# Patient Record
Sex: FEMALE | Race: WHITE | NOT HISPANIC OR LATINO | Employment: OTHER | URBAN - METROPOLITAN AREA
[De-identification: names, ages, dates, MRNs, and addresses within clinical notes are randomized per-mention and may not be internally consistent; named-entity substitution may affect disease eponyms.]

---

## 2017-01-03 ENCOUNTER — OFFICE VISIT (OUTPATIENT)
Dept: INTERNAL MEDICINE | Facility: CLINIC | Age: 77
End: 2017-01-03
Payer: MEDICARE

## 2017-01-03 VITALS
WEIGHT: 108.44 LBS | HEIGHT: 62 IN | TEMPERATURE: 98 F | DIASTOLIC BLOOD PRESSURE: 80 MMHG | BODY MASS INDEX: 19.96 KG/M2 | RESPIRATION RATE: 16 BRPM | SYSTOLIC BLOOD PRESSURE: 130 MMHG | HEART RATE: 68 BPM

## 2017-01-03 DIAGNOSIS — I10 ESSENTIAL HYPERTENSION: Chronic | ICD-10-CM

## 2017-01-03 DIAGNOSIS — R91.1 PULMONARY NODULE: ICD-10-CM

## 2017-01-03 DIAGNOSIS — I77.1 ARTERIAL TORTUOSITY: ICD-10-CM

## 2017-01-03 DIAGNOSIS — I70.0 AORTIC ATHEROSCLEROSIS: ICD-10-CM

## 2017-01-03 DIAGNOSIS — K21.9 GASTROESOPHAGEAL REFLUX DISEASE, ESOPHAGITIS PRESENCE NOT SPECIFIED: ICD-10-CM

## 2017-01-03 DIAGNOSIS — F13.20 BENZODIAZEPINE DEPENDENCE: ICD-10-CM

## 2017-01-03 DIAGNOSIS — I48.0 PAROXYSMAL ATRIAL FIBRILLATION: ICD-10-CM

## 2017-01-03 DIAGNOSIS — E78.5 HYPERLIPIDEMIA, UNSPECIFIED HYPERLIPIDEMIA TYPE: Primary | Chronic | ICD-10-CM

## 2017-01-03 DIAGNOSIS — D69.2 SENILE PURPURA: ICD-10-CM

## 2017-01-03 PROCEDURE — 1157F ADVNC CARE PLAN IN RCRD: CPT | Mod: S$GLB,,, | Performed by: INTERNAL MEDICINE

## 2017-01-03 PROCEDURE — 99499 UNLISTED E&M SERVICE: CPT | Mod: S$GLB,,, | Performed by: INTERNAL MEDICINE

## 2017-01-03 PROCEDURE — 1159F MED LIST DOCD IN RCRD: CPT | Mod: S$GLB,,, | Performed by: INTERNAL MEDICINE

## 2017-01-03 PROCEDURE — 1160F RVW MEDS BY RX/DR IN RCRD: CPT | Mod: S$GLB,,, | Performed by: INTERNAL MEDICINE

## 2017-01-03 PROCEDURE — 3075F SYST BP GE 130 - 139MM HG: CPT | Mod: S$GLB,,, | Performed by: INTERNAL MEDICINE

## 2017-01-03 PROCEDURE — 1126F AMNT PAIN NOTED NONE PRSNT: CPT | Mod: S$GLB,,, | Performed by: INTERNAL MEDICINE

## 2017-01-03 PROCEDURE — 3079F DIAST BP 80-89 MM HG: CPT | Mod: S$GLB,,, | Performed by: INTERNAL MEDICINE

## 2017-01-03 PROCEDURE — 99999 PR PBB SHADOW E&M-EST. PATIENT-LVL III: CPT | Mod: PBBFAC,,, | Performed by: INTERNAL MEDICINE

## 2017-01-03 PROCEDURE — 99213 OFFICE O/P EST LOW 20 MIN: CPT | Mod: S$GLB,,, | Performed by: INTERNAL MEDICINE

## 2017-01-03 NOTE — MR AVS SNAPSHOT
[facility]   [facility]  [place] [address]  Phone: [phone]  Fax: [phone]                  Tigist Murry   1/3/2017 7:40 AM   Office Visit    Description:  Female : 1940   Provider:  Poppy Franco DO   Department:  [facility]           Reason for Visit     Follow-up           Diagnoses this Visit        Comments    Hyperlipidemia, unspecified hyperlipidemia type    -  Primary     Gastroesophageal reflux disease, esophagitis presence not specified         Essential hypertension         Paroxysmal atrial fibrillation         Aortic atherosclerosis         Arterial tortuosity         Benzodiazepine dependence         Senile purpura         Pulmonary nodule                To Do List           Goals (5 Years of Data)     None      Follow-Up and Disposition     Return in about 3 months (around 4/3/2017).      [facility] On Call     [facility] On Call Nurse Care Line -  Assistance  Registered nurses in the [facility] On Call Center provide clinical advisement, health education, appointment booking, and other advisory services.  Call for this free service at [phone].             Medications           Message regarding Medications     Verify the changes and/or additions to your medication regime listed below are the same as discussed with your clinician today.  If any of these changes or additions are incorrect, please notify your healthcare provider.        STOP taking these medications     levocetirizine (XYZAL) 5 MG tablet Take 1 tablet (5 mg total) by mouth every evening.    montelukast (SINGULAIR) 4 mg GrPk granules Take 4 mg by mouth every evening.           Verify that the below list of medications is an accurate representation of the medications you are currently taking.  If none reported, the list may be blank. If incorrect, please contact your healthcare provider. Carry this list with you in case of emergency.           Current  "Medications     alprazolam (XANAX) 0.25 MG tablet Take 1 tablet (0.25 mg total) by mouth 2 (two) times daily as needed for Anxiety.    apixaban (ELIQUIS) 5 mg Tab Take 1 tablet (5 mg total) by mouth 2 (two) times daily.    azelastine (ASTELIN) 137 mcg (0.1 %) nasal spray 1 spray (137 mcg total) by Nasal route 2 (two) times daily.    dexlansoprazole (DEXILANT) 60 mg capsule Take 60 mg by mouth once daily.    diltiazem (TIAZAC) 360 MG CpSR Take 1 capsule (360 mg total) by mouth once daily.    multivitamin capsule Take 1 capsule by mouth once daily.    oxybutynin (DITROPAN) 5 MG Tab Take 1 tablet (5 mg total) by mouth once daily.    ranitidine (ZANTAC) 300 MG tablet Take 1 tablet (300 mg total) by mouth every evening.    simvastatin (ZOCOR) 20 MG tablet Take 1 tablet (20 mg total) by mouth every evening.           Clinical Reference Information           Vital Signs - Last Recorded  Most recent update: 1/3/2017  7:41 AM by Shivani Rangel LPN    BP Pulse Temp Resp Ht Wt    130/80 (BP Location: Left arm, Patient Position: Sitting, BP Method: Manual) 68 97.9 °F (36.6 °C) (Oral) 16 5' 2" (1.575 m) 49.2 kg (108 lb 7.5 oz)    BMI                19.84 kg/m2          Blood Pressure          Most Recent Value    BP  130/80      Allergies as of 1/3/2017     Ciprofloxacin      Immunizations Administered on Date of Encounter - 1/3/2017     None      "

## 2017-01-03 NOTE — PROGRESS NOTES
"Subjective:       Patient ID: Tigist Murry is a 76 y.o. female.    Chief Complaint: Follow-up ("annual visit" per pt)    Patient is a 76 y.o.female with atrial fibrillation, senile purpura, svt, aortic atherosclerosis, benzo dependence and arterial tortuosity who presents today for follow up.      Vaccines: Influenza (yearly); Tetanus (up to date) ; Pneumovax (2010); Zoster (declines for now); Prevnar ( declines for now)  Eye exam: due now; pt will schedule  Mammogram: up to date  Gyn exam: hysterectomy  Colonoscopy: 2013    Dependent edema: improved from last week; wearing compression stockings; likely due to standing too long to clean dishes for two hours      Review of Systems   Constitutional: Negative for appetite change, chills, diaphoresis, fatigue and fever.   HENT: Negative for congestion, dental problem, ear discharge, ear pain, hearing loss, postnasal drip, sinus pressure and sore throat.    Eyes: Negative for discharge, redness and itching.   Respiratory: Negative for cough, chest tightness, shortness of breath and wheezing.    Cardiovascular: Negative for chest pain, palpitations and leg swelling.   Gastrointestinal: Negative for abdominal pain, constipation, diarrhea, nausea and vomiting.   Endocrine: Negative for cold intolerance and heat intolerance.   Genitourinary: Negative for difficulty urinating, frequency, hematuria and urgency.   Musculoskeletal: Negative for arthralgias, back pain, gait problem, myalgias and neck pain.   Skin: Negative for color change and rash.   Neurological: Negative for dizziness, syncope and headaches.   Hematological: Negative for adenopathy.   Psychiatric/Behavioral: Negative for behavioral problems and sleep disturbance. The patient is not nervous/anxious.        Objective:      Physical Exam   Constitutional: She is oriented to person, place, and time. She appears well-developed and well-nourished. No distress.   HENT:   Head: Normocephalic and atraumatic. "   Right Ear: External ear normal.   Left Ear: External ear normal.   Eyes: Conjunctivae and EOM are normal. Pupils are equal, round, and reactive to light. Right eye exhibits no discharge. Left eye exhibits no discharge. No scleral icterus.   Neck: Normal range of motion. Neck supple. No JVD present. No thyromegaly present.   Cardiovascular: Normal rate, regular rhythm, normal heart sounds and intact distal pulses.  Exam reveals no gallop and no friction rub.    No murmur heard.  Pulmonary/Chest: Effort normal and breath sounds normal. No stridor. No respiratory distress. She has no wheezes. She has no rales. She exhibits no tenderness.   Abdominal: Soft. Bowel sounds are normal. She exhibits no distension. There is no tenderness. There is no rebound.   Musculoskeletal: Normal range of motion. She exhibits no edema or tenderness.   Lymphadenopathy:     She has no cervical adenopathy.   Neurological: She is alert and oriented to person, place, and time.   Skin: Skin is warm. No rash noted. She is not diaphoretic. No erythema.   1+ pitting edema b/l   Psychiatric: She has a normal mood and affect. Her behavior is normal.   Nursing note and vitals reviewed.      Assessment and Plan:       1. Hyperlipidemia, unspecified hyperlipidemia type  - diet controlled; on zocor as well    2. Gastroesophageal reflux disease, esophagitis presence not specified  - stable on dexilant; causes chronic cough    3. Essential hypertension  - good control; low salt diet    4. Paroxysmal atrial fibrillation  - stable on diltiazem and apixaban    5. Aortic atherosclerosis  - stable on statin; monitoring    6. Arterial tortuosity  - stable on statin; monitoring    7. Benzodiazepine dependence  - stable; takes xanax prn    8. Senile purpura  - stable; monitoring    9. Pulmonary nodule  - CT due in march        No Follow-up on file.

## 2017-01-05 ENCOUNTER — TELEPHONE (OUTPATIENT)
Dept: INTERNAL MEDICINE | Facility: CLINIC | Age: 77
End: 2017-01-05

## 2017-01-05 NOTE — TELEPHONE ENCOUNTER
----- Message from Fariha Snider LPN sent at 9/27/2016  3:19 PM CDT -----  Regarding: repeat CT  Repeat CT for right lower lobe nodule.

## 2017-01-18 ENCOUNTER — TELEPHONE (OUTPATIENT)
Dept: INTERNAL MEDICINE | Facility: CLINIC | Age: 77
End: 2017-01-18

## 2017-01-18 ENCOUNTER — CLINICAL SUPPORT (OUTPATIENT)
Dept: CARDIOLOGY | Facility: CLINIC | Age: 77
End: 2017-01-18
Payer: MEDICARE

## 2017-01-18 ENCOUNTER — HOSPITAL ENCOUNTER (OUTPATIENT)
Dept: RADIOLOGY | Facility: HOSPITAL | Age: 77
Discharge: HOME | End: 2017-01-18
Attending: INTERNAL MEDICINE
Payer: MEDICARE

## 2017-01-18 ENCOUNTER — OFFICE VISIT (OUTPATIENT)
Dept: INTERNAL MEDICINE | Facility: CLINIC | Age: 77
End: 2017-01-18
Payer: MEDICARE

## 2017-01-18 VITALS
TEMPERATURE: 98 F | HEIGHT: 61 IN | WEIGHT: 108.94 LBS | BODY MASS INDEX: 20.57 KG/M2 | SYSTOLIC BLOOD PRESSURE: 120 MMHG | DIASTOLIC BLOOD PRESSURE: 62 MMHG | HEART RATE: 72 BPM

## 2017-01-18 DIAGNOSIS — R60.0 BILATERAL LEG EDEMA: ICD-10-CM

## 2017-01-18 DIAGNOSIS — M25.561 ACUTE PAIN OF RIGHT KNEE: ICD-10-CM

## 2017-01-18 DIAGNOSIS — I35.9 NONRHEUMATIC AORTIC VALVE DISORDER: ICD-10-CM

## 2017-01-18 DIAGNOSIS — R60.0 BILATERAL LEG EDEMA: Primary | ICD-10-CM

## 2017-01-18 LAB
AORTIC VALVE REGURGITATION: NORMAL
DIASTOLIC DYSFUNCTION: NO
ESTIMATED PA SYSTOLIC PRESSURE: 29
GLOBAL PERICARDIAL EFFUSION: NORMAL
MITRAL VALVE REGURGITATION: NORMAL
RETIRED EF AND QEF - SEE NOTES: 63 (ref 55–65)
TRICUSPID VALVE REGURGITATION: NORMAL

## 2017-01-18 PROCEDURE — 73560 X-RAY EXAM OF KNEE 1 OR 2: CPT | Mod: 26,RT,, | Performed by: RADIOLOGY

## 2017-01-18 PROCEDURE — 1125F AMNT PAIN NOTED PAIN PRSNT: CPT | Mod: S$GLB,,, | Performed by: INTERNAL MEDICINE

## 2017-01-18 PROCEDURE — 99999 PR PBB SHADOW E&M-EST. PATIENT-LVL III: CPT | Mod: PBBFAC,,, | Performed by: INTERNAL MEDICINE

## 2017-01-18 PROCEDURE — 3078F DIAST BP <80 MM HG: CPT | Mod: S$GLB,,, | Performed by: INTERNAL MEDICINE

## 2017-01-18 PROCEDURE — 93306 TTE W/DOPPLER COMPLETE: CPT | Mod: S$GLB,,, | Performed by: INTERNAL MEDICINE

## 2017-01-18 PROCEDURE — 71020 XR CHEST PA AND LATERAL: CPT | Mod: 26,,, | Performed by: RADIOLOGY

## 2017-01-18 PROCEDURE — 3074F SYST BP LT 130 MM HG: CPT | Mod: S$GLB,,, | Performed by: INTERNAL MEDICINE

## 2017-01-18 PROCEDURE — 99214 OFFICE O/P EST MOD 30 MIN: CPT | Mod: S$GLB,,, | Performed by: INTERNAL MEDICINE

## 2017-01-18 PROCEDURE — 1160F RVW MEDS BY RX/DR IN RCRD: CPT | Mod: S$GLB,,, | Performed by: INTERNAL MEDICINE

## 2017-01-18 PROCEDURE — 1157F ADVNC CARE PLAN IN RCRD: CPT | Mod: S$GLB,,, | Performed by: INTERNAL MEDICINE

## 2017-01-18 PROCEDURE — 1159F MED LIST DOCD IN RCRD: CPT | Mod: S$GLB,,, | Performed by: INTERNAL MEDICINE

## 2017-01-18 NOTE — PROGRESS NOTES
Subjective:       Patient ID: Tigist Murry is a 76 y.o. female.    Chief Complaint: Leg Swelling; Foot Swelling; and Knee Pain    Patient is a 76 y.o.female who presents today for swelling.    1. Over the weekend, she had swelling in both ankles and feet. She is wearing the compression stockings during the day and elevating legs at night but swelling is still occurring. She denies pain in the legs. She had a negative ultrasound for clots in both legs a few weeks ago.    2. Right knee pain: located on the knee cap. She had meniscus surgery a few years ago. She cannot kneel on this knee at all due to pain and discomfort. Pain is worse when trying to drive.    Review of Systems   Constitutional: Negative for appetite change, chills, diaphoresis, fatigue and fever.   HENT: Negative for congestion, dental problem, ear discharge, ear pain, hearing loss, postnasal drip, sinus pressure and sore throat.    Eyes: Negative for discharge, redness and itching.   Respiratory: Negative for cough, chest tightness, shortness of breath and wheezing.    Cardiovascular: Positive for leg swelling. Negative for chest pain and palpitations.   Gastrointestinal: Negative for abdominal pain, constipation, diarrhea, nausea and vomiting.   Endocrine: Negative for cold intolerance and heat intolerance.   Genitourinary: Negative for difficulty urinating, frequency, hematuria and urgency.   Musculoskeletal: Positive for arthralgias. Negative for back pain, gait problem, myalgias and neck pain.   Skin: Negative for color change and rash.   Neurological: Negative for dizziness, syncope and headaches.   Hematological: Negative for adenopathy.   Psychiatric/Behavioral: Negative for behavioral problems and sleep disturbance. The patient is not nervous/anxious.        Objective:      Physical Exam   Constitutional: She is oriented to person, place, and time. She appears well-developed and well-nourished. No distress.   HENT:   Head: Normocephalic  and atraumatic.   Right Ear: External ear normal.   Left Ear: External ear normal.   Eyes: Conjunctivae and EOM are normal. Pupils are equal, round, and reactive to light. Right eye exhibits no discharge. Left eye exhibits no discharge. No scleral icterus.   Neck: Normal range of motion. Neck supple. No JVD present. No thyromegaly present.   Cardiovascular: Normal rate, regular rhythm, normal heart sounds and intact distal pulses.  Exam reveals no gallop and no friction rub.    No murmur heard.  Pulmonary/Chest: Effort normal and breath sounds normal. No stridor. No respiratory distress. She has no wheezes. She has no rales. She exhibits no tenderness.   Abdominal: Soft. Bowel sounds are normal. She exhibits no distension. There is no tenderness. There is no rebound.   Musculoskeletal: She exhibits no edema.        Right knee: She exhibits decreased range of motion. She exhibits no swelling. Tenderness found. Medial joint line tenderness noted.   Lymphadenopathy:     She has no cervical adenopathy.   1+ pitting edema bilateral anterior legs   Neurological: She is alert and oriented to person, place, and time.   Skin: Skin is warm. No rash noted. She is not diaphoretic. No erythema.   Psychiatric: She has a normal mood and affect. Her behavior is normal.   Nursing note and vitals reviewed.      Assessment and Plan:       1. Bilateral leg edema  - CHF vs dependent edema  - may need trial of diuretic  - X-Ray Chest PA And Lateral; Future  - 2D Echo w/ Color Flow Doppler; Future  - Brain natriuretic peptide; Future  - Basic metabolic panel; Future    2. Acute pain of right knee  - X-Ray Knee 1 or 2 View Right; Future  - will likely need ortho referral    Notify clinic if symptoms change, worsen or do not improve        No Follow-up on file.

## 2017-01-19 RX ORDER — FUROSEMIDE 20 MG/1
20 TABLET ORAL DAILY
Qty: 7 TABLET | Refills: 0 | Status: SHIPPED | OUTPATIENT
Start: 2017-01-19 | End: 2017-02-01 | Stop reason: SDUPTHER

## 2017-01-19 NOTE — TELEPHONE ENCOUNTER
Spoke to pt and informed of lab and x-ray results, pt agreeable to lasix. [facility]. Informed pt to contact Dr. To's office to schedule.

## 2017-01-19 NOTE — TELEPHONE ENCOUNTER
----- Message from Chana Peterson sent at 1/18/2017  3:15 PM CST -----  Contact: self. Call her at [phone]   Patient is returning a phone call.  Who left a message for the patient: Dr. Giron office  Does patient know what this is regarding:  Test results  Comments:

## 2017-01-30 DIAGNOSIS — I48.0 PAROXYSMAL ATRIAL FIBRILLATION: ICD-10-CM

## 2017-01-30 NOTE — TELEPHONE ENCOUNTER
Nurse spoke with patient and will send 90 scrip to [facility] pharmacy for her.    ----- Message from Marlyn Roa sent at 1/30/2017  8:43 AM CST -----  Contact: pt called  Pt called, requesting 3 month of supply of RX eliquis 5 mg. [facility] pharmacy. She is needing medication as soon as possible.Ph for pt is [phone]. Thank you

## 2017-02-01 ENCOUNTER — OFFICE VISIT (OUTPATIENT)
Dept: INTERNAL MEDICINE | Facility: CLINIC | Age: 77
End: 2017-02-01
Payer: MEDICARE

## 2017-02-01 VITALS
TEMPERATURE: 99 F | WEIGHT: 109.13 LBS | HEART RATE: 65 BPM | HEIGHT: 60 IN | BODY MASS INDEX: 21.42 KG/M2 | SYSTOLIC BLOOD PRESSURE: 124 MMHG | RESPIRATION RATE: 15 BRPM | DIASTOLIC BLOOD PRESSURE: 70 MMHG

## 2017-02-01 DIAGNOSIS — R60.0 BILATERAL LEG EDEMA: Primary | ICD-10-CM

## 2017-02-01 PROCEDURE — 1159F MED LIST DOCD IN RCRD: CPT | Mod: S$GLB,,, | Performed by: INTERNAL MEDICINE

## 2017-02-01 PROCEDURE — 1157F ADVNC CARE PLAN IN RCRD: CPT | Mod: S$GLB,,, | Performed by: INTERNAL MEDICINE

## 2017-02-01 PROCEDURE — 1126F AMNT PAIN NOTED NONE PRSNT: CPT | Mod: S$GLB,,, | Performed by: INTERNAL MEDICINE

## 2017-02-01 PROCEDURE — 99213 OFFICE O/P EST LOW 20 MIN: CPT | Mod: S$GLB,,, | Performed by: INTERNAL MEDICINE

## 2017-02-01 PROCEDURE — 1160F RVW MEDS BY RX/DR IN RCRD: CPT | Mod: S$GLB,,, | Performed by: INTERNAL MEDICINE

## 2017-02-01 PROCEDURE — 3078F DIAST BP <80 MM HG: CPT | Mod: S$GLB,,, | Performed by: INTERNAL MEDICINE

## 2017-02-01 PROCEDURE — 99999 PR PBB SHADOW E&M-EST. PATIENT-LVL III: CPT | Mod: PBBFAC,,, | Performed by: INTERNAL MEDICINE

## 2017-02-01 PROCEDURE — 3074F SYST BP LT 130 MM HG: CPT | Mod: S$GLB,,, | Performed by: INTERNAL MEDICINE

## 2017-02-01 RX ORDER — FUROSEMIDE 20 MG/1
20 TABLET ORAL DAILY PRN
Qty: 30 TABLET | Refills: 6 | Status: SHIPPED | OUTPATIENT
Start: 2017-02-01 | End: 2017-04-25 | Stop reason: SDUPTHER

## 2017-02-01 RX ORDER — POTASSIUM CHLORIDE 750 MG/1
10 TABLET, EXTENDED RELEASE ORAL DAILY
Qty: 30 TABLET | Refills: 6 | Status: SHIPPED | OUTPATIENT
Start: 2017-02-01 | End: 2017-07-01 | Stop reason: SDUPTHER

## 2017-02-01 NOTE — PROGRESS NOTES
Subjective:       Patient ID: Tigist Murry is a 76 y.o. female.    Chief Complaint: Foot Swelling    Patient is a 76 y.o.female who presents today for foot swelling. It did resolve with use of lasix a few weeks ago. She would like to continue this medication as needed. Her cardiac work up was negative for CHF. She denies shortness of breath. She admits to a lot of recent activity on her feet and thinks that exacerbated it again.    Review of Systems   Constitutional: Negative for appetite change, chills, diaphoresis, fatigue and fever.   HENT: Negative for congestion, dental problem, ear discharge, ear pain, hearing loss, postnasal drip, sinus pressure and sore throat.    Eyes: Negative for discharge, redness and itching.   Respiratory: Negative for cough, chest tightness, shortness of breath and wheezing.    Cardiovascular: Positive for leg swelling. Negative for chest pain and palpitations.   Gastrointestinal: Negative for abdominal pain, constipation, diarrhea, nausea and vomiting.   Endocrine: Negative for cold intolerance and heat intolerance.   Genitourinary: Negative for difficulty urinating, frequency, hematuria and urgency.   Musculoskeletal: Negative for arthralgias, back pain, gait problem, myalgias and neck pain.   Skin: Negative for color change and rash.   Neurological: Negative for dizziness, syncope and headaches.   Hematological: Negative for adenopathy.   Psychiatric/Behavioral: Negative for behavioral problems and sleep disturbance. The patient is not nervous/anxious.        Objective:      Physical Exam   Constitutional: She is oriented to person, place, and time. She appears well-developed and well-nourished. No distress.   HENT:   Head: Normocephalic and atraumatic.   Right Ear: External ear normal.   Left Ear: External ear normal.   Eyes: Conjunctivae and EOM are normal. Pupils are equal, round, and reactive to light. Right eye exhibits no discharge. Left eye exhibits no discharge. No  scleral icterus.   Neck: Normal range of motion. Neck supple. No JVD present. No thyromegaly present.   Cardiovascular: Normal rate, regular rhythm, normal heart sounds and intact distal pulses.  Exam reveals no gallop and no friction rub.    No murmur heard.  Pulmonary/Chest: Effort normal and breath sounds normal. No stridor. No respiratory distress. She has no wheezes. She has no rales. She exhibits no tenderness.   Abdominal: Soft. Bowel sounds are normal. She exhibits no distension. There is no tenderness. There is no rebound.   Musculoskeletal: Normal range of motion. She exhibits no edema or tenderness.   Lymphadenopathy:     She has no cervical adenopathy.   2+ pitting edema b/l left worse than right   Neurological: She is alert and oriented to person, place, and time.   Skin: Skin is warm. No rash noted. She is not diaphoretic. No erythema.   Psychiatric: She has a normal mood and affect. Her behavior is normal.   Nursing note and vitals reviewed.      Assessment and Plan:       1. Bilateral leg edema  - advised pt to take potassium supplement on days that she takes the lasix  - compression stockings daily  - low salt diet  - Notify clinic if symptoms change, worsen or do not improve  - furosemide (LASIX) 20 MG tablet; Take 1 tablet (20 mg total) by mouth daily as needed (sweeling).  Dispense: 30 tablet; Refill: 6  - potassium chloride (KLOR-CON) 10 MEQ TbSR; Take 1 tablet (10 mEq total) by mouth once daily.  Dispense: 30 tablet; Refill: 6          No Follow-up on file.

## 2017-04-04 ENCOUNTER — TELEPHONE (OUTPATIENT)
Dept: INTERNAL MEDICINE | Facility: CLINIC | Age: 77
End: 2017-04-04

## 2017-04-04 DIAGNOSIS — I10 ESSENTIAL HYPERTENSION: Primary | ICD-10-CM

## 2017-04-04 DIAGNOSIS — E78.5 HYPERLIPIDEMIA, UNSPECIFIED HYPERLIPIDEMIA TYPE: ICD-10-CM

## 2017-04-25 ENCOUNTER — TELEPHONE (OUTPATIENT)
Dept: INTERNAL MEDICINE | Facility: CLINIC | Age: 77
End: 2017-04-25

## 2017-04-25 DIAGNOSIS — R60.0 BILATERAL LEG EDEMA: ICD-10-CM

## 2017-04-25 DIAGNOSIS — E78.5 HYPERLIPIDEMIA, UNSPECIFIED HYPERLIPIDEMIA TYPE: ICD-10-CM

## 2017-04-25 DIAGNOSIS — I48.0 PAROXYSMAL ATRIAL FIBRILLATION: ICD-10-CM

## 2017-04-25 RX ORDER — SIMVASTATIN 20 MG/1
20 TABLET, FILM COATED ORAL NIGHTLY
Qty: 90 TABLET | Refills: 3 | Status: SHIPPED | OUTPATIENT
Start: 2017-04-25 | End: 2017-05-10 | Stop reason: SDUPTHER

## 2017-04-25 RX ORDER — FUROSEMIDE 20 MG/1
20 TABLET ORAL DAILY PRN
Qty: 90 TABLET | Refills: 3 | Status: SHIPPED | OUTPATIENT
Start: 2017-04-25 | End: 2017-05-10 | Stop reason: SDUPTHER

## 2017-04-26 DIAGNOSIS — N32.81 OVERACTIVE BLADDER: ICD-10-CM

## 2017-04-26 DIAGNOSIS — I48.0 PAROXYSMAL ATRIAL FIBRILLATION: ICD-10-CM

## 2017-04-26 DIAGNOSIS — E78.5 HYPERLIPIDEMIA: Chronic | ICD-10-CM

## 2017-04-26 RX ORDER — SIMVASTATIN 20 MG/1
TABLET, FILM COATED ORAL
Qty: 90 TABLET | Refills: 3 | Status: SHIPPED | OUTPATIENT
Start: 2017-04-26 | End: 2017-05-30 | Stop reason: SDUPTHER

## 2017-04-26 RX ORDER — OXYBUTYNIN CHLORIDE 5 MG/1
5 TABLET ORAL DAILY
Qty: 90 TABLET | Refills: 1 | Status: SHIPPED | OUTPATIENT
Start: 2017-04-26 | End: 2017-07-14 | Stop reason: SDUPTHER

## 2017-04-26 NOTE — TELEPHONE ENCOUNTER
----- Message from Claire Evangelista sent at 4/26/2017  8:46 AM CDT -----  RX request - refill or new RX.  Is this a refill or new RX: refill   RX name and strength: oxybutynin (DITROPAN) 5 MG Tab  Directions: 1x  Is this a 30 day or 90 day RX:  30  Pharmacy name and phone #: [facility] [phone]  Comments:  Need authorization    RX request - refill or new RX.  Is this a refill or new RX:  refill  RX name and strength: simvastatin (ZOCOR) 20 MG tablet  Directions: 1x  Is this a 30 day or 90 day RX:    Pharmacy name and phone #: [phone]  Comments:  Need authorization

## 2017-04-27 DIAGNOSIS — I48.0 PAROXYSMAL ATRIAL FIBRILLATION: ICD-10-CM

## 2017-05-10 ENCOUNTER — TELEPHONE (OUTPATIENT)
Dept: INTERNAL MEDICINE | Facility: CLINIC | Age: 77
End: 2017-05-10

## 2017-05-10 DIAGNOSIS — R60.0 BILATERAL LEG EDEMA: ICD-10-CM

## 2017-05-10 DIAGNOSIS — E78.5 HYPERLIPIDEMIA, UNSPECIFIED HYPERLIPIDEMIA TYPE: ICD-10-CM

## 2017-05-10 RX ORDER — SIMVASTATIN 20 MG/1
20 TABLET, FILM COATED ORAL NIGHTLY
Qty: 90 TABLET | Refills: 3 | Status: SHIPPED | OUTPATIENT
Start: 2017-05-10 | End: 2018-04-13 | Stop reason: SDUPTHER

## 2017-05-10 RX ORDER — FUROSEMIDE 20 MG/1
20 TABLET ORAL DAILY PRN
Qty: 90 TABLET | Refills: 3 | Status: SHIPPED | OUTPATIENT
Start: 2017-05-10 | End: 2018-06-15

## 2017-05-22 DIAGNOSIS — I47.10 SVT (SUPRAVENTRICULAR TACHYCARDIA): Primary | ICD-10-CM

## 2017-05-23 ENCOUNTER — LAB VISIT (OUTPATIENT)
Dept: LAB | Facility: HOSPITAL | Age: 77
End: 2017-05-23
Attending: INTERNAL MEDICINE
Payer: MEDICARE

## 2017-05-23 DIAGNOSIS — I10 ESSENTIAL HYPERTENSION: ICD-10-CM

## 2017-05-23 DIAGNOSIS — E78.5 HYPERLIPIDEMIA, UNSPECIFIED HYPERLIPIDEMIA TYPE: ICD-10-CM

## 2017-05-23 LAB
ALBUMIN SERPL BCP-MCNC: 4.4 G/DL
ALP SERPL-CCNC: 94 U/L
ALT SERPL W/O P-5'-P-CCNC: 27 U/L
ANION GAP SERPL CALC-SCNC: 8 MMOL/L
AST SERPL-CCNC: 21 U/L
BASOPHILS # BLD AUTO: 0.02 K/UL
BASOPHILS NFR BLD: 0.3 %
BILIRUB SERPL-MCNC: 0.9 MG/DL
BUN SERPL-MCNC: 17 MG/DL
CALCIUM SERPL-MCNC: 10.4 MG/DL
CHLORIDE SERPL-SCNC: 103 MMOL/L
CHOLEST/HDLC SERPL: 2.5 {RATIO}
CO2 SERPL-SCNC: 32 MMOL/L
CREAT SERPL-MCNC: 1 MG/DL
DIFFERENTIAL METHOD: ABNORMAL
EOSINOPHIL # BLD AUTO: 0.1 K/UL
EOSINOPHIL NFR BLD: 0.8 %
ERYTHROCYTE [DISTWIDTH] IN BLOOD BY AUTOMATED COUNT: 12.9 %
EST. GFR  (AFRICAN AMERICAN): >60 ML/MIN/1.73 M^2
EST. GFR  (NON AFRICAN AMERICAN): 54.5 ML/MIN/1.73 M^2
GLUCOSE SERPL-MCNC: 85 MG/DL
HCT VFR BLD AUTO: 49.4 %
HDL/CHOLESTEROL RATIO: 40.7 %
HDLC SERPL-MCNC: 177 MG/DL
HDLC SERPL-MCNC: 72 MG/DL
HGB BLD-MCNC: 16.1 G/DL
LDLC SERPL CALC-MCNC: 93.4 MG/DL
LYMPHOCYTES # BLD AUTO: 1.4 K/UL
LYMPHOCYTES NFR BLD: 21.2 %
MCH RBC QN AUTO: 30.2 PG
MCHC RBC AUTO-ENTMCNC: 32.6 %
MCV RBC AUTO: 93 FL
MONOCYTES # BLD AUTO: 0.8 K/UL
MONOCYTES NFR BLD: 11.7 %
NEUTROPHILS # BLD AUTO: 4.4 K/UL
NEUTROPHILS NFR BLD: 65.7 %
NONHDLC SERPL-MCNC: 105 MG/DL
PLATELET # BLD AUTO: 216 K/UL
PMV BLD AUTO: 12 FL
POTASSIUM SERPL-SCNC: 4.1 MMOL/L
PROT SERPL-MCNC: 7.4 G/DL
RBC # BLD AUTO: 5.33 M/UL
SODIUM SERPL-SCNC: 143 MMOL/L
TRIGL SERPL-MCNC: 58 MG/DL
TSH SERPL DL<=0.005 MIU/L-ACNC: 1.47 UIU/ML
WBC # BLD AUTO: 6.64 K/UL

## 2017-05-23 PROCEDURE — 36415 COLL VENOUS BLD VENIPUNCTURE: CPT | Mod: PO

## 2017-05-23 PROCEDURE — 80053 COMPREHEN METABOLIC PANEL: CPT

## 2017-05-23 PROCEDURE — 80061 LIPID PANEL: CPT

## 2017-05-23 PROCEDURE — 84443 ASSAY THYROID STIM HORMONE: CPT

## 2017-05-23 PROCEDURE — 85025 COMPLETE CBC W/AUTO DIFF WBC: CPT

## 2017-05-23 RX ORDER — DILTIAZEM HYDROCHLORIDE 360 MG/1
CAPSULE, EXTENDED RELEASE ORAL
Qty: 90 CAPSULE | Refills: 3 | Status: SHIPPED | OUTPATIENT
Start: 2017-05-23 | End: 2017-05-30 | Stop reason: SDUPTHER

## 2017-05-30 ENCOUNTER — OFFICE VISIT (OUTPATIENT)
Dept: INTERNAL MEDICINE | Facility: CLINIC | Age: 77
End: 2017-05-30
Payer: MEDICARE

## 2017-05-30 VITALS
HEART RATE: 52 BPM | BODY MASS INDEX: 21.12 KG/M2 | DIASTOLIC BLOOD PRESSURE: 67 MMHG | HEIGHT: 60 IN | WEIGHT: 107.56 LBS | SYSTOLIC BLOOD PRESSURE: 123 MMHG | TEMPERATURE: 98 F | RESPIRATION RATE: 16 BRPM

## 2017-05-30 DIAGNOSIS — I48.0 PAROXYSMAL ATRIAL FIBRILLATION: ICD-10-CM

## 2017-05-30 DIAGNOSIS — D69.2 SENILE PURPURA: ICD-10-CM

## 2017-05-30 DIAGNOSIS — Z00.00 ANNUAL PHYSICAL EXAM: Primary | ICD-10-CM

## 2017-05-30 DIAGNOSIS — E78.5 HYPERLIPIDEMIA, UNSPECIFIED HYPERLIPIDEMIA TYPE: Chronic | ICD-10-CM

## 2017-05-30 DIAGNOSIS — I10 ESSENTIAL HYPERTENSION: Chronic | ICD-10-CM

## 2017-05-30 DIAGNOSIS — Z12.31 VISIT FOR SCREENING MAMMOGRAM: ICD-10-CM

## 2017-05-30 DIAGNOSIS — F41.9 ANXIETY: ICD-10-CM

## 2017-05-30 DIAGNOSIS — G47.00 INSOMNIA, UNSPECIFIED TYPE: ICD-10-CM

## 2017-05-30 DIAGNOSIS — I47.10 SVT (SUPRAVENTRICULAR TACHYCARDIA): ICD-10-CM

## 2017-05-30 DIAGNOSIS — R91.1 PULMONARY NODULE: ICD-10-CM

## 2017-05-30 PROCEDURE — 99499 UNLISTED E&M SERVICE: CPT | Mod: S$GLB,,, | Performed by: INTERNAL MEDICINE

## 2017-05-30 PROCEDURE — G0009 ADMIN PNEUMOCOCCAL VACCINE: HCPCS | Mod: S$GLB,,, | Performed by: INTERNAL MEDICINE

## 2017-05-30 PROCEDURE — 99999 PR PBB SHADOW E&M-EST. PATIENT-LVL III: CPT | Mod: PBBFAC,,, | Performed by: INTERNAL MEDICINE

## 2017-05-30 PROCEDURE — 90670 PCV13 VACCINE IM: CPT | Mod: S$GLB,,, | Performed by: INTERNAL MEDICINE

## 2017-05-30 PROCEDURE — 99397 PER PM REEVAL EST PAT 65+ YR: CPT | Mod: S$GLB,,, | Performed by: INTERNAL MEDICINE

## 2017-05-30 RX ORDER — DILTIAZEM HYDROCHLORIDE 360 MG/1
360 CAPSULE, EXTENDED RELEASE ORAL DAILY
Qty: 90 CAPSULE | Refills: 3 | Status: SHIPPED | OUTPATIENT
Start: 2017-05-30 | End: 2017-09-11 | Stop reason: SDUPTHER

## 2017-05-30 RX ORDER — TRAZODONE HYDROCHLORIDE 50 MG/1
50 TABLET ORAL NIGHTLY
Qty: 90 TABLET | Refills: 3 | Status: SHIPPED | OUTPATIENT
Start: 2017-05-30 | End: 2018-03-16 | Stop reason: SDUPTHER

## 2017-05-30 NOTE — PROGRESS NOTES
Subjective:       Patient ID: Tigist Murry is a 76 y.o. female.    Chief Complaint: Annual Exam and Anxiety    Patient is a 76 y.o.female who presents today for annual.    Cholesterol:  Vaccines: Influenza (yearly); Tetanus (up to date) ; Pneumovax (2010); Zoster (declines for now); Prevnar ( today)  Eye exam: due now  Mammogram: due now  Gyn exam: hysterectomy  Colonoscopy: 2013  DEXA: declines    Review of Systems   Constitutional: Negative for appetite change, chills, diaphoresis, fatigue and fever.   HENT: Negative for congestion, dental problem, ear discharge, ear pain, hearing loss, postnasal drip, sinus pressure and sore throat.    Eyes: Negative for discharge, redness and itching.   Respiratory: Negative for cough, chest tightness, shortness of breath and wheezing.    Cardiovascular: Negative for chest pain, palpitations and leg swelling.   Gastrointestinal: Negative for abdominal pain, constipation, diarrhea, nausea and vomiting.   Endocrine: Negative for cold intolerance and heat intolerance.   Genitourinary: Negative for difficulty urinating, frequency, hematuria and urgency.   Musculoskeletal: Negative for arthralgias, back pain, gait problem, myalgias and neck pain.   Skin: Negative for color change and rash.   Neurological: Negative for dizziness, syncope and headaches.   Hematological: Negative for adenopathy.   Psychiatric/Behavioral: Positive for sleep disturbance. Negative for behavioral problems. The patient is not nervous/anxious.        Objective:      Physical Exam   Constitutional: She is oriented to person, place, and time. She appears well-developed and well-nourished. No distress.   HENT:   Head: Normocephalic and atraumatic.   Right Ear: External ear normal.   Left Ear: External ear normal.   Eyes: Conjunctivae and EOM are normal. Pupils are equal, round, and reactive to light. Right eye exhibits no discharge. Left eye exhibits no discharge. No scleral icterus.   Neck: Normal range of  motion. Neck supple. No JVD present. No thyromegaly present.   Cardiovascular: Normal rate, regular rhythm, normal heart sounds and intact distal pulses.  Exam reveals no gallop and no friction rub.    No murmur heard.  Pulmonary/Chest: Effort normal and breath sounds normal. No stridor. No respiratory distress. She has no wheezes. She has no rales. She exhibits no tenderness.   Abdominal: Soft. Bowel sounds are normal. She exhibits no distension. There is no tenderness. There is no rebound.   Musculoskeletal: Normal range of motion. She exhibits no edema or tenderness.   Lymphadenopathy:     She has no cervical adenopathy.   Neurological: She is alert and oriented to person, place, and time. No cranial nerve deficit.   Skin: Skin is warm. No rash noted. She is not diaphoretic. No erythema.   Psychiatric: She has a normal mood and affect. Her behavior is normal.   Nursing note and vitals reviewed.      Assessment and Plan:       1. Annual physical exam  - labs reviewed  - mammo due now  - declines dexa  - Pneumococcal Conjugate Vaccine (13 Valent) (IM)    2. Pulmonary nodule  - due for CT; pt declines for now    3. Visit for screening mammogram  - Mammo Digital Screening Bilat with CAD; Future    4. Paroxysmal atrial fibrillation  - stable on blood thinner and diltiazem; follows with cardio    5. Essential hypertension  - good control    6. Hyperlipidemia, unspecified hyperlipidemia type  - on statin    7. Senile purpura  - monitoring    8. Anxiety  - stable on xanax prn    9. SVT (supraventricular tachycardia)  - diltiaZEM (TIAZAC) 360 MG CpSR; Take 1 capsule (360 mg total) by mouth once daily.  Dispense: 90 capsule; Refill: 3    10. Insomnia, unspecified type  - trial of trazodone          No Follow-up on file.

## 2017-06-08 ENCOUNTER — HOSPITAL ENCOUNTER (OUTPATIENT)
Dept: RADIOLOGY | Facility: HOSPITAL | Age: 77
Discharge: HOME | End: 2017-06-08
Attending: INTERNAL MEDICINE
Payer: MEDICARE

## 2017-06-08 DIAGNOSIS — Z12.31 VISIT FOR SCREENING MAMMOGRAM: ICD-10-CM

## 2017-06-08 PROCEDURE — 77063 BREAST TOMOSYNTHESIS BI: CPT | Mod: 26,,, | Performed by: RADIOLOGY

## 2017-06-08 PROCEDURE — 77067 SCR MAMMO BI INCL CAD: CPT | Mod: TC

## 2017-06-08 PROCEDURE — 77067 SCR MAMMO BI INCL CAD: CPT | Mod: 26,,, | Performed by: RADIOLOGY

## 2017-06-13 DIAGNOSIS — R05.3 CHRONIC COUGH: ICD-10-CM

## 2017-06-13 NOTE — TELEPHONE ENCOUNTER
----- Message from Rick Bourne sent at 6/13/2017  9:17 AM CDT -----  Contact: Patient   RX request - refill or new RX.  Is this a refill or new RX: new   RX name and strength: ranitidine (ZANTAC) 300 MG tablet   Directions: Take 1 tablet (300 mg total) by mouth every evening.   Is this a 30 day or 90 day RX:  90  Pharmacy name and phone #: [facility] -  [phone] (Phone)  [phone] (Fax)  Comments:  Need a new RX for medication from PCP..

## 2017-06-14 NOTE — TELEPHONE ENCOUNTER
----- Message from Risa Mix sent at 6/14/2017  8:28 AM CDT -----  Contact: patient- [phone]  RX request - refill or new RX.  Is this a refill or new RX:  New Rx  RX name and strength: ranitidine (ZANTAC) 300 MG tablet   Directions:   Is this a 30 day or 90 day RX:  90  Pharmacy name and phone #: [facility] [phone] (Phone)  [phone] (Fax)   Comments:

## 2017-07-01 ENCOUNTER — TELEPHONE (OUTPATIENT)
Dept: INTERNAL MEDICINE | Facility: CLINIC | Age: 77
End: 2017-07-01

## 2017-07-01 DIAGNOSIS — R60.0 BILATERAL LEG EDEMA: ICD-10-CM

## 2017-07-01 RX ORDER — POTASSIUM CHLORIDE 750 MG/1
10 TABLET, EXTENDED RELEASE ORAL DAILY
Qty: 90 TABLET | Refills: 3 | Status: SHIPPED | OUTPATIENT
Start: 2017-07-01 | End: 2018-06-15

## 2017-07-12 ENCOUNTER — TELEPHONE (OUTPATIENT)
Dept: ELECTROPHYSIOLOGY | Facility: CLINIC | Age: 77
End: 2017-07-12

## 2017-07-12 NOTE — TELEPHONE ENCOUNTER
----- Message from Karoline Sanchez MA sent at 7/11/2017  5:00 PM CDT -----  Received: Today   Message Contents   [facility]oh[facility] ANTOINE Staff  Caller: pt (Today,  4:33 PM)         Pt needs to have an ECHO and EKG done at the [facility] on 9/11/17 before her visit with Dr. Dowling.  I do not have orders and it is giving me trouble trying to schedule these appt's.  Can you please contact the pt to schedule?  She can be reached @ [phone].  Thanks!!

## 2017-07-14 ENCOUNTER — TELEPHONE (OUTPATIENT)
Dept: INTERNAL MEDICINE | Facility: CLINIC | Age: 77
End: 2017-07-14

## 2017-07-14 DIAGNOSIS — N32.81 OVERACTIVE BLADDER: ICD-10-CM

## 2017-07-14 RX ORDER — OXYBUTYNIN CHLORIDE 5 MG/1
5 TABLET ORAL DAILY
Qty: 90 TABLET | Refills: 1 | Status: SHIPPED | OUTPATIENT
Start: 2017-07-14 | End: 2018-05-08 | Stop reason: SDUPTHER

## 2017-07-18 RX ORDER — ALPRAZOLAM 0.25 MG/1
TABLET ORAL
Qty: 180 TABLET | Refills: 0 | Status: SHIPPED | OUTPATIENT
Start: 2017-07-18 | End: 2017-12-11 | Stop reason: SDUPTHER

## 2017-08-10 ENCOUNTER — HOSPITAL ENCOUNTER (OUTPATIENT)
Dept: CARDIOLOGY | Facility: HOSPITAL | Age: 77
Discharge: HOME | End: 2017-08-10
Attending: INTERNAL MEDICINE
Payer: MEDICARE

## 2017-08-10 DIAGNOSIS — F41.9 ANXIETY: ICD-10-CM

## 2017-08-10 DIAGNOSIS — E78.5 HYPERLIPIDEMIA, UNSPECIFIED HYPERLIPIDEMIA TYPE: Chronic | ICD-10-CM

## 2017-08-10 DIAGNOSIS — I47.10 SVT (SUPRAVENTRICULAR TACHYCARDIA): ICD-10-CM

## 2017-08-10 DIAGNOSIS — R00.2 PALPITATION: ICD-10-CM

## 2017-08-10 DIAGNOSIS — I10 ESSENTIAL HYPERTENSION: Chronic | ICD-10-CM

## 2017-08-10 DIAGNOSIS — R00.2 PALPITATIONS: ICD-10-CM

## 2017-08-10 DIAGNOSIS — I48.0 PAROXYSMAL ATRIAL FIBRILLATION: ICD-10-CM

## 2017-08-10 PROCEDURE — 93227 XTRNL ECG REC<48 HR R&I: CPT | Mod: ,,, | Performed by: INTERNAL MEDICINE

## 2017-08-10 PROCEDURE — 93226 XTRNL ECG REC<48 HR SCAN A/R: CPT

## 2017-08-16 ENCOUNTER — TELEPHONE (OUTPATIENT)
Dept: INTERNAL MEDICINE | Facility: CLINIC | Age: 77
End: 2017-08-16

## 2017-08-16 RX ORDER — BETAMETHASONE VALERATE 1.2 MG/G
CREAM TOPICAL 2 TIMES DAILY
Qty: 1 TUBE | Refills: 1 | Status: SHIPPED | OUTPATIENT
Start: 2017-08-16 | End: 2018-06-01

## 2017-08-16 NOTE — TELEPHONE ENCOUNTER
----- Message from Noreen Sebastian sent at 8/16/2017  9:28 AM CDT -----  Contact: Self [phone]  RX request - refill or new RX.  Is this a refill or new RX:  New  RX name and strength: Betamethasone Valerate Lotion 0.1%  Directions:   Is this a 30 day or 90 day RX:    Pharmacy name and phone #: [facility] [phone] (phone) [phone] (Fax)  Comments:  Psoriasis cream was prescribed by her dermatologist advised may need to be seen by Dr Franco. Please advise

## 2017-09-11 ENCOUNTER — OFFICE VISIT (OUTPATIENT)
Dept: CARDIOLOGY | Facility: CLINIC | Age: 77
End: 2017-09-11
Payer: MEDICARE

## 2017-09-11 VITALS
SYSTOLIC BLOOD PRESSURE: 110 MMHG | HEIGHT: 60 IN | DIASTOLIC BLOOD PRESSURE: 58 MMHG | WEIGHT: 105 LBS | HEART RATE: 60 BPM | BODY MASS INDEX: 20.62 KG/M2

## 2017-09-11 DIAGNOSIS — I48.0 PAROXYSMAL ATRIAL FIBRILLATION: Primary | ICD-10-CM

## 2017-09-11 DIAGNOSIS — I48.0 PAF (PAROXYSMAL ATRIAL FIBRILLATION): ICD-10-CM

## 2017-09-11 DIAGNOSIS — E78.5 HYPERLIPIDEMIA, UNSPECIFIED HYPERLIPIDEMIA TYPE: Chronic | ICD-10-CM

## 2017-09-11 DIAGNOSIS — I10 ESSENTIAL HYPERTENSION: Chronic | ICD-10-CM

## 2017-09-11 DIAGNOSIS — I47.10 SVT (SUPRAVENTRICULAR TACHYCARDIA): ICD-10-CM

## 2017-09-11 PROCEDURE — 1126F AMNT PAIN NOTED NONE PRSNT: CPT | Mod: S$GLB,,, | Performed by: INTERNAL MEDICINE

## 2017-09-11 PROCEDURE — 3074F SYST BP LT 130 MM HG: CPT | Mod: S$GLB,,, | Performed by: INTERNAL MEDICINE

## 2017-09-11 PROCEDURE — 99214 OFFICE O/P EST MOD 30 MIN: CPT | Mod: S$GLB,,, | Performed by: INTERNAL MEDICINE

## 2017-09-11 PROCEDURE — 3008F BODY MASS INDEX DOCD: CPT | Mod: S$GLB,,, | Performed by: INTERNAL MEDICINE

## 2017-09-11 PROCEDURE — 99499 UNLISTED E&M SERVICE: CPT | Mod: S$GLB,,, | Performed by: INTERNAL MEDICINE

## 2017-09-11 PROCEDURE — 1159F MED LIST DOCD IN RCRD: CPT | Mod: S$GLB,,, | Performed by: INTERNAL MEDICINE

## 2017-09-11 PROCEDURE — 3078F DIAST BP <80 MM HG: CPT | Mod: S$GLB,,, | Performed by: INTERNAL MEDICINE

## 2017-09-11 PROCEDURE — 93000 ELECTROCARDIOGRAM COMPLETE: CPT | Mod: S$GLB,,, | Performed by: INTERNAL MEDICINE

## 2017-09-11 PROCEDURE — 99999 PR PBB SHADOW E&M-EST. PATIENT-LVL III: CPT | Mod: PBBFAC,,, | Performed by: INTERNAL MEDICINE

## 2017-09-11 RX ORDER — DILTIAZEM HYDROCHLORIDE 240 MG/1
240 CAPSULE, EXTENDED RELEASE ORAL DAILY
Qty: 90 CAPSULE | Refills: 3 | Status: SHIPPED | OUTPATIENT
Start: 2017-09-11 | End: 2018-09-18 | Stop reason: SDUPTHER

## 2017-09-11 NOTE — PROGRESS NOTES
Subjective:    Patient ID:  Tigist Murry is a 77 y.o. female who presents for follow-up of SVT      HPI   Former pt of JOSE Perry; switched to me to be seen in [place]    77 y.o. 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  Still has PAF, as evidenced on MCOT and Holter monitoring. Asx for the most part. Sometimes fast HR, during which she feels palps.  No CP, no SOB, no LH/presync/sync.    has a chronic cough thought related to GERD or some form of reflex related to eating / palatal stimulation  no LH/presync/sync.    Holter: NSR, PAF (more NSR than AF). 3.3 sec conversion pause (unclear time day/night).  echo 63%. mild LAE.    My interpretation of today's ECG is NSR    Review of Systems   Constitution: Negative. Negative for weakness and malaise/fatigue.   HENT: Negative.  Negative for ear pain and tinnitus.    Eyes: Negative for blurred vision.   Cardiovascular: Negative for chest pain, dyspnea on exertion, near-syncope and syncope.   Respiratory: Positive for cough. Negative for shortness of breath.    Endocrine: Negative.  Negative for polyuria.   Hematologic/Lymphatic: Does not bruise/bleed easily.   Skin: Negative.  Negative for rash.   Musculoskeletal: Negative.  Negative for joint pain and muscle weakness.   Gastrointestinal: Negative.  Negative for abdominal pain and change in bowel habit.   Genitourinary: Negative for frequency.   Neurological: Negative.  Negative for dizziness.   Psychiatric/Behavioral: Negative for depression. The patient is nervous/anxious.    Allergic/Immunologic: Negative for environmental allergies.        Objective:    Physical Exam   Constitutional: She is oriented to person, place, and time. Vital signs are normal. She appears well-developed and well-nourished. She is active and cooperative.   HENT:   Head: Normocephalic and atraumatic.   Eyes: Conjunctivae and EOM are normal.   Neck: Normal range of  motion. Carotid bruit is not present. No tracheal deviation and no edema present. No thyroid mass and no thyromegaly present.   Cardiovascular: Normal rate, regular rhythm, normal heart sounds, intact distal pulses and normal pulses.   No extrasystoles are present. PMI is not displaced.  Exam reveals no gallop and no friction rub.    No murmur heard.  Pulmonary/Chest: Effort normal and breath sounds normal. No respiratory distress. She has no wheezes. She has no rales.   Abdominal: Soft. Normal appearance. She exhibits no distension. There is no hepatosplenomegaly.   Musculoskeletal: Normal range of motion.   Neurological: She is alert and oriented to person, place, and time. Coordination normal.   Skin: Skin is warm and dry. No rash noted.   Psychiatric: She has a normal mood and affect. Her speech is normal and behavior is normal. Thought content normal. Cognition and memory are normal.   Nursing note and vitals reviewed.        Assessment:       1. Paroxysmal atrial fibrillation    2. Essential hypertension    3. Hyperlipidemia, unspecified hyperlipidemia type    4. SVT (supraventricular tachycardia)         Plan:       Asx PAF.  Decrease diltiazem 360 -> 240  Return in 1 year with echo and holter, or earlier prn.

## 2017-09-14 DIAGNOSIS — I48.0 PAF (PAROXYSMAL ATRIAL FIBRILLATION): Primary | ICD-10-CM

## 2017-10-09 ENCOUNTER — TELEPHONE (OUTPATIENT)
Dept: INTERNAL MEDICINE | Facility: CLINIC | Age: 77
End: 2017-10-09

## 2017-10-09 DIAGNOSIS — E78.5 HYPERLIPIDEMIA, UNSPECIFIED HYPERLIPIDEMIA TYPE: Primary | ICD-10-CM

## 2017-10-09 NOTE — TELEPHONE ENCOUNTER
----- Message from Rick Bourne sent at 10/9/2017  3:32 PM CDT -----  Contact: self [phone]  Doctor appointment and lab have been scheduled.  Please link lab orders to the lab appointment.  Date of doctor appointment:  12/11  Physical or EP:  EP  Date of lab appointment:  12/05  Comments:

## 2017-10-10 ENCOUNTER — IMMUNIZATION (OUTPATIENT)
Dept: FAMILY MEDICINE | Facility: CLINIC | Age: 77
End: 2017-10-10
Payer: MEDICARE

## 2017-10-10 DIAGNOSIS — Z23 NEED FOR PROPHYLACTIC VACCINATION AND INOCULATION AGAINST INFLUENZA: Primary | ICD-10-CM

## 2017-10-10 PROCEDURE — 90662 IIV NO PRSV INCREASED AG IM: CPT | Mod: S$GLB,,, | Performed by: INTERNAL MEDICINE

## 2017-10-10 PROCEDURE — G0008 ADMIN INFLUENZA VIRUS VAC: HCPCS | Mod: S$GLB,,, | Performed by: INTERNAL MEDICINE

## 2017-10-18 ENCOUNTER — TELEPHONE (OUTPATIENT)
Dept: ELECTROPHYSIOLOGY | Facility: CLINIC | Age: 77
End: 2017-10-18

## 2017-10-18 NOTE — TELEPHONE ENCOUNTER
----- Message from Moe Rodriguez sent at 10/18/2017  8:30 AM CDT -----  Contact: patient  Please call pt at [phone]. Patient would like to get a clarification on Diltiazem 240 mg   Dr Dowling pt    Thank you

## 2017-10-18 NOTE — TELEPHONE ENCOUNTER
Returned Pt's call. No answer. Left message for Pt to return call if she still has questions.    Pt returned call. Shew anted to complete her diltiazem 360 mg before changing to the 240 mg. Explained to her that her HR was slowing to much at times and she needed to decreased her dosage. Pt voiced understanding and will decrease dose.

## 2017-12-05 ENCOUNTER — LAB VISIT (OUTPATIENT)
Dept: LAB | Facility: HOSPITAL | Age: 77
End: 2017-12-05
Attending: INTERNAL MEDICINE
Payer: MEDICARE

## 2017-12-05 DIAGNOSIS — E78.5 HYPERLIPIDEMIA, UNSPECIFIED HYPERLIPIDEMIA TYPE: ICD-10-CM

## 2017-12-05 LAB
ALBUMIN SERPL BCP-MCNC: 3.9 G/DL
ALP SERPL-CCNC: 84 U/L
ALT SERPL W/O P-5'-P-CCNC: 23 U/L
ANION GAP SERPL CALC-SCNC: 10 MMOL/L
AST SERPL-CCNC: 20 U/L
BILIRUB SERPL-MCNC: 0.7 MG/DL
BUN SERPL-MCNC: 17 MG/DL
CALCIUM SERPL-MCNC: 10 MG/DL
CHLORIDE SERPL-SCNC: 105 MMOL/L
CHOLEST SERPL-MCNC: 169 MG/DL
CHOLEST/HDLC SERPL: 2.7 {RATIO}
CO2 SERPL-SCNC: 28 MMOL/L
CREAT SERPL-MCNC: 0.9 MG/DL
EST. GFR  (AFRICAN AMERICAN): >60 ML/MIN/1.73 M^2
EST. GFR  (NON AFRICAN AMERICAN): >60 ML/MIN/1.73 M^2
GLUCOSE SERPL-MCNC: 96 MG/DL
HDLC SERPL-MCNC: 63 MG/DL
HDLC SERPL: 37.3 %
LDLC SERPL CALC-MCNC: 92.4 MG/DL
NONHDLC SERPL-MCNC: 106 MG/DL
POTASSIUM SERPL-SCNC: 4.3 MMOL/L
PROT SERPL-MCNC: 7 G/DL
SODIUM SERPL-SCNC: 143 MMOL/L
TRIGL SERPL-MCNC: 68 MG/DL

## 2017-12-05 PROCEDURE — 36415 COLL VENOUS BLD VENIPUNCTURE: CPT | Mod: PO

## 2017-12-05 PROCEDURE — 80053 COMPREHEN METABOLIC PANEL: CPT

## 2017-12-05 PROCEDURE — 80061 LIPID PANEL: CPT

## 2017-12-06 ENCOUNTER — OFFICE VISIT (OUTPATIENT)
Dept: INTERNAL MEDICINE | Facility: CLINIC | Age: 77
End: 2017-12-06
Payer: MEDICARE

## 2017-12-06 VITALS
WEIGHT: 105 LBS | HEART RATE: 88 BPM | RESPIRATION RATE: 15 BRPM | BODY MASS INDEX: 20.62 KG/M2 | HEIGHT: 60 IN | TEMPERATURE: 99 F | SYSTOLIC BLOOD PRESSURE: 100 MMHG | DIASTOLIC BLOOD PRESSURE: 60 MMHG

## 2017-12-06 DIAGNOSIS — G56.01 CARPAL TUNNEL SYNDROME ON RIGHT: ICD-10-CM

## 2017-12-06 DIAGNOSIS — D69.2 SENILE PURPURA: ICD-10-CM

## 2017-12-06 DIAGNOSIS — I77.1 ARTERIAL TORTUOSITY: ICD-10-CM

## 2017-12-06 DIAGNOSIS — I10 HYPERTENSION, UNSPECIFIED TYPE: Chronic | ICD-10-CM

## 2017-12-06 DIAGNOSIS — F13.20 BENZODIAZEPINE DEPENDENCE: ICD-10-CM

## 2017-12-06 DIAGNOSIS — I70.0 AORTIC ATHEROSCLEROSIS: ICD-10-CM

## 2017-12-06 DIAGNOSIS — K57.90 DIVERTICULOSIS OF INTESTINE WITHOUT BLEEDING, UNSPECIFIED INTESTINAL TRACT LOCATION: ICD-10-CM

## 2017-12-06 DIAGNOSIS — Z00.00 ENCOUNTER FOR PREVENTIVE HEALTH EXAMINATION: Primary | ICD-10-CM

## 2017-12-06 DIAGNOSIS — F41.9 ANXIETY: ICD-10-CM

## 2017-12-06 DIAGNOSIS — I48.91 ATRIAL FIBRILLATION, UNSPECIFIED TYPE: ICD-10-CM

## 2017-12-06 DIAGNOSIS — Z79.01 LONG TERM CURRENT USE OF ANTICOAGULANT: ICD-10-CM

## 2017-12-06 DIAGNOSIS — L40.9 PSORIASIS: ICD-10-CM

## 2017-12-06 DIAGNOSIS — K21.9 GASTROESOPHAGEAL REFLUX DISEASE, ESOPHAGITIS PRESENCE NOT SPECIFIED: ICD-10-CM

## 2017-12-06 DIAGNOSIS — I51.89 LEFT VENTRICULAR DIASTOLIC DYSFUNCTION WITH PRESERVED SYSTOLIC FUNCTION: ICD-10-CM

## 2017-12-06 DIAGNOSIS — E78.49 OTHER HYPERLIPIDEMIA: Chronic | ICD-10-CM

## 2017-12-06 DIAGNOSIS — N32.81 OVERACTIVE BLADDER: ICD-10-CM

## 2017-12-06 DIAGNOSIS — M85.80 OSTEOPENIA, UNSPECIFIED LOCATION: ICD-10-CM

## 2017-12-06 DIAGNOSIS — F43.21 SITUATIONAL DEPRESSION: ICD-10-CM

## 2017-12-06 DIAGNOSIS — R91.1 PULMONARY NODULE: ICD-10-CM

## 2017-12-06 PROCEDURE — 99499 UNLISTED E&M SERVICE: CPT | Mod: S$GLB,,, | Performed by: NURSE PRACTITIONER

## 2017-12-06 PROCEDURE — 99999 PR PBB SHADOW E&M-EST. PATIENT-LVL V: CPT | Mod: PBBFAC,,, | Performed by: NURSE PRACTITIONER

## 2017-12-06 PROCEDURE — G0439 PPPS, SUBSEQ VISIT: HCPCS | Mod: S$GLB,,, | Performed by: NURSE PRACTITIONER

## 2017-12-06 RX ORDER — LANOLIN ALCOHOL/MO/W.PET/CERES
1 CREAM (GRAM) TOPICAL 2 TIMES DAILY
COMMUNITY
End: 2022-04-07

## 2017-12-06 RX ORDER — UBIDECARENONE 30 MG
100 CAPSULE ORAL DAILY
COMMUNITY
End: 2022-04-07

## 2017-12-06 RX ORDER — GLUCOSAMINE/CHONDRO SU A 500-400 MG
1 TABLET ORAL DAILY
COMMUNITY
End: 2018-06-15

## 2017-12-06 RX ORDER — ASCORBIC ACID 500 MG
500 TABLET ORAL DAILY
COMMUNITY
End: 2022-04-07

## 2017-12-06 NOTE — PATIENT INSTRUCTIONS
Counseling and Referral of Other Preventative  (Italic type indicates deductible and co-insurance are waived)    Patient Name: Tigist Murry  Today's Date: 12/6/2017      SERVICE LIMITATIONS RECOMMENDATION    Vaccines    · Pneumococcal (once after 65)    · Influenza (annually)    · Hepatitis B (if medium/high risk)    · Prevnar 13      Hepatitis B medium/high risk factors:       - End-stage renal disease       - Hemophiliacs who received Factor VII or         IX concentrates       - Clients of institutions for the mentally             retarded       - Persons who live in the same house as          a HepB carrier       - Homosexual men       - Illicit injectable drug abusers     Pneumococcal: current     Influenza: current     Hepatitis B: n/a     Prevnar 13: current    Mammogram (biennial age 50-74)  Annually (age 40 or over)  6/8/17    Pap (up to age 70 and after 70 if unknown history or abnormal study last 10 years)         n/a    Colorectal cancer screening (to age 75)    · Fecal occult blood test (annual)  · Flexible sigmoidoscopy (5y)  · Screening colonoscopy (10y)  · Barium enema   external- Dr Hyde   2015    Diabetes self-management training (no USPSTF recommendations)  Requires referral by treating physician for patient with diabetes or renal disease. 10 hours of initial DSMT sessions of no less than 30 minutes each in a continuous 12-month period. 2 hours of follow-up DSMT in subsequent years. n/a    Bone mass measurements (age 65 & older, biennial)  Requires diagnosis related to osteoporosis or estrogen deficiency. Biennial benefit unless patient has history of long-term glucocorticoid  declined    Glaucoma screening (no USPSTF recommendation)  Diabetes mellitus, family history   , age 50 or over    American, age 65 or over  current-external    Medical nutrition therapy for diabetes or renal disease (no recommended schedule)  Requires referral by treating physician for patient  with diabetes or renal disease or kidney transplant within the past 3 years.  Can be provided in same year as diabetes self-management training (DSMT), and CMS recommends medical nutrition therapy take place after DSMT. Up to 3 hours for initial year and 2 hours in subsequent years.  n/a    Cardiovascular screening blood tests (every 5 years)  · Fasting lipid panel  Order as a panel if possible  current    Diabetes screening tests (at least every 3 years, Medicare covers annually or at 6-month intervals for prediabetic patients)  · Fasting blood sugar (FBS) or glucose tolerance test (GTT)  Patient must be diagnosed with one of the following:       - Hypertension       - Dyslipidemia       - Obesity (BMI 30kg/m2)       - Previous elevated impaired FBS or GTT       ... or any two of the following:       - Overweight (BMI 25 but <30)       - Family history of diabetes       - Age 65 or older       - History of gestational diabetes or birth of baby weighing more than 9 pounds  current    HIV screening (annually for increased risk patients)  · HIV-1 and HIV-2 by EIA, or ALLY, rapid antibody test or oral mucosa transudate  Patients must be at increased risk for HIV infection per USPSTF guidelines or pregnant. Tests covered annually for patient at increased risk or as requested by the patient. Pregnant patients may receive up to 3 tests during pregnancy.  Risks discussed, screening is declined    Smoking cessation counseling (up to 8 sessions per year)  Patients must be asymptomatic of tobacco-related conditions to receive as a preventative service.  n/a    Subsequent annual wellness visit  At least 12 months since last AWV  Return in one year     The following information is provided to all patients.  This information is to help you find resources for any of the problems found today that may be affecting your health:                Living healthy guide: www.[facility].[place].gov      Understanding Diabetes: www.diabetes.org       Eating healthy: www.cdc.gov/healthyweight      [facility] home safety checklist: www.cdc.gov/steadi/patient.html      Agency on Aging: www.goea.[place].gov      Alcoholics anonymous (AA): www.aa.org      Physical Activity: www.jhon.nih.gov/yc2fawj      Tobacco use: www.quitwithusla.org

## 2017-12-06 NOTE — PROGRESS NOTES
Tigist Murry presented for a  Medicare AWV and comprehensive Health Risk Assessment today. The following components were reviewed and updated:    · Medical history  · Family History  · Social history  · Allergies and Current Medications  · Health Risk Assessment  · Health Maintenance  · Care Team     ** See Completed Assessments for Annual Wellness Visit within the encounter summary.**       The following assessments were completed:  · Living Situation  · CAGE  · Depression Screening  · Timed Get Up and Go  · Whisper Test  · Cognitive Function Screening  · Nutrition Screening  · ADL Screening  · PAQ Screening    Vitals:    12/06/17 0916   BP: 100/60   Pulse: 88   Resp: 15   Temp: 98.6 °F (37 °C)   TempSrc: Oral   Weight: 47.6 kg (105 lb)   Height: 5' (1.524 m)     Body mass index is 20.51 kg/m².  Physical Exam   Constitutional: She is oriented to person, place, and time. She appears well-developed and well-nourished.   HENT:   Head: Normocephalic and atraumatic.   Cardiovascular: Normal rate, regular rhythm and normal heart sounds.    No murmur heard.  Pulmonary/Chest: Effort normal and breath sounds normal.   Abdominal: Soft. Bowel sounds are normal. There is no tenderness.   Musculoskeletal: She exhibits no edema.   Neurological: She is alert and oriented to person, place, and time.   Skin: Skin is warm and dry.   Psychiatric: She has a normal mood and affect. Her behavior is normal. Judgment and thought content normal.   Nursing note and vitals reviewed.        Diagnoses and health risks identified today and associated recommendations/orders:    1. Pulmonary nodule  Stable- followed by pulmonary & PCP    2. Hypertension, unspecified type  Stable- followed by cardiology    3. Other hyperlipidemia  Stable- followed by cardiology    4. Senile purpura  Stable- followed by PCP    5. Benzodiazepine dependence  Stable- followed by PCP      6. 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 Gastroesophageal reflux disease, esophagitis presence not specified  Stable- followed by external gastroenterology    8. Aortic atherosclerosis  Stable- followed by cardiology    9. Arterial tortuosity  Stable- followed by cardiology    10. Atrial fibrillation, unspecified type  Stable- followed by cardiology    11. Situational depression  Stable- followed by PCP      12. Osteopenia, unspecified location  Stable- followed by PCP    13. Carpal tunnel syndrome on right  Stable- followed by PCP    14. Psoriasis  Stable- followed by PCP    15. Anxiety  Stable- followed by PCP      16. Diverticulosis of intestine without bleeding, unspecified intestinal tract location  Stable- followed by external gastroenterology    17. Overactive bladder  Stable- followed by PCP    18. Encounter for preventive health examination  Assessments completed  Preventative health recommendations reviewed     19. Long term anticoagulant  Stable- followed by cardiology      Provided Tigist with a 5-10 year written screening schedule and personal prevention plan. Recommendations were developed using the USPSTF age appropriate recommendations. Education, counseling, and referrals were provided as needed. After Visit Summary printed and given to patient which includes a list of additional screenings\tests needed. Declined dxa. Fall risk precautions. anticoagulant  Precautions-bleeding,trauma- states knowledgeable.    Return in about 1 year (around 12/6/2018) for HRA.    Bonnie Jackson NP

## 2017-12-11 ENCOUNTER — OFFICE VISIT (OUTPATIENT)
Dept: INTERNAL MEDICINE | Facility: CLINIC | Age: 77
End: 2017-12-11
Payer: MEDICARE

## 2017-12-11 VITALS
TEMPERATURE: 98 F | BODY MASS INDEX: 20.91 KG/M2 | WEIGHT: 106.5 LBS | DIASTOLIC BLOOD PRESSURE: 72 MMHG | RESPIRATION RATE: 16 BRPM | HEART RATE: 101 BPM | SYSTOLIC BLOOD PRESSURE: 124 MMHG | HEIGHT: 60 IN

## 2017-12-11 DIAGNOSIS — I70.0 AORTIC ATHEROSCLEROSIS: ICD-10-CM

## 2017-12-11 DIAGNOSIS — I10 HYPERTENSION, UNSPECIFIED TYPE: Primary | Chronic | ICD-10-CM

## 2017-12-11 DIAGNOSIS — E78.49 OTHER HYPERLIPIDEMIA: Chronic | ICD-10-CM

## 2017-12-11 PROCEDURE — 99499 UNLISTED E&M SERVICE: CPT | Mod: S$GLB,,, | Performed by: INTERNAL MEDICINE

## 2017-12-11 PROCEDURE — 99213 OFFICE O/P EST LOW 20 MIN: CPT | Mod: S$GLB,,, | Performed by: INTERNAL MEDICINE

## 2017-12-11 PROCEDURE — 99999 PR PBB SHADOW E&M-EST. PATIENT-LVL III: CPT | Mod: PBBFAC,,, | Performed by: INTERNAL MEDICINE

## 2017-12-11 RX ORDER — ALPRAZOLAM 0.25 MG/1
0.25 TABLET ORAL 2 TIMES DAILY PRN
Qty: 180 TABLET | Refills: 0 | Status: SHIPPED | OUTPATIENT
Start: 2017-12-11 | End: 2018-10-31 | Stop reason: SDUPTHER

## 2017-12-11 NOTE — PROGRESS NOTES
Subjective:       Patient ID: Tigist Murry is a 77 y.o. female.    Chief Complaint: Follow-up (6 mo)    Patient is a 77 y.o.female who presents today for follow up.    Cholesterol: reviewed  Vaccines: Influenza (yearly); Tetanus (up to date) ; Pneumovax (2010); Zoster (declines for now); Prevnar (done)  Eye exam: due now  Mammogram: June 2017  Gyn exam: hysterectomy  Colonoscopy: 2013  DEXA: declines; understands risks        Review of Systems   Constitutional: Negative for appetite change, chills, diaphoresis, fatigue and fever.   HENT: Negative for congestion, dental problem, ear discharge, ear pain, hearing loss, postnasal drip, sinus pressure and sore throat.    Eyes: Negative for discharge, redness and itching.   Respiratory: Negative for cough, chest tightness, shortness of breath and wheezing.    Cardiovascular: Negative for chest pain, palpitations and leg swelling.   Gastrointestinal: Negative for abdominal pain, constipation, diarrhea, nausea and vomiting.   Endocrine: Negative for cold intolerance and heat intolerance.   Genitourinary: Negative for difficulty urinating, frequency, hematuria and urgency.   Musculoskeletal: Negative for arthralgias, back pain, gait problem, myalgias and neck pain.   Skin: Negative for color change and rash.   Neurological: Negative for dizziness, syncope and headaches.   Hematological: Negative for adenopathy.   Psychiatric/Behavioral: Negative for behavioral problems and sleep disturbance. The patient is not nervous/anxious.        Objective:      Physical Exam   Constitutional: She is oriented to person, place, and time. She appears well-developed and well-nourished. No distress.   HENT:   Head: Normocephalic and atraumatic.   Right Ear: External ear normal.   Left Ear: External ear normal.   Nose: Nose normal.   Mouth/Throat: Oropharynx is clear and moist. No oropharyngeal exudate.   Eyes: Conjunctivae and EOM are normal. Pupils are equal, round, and reactive to  light. Right eye exhibits no discharge. Left eye exhibits no discharge. No scleral icterus.   Neck: Normal range of motion. Neck supple. No JVD present. No thyromegaly present.   Cardiovascular: Normal rate, regular rhythm, normal heart sounds and intact distal pulses.  Exam reveals no gallop and no friction rub.    No murmur heard.  Pulmonary/Chest: Effort normal and breath sounds normal. No stridor. No respiratory distress. She has no wheezes. She has no rales. She exhibits no tenderness.   Abdominal: Soft. Bowel sounds are normal. She exhibits no distension. There is no tenderness. There is no rebound.   Musculoskeletal: Normal range of motion. She exhibits no edema or tenderness.   Lymphadenopathy:     She has no cervical adenopathy.   Neurological: She is alert and oriented to person, place, and time. No cranial nerve deficit.   Skin: Skin is warm and dry. No rash noted. She is not diaphoretic. No erythema.   Psychiatric: She has a normal mood and affect. Her behavior is normal.   Nursing note and vitals reviewed.      Assessment and Plan:       1. Hypertension, unspecified type  - stable on current meds  - labs reviewed    2. Other hyperlipidemia  - stable on meds    3. Aortic atherosclerosis  - stable on statin          No Follow-up on file.

## 2018-02-14 ENCOUNTER — TELEPHONE (OUTPATIENT)
Dept: INTERNAL MEDICINE | Facility: CLINIC | Age: 78
End: 2018-02-14

## 2018-02-14 DIAGNOSIS — I10 BENIGN HYPERTENSION: ICD-10-CM

## 2018-02-14 DIAGNOSIS — Z00.00 ANNUAL PHYSICAL EXAM: Primary | ICD-10-CM

## 2018-02-14 NOTE — TELEPHONE ENCOUNTER
----- Message from Chana Peterson sent at 2/14/2018  3:13 PM CST -----  Contact: Self call [phone]  Doctor appointment and lab have been scheduled.  Please link lab orders to the lab appointment.  Date of doctor appointment:  6/1  Physical or EP:  EPP  Date of lab appointment:  5/25  Comments:

## 2018-03-16 RX ORDER — TRAZODONE HYDROCHLORIDE 50 MG/1
TABLET ORAL
Qty: 90 TABLET | Refills: 3 | Status: SHIPPED | OUTPATIENT
Start: 2018-03-16 | End: 2018-06-01

## 2018-04-12 ENCOUNTER — TELEPHONE (OUTPATIENT)
Dept: INTERNAL MEDICINE | Facility: CLINIC | Age: 78
End: 2018-04-12

## 2018-04-13 DIAGNOSIS — E78.5 HYPERLIPIDEMIA, UNSPECIFIED HYPERLIPIDEMIA TYPE: ICD-10-CM

## 2018-04-13 RX ORDER — SIMVASTATIN 20 MG/1
20 TABLET, FILM COATED ORAL NIGHTLY
Qty: 90 TABLET | Refills: 0 | Status: SHIPPED | OUTPATIENT
Start: 2018-04-13 | End: 2018-11-12 | Stop reason: SDUPTHER

## 2018-04-13 NOTE — TELEPHONE ENCOUNTER
"----- Message from Chana Peterson sent at 4/13/2018  2:55 PM CDT -----  Contact: Self Call    [phone]  RX request - refill or new RX.  Is this a refill or new RX:  Refill  RX name and strength: simvastatin (ZOCOR) 20 MG tablet  Directions:   Is this a 30 day or 90 day RX:  90  Pharmacy name and phone # (DON'T enter "on file" or "in chart"): [facility]      [phone] (Phone)  [phone] (Fax)  "

## 2018-04-16 ENCOUNTER — TELEPHONE (OUTPATIENT)
Dept: INTERNAL MEDICINE | Facility: CLINIC | Age: 78
End: 2018-04-16

## 2018-04-16 NOTE — TELEPHONE ENCOUNTER
Received [place] approval for Simvastatin 20 mg .  Approved through 4-    faxed approval to [employer] @ [phone]

## 2018-04-30 DIAGNOSIS — R05.3 CHRONIC COUGH: ICD-10-CM

## 2018-05-08 ENCOUNTER — TELEPHONE (OUTPATIENT)
Dept: INTERNAL MEDICINE | Facility: CLINIC | Age: 78
End: 2018-05-08

## 2018-05-08 DIAGNOSIS — N32.81 OVERACTIVE BLADDER: ICD-10-CM

## 2018-05-08 RX ORDER — OXYBUTYNIN CHLORIDE 5 MG/1
5 TABLET ORAL DAILY
Qty: 90 TABLET | Refills: 0 | Status: SHIPPED | OUTPATIENT
Start: 2018-05-08 | End: 2018-05-08 | Stop reason: SDUPTHER

## 2018-05-08 RX ORDER — OXYBUTYNIN CHLORIDE 5 MG/1
5 TABLET ORAL DAILY
Qty: 90 TABLET | Refills: 0 | Status: SHIPPED | OUTPATIENT
Start: 2018-05-08 | End: 2018-09-12 | Stop reason: SDUPTHER

## 2018-05-08 NOTE — TELEPHONE ENCOUNTER
"----- Message from Ana Ruiz sent at 5/8/2018  1:06 PM CDT -----  Contact: self/ [phone]  RX request - refill or new RX.refill  Is this a refill or new RX:    RX name and strength: oxybutynin (DITROPAN) 5 MG Tab 90 tablet   Directions:   Is this a 30 day or 90 day RX:    Pharmacy name and phone # (DON'T enter "on file" or "in chart"): [facility] - [place], [place] - [address] [phone] (Phone)  [phone] (Fax)    Comments:        "

## 2018-05-08 NOTE — TELEPHONE ENCOUNTER
"----- Message from Carrie Alexander LPN sent at 5/8/2018  3:18 PM CDT -----  Contact: self/ [phone]      ----- Message -----  From: Ana Ruiz  Sent: 5/8/2018   1:06 PM  To: [facility] Staff    RX request - refill or new RX.refill  Is this a refill or new RX:    RX name and strength: oxybutynin (DITROPAN) 5 MG Tab 90 tablet   Directions:   Is this a 30 day or 90 day RX:    Pharmacy name and phone # (DON'T enter "on file" or "in chart"): [facility] - [place], [place] - [address] [phone] (Phone)  [phone] (Fax)    Comments:        "

## 2018-05-21 ENCOUNTER — PES CALL (OUTPATIENT)
Dept: ADMINISTRATIVE | Facility: CLINIC | Age: 78
End: 2018-05-21

## 2018-05-25 ENCOUNTER — LAB VISIT (OUTPATIENT)
Dept: LAB | Facility: HOSPITAL | Age: 78
End: 2018-05-25
Attending: INTERNAL MEDICINE
Payer: MEDICARE

## 2018-05-25 DIAGNOSIS — I10 BENIGN HYPERTENSION: ICD-10-CM

## 2018-05-25 LAB
ALBUMIN SERPL BCP-MCNC: 4.3 G/DL
ALP SERPL-CCNC: 85 U/L
ALT SERPL W/O P-5'-P-CCNC: 29 U/L
ANION GAP SERPL CALC-SCNC: 8 MMOL/L
AST SERPL-CCNC: 25 U/L
BASOPHILS # BLD AUTO: 0.02 K/UL
BASOPHILS NFR BLD: 0.4 %
BILIRUB SERPL-MCNC: 0.9 MG/DL
BUN SERPL-MCNC: 19 MG/DL
CALCIUM SERPL-MCNC: 10.3 MG/DL
CHLORIDE SERPL-SCNC: 104 MMOL/L
CHOLEST SERPL-MCNC: 166 MG/DL
CHOLEST/HDLC SERPL: 2.7 {RATIO}
CO2 SERPL-SCNC: 31 MMOL/L
CREAT SERPL-MCNC: 1 MG/DL
DIFFERENTIAL METHOD: ABNORMAL
EOSINOPHIL # BLD AUTO: 0 K/UL
EOSINOPHIL NFR BLD: 0.7 %
ERYTHROCYTE [DISTWIDTH] IN BLOOD BY AUTOMATED COUNT: 12.6 %
EST. GFR  (AFRICAN AMERICAN): >60 ML/MIN/1.73 M^2
EST. GFR  (NON AFRICAN AMERICAN): 54.1 ML/MIN/1.73 M^2
GLUCOSE SERPL-MCNC: 81 MG/DL
HCT VFR BLD AUTO: 49.5 %
HDLC SERPL-MCNC: 62 MG/DL
HDLC SERPL: 37.3 %
HGB BLD-MCNC: 15.8 G/DL
IMM GRANULOCYTES # BLD AUTO: 0.02 K/UL
IMM GRANULOCYTES NFR BLD AUTO: 0.4 %
LDLC SERPL CALC-MCNC: 94.8 MG/DL
LYMPHOCYTES # BLD AUTO: 1 K/UL
LYMPHOCYTES NFR BLD: 21.1 %
MCH RBC QN AUTO: 30.2 PG
MCHC RBC AUTO-ENTMCNC: 31.9 G/DL
MCV RBC AUTO: 95 FL
MONOCYTES # BLD AUTO: 0.4 K/UL
MONOCYTES NFR BLD: 8.9 %
NEUTROPHILS # BLD AUTO: 3.1 K/UL
NEUTROPHILS NFR BLD: 68.5 %
NONHDLC SERPL-MCNC: 104 MG/DL
NRBC BLD-RTO: 0 /100 WBC
PLATELET # BLD AUTO: 212 K/UL
PMV BLD AUTO: 11.2 FL
POTASSIUM SERPL-SCNC: 4.4 MMOL/L
PROT SERPL-MCNC: 6.9 G/DL
RBC # BLD AUTO: 5.23 M/UL
SODIUM SERPL-SCNC: 143 MMOL/L
TRIGL SERPL-MCNC: 46 MG/DL
TSH SERPL DL<=0.005 MIU/L-ACNC: 0.99 UIU/ML
WBC # BLD AUTO: 4.5 K/UL

## 2018-05-25 PROCEDURE — 84443 ASSAY THYROID STIM HORMONE: CPT

## 2018-05-25 PROCEDURE — 36415 COLL VENOUS BLD VENIPUNCTURE: CPT | Mod: PO

## 2018-05-25 PROCEDURE — 80061 LIPID PANEL: CPT

## 2018-05-25 PROCEDURE — 85025 COMPLETE CBC W/AUTO DIFF WBC: CPT

## 2018-05-25 PROCEDURE — 80053 COMPREHEN METABOLIC PANEL: CPT

## 2018-05-28 DIAGNOSIS — I48.0 PAROXYSMAL ATRIAL FIBRILLATION: ICD-10-CM

## 2018-05-28 RX ORDER — APIXABAN 5 MG/1
TABLET, FILM COATED ORAL
Qty: 180 TABLET | Refills: 3 | Status: SHIPPED | OUTPATIENT
Start: 2018-05-28 | End: 2019-05-03 | Stop reason: SDUPTHER

## 2018-06-01 ENCOUNTER — OFFICE VISIT (OUTPATIENT)
Dept: INTERNAL MEDICINE | Facility: CLINIC | Age: 78
End: 2018-06-01
Payer: MEDICARE

## 2018-06-01 VITALS
HEIGHT: 62 IN | WEIGHT: 108.25 LBS | TEMPERATURE: 98 F | BODY MASS INDEX: 19.92 KG/M2 | HEART RATE: 65 BPM | RESPIRATION RATE: 18 BRPM | SYSTOLIC BLOOD PRESSURE: 132 MMHG | DIASTOLIC BLOOD PRESSURE: 60 MMHG

## 2018-06-01 DIAGNOSIS — K21.9 GASTROESOPHAGEAL REFLUX DISEASE, ESOPHAGITIS PRESENCE NOT SPECIFIED: ICD-10-CM

## 2018-06-01 DIAGNOSIS — E78.49 OTHER HYPERLIPIDEMIA: Chronic | ICD-10-CM

## 2018-06-01 DIAGNOSIS — Z00.00 ANNUAL PHYSICAL EXAM: Primary | ICD-10-CM

## 2018-06-01 DIAGNOSIS — G47.00 INSOMNIA, UNSPECIFIED TYPE: ICD-10-CM

## 2018-06-01 DIAGNOSIS — Z12.31 ENCOUNTER FOR SCREENING MAMMOGRAM FOR BREAST CANCER: ICD-10-CM

## 2018-06-01 DIAGNOSIS — D69.2 SENILE PURPURA: ICD-10-CM

## 2018-06-01 DIAGNOSIS — R91.1 PULMONARY NODULE: ICD-10-CM

## 2018-06-01 DIAGNOSIS — I10 HYPERTENSION, UNSPECIFIED TYPE: Chronic | ICD-10-CM

## 2018-06-01 DIAGNOSIS — F13.20 BENZODIAZEPINE DEPENDENCE: ICD-10-CM

## 2018-06-01 DIAGNOSIS — I77.1 ARTERIAL TORTUOSITY: ICD-10-CM

## 2018-06-01 DIAGNOSIS — I48.91 ATRIAL FIBRILLATION, UNSPECIFIED TYPE: ICD-10-CM

## 2018-06-01 DIAGNOSIS — F41.9 ANXIETY: ICD-10-CM

## 2018-06-01 DIAGNOSIS — I70.0 AORTIC ATHEROSCLEROSIS: ICD-10-CM

## 2018-06-01 PROCEDURE — 99999 PR PBB SHADOW E&M-EST. PATIENT-LVL III: CPT | Mod: PBBFAC,,, | Performed by: INTERNAL MEDICINE

## 2018-06-01 PROCEDURE — 99499 UNLISTED E&M SERVICE: CPT | Mod: S$PBB,,, | Performed by: INTERNAL MEDICINE

## 2018-06-01 PROCEDURE — 3075F SYST BP GE 130 - 139MM HG: CPT | Mod: CPTII,S$GLB,, | Performed by: INTERNAL MEDICINE

## 2018-06-01 PROCEDURE — 3078F DIAST BP <80 MM HG: CPT | Mod: CPTII,S$GLB,, | Performed by: INTERNAL MEDICINE

## 2018-06-01 PROCEDURE — 99397 PER PM REEVAL EST PAT 65+ YR: CPT | Mod: S$GLB,,, | Performed by: INTERNAL MEDICINE

## 2018-06-01 RX ORDER — BETAMETHASONE VALERATE 0.1 %
LOTION (ML) TOPICAL 2 TIMES DAILY
Qty: 60 ML | Refills: 1 | Status: SHIPPED | OUTPATIENT
Start: 2018-06-01 | End: 2019-05-07

## 2018-06-01 RX ORDER — TRAZODONE HYDROCHLORIDE 100 MG/1
100 TABLET ORAL NIGHTLY PRN
Qty: 30 TABLET | Refills: 3 | Status: SHIPPED | OUTPATIENT
Start: 2018-06-01 | End: 2018-06-15

## 2018-06-01 RX ORDER — DILTIAZEM HYDROCHLORIDE 240 MG/1
CAPSULE, EXTENDED RELEASE ORAL
COMMUNITY
Start: 2018-02-22 | End: 2018-06-15

## 2018-06-01 NOTE — PROGRESS NOTES
Subjective:       Patient ID: Tigist Murry is a 77 y.o. female.    Chief Complaint: Annual Exam    HPI   Patient is a 77 y.o.female who presents today for annual.     Cholesterol: normal  Vaccines: Influenza (10/17); Tetanus (declined) ; Pneumovax (2010); Zoster (2018); Prevnar (2017)  Eye exam: 2017  Mammogram: 6/17  Gyn exam: hysterectomy  Colonoscopy: 2013  DEXA: declines  Review of Systems   Constitutional: Negative for activity change, appetite change, chills, diaphoresis, fatigue, fever and unexpected weight change.   HENT: Negative for congestion, mouth sores, postnasal drip, rhinorrhea, sinus pressure, sneezing, sore throat, trouble swallowing and voice change.    Eyes: Negative for pain, discharge and visual disturbance.   Respiratory: Negative for cough, shortness of breath and wheezing.    Cardiovascular: Negative for chest pain, palpitations and leg swelling.   Gastrointestinal: Negative for abdominal pain, blood in stool, constipation, diarrhea, nausea and vomiting.   Endocrine: Negative for cold intolerance and heat intolerance.   Genitourinary: Negative for difficulty urinating, dysuria, frequency, hematuria and urgency.   Musculoskeletal: Negative for arthralgias and myalgias.   Skin: Negative for rash and wound.   Allergic/Immunologic: Negative for environmental allergies and food allergies.   Neurological: Negative for dizziness, tremors, seizures, syncope, weakness, light-headedness and headaches.   Hematological: Negative for adenopathy. Does not bruise/bleed easily.   Psychiatric/Behavioral: Positive for sleep disturbance. Negative for confusion, self-injury and suicidal ideas. The patient is nervous/anxious.        Objective:      Physical Exam   Constitutional: She is oriented to person, place, and time. She appears well-developed and well-nourished. No distress.   HENT:   Head: Normocephalic and atraumatic.   Right Ear: External ear normal.   Left Ear: External ear normal.   Nose: Nose  normal.   Mouth/Throat: Oropharynx is clear and moist. No oropharyngeal exudate.   Eyes: Conjunctivae and EOM are normal. Pupils are equal, round, and reactive to light. Right eye exhibits no discharge. Left eye exhibits no discharge. No scleral icterus.   Neck: Neck supple. No JVD present. No thyromegaly present.   Cardiovascular: Normal rate, regular rhythm, normal heart sounds and intact distal pulses.    No murmur heard.  Pulmonary/Chest: Effort normal and breath sounds normal. No respiratory distress. She has no wheezes. She has no rales. She exhibits no tenderness.   Abdominal: Soft. Bowel sounds are normal. She exhibits no distension. There is no tenderness. There is no guarding.   Musculoskeletal: She exhibits no edema.   Lymphadenopathy:     She has no cervical adenopathy.   Neurological: She is alert and oriented to person, place, and time. No cranial nerve deficit. Coordination normal.   Skin: Skin is warm and dry. No rash noted. She is not diaphoretic. No pallor.   Psychiatric: She has a normal mood and affect. Judgment normal.   Nursing note and vitals reviewed.      Assessment:       1. Annual physical exam    2. Atrial fibrillation, unspecified type    3. Hypertension, unspecified type    4. Other hyperlipidemia    5. Arterial tortuosity    6. Aortic atherosclerosis    7. Gastroesophageal reflux disease, esophagitis presence not specified    8. Insomnia, unspecified type    9. Anxiety    10. Benzodiazepine dependence    11. Senile purpura    12. Pulmonary nodule        Plan:    1. Blood work reviewed with pt       Vaccines: Influenza (10/17); Tetanus (declined) ; Pneumovax (2010); Zoster (2018); Prevnar (2017)       Eye exam: 2017       Mammogram: 6/17       Gyn exam: hysterectomy       Colonoscopy: 2013       DEXA: declines   2. PAF- stable, CPT   3. HTN-  Controlled on current meds   4. HLD- stable on Zocor   5/6. Stable, CPT   7. GERD- stable on Dexilant/Zantac   8. Insomnia- increase Trazodone to  100 mg qHS   9/10. Anxiety/benzo dependence- stable on Xanax PRN  11. Senile purpura- stable, continue to monitor   12. Pulm nodule- pt declining repeat CT scan   13. Mammo ordered

## 2018-06-15 ENCOUNTER — OFFICE VISIT (OUTPATIENT)
Dept: FAMILY MEDICINE | Facility: CLINIC | Age: 78
End: 2018-06-15
Payer: MEDICARE

## 2018-06-15 ENCOUNTER — HOSPITAL ENCOUNTER (OUTPATIENT)
Dept: RADIOLOGY | Facility: HOSPITAL | Age: 78
Discharge: HOME | End: 2018-06-15
Attending: INTERNAL MEDICINE
Payer: MEDICARE

## 2018-06-15 VITALS
WEIGHT: 108.94 LBS | HEIGHT: 60 IN | BODY MASS INDEX: 21.39 KG/M2 | DIASTOLIC BLOOD PRESSURE: 60 MMHG | SYSTOLIC BLOOD PRESSURE: 118 MMHG | OXYGEN SATURATION: 95 % | HEART RATE: 70 BPM

## 2018-06-15 DIAGNOSIS — Z12.31 ENCOUNTER FOR SCREENING MAMMOGRAM FOR BREAST CANCER: ICD-10-CM

## 2018-06-15 DIAGNOSIS — J84.10 CALCIFIED GRANULOMA OF LUNG: ICD-10-CM

## 2018-06-15 DIAGNOSIS — I10 ESSENTIAL HYPERTENSION: Chronic | ICD-10-CM

## 2018-06-15 DIAGNOSIS — F13.20 BENZODIAZEPINE DEPENDENCE: ICD-10-CM

## 2018-06-15 DIAGNOSIS — N32.81 OVERACTIVE BLADDER: ICD-10-CM

## 2018-06-15 DIAGNOSIS — F41.9 ANXIETY: ICD-10-CM

## 2018-06-15 DIAGNOSIS — L40.9 PSORIASIS: ICD-10-CM

## 2018-06-15 DIAGNOSIS — N18.30 CKD (CHRONIC KIDNEY DISEASE) STAGE 3, GFR 30-59 ML/MIN: ICD-10-CM

## 2018-06-15 DIAGNOSIS — M85.80 OSTEOPENIA, UNSPECIFIED LOCATION: ICD-10-CM

## 2018-06-15 DIAGNOSIS — I70.0 AORTIC ATHEROSCLEROSIS: ICD-10-CM

## 2018-06-15 DIAGNOSIS — Z00.00 ENCOUNTER FOR PREVENTIVE HEALTH EXAMINATION: Primary | ICD-10-CM

## 2018-06-15 DIAGNOSIS — E78.49 OTHER HYPERLIPIDEMIA: Chronic | ICD-10-CM

## 2018-06-15 DIAGNOSIS — I48.91 ATRIAL FIBRILLATION, UNSPECIFIED TYPE: ICD-10-CM

## 2018-06-15 DIAGNOSIS — K21.9 GASTROESOPHAGEAL REFLUX DISEASE, ESOPHAGITIS PRESENCE NOT SPECIFIED: ICD-10-CM

## 2018-06-15 PROCEDURE — 77067 SCR MAMMO BI INCL CAD: CPT | Mod: 26,,, | Performed by: RADIOLOGY

## 2018-06-15 PROCEDURE — 99999 PR PBB SHADOW E&M-EST. PATIENT-LVL V: CPT | Mod: PBBFAC,,, | Performed by: NURSE PRACTITIONER

## 2018-06-15 PROCEDURE — 3074F SYST BP LT 130 MM HG: CPT | Mod: CPTII,S$GLB,, | Performed by: NURSE PRACTITIONER

## 2018-06-15 PROCEDURE — G0439 PPPS, SUBSEQ VISIT: HCPCS | Mod: S$GLB,,, | Performed by: NURSE PRACTITIONER

## 2018-06-15 PROCEDURE — 3078F DIAST BP <80 MM HG: CPT | Mod: CPTII,S$GLB,, | Performed by: NURSE PRACTITIONER

## 2018-06-15 PROCEDURE — 77063 BREAST TOMOSYNTHESIS BI: CPT | Mod: 26,,, | Performed by: RADIOLOGY

## 2018-06-15 PROCEDURE — 99499 UNLISTED E&M SERVICE: CPT | Mod: S$GLB,,, | Performed by: NURSE PRACTITIONER

## 2018-06-15 PROCEDURE — 77067 SCR MAMMO BI INCL CAD: CPT | Mod: TC

## 2018-06-15 RX ORDER — TRAZODONE HYDROCHLORIDE 50 MG/1
1 TABLET ORAL NIGHTLY
COMMUNITY
Start: 2018-06-04 | End: 2019-06-10 | Stop reason: SDUPTHER

## 2018-06-15 NOTE — PATIENT INSTRUCTIONS
Counseling and Referral of Other Preventative  (Italic type indicates deductible and co-insurance are waived)    Patient Name: Tigist Murry  Today's Date: 6/15/2018    Health Maintenance       Date Due Completion Date    Mammogram 06/09/2018 6/9/2017    Influenza Vaccine 08/01/2018 10/10/2017    Override on 10/10/2017: Done    DEXA SCAN 09/20/2018 9/20/2016 (Declined)    Override on 9/20/2016: Declined    Override on 8/3/2010: Done    Lipid Panel 05/25/2019 5/25/2018    Colonoscopy 01/04/2020 1/4/2013 (Done)    Override on 1/4/2013: Done    Override on 1/17/2006: Done    TETANUS VACCINE 09/20/2026 9/20/2016 (Declined)    Override on 9/20/2016: Declined        No orders of the defined types were placed in this encounter.    The following information is provided to all patients.  This information is to help you find resources for any of the problems found today that may be affecting your health:                Living healthy guide: www.[facility].[place].gov      Understanding Diabetes: www.diabetes.org      Eating healthy: www.cdc.gov/healthyweight      CDC home safety checklist: www.cdc.gov/steadi/patient.html      Agency on Aging: www.goea.[place].gov      Alcoholics anonymous (AA): www.aa.org      Physical Activity: www.jhon.nih.gov/ua5gjvs      Tobacco use: www.quitwithusla.org

## 2018-06-15 NOTE — PROGRESS NOTES
Tigist Murry presented for a  Medicare AWV and comprehensive Health Risk Assessment today. The following components were reviewed and updated:    · Medical history  · Family History  · Social history  · Allergies and Current Medications  · Health Risk Assessment  · Health Maintenance  · Care Team     ** See Completed Assessments for Annual Wellness Visit within the encounter summary.**       The following assessments were completed:  · Living Situation  · CAGE  · Depression Screening  · Timed Get Up and Go  · Whisper Test  · Cognitive Function Screening  · Nutrition Screening  · ADL Screening  · PAQ Screening    Vitals:    06/15/18 1308   BP: 118/60   BP Location: Right arm   Patient Position: Sitting   BP Method: Medium (Manual)   Pulse: 70   SpO2: 95%   Weight: 49.4 kg (108 lb 14.5 oz)   Height: 5' (1.524 m)     Body mass index is 21.27 kg/m².     Physical Exam   Constitutional: She is oriented to person, place, and time. She appears well-developed and well-nourished. No distress.   HENT:   Head: Normocephalic and atraumatic.   Eyes: EOM are normal. Pupils are equal, round, and reactive to light.   Neck: Neck supple. No JVD present. No tracheal deviation present.   Cardiovascular: Normal rate, regular rhythm, normal heart sounds and intact distal pulses.    No murmur heard.  Pulmonary/Chest: Effort normal and breath sounds normal. No respiratory distress. She has no wheezes. She has no rales.   Abdominal: Soft. Bowel sounds are normal. She exhibits no distension and no mass. There is no tenderness.   Musculoskeletal: Normal range of motion. She exhibits no edema or tenderness.   Neurological: She is alert and oriented to person, place, and time. Coordination normal.   Skin: Skin is warm and dry. No erythema. No pallor.   Psychiatric: She has a normal mood and affect. Her behavior is normal. Judgment and thought content normal. Cognition and memory are normal. She expresses no homicidal and no suicidal ideation.    Nursing note and vitals reviewed.        Diagnoses and health risks identified today and associated recommendations/orders:    1. Encounter for preventive health examination    2. Essential hypertension  Chronic; stable on medication.  Followed by PCP.    3. Other hyperlipidemia  Chronic; stable on medication.  Followed by PCP.    4. Aortic atherosclerosis  Chronic; stable.  Followed by Cardiology.    5. Atrial fibrillation, unspecified type  Chronic; stable on medication.  Followed by Cardiology.    6. Calcified granuloma of lung  Chronic; stable.  As seen on imaging dated 09/23/16.  Followed by PCP.    7. CKD (chronic kidney disease) stage 3, GFR 30-59 ml/min  Chronic; stable.  Followed by PCP.    8. Gastroesophageal reflux disease, esophagitis presence not specified  Chronic; stable on medication.  Followed by PCP.    9. Anxiety  Chronic; stable on medication.  Followed by PCP.    10. Benzodiazepine dependence  Chronic; stable.  Followed by PCP.    11. Psoriasis  Chronic; stable on medication.  Followed by PCP.    12. Osteopenia, unspecified location  Chronic; stable on medication.  Followed by PCP.    13. Overactive bladder  Chronic; stable on medication.  Followed by PCP.    14. BMI 21.0-21.9, adult      Provided Tigist with a 5-10 year written screening schedule and personal prevention plan. Recommendations were developed using the USPSTF age appropriate recommendations. Education, counseling, and referrals were provided as needed. After Visit Summary printed and given to patient which includes a list of additional screenings\tests needed.    Follow-up in about 6 months (around 12/1/2018) for follow-up with PCP, Annual Wellness Visit in 1 year.    Hillary Layton NP

## 2018-08-07 ENCOUNTER — OFFICE VISIT (OUTPATIENT)
Dept: INTERNAL MEDICINE | Facility: CLINIC | Age: 78
End: 2018-08-07
Payer: MEDICARE

## 2018-08-07 VITALS
HEART RATE: 84 BPM | DIASTOLIC BLOOD PRESSURE: 60 MMHG | SYSTOLIC BLOOD PRESSURE: 120 MMHG | WEIGHT: 110.44 LBS | BODY MASS INDEX: 20.85 KG/M2 | HEIGHT: 61 IN | TEMPERATURE: 98 F

## 2018-08-07 DIAGNOSIS — Z01.818 PRE-OP EXAM: Primary | ICD-10-CM

## 2018-08-07 DIAGNOSIS — I77.1 ARTERIAL TORTUOSITY: Chronic | ICD-10-CM

## 2018-08-07 DIAGNOSIS — J84.10 CALCIFIED GRANULOMA OF LUNG: Chronic | ICD-10-CM

## 2018-08-07 DIAGNOSIS — E78.49 OTHER HYPERLIPIDEMIA: Chronic | ICD-10-CM

## 2018-08-07 DIAGNOSIS — I70.0 AORTIC ATHEROSCLEROSIS: Chronic | ICD-10-CM

## 2018-08-07 DIAGNOSIS — K21.9 GASTROESOPHAGEAL REFLUX DISEASE, ESOPHAGITIS PRESENCE NOT SPECIFIED: Chronic | ICD-10-CM

## 2018-08-07 DIAGNOSIS — R91.1 PULMONARY NODULE: Chronic | ICD-10-CM

## 2018-08-07 DIAGNOSIS — I48.91 ATRIAL FIBRILLATION, UNSPECIFIED TYPE: Chronic | ICD-10-CM

## 2018-08-07 DIAGNOSIS — I10 ESSENTIAL HYPERTENSION: ICD-10-CM

## 2018-08-07 DIAGNOSIS — I51.89 LEFT VENTRICULAR DIASTOLIC DYSFUNCTION WITH PRESERVED SYSTOLIC FUNCTION: Chronic | ICD-10-CM

## 2018-08-07 DIAGNOSIS — K57.90 DIVERTICULOSIS OF INTESTINE WITHOUT BLEEDING, UNSPECIFIED INTESTINAL TRACT LOCATION: ICD-10-CM

## 2018-08-07 DIAGNOSIS — N32.81 OVERACTIVE BLADDER: ICD-10-CM

## 2018-08-07 DIAGNOSIS — G56.01 CARPAL TUNNEL SYNDROME ON RIGHT: ICD-10-CM

## 2018-08-07 DIAGNOSIS — N18.30 CKD (CHRONIC KIDNEY DISEASE) STAGE 3, GFR 30-59 ML/MIN: ICD-10-CM

## 2018-08-07 DIAGNOSIS — L40.9 PSORIASIS: Chronic | ICD-10-CM

## 2018-08-07 DIAGNOSIS — F43.21 SITUATIONAL DEPRESSION: Chronic | ICD-10-CM

## 2018-08-07 DIAGNOSIS — F41.9 ANXIETY: Chronic | ICD-10-CM

## 2018-08-07 PROCEDURE — 99999 PR PBB SHADOW E&M-EST. PATIENT-LVL III: CPT | Mod: PBBFAC,,, | Performed by: FAMILY MEDICINE

## 2018-08-07 PROCEDURE — 3074F SYST BP LT 130 MM HG: CPT | Mod: CPTII,S$GLB,, | Performed by: FAMILY MEDICINE

## 2018-08-07 PROCEDURE — 99214 OFFICE O/P EST MOD 30 MIN: CPT | Mod: S$GLB,,, | Performed by: FAMILY MEDICINE

## 2018-08-07 PROCEDURE — 3078F DIAST BP <80 MM HG: CPT | Mod: CPTII,S$GLB,, | Performed by: FAMILY MEDICINE

## 2018-08-08 PROBLEM — N18.30 CKD (CHRONIC KIDNEY DISEASE) STAGE 3, GFR 30-59 ML/MIN: Chronic | Status: ACTIVE | Noted: 2018-06-15

## 2018-08-08 PROBLEM — R91.1 PULMONARY NODULE: Chronic | Status: ACTIVE | Noted: 2017-01-03

## 2018-08-08 NOTE — PROGRESS NOTES
Subjective:   Patient ID: Tigist Murry is a 77 y.o. female.    Chief Complaint: Pre-op Exam      HPI  76 yo female here for pre-op exam for eye surgery.  Patient queried and denies any further complaints.      ALLERGIES AND MEDICATIONS: updated and reviewed.  Review of patient's allergies indicates:   Allergen Reactions    Ciprofloxacin      Other reaction(s): Nausea       Current Outpatient Prescriptions:     ALPRAZolam (XANAX) 0.25 MG tablet, Take 1 tablet (0.25 mg total) by mouth 2 (two) times daily as needed. anxiety, Disp: 180 tablet, Rfl: 0    ascorbic acid, vitamin C, (VITAMIN C) 500 MG tablet, Take 500 mg by mouth once daily., Disp: , Rfl:     betamethasone valerate 0.1% (VALISONE) 0.1 % Lotn, Apply topically 2 (two) times daily., Disp: 60 mL, Rfl: 1    calcium citrate-vitamin D3 315-200 mg (CITRACAL+D) 315-200 mg-unit per tablet, Take 1 tablet by mouth 2 (two) times daily., Disp: , Rfl:     co-enzyme Q-10 30 mg capsule, Take 100 mg by mouth once daily., Disp: , Rfl:     dexlansoprazole (DEXILANT) 60 mg capsule, Take 60 mg by mouth once daily., Disp: , Rfl:     diltiaZEM (TIAZAC) 240 MG CpSR, Take 1 capsule (240 mg total) by mouth once daily., Disp: 90 capsule, Rfl: 3    ELIQUIS 5 mg Tab, TAKE ONE TABLET BY MOUTH TWICE A DAY, Disp: 180 tablet, Rfl: 3    multivitamin capsule, Take 1 capsule by mouth once daily., Disp: , Rfl:     oxybutynin (DITROPAN) 5 MG Tab, TAKE 1 TABLET (5 MG TOTAL) BY MOUTH ONCE DAILY., Disp: 90 tablet, Rfl: 0    ranitidine (ZANTAC) 300 MG tablet, TAKE 1 TABLET (300 MG TOTAL) BY MOUTH EVERY EVENING., Disp: 90 tablet, Rfl: 3    simvastatin (ZOCOR) 20 MG tablet, Take 1 tablet (20 mg total) by mouth every evening., Disp: 90 tablet, Rfl: 0    traZODone (DESYREL) 50 MG tablet, Take 1 tablet by mouth every evening. , Disp: , Rfl:     Review of Systems   Constitutional: Negative for activity change, appetite change, chills, diaphoresis, fatigue, fever and unexpected weight  "change.   HENT: Negative for congestion, ear discharge, ear pain, facial swelling, hearing loss, nosebleeds, postnasal drip, rhinorrhea, sinus pressure, sneezing, sore throat, tinnitus, trouble swallowing and voice change.    Eyes: Negative for photophobia, pain, discharge, redness, itching and visual disturbance.   Respiratory: Negative for cough, chest tightness, shortness of breath and wheezing.    Cardiovascular: Negative for chest pain, palpitations and leg swelling.   Gastrointestinal: Negative for abdominal distention, abdominal pain, anal bleeding, blood in stool, constipation, diarrhea, nausea, rectal pain and vomiting.   Endocrine: Negative for cold intolerance, heat intolerance, polydipsia, polyphagia and polyuria.   Genitourinary: Negative for difficulty urinating, dysuria and flank pain.   Musculoskeletal: Negative for arthralgias, back pain, joint swelling, myalgias and neck pain.   Skin: Negative for rash.   Neurological: Negative for dizziness, tremors, seizures, syncope, speech difficulty, weakness, light-headedness, numbness and headaches.   Psychiatric/Behavioral: Negative for behavioral problems, confusion, decreased concentration, dysphoric mood, sleep disturbance and suicidal ideas. The patient is not nervous/anxious and is not hyperactive.        Objective:     Vitals:    08/07/18 0837   BP: 120/60   Pulse: 84   Temp: 97.7 °F (36.5 °C)   TempSrc: Oral   Weight: 50.1 kg (110 lb 7.2 oz)   Height: 5' 1" (1.549 m)   PainSc: 0-No pain     Body mass index is 20.87 kg/m².    Physical Exam   Constitutional: She is oriented to person, place, and time. She appears well-developed and well-nourished. She is cooperative. She does not have a sickly appearance. No distress.   HENT:   Head: Normocephalic and atraumatic.   Right Ear: Hearing, tympanic membrane, external ear and ear canal normal. No tenderness.   Left Ear: Hearing, tympanic membrane, external ear and ear canal normal. No tenderness.   Nose: " Nose normal.   Mouth/Throat: Oropharynx is clear and moist.   Eyes: Conjunctivae and lids are normal. Pupils are equal, round, and reactive to light. Right eye exhibits no discharge. Left eye exhibits no discharge. Right conjunctiva is not injected. Left conjunctiva is not injected. No scleral icterus. Right eye exhibits normal extraocular motion. Left eye exhibits normal extraocular motion.   Neck: Normal range of motion. Neck supple. No JVD present. Carotid bruit is not present. No tracheal deviation and no edema present. No thyromegaly present.   Cardiovascular: Normal rate, regular rhythm, normal heart sounds and normal pulses.  Exam reveals no friction rub.    No murmur heard.  Pulmonary/Chest: Effort normal and breath sounds normal. No accessory muscle usage. No respiratory distress. She has no wheezes. She has no rhonchi. She has no rales.   Abdominal: Soft. Bowel sounds are normal. She exhibits no distension, no abdominal bruit, no pulsatile midline mass and no mass. There is no hepatosplenomegaly. There is no tenderness. There is no rebound, no guarding, no CVA tenderness, no tenderness at McBurney's point and negative Santamaria's sign.   Musculoskeletal: She exhibits no edema.   Lymphadenopathy:        Head (right side): No submandibular, no preauricular and no posterior auricular adenopathy present.        Head (left side): No submandibular, no preauricular and no posterior auricular adenopathy present.     She has no cervical adenopathy.   Neurological: She is alert and oriented to person, place, and time. GCS eye subscore is 4. GCS verbal subscore is 5. GCS motor subscore is 6.   Skin: Skin is warm and dry. No ecchymosis and no rash noted. Rash is not maculopapular and not urticarial. She is not diaphoretic. No cyanosis or erythema. Nails show no clubbing.   Psychiatric: She has a normal mood and affect. Her speech is normal and behavior is normal. Thought content normal. Her mood appears not anxious. Her  affect is not angry and not inappropriate. She does not exhibit a depressed mood.       Assessment and Plan:   Tigist was seen today for pre-op exam.    Diagnoses and all orders for this visit:    Pre-op exam    Carpal tunnel syndrome on right    Anxiety    Situational depression    Psoriasis    Pulmonary nodule    Calcified granuloma of lung    Essential hypertension    Other hyperlipidemia    Atrial fibrillation, unspecified type    Aortic atherosclerosis    Arterial tortuosity    Left ventricular diastolic dysfunction with preserved systolic function    Overactive bladder    CKD (chronic kidney disease) stage 3, GFR 30-59 ml/min    Diverticulosis of intestine without bleeding, unspecified intestinal tract location    Gastroesophageal reflux disease, esophagitis presence not specified      May proceed w eye surgery at low cv risk  Form faxed 8/07 and copy given to pt    Follow-up in about 3 months (around 11/7/2018), or if symptoms worsen or fail to improve.    THIS NOTE WILL BE SHARED WITH THE PATIENT.

## 2018-08-23 ENCOUNTER — TELEPHONE (OUTPATIENT)
Dept: INTERNAL MEDICINE | Facility: CLINIC | Age: 78
End: 2018-08-23

## 2018-08-23 NOTE — TELEPHONE ENCOUNTER
----- Message from Balbina Palomino sent at 8/23/2018 10:54 AM CDT -----  Contact: Pt Home [phone]  Patient would like a call back in regards to wanting to know if she could stop taking her medication for ELIQUIS 5 mg Tab two days before her eye surgery?

## 2018-08-24 ENCOUNTER — TELEPHONE (OUTPATIENT)
Dept: ELECTROPHYSIOLOGY | Facility: CLINIC | Age: 78
End: 2018-08-24

## 2018-08-24 NOTE — TELEPHONE ENCOUNTER
Returned call to Pt. Pt is having cataract surgery on 8/31/18 and her doctor would like her to hold eliquis prior to the surgery. Pt advised to hold for eliquis for 5 days prior to procedure. Pt voiced understanding.         ----- Message from Karoline Sanchez MA sent at 8/24/2018  1:41 PM CDT -----  Contact: pt [phone]       ----- Message -----  From: Tigist Payton  Sent: 8/24/2018  11:59 AM  To: Nitesh ANTOINE Staff    Pt having a procedure on 8/29/18 and need to know how long to stay off Eliquis 5 mg before?     Thanks

## 2018-08-24 NOTE — TELEPHONE ENCOUNTER
----- Message from Samanta Willingham sent at 8/23/2018  4:03 PM CDT -----  Contact: Tigist Murry [phone]  Patient is returning a phone call.  Who left a message for the patient: Fariha Snider LPN   Does patient know what this is regarding: Eye Surgery    Comments:

## 2018-09-12 DIAGNOSIS — N32.81 OVERACTIVE BLADDER: ICD-10-CM

## 2018-09-12 RX ORDER — OXYBUTYNIN CHLORIDE 5 MG/1
TABLET ORAL
Qty: 90 TABLET | Refills: 0 | Status: SHIPPED | OUTPATIENT
Start: 2018-09-12 | End: 2018-11-21 | Stop reason: SDUPTHER

## 2018-09-17 ENCOUNTER — CLINICAL SUPPORT (OUTPATIENT)
Dept: CARDIOLOGY | Facility: CLINIC | Age: 78
End: 2018-09-17
Attending: INTERNAL MEDICINE
Payer: MEDICARE

## 2018-09-17 DIAGNOSIS — I48.0 PAROXYSMAL ATRIAL FIBRILLATION: ICD-10-CM

## 2018-09-17 DIAGNOSIS — I10 ESSENTIAL HYPERTENSION: Chronic | ICD-10-CM

## 2018-09-17 LAB
AORTIC VALVE REGURGITATION: NORMAL
ESTIMATED PA SYSTOLIC PRESSURE: 28.81
MITRAL VALVE REGURGITATION: NORMAL
RETIRED EF AND QEF - SEE NOTES: 65 (ref 55–65)
TRICUSPID VALVE REGURGITATION: NORMAL

## 2018-09-17 PROCEDURE — 93227 XTRNL ECG REC<48 HR R&I: CPT | Mod: S$PBB,,, | Performed by: INTERNAL MEDICINE

## 2018-09-17 PROCEDURE — 93306 TTE W/DOPPLER COMPLETE: CPT | Mod: PBBFAC,PO | Performed by: INTERNAL MEDICINE

## 2018-09-17 PROCEDURE — 93226 XTRNL ECG REC<48 HR SCAN A/R: CPT | Mod: PBBFAC,PO | Performed by: INTERNAL MEDICINE

## 2018-09-17 NOTE — PROGRESS NOTES
24 hour Holter monitor placed as ordered. Patient and/or family verbalized understanding of instructions.

## 2018-09-18 DIAGNOSIS — I47.10 SVT (SUPRAVENTRICULAR TACHYCARDIA): ICD-10-CM

## 2018-09-18 RX ORDER — DILTIAZEM HYDROCHLORIDE 240 MG/1
240 CAPSULE, EXTENDED RELEASE ORAL DAILY
Qty: 90 CAPSULE | Refills: 3 | Status: SHIPPED | OUTPATIENT
Start: 2018-09-18 | End: 2018-10-22 | Stop reason: SDUPTHER

## 2018-10-18 DIAGNOSIS — I49.9 CARDIAC ARRHYTHMIA, UNSPECIFIED CARDIAC ARRHYTHMIA TYPE: Primary | ICD-10-CM

## 2018-10-22 ENCOUNTER — CLINICAL SUPPORT (OUTPATIENT)
Dept: FAMILY MEDICINE | Facility: CLINIC | Age: 78
End: 2018-10-22
Payer: MEDICARE

## 2018-10-22 ENCOUNTER — OFFICE VISIT (OUTPATIENT)
Dept: CARDIOLOGY | Facility: CLINIC | Age: 78
End: 2018-10-22
Payer: MEDICARE

## 2018-10-22 VITALS
WEIGHT: 107 LBS | DIASTOLIC BLOOD PRESSURE: 77 MMHG | BODY MASS INDEX: 19.69 KG/M2 | HEIGHT: 62 IN | SYSTOLIC BLOOD PRESSURE: 144 MMHG | HEART RATE: 69 BPM

## 2018-10-22 DIAGNOSIS — I10 ESSENTIAL HYPERTENSION: Chronic | ICD-10-CM

## 2018-10-22 DIAGNOSIS — Z23 FLU VACCINE NEED: Primary | ICD-10-CM

## 2018-10-22 DIAGNOSIS — Z79.01 LONG TERM CURRENT USE OF ANTICOAGULANT: ICD-10-CM

## 2018-10-22 DIAGNOSIS — I48.91 ATRIAL FIBRILLATION, UNSPECIFIED TYPE: Primary | Chronic | ICD-10-CM

## 2018-10-22 DIAGNOSIS — I51.89 LEFT VENTRICULAR DIASTOLIC DYSFUNCTION WITH PRESERVED SYSTOLIC FUNCTION: Chronic | ICD-10-CM

## 2018-10-22 DIAGNOSIS — E78.49 OTHER HYPERLIPIDEMIA: Chronic | ICD-10-CM

## 2018-10-22 DIAGNOSIS — I49.9 CARDIAC ARRHYTHMIA, UNSPECIFIED CARDIAC ARRHYTHMIA TYPE: ICD-10-CM

## 2018-10-22 DIAGNOSIS — I47.10 SVT (SUPRAVENTRICULAR TACHYCARDIA): ICD-10-CM

## 2018-10-22 PROCEDURE — 3078F DIAST BP <80 MM HG: CPT | Mod: CPTII,,, | Performed by: INTERNAL MEDICINE

## 2018-10-22 PROCEDURE — 3077F SYST BP >= 140 MM HG: CPT | Mod: CPTII,,, | Performed by: INTERNAL MEDICINE

## 2018-10-22 PROCEDURE — 99213 OFFICE O/P EST LOW 20 MIN: CPT | Mod: PBBFAC,PO | Performed by: INTERNAL MEDICINE

## 2018-10-22 PROCEDURE — 93010 ELECTROCARDIOGRAM REPORT: CPT | Mod: S$PBB,,, | Performed by: INTERNAL MEDICINE

## 2018-10-22 PROCEDURE — 90662 IIV NO PRSV INCREASED AG IM: CPT | Mod: PBBFAC,PO

## 2018-10-22 PROCEDURE — 99999 PR PBB SHADOW E&M-EST. PATIENT-LVL III: CPT | Mod: PBBFAC,,, | Performed by: INTERNAL MEDICINE

## 2018-10-22 PROCEDURE — 99214 OFFICE O/P EST MOD 30 MIN: CPT | Mod: S$PBB,,, | Performed by: INTERNAL MEDICINE

## 2018-10-22 PROCEDURE — 1101F PT FALLS ASSESS-DOCD LE1/YR: CPT | Mod: CPTII,,, | Performed by: INTERNAL MEDICINE

## 2018-10-22 PROCEDURE — 93005 ELECTROCARDIOGRAM TRACING: CPT | Mod: PBBFAC,PO | Performed by: INTERNAL MEDICINE

## 2018-10-22 RX ORDER — DILTIAZEM HYDROCHLORIDE 240 MG/1
240 CAPSULE, EXTENDED RELEASE ORAL DAILY
Qty: 90 CAPSULE | Refills: 3 | Status: SHIPPED | OUTPATIENT
Start: 2018-10-22 | End: 2019-10-24 | Stop reason: SDUPTHER

## 2018-10-22 RX ORDER — CHLORTHALIDONE 25 MG/1
TABLET ORAL
Qty: 90 TABLET | Refills: 3 | Status: SHIPPED | OUTPATIENT
Start: 2018-10-22 | End: 2019-10-24 | Stop reason: SDUPTHER

## 2018-10-22 NOTE — PROGRESS NOTES
Subjective:    Patient ID:  Tigist Murry is a 78 y.o. female who presents for follow-up of PAF      HPI   Former pt of JOSE Perry; switched to me to be seen in [place]    78 y.o. 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  Still has PAF, as evidenced on MCOT and Holter monitoring. Asx for the most part. Sometimes fast HR, during which she feels palps.  No CP, no SOB, no LH/presync/sync.  no LH/presync/sync.    Working out (aerobics, yoga) 4x/week!    Holter: SR, 3h of AF/AFL (asx)  echo 60-65%.    My interpretation of today's ECG is NSR    Review of Systems   Constitution: Negative. Negative for weakness and malaise/fatigue.   HENT: Negative.  Negative for ear pain and tinnitus.    Eyes: Negative for blurred vision.   Cardiovascular: Negative.  Negative for chest pain, dyspnea on exertion, near-syncope, palpitations and syncope.   Respiratory: Negative for shortness of breath.    Endocrine: Negative.  Negative for polyuria.   Hematologic/Lymphatic: Does not bruise/bleed easily.   Skin: Negative.  Negative for rash.   Musculoskeletal: Negative.  Negative for joint pain and muscle weakness.   Gastrointestinal: Negative.  Negative for abdominal pain and change in bowel habit.   Genitourinary: Negative for frequency.   Neurological: Negative.  Negative for dizziness.   Psychiatric/Behavioral: Negative for depression. The patient is nervous/anxious.    Allergic/Immunologic: Negative for environmental allergies.        Objective:    Physical Exam   Constitutional: She is oriented to person, place, and time. Vital signs are normal. She appears well-developed and well-nourished. She is active and cooperative.   HENT:   Head: Normocephalic and atraumatic.   Eyes: Conjunctivae and EOM are normal.   Neck: Normal range of motion. Carotid bruit is not present. No tracheal deviation and no edema present. No thyroid mass and no thyromegaly present.    Cardiovascular: Normal rate, regular rhythm, normal heart sounds, intact distal pulses and normal pulses.  No extrasystoles are present. PMI is not displaced. Exam reveals no gallop and no friction rub.   No murmur heard.  Pulmonary/Chest: Effort normal and breath sounds normal. No respiratory distress. She has no wheezes. She has no rales.   Abdominal: Soft. Normal appearance. She exhibits no distension. There is no hepatosplenomegaly.   Musculoskeletal: Normal range of motion.   Neurological: She is alert and oriented to person, place, and time. Coordination normal.   Skin: Skin is warm and dry. No rash noted.   Psychiatric: She has a normal mood and affect. Her speech is normal and behavior is normal. Thought content normal. Cognition and memory are normal.   Nursing note and vitals reviewed.        Assessment:       1. Atrial fibrillation, unspecified type    2. Cardiac arrhythmia, unspecified cardiac arrhythmia type    3. Essential hypertension    4. Other hyperlipidemia    5. Left ventricular diastolic dysfunction with preserved systolic function    6. Long term current use of anticoagulant    7. SVT (supraventricular tachycardia)         Plan:       Asx PAF.  Continue dilt. Start chlorthalidone.  Return in 1 year with echo, or earlier prn.

## 2018-10-31 RX ORDER — ALPRAZOLAM 0.25 MG/1
TABLET ORAL
Qty: 180 TABLET | Refills: 0 | Status: SHIPPED | OUTPATIENT
Start: 2018-10-31 | End: 2019-06-10 | Stop reason: SDUPTHER

## 2018-11-12 DIAGNOSIS — E78.5 HYPERLIPIDEMIA, UNSPECIFIED HYPERLIPIDEMIA TYPE: ICD-10-CM

## 2018-11-12 RX ORDER — SIMVASTATIN 20 MG/1
TABLET, FILM COATED ORAL
Qty: 90 TABLET | Refills: 0 | Status: SHIPPED | OUTPATIENT
Start: 2018-11-12 | End: 2020-03-11

## 2018-11-16 NOTE — PROGRESS NOTES
Subjective:       Patient ID: Tigist Murry is a 78 y.o. female.    Chief Complaint: Follow-up    Patient is a 78 y.o.female who presents today for followup. Annual was done in June. She does complain of right sciatica.     Cholesterol: up to date  Vaccines: Influenza (yearly); Tetanus (up to date) ; Pneumovax (2010); Zoster (declines for now); Prevnar (done)  Eye exam: due now  Mammogram: June 2018  Gyn exam: hysterectomy  Colonoscopy: 2013  DEXA: declines  Exercise: daily at the [employer]/ yoga  Review of Systems   Constitutional: Negative for appetite change, chills, diaphoresis, fatigue and fever.   HENT: Negative for congestion, dental problem, ear discharge, ear pain, hearing loss, postnasal drip, sinus pressure and sore throat.    Eyes: Negative for discharge, redness and itching.   Respiratory: Negative for cough, chest tightness, shortness of breath and wheezing.    Cardiovascular: Negative for chest pain, palpitations and leg swelling.   Gastrointestinal: Negative for abdominal pain, constipation, diarrhea, nausea and vomiting.   Endocrine: Negative for cold intolerance and heat intolerance.   Genitourinary: Negative for difficulty urinating, frequency, hematuria and urgency.   Musculoskeletal: Negative for arthralgias, back pain, gait problem, myalgias and neck pain.   Skin: Negative for color change and rash.   Neurological: Negative for dizziness, syncope and headaches.   Hematological: Negative for adenopathy.   Psychiatric/Behavioral: Negative for behavioral problems and sleep disturbance. The patient is not nervous/anxious.        Objective:      Physical Exam   Constitutional: She is oriented to person, place, and time. She appears well-developed and well-nourished. No distress.   HENT:   Head: Normocephalic and atraumatic.   Right Ear: External ear normal.   Left Ear: External ear normal.   Nose: Nose normal.   Mouth/Throat: Oropharynx is clear and moist. No oropharyngeal exudate.   Eyes:  Conjunctivae and EOM are normal. Pupils are equal, round, and reactive to light. Right eye exhibits no discharge. Left eye exhibits no discharge. No scleral icterus.   Neck: Normal range of motion. Neck supple. No JVD present. No thyromegaly present.   Cardiovascular: Normal rate, regular rhythm, normal heart sounds and intact distal pulses. Exam reveals no gallop and no friction rub.   No murmur heard.  Pulmonary/Chest: Effort normal and breath sounds normal. No stridor. No respiratory distress. She has no wheezes. She has no rales. She exhibits no tenderness.   Abdominal: Soft. Bowel sounds are normal. She exhibits no distension. There is no tenderness. There is no rebound.   Musculoskeletal: Normal range of motion. She exhibits no edema or tenderness.   Lymphadenopathy:     She has no cervical adenopathy.   Neurological: She is alert and oriented to person, place, and time. No cranial nerve deficit.   Skin: Skin is warm and dry. No rash noted. She is not diaphoretic. No erythema.   Psychiatric: She has a normal mood and affect. Her behavior is normal.   Nursing note and vitals reviewed.      Assessment and Plan:       1. Aortic atherosclerosis  - stable on meds    2. Pulmonary nodule  - due for repeat CT; pt prefers to wait for her annual    3. Aortic atherosclerosis  - stable    4. Atrial fibrillation, unspecified type  - sees cardio    5. Essential hypertension  - good control at home    6. Other hyperlipidemia  - stable    7. CKD (chronic kidney disease) stage 3, GFR 30-59 ml/min  - avoid nsaids    8. Overactive bladder  - stable    9. Gastroesophageal reflux disease, esophagitis presence not specified  - stable    10. Hyperlipidemia, unspecified hyperlipidemia type  - stable    11. Postmenopausal  - declines dexa    12. Right sided sciatica  - trial of voltaren gel; takes tylenol with no relief          No Follow-up on file.

## 2018-11-21 DIAGNOSIS — N32.81 OVERACTIVE BLADDER: ICD-10-CM

## 2018-11-21 RX ORDER — OXYBUTYNIN CHLORIDE 5 MG/1
TABLET ORAL
Qty: 90 TABLET | Refills: 3 | Status: SHIPPED | OUTPATIENT
Start: 2018-11-21 | End: 2019-09-03 | Stop reason: SDUPTHER

## 2018-11-30 ENCOUNTER — OFFICE VISIT (OUTPATIENT)
Dept: INTERNAL MEDICINE | Facility: CLINIC | Age: 78
End: 2018-11-30
Payer: MEDICARE

## 2018-11-30 VITALS
RESPIRATION RATE: 16 BRPM | DIASTOLIC BLOOD PRESSURE: 70 MMHG | WEIGHT: 107.81 LBS | BODY MASS INDEX: 19.1 KG/M2 | SYSTOLIC BLOOD PRESSURE: 115 MMHG | HEART RATE: 62 BPM | TEMPERATURE: 98 F | HEIGHT: 63 IN

## 2018-11-30 DIAGNOSIS — M54.31 RIGHT SIDED SCIATICA: ICD-10-CM

## 2018-11-30 DIAGNOSIS — Z00.00 ANNUAL PHYSICAL EXAM: Primary | ICD-10-CM

## 2018-11-30 DIAGNOSIS — I10 ESSENTIAL HYPERTENSION: Chronic | ICD-10-CM

## 2018-11-30 DIAGNOSIS — I70.0 AORTIC ATHEROSCLEROSIS: Chronic | ICD-10-CM

## 2018-11-30 DIAGNOSIS — N18.30 CKD (CHRONIC KIDNEY DISEASE) STAGE 3, GFR 30-59 ML/MIN: Chronic | ICD-10-CM

## 2018-11-30 DIAGNOSIS — N32.81 OVERACTIVE BLADDER: Chronic | ICD-10-CM

## 2018-11-30 DIAGNOSIS — E78.5 HYPERLIPIDEMIA, UNSPECIFIED HYPERLIPIDEMIA TYPE: ICD-10-CM

## 2018-11-30 DIAGNOSIS — K21.9 GASTROESOPHAGEAL REFLUX DISEASE, ESOPHAGITIS PRESENCE NOT SPECIFIED: Chronic | ICD-10-CM

## 2018-11-30 DIAGNOSIS — I48.91 ATRIAL FIBRILLATION, UNSPECIFIED TYPE: Chronic | ICD-10-CM

## 2018-11-30 DIAGNOSIS — Z78.0 POSTMENOPAUSAL: ICD-10-CM

## 2018-11-30 DIAGNOSIS — R91.1 PULMONARY NODULE: Chronic | ICD-10-CM

## 2018-11-30 DIAGNOSIS — E78.49 OTHER HYPERLIPIDEMIA: Chronic | ICD-10-CM

## 2018-11-30 PROCEDURE — 99213 OFFICE O/P EST LOW 20 MIN: CPT | Mod: S$GLB,,, | Performed by: INTERNAL MEDICINE

## 2018-11-30 PROCEDURE — 1101F PT FALLS ASSESS-DOCD LE1/YR: CPT | Mod: CPTII,S$GLB,, | Performed by: INTERNAL MEDICINE

## 2018-11-30 PROCEDURE — 99999 PR PBB SHADOW E&M-EST. PATIENT-LVL III: CPT | Mod: PBBFAC,HCNC,, | Performed by: INTERNAL MEDICINE

## 2018-11-30 PROCEDURE — 3078F DIAST BP <80 MM HG: CPT | Mod: CPTII,S$GLB,, | Performed by: INTERNAL MEDICINE

## 2018-11-30 PROCEDURE — 3074F SYST BP LT 130 MM HG: CPT | Mod: CPTII,S$GLB,, | Performed by: INTERNAL MEDICINE

## 2018-11-30 RX ORDER — DICLOFENAC SODIUM 10 MG/G
2 GEL TOPICAL DAILY
Qty: 3 TUBE | Refills: 0 | Status: SHIPPED | OUTPATIENT
Start: 2018-11-30 | End: 2018-12-07

## 2018-12-06 ENCOUNTER — TELEPHONE (OUTPATIENT)
Dept: INTERNAL MEDICINE | Facility: CLINIC | Age: 78
End: 2018-12-06

## 2018-12-06 NOTE — TELEPHONE ENCOUNTER
----- Message from Balbina Palomino sent at 12/6/2018 11:29 AM CST -----  Contact: Pt Home [phone] or Mobile [phone]   Patient is calling in regards to her being seen on 11/30/2018 and she said that she was told that you was going to send a new script to her pharmacy [facility]. She said that she went to the pharmacy on today and they did not have it there.    Patient's pharmacy:  [facility] - [address]  Phone# [phone],Fax# [phone]   Comment: Patient would like to know if the script was sent to [facility] pharmacy?

## 2018-12-07 ENCOUNTER — TELEPHONE (OUTPATIENT)
Dept: INTERNAL MEDICINE | Facility: CLINIC | Age: 78
End: 2018-12-07

## 2018-12-07 NOTE — TELEPHONE ENCOUNTER
----- Message from Ana Ruiz sent at 12/7/2018  2:05 PM CST -----  Contact: self/ [phone]  Patient says she will not be taking diclofenac sodium (VOLTAREN) 1 % Gel. And please take it off her record.

## 2019-02-01 ENCOUNTER — TELEPHONE (OUTPATIENT)
Dept: INTERNAL MEDICINE | Facility: CLINIC | Age: 79
End: 2019-02-01

## 2019-02-01 DIAGNOSIS — M25.559 ARTHRALGIA OF HIP, UNSPECIFIED LATERALITY: Primary | ICD-10-CM

## 2019-02-01 NOTE — TELEPHONE ENCOUNTER
----- Message from Samanta Willingham sent at 2/1/2019 10:12 AM CST -----  Contact: Tigist Murry [phone]  The patient is requesting a call back in regards to [employer] needing to receive authorization/referral for the appointment she has scheduled on 3/1 with orthopedics. Please call to advise.

## 2019-02-19 ENCOUNTER — TELEPHONE (OUTPATIENT)
Dept: ORTHOPEDICS | Facility: CLINIC | Age: 79
End: 2019-02-19

## 2019-03-01 ENCOUNTER — HOSPITAL ENCOUNTER (OUTPATIENT)
Dept: RADIOLOGY | Facility: HOSPITAL | Age: 79
Discharge: HOME | End: 2019-03-01
Attending: ORTHOPAEDIC SURGERY
Payer: MEDICARE

## 2019-03-01 ENCOUNTER — OFFICE VISIT (OUTPATIENT)
Dept: ORTHOPEDICS | Facility: CLINIC | Age: 79
End: 2019-03-01
Payer: MEDICARE

## 2019-03-01 ENCOUNTER — TELEPHONE (OUTPATIENT)
Dept: ORTHOPEDICS | Facility: CLINIC | Age: 79
End: 2019-03-01

## 2019-03-01 VITALS — BODY MASS INDEX: 18.96 KG/M2 | HEIGHT: 63 IN | WEIGHT: 107 LBS

## 2019-03-01 DIAGNOSIS — M54.30 SCIATICA, UNSPECIFIED LATERALITY: Primary | ICD-10-CM

## 2019-03-01 DIAGNOSIS — M25.551 BILATERAL HIP PAIN: Primary | ICD-10-CM

## 2019-03-01 DIAGNOSIS — M25.552 BILATERAL HIP PAIN: Primary | ICD-10-CM

## 2019-03-01 DIAGNOSIS — M25.552 BILATERAL HIP PAIN: ICD-10-CM

## 2019-03-01 DIAGNOSIS — M25.551 BILATERAL HIP PAIN: ICD-10-CM

## 2019-03-01 PROCEDURE — 1101F PR PT FALLS ASSESS DOC 0-1 FALLS W/OUT INJ PAST YR: ICD-10-PCS | Mod: HCNC,CPTII,S$GLB, | Performed by: ORTHOPAEDIC SURGERY

## 2019-03-01 PROCEDURE — 73521 XR HIPS BILATERAL 2 VIEW INCL AP PELVIS: ICD-10-PCS | Mod: 26,HCNC,, | Performed by: RADIOLOGY

## 2019-03-01 PROCEDURE — 73521 X-RAY EXAM HIPS BI 2 VIEWS: CPT | Mod: TC,HCNC,PN

## 2019-03-01 PROCEDURE — 99203 OFFICE O/P NEW LOW 30 MIN: CPT | Mod: HCNC,S$GLB,, | Performed by: ORTHOPAEDIC SURGERY

## 2019-03-01 PROCEDURE — 99999 PR PBB SHADOW E&M-EST. PATIENT-LVL II: ICD-10-PCS | Mod: PBBFAC,HCNC,, | Performed by: ORTHOPAEDIC SURGERY

## 2019-03-01 PROCEDURE — 99203 PR OFFICE/OUTPT VISIT, NEW, LEVL III, 30-44 MIN: ICD-10-PCS | Mod: HCNC,S$GLB,, | Performed by: ORTHOPAEDIC SURGERY

## 2019-03-01 PROCEDURE — 1101F PT FALLS ASSESS-DOCD LE1/YR: CPT | Mod: HCNC,CPTII,S$GLB, | Performed by: ORTHOPAEDIC SURGERY

## 2019-03-01 PROCEDURE — 99999 PR PBB SHADOW E&M-EST. PATIENT-LVL II: CPT | Mod: PBBFAC,HCNC,, | Performed by: ORTHOPAEDIC SURGERY

## 2019-03-01 PROCEDURE — 73521 X-RAY EXAM HIPS BI 2 VIEWS: CPT | Mod: 26,HCNC,, | Performed by: RADIOLOGY

## 2019-03-01 NOTE — PROGRESS NOTES
Subjective:      Patient ID: Tigist Murry is a 78 y.o. female.    Chief Complaint: Pain of the Right Hip and Pain of the Left Hip    HPI      Tigist Murry is seen for evaluation and treatment of hip pain.  They have experienced problems with their right hip over the past 2 months Pain is located Posteriorly without distal radiation. They have been treated with over the counter analgesics.   Symptoms have recently improved. Ambulation reportedly has not been impaired. Self care ADLs are not painful.  Patient denies numbness, weakness and incontinence.    Review of Systems   Constitution: Negative for fever and weight loss.   HENT: Negative for congestion.    Eyes: Negative for visual disturbance.   Cardiovascular: Negative for chest pain.   Respiratory: Negative for shortness of breath.    Hematologic/Lymphatic: Negative for bleeding problem. Does not bruise/bleed easily.   Skin: Negative for poor wound healing.   Gastrointestinal: Negative for abdominal pain.   Genitourinary: Negative for dysuria.   Neurological: Negative for seizures.   Psychiatric/Behavioral: Negative for altered mental status.   Allergic/Immunologic: Negative for persistent infections.         Objective:            Ortho/SPM Exam    Right hip.    The patient is not in acute distress.   Body habitus is:normal.   Sclerae normal  The patient walks without a limp.   Respiratory distress:  none  The skin over the hip is:intact.   There is:no local tenderness.   Range of motion- Flexion 110, External rotation 40, internal rotation 35.  Resisted SLR negative.  Pain with rotation negative  Sciatic tension findings negative.  Shortening/lengthening compared to the contralateral side absent.  Pulses DP present, PT present.  Motor normal 5/5 strength in all tested muscle groups.   Sensory normal.    I reviewed the relevant radiographic images for the patient's condition:  Recent right hip films are normal for the patient's age      Assessment:        No diagnosis found.     the condition is resolving  Plan:       There are no diagnoses linked to this encounter.    I explained my diagnostic impression and the reasoning behind it in detail, using layman's terms.  Models and/or pictures were used to help in the explanation.    Tylenol products recommended    Activity modification explained    Home exercise program explained in detail    Natural history of the condition explained

## 2019-05-03 DIAGNOSIS — I48.0 PAROXYSMAL ATRIAL FIBRILLATION: ICD-10-CM

## 2019-05-03 RX ORDER — APIXABAN 5 MG/1
TABLET, FILM COATED ORAL
Qty: 180 TABLET | Refills: 3 | Status: SHIPPED | OUTPATIENT
Start: 2019-05-03 | End: 2019-10-24 | Stop reason: SDUPTHER

## 2019-05-07 ENCOUNTER — OFFICE VISIT (OUTPATIENT)
Dept: FAMILY MEDICINE | Facility: CLINIC | Age: 79
End: 2019-05-07
Payer: MEDICARE

## 2019-05-07 VITALS
HEART RATE: 65 BPM | HEIGHT: 63 IN | DIASTOLIC BLOOD PRESSURE: 62 MMHG | WEIGHT: 109.38 LBS | SYSTOLIC BLOOD PRESSURE: 108 MMHG | BODY MASS INDEX: 19.38 KG/M2 | OXYGEN SATURATION: 97 %

## 2019-05-07 DIAGNOSIS — F13.20 BENZODIAZEPINE DEPENDENCE: Chronic | ICD-10-CM

## 2019-05-07 DIAGNOSIS — N18.30 CKD (CHRONIC KIDNEY DISEASE) STAGE 3, GFR 30-59 ML/MIN: Chronic | ICD-10-CM

## 2019-05-07 DIAGNOSIS — N32.81 OVERACTIVE BLADDER: Chronic | ICD-10-CM

## 2019-05-07 DIAGNOSIS — Z00.00 ENCOUNTER FOR PREVENTIVE HEALTH EXAMINATION: Primary | ICD-10-CM

## 2019-05-07 DIAGNOSIS — I70.0 AORTIC ATHEROSCLEROSIS: Chronic | ICD-10-CM

## 2019-05-07 DIAGNOSIS — E78.49 OTHER HYPERLIPIDEMIA: Chronic | ICD-10-CM

## 2019-05-07 DIAGNOSIS — I48.0 PAROXYSMAL ATRIAL FIBRILLATION: ICD-10-CM

## 2019-05-07 DIAGNOSIS — D69.2 SENILE PURPURA: ICD-10-CM

## 2019-05-07 DIAGNOSIS — K21.9 GASTROESOPHAGEAL REFLUX DISEASE, ESOPHAGITIS PRESENCE NOT SPECIFIED: Chronic | ICD-10-CM

## 2019-05-07 DIAGNOSIS — I10 ESSENTIAL HYPERTENSION: Chronic | ICD-10-CM

## 2019-05-07 DIAGNOSIS — J84.10 CALCIFIED GRANULOMA OF LUNG: Chronic | ICD-10-CM

## 2019-05-07 DIAGNOSIS — F41.9 ANXIETY: Chronic | ICD-10-CM

## 2019-05-07 PROCEDURE — 99499 UNLISTED E&M SERVICE: CPT | Mod: S$GLB,,, | Performed by: NURSE PRACTITIONER

## 2019-05-07 PROCEDURE — 99499 RISK ADDL DX/OHS AUDIT: ICD-10-PCS | Mod: S$GLB,,, | Performed by: NURSE PRACTITIONER

## 2019-05-07 PROCEDURE — 3078F PR MOST RECENT DIASTOLIC BLOOD PRESSURE < 80 MM HG: ICD-10-PCS | Mod: HCNC,CPTII,S$GLB, | Performed by: NURSE PRACTITIONER

## 2019-05-07 PROCEDURE — 3074F SYST BP LT 130 MM HG: CPT | Mod: HCNC,CPTII,S$GLB, | Performed by: NURSE PRACTITIONER

## 2019-05-07 PROCEDURE — 3078F DIAST BP <80 MM HG: CPT | Mod: HCNC,CPTII,S$GLB, | Performed by: NURSE PRACTITIONER

## 2019-05-07 PROCEDURE — G0439 PR MEDICARE ANNUAL WELLNESS SUBSEQUENT VISIT: ICD-10-PCS | Mod: HCNC,S$GLB,, | Performed by: NURSE PRACTITIONER

## 2019-05-07 PROCEDURE — 99999 PR PBB SHADOW E&M-EST. PATIENT-LVL V: CPT | Mod: PBBFAC,HCNC,, | Performed by: NURSE PRACTITIONER

## 2019-05-07 PROCEDURE — G0439 PPPS, SUBSEQ VISIT: HCPCS | Mod: HCNC,S$GLB,, | Performed by: NURSE PRACTITIONER

## 2019-05-07 PROCEDURE — 3074F PR MOST RECENT SYSTOLIC BLOOD PRESSURE < 130 MM HG: ICD-10-PCS | Mod: HCNC,CPTII,S$GLB, | Performed by: NURSE PRACTITIONER

## 2019-05-07 PROCEDURE — 99999 PR PBB SHADOW E&M-EST. PATIENT-LVL V: ICD-10-PCS | Mod: PBBFAC,HCNC,, | Performed by: NURSE PRACTITIONER

## 2019-05-07 RX ORDER — ACETAMINOPHEN 500 MG
500 TABLET ORAL EVERY 12 HOURS PRN
COMMUNITY
End: 2022-04-07

## 2019-05-07 NOTE — PATIENT INSTRUCTIONS
Counseling and Referral of Other Preventative  (Italic type indicates deductible and co-insurance are waived)    Patient Name: Tigist Murry  Today's Date: 5/9/2019    Health Maintenance       Date Due Completion Date    Shingles Vaccine (1 of 2) 09/09/1989 ---    DEXA SCAN 09/20/2018 9/20/2016 (Declined)    Override on 9/20/2016: Declined    Override on 8/3/2010: Done    Mammogram 06/15/2019 6/15/2018    Lipid Panel 05/25/2019 5/25/2018    Influenza Vaccine 08/01/2019 10/22/2018    Override on 10/10/2017: Done    Colonoscopy 01/04/2020 1/4/2013 (Done)    Override on 1/4/2013: Done    Override on 1/17/2006: Done    TETANUS VACCINE 09/20/2026 9/20/2016 (Declined)    Override on 9/20/2016: Declined        No orders of the defined types were placed in this encounter.    The following information is provided to all patients.  This information is to help you find resources for any of the problems found today that may be affecting your health:                Living healthy guide: www.[facility].[place].gov      Understanding Diabetes: www.diabetes.org      Eating healthy: www.cdc.gov/healthyweight      CDC home safety checklist: www.cdc.gov/steadi/patient.html      Agency on Aging: www.goea.[place].[facility]      Alcoholics anonymous (AA): www.aa.org      Physical Activity: www.jhon.nih.gov/uy3qbxo      Tobacco use: www.quitwithusla.org

## 2019-05-07 NOTE — PROGRESS NOTES
"Tigist Murry presented for a  Medicare AWV and comprehensive Health Risk Assessment today. The following components were reviewed and updated:    · Medical history  · Family History  · Social history  · Allergies and Current Medications  · Health Risk Assessment  · Health Maintenance  · Care Team     ** See Completed Assessments for Annual Wellness Visit within the encounter summary.**       The following assessments were completed:  · Living Situation  · CAGE  · Depression Screening  · Timed Get Up and Go  · Whisper Test  · Cognitive Function Screening  · Nutrition Screening  · ADL Screening  · PAQ Screening    Vitals:    05/07/19 0841   BP: 108/62   BP Location: Right arm   Patient Position: Sitting   BP Method: Medium (Manual)   Pulse: 65   SpO2: 97%   Weight: 49.6 kg (109 lb 5.6 oz)   Height: 5' 3" (1.6 m)     Body mass index is 19.37 kg/m².     Physical Exam   Constitutional: She is oriented to person, place, and time. She appears well-developed and well-nourished. No distress.   HENT:   Head: Normocephalic and atraumatic.   Eyes: Pupils are equal, round, and reactive to light. EOM are normal.   Neck: Neck supple. No JVD present. No tracheal deviation present.   Cardiovascular: Normal rate, regular rhythm, normal heart sounds and intact distal pulses.   No murmur heard.  Pulmonary/Chest: Effort normal and breath sounds normal. No respiratory distress. She has no wheezes. She has no rales.   Abdominal: Soft. Bowel sounds are normal. She exhibits no distension and no mass. There is no tenderness.   Musculoskeletal: Normal range of motion. She exhibits no edema or tenderness.   Neurological: She is alert and oriented to person, place, and time. Coordination normal.   Skin: Skin is warm and dry. No erythema. No pallor.   Psychiatric: She has a normal mood and affect. Her behavior is normal. Judgment and thought content normal. Cognition and memory are normal. She expresses no homicidal and no suicidal ideation. "   Nursing note and vitals reviewed.        Diagnoses and health risks identified today and associated recommendations/orders:    1. Encounter for preventive health examination    2. Essential hypertension  Chronic; stable on medication.  Followed by PCP.    3. Other hyperlipidemia  Chronic; stable on medication.  Followed by PCP.    4. Aortic atherosclerosis  Chronic; stable on medication.  Followed by Cardiology.    5. Paroxysmal atrial fibrillation  Chronic; stable on medication.  Followed by Cardiology.    6. Calcified granuloma of lung  Chronic; stable.  Followed by PCP.    7. CKD (chronic kidney disease) stage 3, GFR 30-59 ml/min  Chronic; stable.  Followed by PCP.    8. Senile purpura  Chronic; stable.  Followed by PCP.    9. Anxiety  Chronic; stable on medication.  Followed by PCP.    10. Benzodiazepine dependence    11. Gastroesophageal reflux disease, esophagitis presence not specified  Chronic; stable on medication.  Followed by PCP.    12. Overactive bladder  Chronic; stable on medication.  Followed by PCP.    13. Body mass index (BMI) 19.9 or less, adult      Provided Tigist with a 5-10 year written screening schedule and personal prevention plan. Recommendations were developed using the USPSTF age appropriate recommendations. Education, counseling, and referrals were provided as needed. After Visit Summary printed and given to patient which includes a list of additional screenings\tests needed.    Follow up in 1 month (on 6/7/2019) for annual visit with PCP, Annual Wellness Visit in 1 year.    Hillary Layton NP         I offered to discuss end of life issues, including information on how to make advance directives that the patient could use to name someone who would make medical decisions on their behalf if they became too ill to make themselves.    ___Patient declined  _X_Patient is interested, I provided paper work and offered to discuss.

## 2019-05-24 NOTE — PROGRESS NOTES
Subjective:       Patient ID: Tigist Murry is a 78 y.o. female.    Chief Complaint: Annual Exam    Patient is a 78 y.o.female who presents today for follow up    Cholesterol: up to date  Vaccines: Influenza (yearly); Tetanus (up to date) ; Pneumovax (2010); Zoster (declines for now); Prevnar (done)  Eye exam: up to date  Mammogram: due now  Gyn exam: hysterectomy  Colonoscopy: 2013  DEXA: declines    (+) insomnia: used to try trazodone    (+) anxiety: mostly at night; taking trazodone and xanax prn  Review of Systems   Constitutional: Negative for appetite change, chills, diaphoresis, fatigue and fever.   HENT: Negative for congestion, dental problem, ear discharge, ear pain, hearing loss, postnasal drip, sinus pressure and sore throat.    Eyes: Negative for discharge, redness and itching.   Respiratory: Negative for cough, chest tightness, shortness of breath and wheezing.    Cardiovascular: Negative for chest pain, palpitations and leg swelling.   Gastrointestinal: Negative for abdominal pain, constipation, diarrhea, nausea and vomiting.   Endocrine: Negative for cold intolerance and heat intolerance.   Genitourinary: Negative for difficulty urinating, frequency, hematuria and urgency.   Musculoskeletal: Negative for arthralgias, back pain, gait problem, myalgias and neck pain.   Skin: Negative for color change and rash.   Neurological: Negative for dizziness, syncope and headaches.   Hematological: Negative for adenopathy.   Psychiatric/Behavioral: Negative for behavioral problems and sleep disturbance. The patient is not nervous/anxious.        Objective:      Physical Exam   Constitutional: She is oriented to person, place, and time. She appears well-developed and well-nourished. No distress.   HENT:   Head: Normocephalic and atraumatic.   Right Ear: External ear normal.   Left Ear: External ear normal.   Nose: Nose normal.   Mouth/Throat: Oropharynx is clear and moist. No oropharyngeal exudate.   Eyes:  Pupils are equal, round, and reactive to light. Conjunctivae and EOM are normal. Right eye exhibits no discharge. Left eye exhibits no discharge. No scleral icterus.   Neck: Normal range of motion. Neck supple. No JVD present. No thyromegaly present.   Cardiovascular: Normal rate, regular rhythm, normal heart sounds and intact distal pulses. Exam reveals no gallop and no friction rub.   No murmur heard.  Pulmonary/Chest: Effort normal and breath sounds normal. No stridor. No respiratory distress. She has no wheezes. She has no rales. She exhibits no tenderness.   Abdominal: Soft. Bowel sounds are normal. She exhibits no distension. There is no tenderness. There is no rebound.   Musculoskeletal: Normal range of motion. She exhibits no edema or tenderness.   Lymphadenopathy:     She has no cervical adenopathy.   Neurological: She is alert and oriented to person, place, and time. No cranial nerve deficit.   Skin: Skin is warm and dry. No rash noted. She is not diaphoretic. No erythema.   Psychiatric: She has a normal mood and affect. Her behavior is normal.   Nursing note and vitals reviewed.      Assessment and Plan:       1. Aortic atherosclerosis  - stable on meds    2. Essential hypertension  - stable    3. Other hyperlipidemia  - stable    4. CKD (chronic kidney disease) stage 3, GFR 30-59 ml/min  - stable; avoid nsaids    5. Overactive bladder  - stalbe    6. Senile purpura      7. Long term current use of anticoagulant      8. 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 PAF (paroxysmal atrial fibrillation)  - on eliquis  10. Insomnia, unspecified type  - restart trazodone    11. ARAVIND (generalized anxiety disorder)  - increase trazodone to 100 mg daily    12. Visit for screening mammogram    - Mammo Digital Screening Bilat without CA; Future          No follow-ups on file.

## 2019-05-31 ENCOUNTER — LAB VISIT (OUTPATIENT)
Dept: LAB | Facility: HOSPITAL | Age: 79
End: 2019-05-31
Attending: INTERNAL MEDICINE
Payer: MEDICARE

## 2019-05-31 ENCOUNTER — TELEPHONE (OUTPATIENT)
Dept: INTERNAL MEDICINE | Facility: CLINIC | Age: 79
End: 2019-05-31

## 2019-05-31 DIAGNOSIS — E78.49 OTHER HYPERLIPIDEMIA: ICD-10-CM

## 2019-05-31 DIAGNOSIS — N18.30 CKD (CHRONIC KIDNEY DISEASE) STAGE 3, GFR 30-59 ML/MIN: ICD-10-CM

## 2019-05-31 DIAGNOSIS — Z00.00 ANNUAL PHYSICAL EXAM: ICD-10-CM

## 2019-05-31 DIAGNOSIS — E55.9 VITAMIN D DEFICIENCY: ICD-10-CM

## 2019-05-31 DIAGNOSIS — Z00.00 ANNUAL PHYSICAL EXAM: Primary | ICD-10-CM

## 2019-05-31 DIAGNOSIS — I10 ESSENTIAL HYPERTENSION: ICD-10-CM

## 2019-05-31 LAB
25(OH)D3+25(OH)D2 SERPL-MCNC: 68 NG/ML (ref 30–96)
ALBUMIN SERPL BCP-MCNC: 4.2 G/DL (ref 3.5–5.2)
ALP SERPL-CCNC: 79 U/L (ref 55–135)
ALT SERPL W/O P-5'-P-CCNC: 26 U/L (ref 10–44)
ANION GAP SERPL CALC-SCNC: 8 MMOL/L (ref 8–16)
AST SERPL-CCNC: 21 U/L (ref 10–40)
BASOPHILS # BLD AUTO: 0.03 K/UL (ref 0–0.2)
BASOPHILS NFR BLD: 0.7 % (ref 0–1.9)
BILIRUB SERPL-MCNC: 0.8 MG/DL (ref 0.1–1)
BUN SERPL-MCNC: 19 MG/DL (ref 8–23)
CALCIUM SERPL-MCNC: 10.3 MG/DL (ref 8.7–10.5)
CHLORIDE SERPL-SCNC: 102 MMOL/L (ref 95–110)
CHOLEST SERPL-MCNC: 204 MG/DL (ref 120–199)
CHOLEST/HDLC SERPL: 3 {RATIO} (ref 2–5)
CO2 SERPL-SCNC: 32 MMOL/L (ref 23–29)
CREAT SERPL-MCNC: 0.8 MG/DL (ref 0.5–1.4)
DIFFERENTIAL METHOD: ABNORMAL
EOSINOPHIL # BLD AUTO: 0 K/UL (ref 0–0.5)
EOSINOPHIL NFR BLD: 0.4 % (ref 0–8)
ERYTHROCYTE [DISTWIDTH] IN BLOOD BY AUTOMATED COUNT: 12.7 % (ref 11.5–14.5)
EST. GFR  (AFRICAN AMERICAN): >60 ML/MIN/1.73 M^2
EST. GFR  (NON AFRICAN AMERICAN): >60 ML/MIN/1.73 M^2
GLUCOSE SERPL-MCNC: 92 MG/DL (ref 70–110)
HCT VFR BLD AUTO: 51.8 % (ref 37–48.5)
HDLC SERPL-MCNC: 67 MG/DL (ref 40–75)
HDLC SERPL: 32.8 % (ref 20–50)
HGB BLD-MCNC: 16.8 G/DL (ref 12–16)
IMM GRANULOCYTES # BLD AUTO: 0.01 K/UL (ref 0–0.04)
IMM GRANULOCYTES NFR BLD AUTO: 0.2 % (ref 0–0.5)
LDLC SERPL CALC-MCNC: 119.4 MG/DL (ref 63–159)
LYMPHOCYTES # BLD AUTO: 1.1 K/UL (ref 1–4.8)
LYMPHOCYTES NFR BLD: 24.9 % (ref 18–48)
MCH RBC QN AUTO: 30.3 PG (ref 27–31)
MCHC RBC AUTO-ENTMCNC: 32.4 G/DL (ref 32–36)
MCV RBC AUTO: 93 FL (ref 82–98)
MONOCYTES # BLD AUTO: 0.4 K/UL (ref 0.3–1)
MONOCYTES NFR BLD: 9.8 % (ref 4–15)
NEUTROPHILS # BLD AUTO: 2.9 K/UL (ref 1.8–7.7)
NEUTROPHILS NFR BLD: 64 % (ref 38–73)
NONHDLC SERPL-MCNC: 137 MG/DL
NRBC BLD-RTO: 0 /100 WBC
PLATELET # BLD AUTO: 240 K/UL (ref 150–350)
PMV BLD AUTO: 11.2 FL (ref 9.2–12.9)
POTASSIUM SERPL-SCNC: 3.8 MMOL/L (ref 3.5–5.1)
PROT SERPL-MCNC: 7.1 G/DL (ref 6–8.4)
RBC # BLD AUTO: 5.55 M/UL (ref 4–5.4)
SODIUM SERPL-SCNC: 142 MMOL/L (ref 136–145)
TRIGL SERPL-MCNC: 88 MG/DL (ref 30–150)
TSH SERPL DL<=0.005 MIU/L-ACNC: 1.22 UIU/ML (ref 0.4–4)
WBC # BLD AUTO: 4.5 K/UL (ref 3.9–12.7)

## 2019-05-31 PROCEDURE — 84443 ASSAY THYROID STIM HORMONE: CPT | Mod: HCNC

## 2019-05-31 PROCEDURE — 82306 VITAMIN D 25 HYDROXY: CPT | Mod: HCNC

## 2019-05-31 PROCEDURE — 36415 COLL VENOUS BLD VENIPUNCTURE: CPT | Mod: HCNC,PO

## 2019-05-31 PROCEDURE — 80053 COMPREHEN METABOLIC PANEL: CPT | Mod: HCNC

## 2019-05-31 PROCEDURE — 80061 LIPID PANEL: CPT | Mod: HCNC

## 2019-05-31 PROCEDURE — 85025 COMPLETE CBC W/AUTO DIFF WBC: CPT | Mod: HCNC

## 2019-06-07 ENCOUNTER — OFFICE VISIT (OUTPATIENT)
Dept: INTERNAL MEDICINE | Facility: CLINIC | Age: 79
End: 2019-06-07
Payer: MEDICARE

## 2019-06-07 VITALS
HEART RATE: 78 BPM | HEIGHT: 61 IN | SYSTOLIC BLOOD PRESSURE: 115 MMHG | RESPIRATION RATE: 16 BRPM | BODY MASS INDEX: 20.73 KG/M2 | DIASTOLIC BLOOD PRESSURE: 66 MMHG | WEIGHT: 109.81 LBS | TEMPERATURE: 98 F

## 2019-06-07 DIAGNOSIS — N32.81 OVERACTIVE BLADDER: Chronic | ICD-10-CM

## 2019-06-07 DIAGNOSIS — E78.49 OTHER HYPERLIPIDEMIA: Chronic | ICD-10-CM

## 2019-06-07 DIAGNOSIS — G47.00 INSOMNIA, UNSPECIFIED TYPE: ICD-10-CM

## 2019-06-07 DIAGNOSIS — I51.89 LEFT VENTRICULAR DIASTOLIC DYSFUNCTION WITH PRESERVED SYSTOLIC FUNCTION: Chronic | ICD-10-CM

## 2019-06-07 DIAGNOSIS — Z79.01 LONG TERM CURRENT USE OF ANTICOAGULANT: ICD-10-CM

## 2019-06-07 DIAGNOSIS — I70.0 AORTIC ATHEROSCLEROSIS: Primary | Chronic | ICD-10-CM

## 2019-06-07 DIAGNOSIS — D69.2 SENILE PURPURA: ICD-10-CM

## 2019-06-07 DIAGNOSIS — I48.0 PAF (PAROXYSMAL ATRIAL FIBRILLATION): ICD-10-CM

## 2019-06-07 DIAGNOSIS — I10 ESSENTIAL HYPERTENSION: Chronic | ICD-10-CM

## 2019-06-07 DIAGNOSIS — F41.1 GAD (GENERALIZED ANXIETY DISORDER): ICD-10-CM

## 2019-06-07 DIAGNOSIS — Z12.31 VISIT FOR SCREENING MAMMOGRAM: ICD-10-CM

## 2019-06-07 DIAGNOSIS — N18.30 CKD (CHRONIC KIDNEY DISEASE) STAGE 3, GFR 30-59 ML/MIN: Chronic | ICD-10-CM

## 2019-06-07 PROCEDURE — 1101F PT FALLS ASSESS-DOCD LE1/YR: CPT | Mod: HCNC,CPTII,S$GLB, | Performed by: INTERNAL MEDICINE

## 2019-06-07 PROCEDURE — 99999 PR PBB SHADOW E&M-EST. PATIENT-LVL III: ICD-10-PCS | Mod: PBBFAC,HCNC,, | Performed by: INTERNAL MEDICINE

## 2019-06-07 PROCEDURE — 1101F PR PT FALLS ASSESS DOC 0-1 FALLS W/OUT INJ PAST YR: ICD-10-PCS | Mod: HCNC,CPTII,S$GLB, | Performed by: INTERNAL MEDICINE

## 2019-06-07 PROCEDURE — 3074F SYST BP LT 130 MM HG: CPT | Mod: HCNC,CPTII,S$GLB, | Performed by: INTERNAL MEDICINE

## 2019-06-07 PROCEDURE — 99214 PR OFFICE/OUTPT VISIT, EST, LEVL IV, 30-39 MIN: ICD-10-PCS | Mod: HCNC,S$GLB,, | Performed by: INTERNAL MEDICINE

## 2019-06-07 PROCEDURE — 3078F PR MOST RECENT DIASTOLIC BLOOD PRESSURE < 80 MM HG: ICD-10-PCS | Mod: HCNC,CPTII,S$GLB, | Performed by: INTERNAL MEDICINE

## 2019-06-07 PROCEDURE — 99999 PR PBB SHADOW E&M-EST. PATIENT-LVL III: CPT | Mod: PBBFAC,HCNC,, | Performed by: INTERNAL MEDICINE

## 2019-06-07 PROCEDURE — 3078F DIAST BP <80 MM HG: CPT | Mod: HCNC,CPTII,S$GLB, | Performed by: INTERNAL MEDICINE

## 2019-06-07 PROCEDURE — 99214 OFFICE O/P EST MOD 30 MIN: CPT | Mod: HCNC,S$GLB,, | Performed by: INTERNAL MEDICINE

## 2019-06-07 PROCEDURE — 3074F PR MOST RECENT SYSTOLIC BLOOD PRESSURE < 130 MM HG: ICD-10-PCS | Mod: HCNC,CPTII,S$GLB, | Performed by: INTERNAL MEDICINE

## 2019-06-10 ENCOUNTER — TELEPHONE (OUTPATIENT)
Dept: INTERNAL MEDICINE | Facility: CLINIC | Age: 79
End: 2019-06-10

## 2019-06-10 RX ORDER — TRAZODONE HYDROCHLORIDE 50 MG/1
50 TABLET ORAL NIGHTLY
Qty: 90 TABLET | Refills: 3 | Status: SHIPPED | OUTPATIENT
Start: 2019-06-10 | End: 2020-06-04

## 2019-06-10 RX ORDER — ALPRAZOLAM 0.25 MG/1
0.25 TABLET ORAL 2 TIMES DAILY PRN
Qty: 180 TABLET | Refills: 0 | Status: SHIPPED | OUTPATIENT
Start: 2019-06-10 | End: 2020-06-24 | Stop reason: SDUPTHER

## 2019-06-10 NOTE — TELEPHONE ENCOUNTER
----- Message from Karina Rodriguez sent at 6/10/2019  9:32 AM CDT -----  Contact: patient [phone]  Patient called to report that the xanax and trazadone both helped and she would like this ordered from [facility] .    Patient is calling for an RX refill or new RX.  Is this a refill or new RX:  new  RX name and strength: traZODone (DESYREL) 50 MG tablet  Directions (copy/paste from chart):    Is this a 30 day or 90 day RX:  30  Local pharmacy or mail order pharmacy:  Mail order  Pharmacy name and phone # (copy/paste from chart):   [facility] - [facility] [address] [phone]   Comments:      Patient is calling for an RX refill or new RX.  Is this a refill or new RX:    RX name and strength: ALPRAZolam (XANAX) 0.25 MG tablet  Directions (copy/paste from chart):    Is this a 30 day or 90 day RX:  30  Local pharmacy or mail order pharmacy:  Mail order  Pharmacy name and phone # see dina  Comments:

## 2019-06-21 ENCOUNTER — HOSPITAL ENCOUNTER (OUTPATIENT)
Dept: RADIOLOGY | Facility: HOSPITAL | Age: 79
Discharge: HOME | End: 2019-06-21
Attending: INTERNAL MEDICINE
Payer: MEDICARE

## 2019-06-21 DIAGNOSIS — Z12.31 VISIT FOR SCREENING MAMMOGRAM: ICD-10-CM

## 2019-06-21 PROCEDURE — 77063 MAMMO DIGITAL SCREENING BILAT WITH TOMOSYNTHESIS_CAD: ICD-10-PCS | Mod: 26,HCNC,, | Performed by: RADIOLOGY

## 2019-06-21 PROCEDURE — 77063 BREAST TOMOSYNTHESIS BI: CPT | Mod: 26,HCNC,, | Performed by: RADIOLOGY

## 2019-06-21 PROCEDURE — 77067 MAMMO DIGITAL SCREENING BILAT WITH TOMOSYNTHESIS_CAD: ICD-10-PCS | Mod: 26,HCNC,, | Performed by: RADIOLOGY

## 2019-06-21 PROCEDURE — 77067 SCR MAMMO BI INCL CAD: CPT | Mod: TC,HCNC

## 2019-06-21 PROCEDURE — 77067 SCR MAMMO BI INCL CAD: CPT | Mod: 26,HCNC,, | Performed by: RADIOLOGY

## 2019-06-26 DIAGNOSIS — R05.3 CHRONIC COUGH: ICD-10-CM

## 2019-06-27 RX ORDER — ALPRAZOLAM 0.25 MG/1
TABLET ORAL
Qty: 180 TABLET | Refills: 0 | OUTPATIENT
Start: 2019-06-27

## 2019-07-26 ENCOUNTER — OFFICE VISIT (OUTPATIENT)
Dept: INTERNAL MEDICINE | Facility: CLINIC | Age: 79
End: 2019-07-26
Payer: MEDICARE

## 2019-07-26 VITALS
HEART RATE: 80 BPM | DIASTOLIC BLOOD PRESSURE: 80 MMHG | TEMPERATURE: 98 F | BODY MASS INDEX: 21.02 KG/M2 | WEIGHT: 111.31 LBS | RESPIRATION RATE: 14 BRPM | HEIGHT: 61 IN | SYSTOLIC BLOOD PRESSURE: 120 MMHG

## 2019-07-26 DIAGNOSIS — N30.00 ACUTE CYSTITIS WITHOUT HEMATURIA: Primary | ICD-10-CM

## 2019-07-26 PROCEDURE — 87186 SC STD MICRODIL/AGAR DIL: CPT | Mod: 59,HCNC

## 2019-07-26 PROCEDURE — 3079F DIAST BP 80-89 MM HG: CPT | Mod: HCNC,CPTII,S$GLB, | Performed by: INTERNAL MEDICINE

## 2019-07-26 PROCEDURE — 1101F PR PT FALLS ASSESS DOC 0-1 FALLS W/OUT INJ PAST YR: ICD-10-PCS | Mod: HCNC,CPTII,S$GLB, | Performed by: INTERNAL MEDICINE

## 2019-07-26 PROCEDURE — 99213 PR OFFICE/OUTPT VISIT, EST, LEVL III, 20-29 MIN: ICD-10-PCS | Mod: HCNC,S$GLB,, | Performed by: INTERNAL MEDICINE

## 2019-07-26 PROCEDURE — 3079F PR MOST RECENT DIASTOLIC BLOOD PRESSURE 80-89 MM HG: ICD-10-PCS | Mod: HCNC,CPTII,S$GLB, | Performed by: INTERNAL MEDICINE

## 2019-07-26 PROCEDURE — 1101F PT FALLS ASSESS-DOCD LE1/YR: CPT | Mod: HCNC,CPTII,S$GLB, | Performed by: INTERNAL MEDICINE

## 2019-07-26 PROCEDURE — 87086 URINE CULTURE/COLONY COUNT: CPT | Mod: HCNC

## 2019-07-26 PROCEDURE — 99999 PR PBB SHADOW E&M-EST. PATIENT-LVL III: ICD-10-PCS | Mod: PBBFAC,HCNC,, | Performed by: INTERNAL MEDICINE

## 2019-07-26 PROCEDURE — 99213 OFFICE O/P EST LOW 20 MIN: CPT | Mod: HCNC,S$GLB,, | Performed by: INTERNAL MEDICINE

## 2019-07-26 PROCEDURE — 81001 URINALYSIS AUTO W/SCOPE: CPT | Mod: HCNC

## 2019-07-26 PROCEDURE — 87077 CULTURE AEROBIC IDENTIFY: CPT | Mod: 59,HCNC

## 2019-07-26 PROCEDURE — 3074F SYST BP LT 130 MM HG: CPT | Mod: HCNC,CPTII,S$GLB, | Performed by: INTERNAL MEDICINE

## 2019-07-26 PROCEDURE — 3074F PR MOST RECENT SYSTOLIC BLOOD PRESSURE < 130 MM HG: ICD-10-PCS | Mod: HCNC,CPTII,S$GLB, | Performed by: INTERNAL MEDICINE

## 2019-07-26 PROCEDURE — 99999 PR PBB SHADOW E&M-EST. PATIENT-LVL III: CPT | Mod: PBBFAC,HCNC,, | Performed by: INTERNAL MEDICINE

## 2019-07-26 PROCEDURE — 87088 URINE BACTERIA CULTURE: CPT | Mod: HCNC

## 2019-07-26 RX ORDER — NITROFURANTOIN 25; 75 MG/1; MG/1
100 CAPSULE ORAL 2 TIMES DAILY
Qty: 10 CAPSULE | Refills: 0 | Status: SHIPPED | OUTPATIENT
Start: 2019-07-26 | End: 2019-07-31

## 2019-07-26 NOTE — PROGRESS NOTES
Subjective:       Patient ID: Tigist Murry is a 78 y.o. female.    Chief Complaint: Cystitis    HPI     78-year-old female here for evaluation of a possible bladder infection.  She started with symptoms yesterday.  She took azo, which helped and woke up this morning about 4 am.  She had bright red blood in her urine.  She has a cup of sterile urine with her that is red.  She feels like she has to go now.  She does not get these very often.  It has been years since her last UTI.    Review of Systems    Objective:      Physical Exam   Constitutional: She is oriented to person, place, and time. She appears well-developed and well-nourished.   HENT:   Head: Normocephalic and atraumatic.   Mouth/Throat: No oropharyngeal exudate.   Eyes: Pupils are equal, round, and reactive to light. EOM are normal. Right eye exhibits no discharge. Left eye exhibits no discharge. No scleral icterus.   Neck: Normal range of motion. Neck supple. No tracheal deviation present. No thyromegaly present.   Cardiovascular: Normal rate, regular rhythm and normal heart sounds. Exam reveals no gallop and no friction rub.   No murmur heard.  Pulmonary/Chest: Effort normal and breath sounds normal. No respiratory distress. She has no wheezes. She has no rales. She exhibits no tenderness.   Abdominal: Soft. Bowel sounds are normal. She exhibits no distension and no mass. There is no tenderness. There is no rebound and no guarding.   Musculoskeletal: Normal range of motion. She exhibits no edema or tenderness.   Neurological: She is alert and oriented to person, place, and time.   Skin: Skin is warm and dry. No rash noted. No erythema. No pallor.   Psychiatric: She has a normal mood and affect. Her behavior is normal.   Vitals reviewed.      Assessment:       1. Acute cystitis without hematuria        Plan:       1.  Check urinalysis, urine culture.  Macrobid 100 mg b.i.d. x5 days.

## 2019-07-27 LAB
AMORPH CRY UR QL COMP ASSIST: ABNORMAL
BACTERIA #/AREA URNS AUTO: ABNORMAL /HPF
BILIRUB UR QL STRIP: NEGATIVE
CLARITY UR REFRACT.AUTO: ABNORMAL
COLOR UR AUTO: ABNORMAL
GLUCOSE UR QL STRIP: NEGATIVE
HGB UR QL STRIP: ABNORMAL
HYALINE CASTS UR QL AUTO: 0 /LPF
KETONES UR QL STRIP: NEGATIVE
LEUKOCYTE ESTERASE UR QL STRIP: ABNORMAL
MICROSCOPIC COMMENT: ABNORMAL
NITRITE UR QL STRIP: POSITIVE
PH UR STRIP: 6 [PH] (ref 5–8)
PROT UR QL STRIP: ABNORMAL
RBC #/AREA URNS AUTO: >100 /HPF (ref 0–4)
SP GR UR STRIP: 1.01 (ref 1–1.03)
URN SPEC COLLECT METH UR: ABNORMAL
WBC #/AREA URNS AUTO: 6 /HPF (ref 0–5)

## 2019-07-29 LAB
BACTERIA UR CULT: ABNORMAL
BACTERIA UR CULT: ABNORMAL

## 2019-09-03 DIAGNOSIS — N32.81 OVERACTIVE BLADDER: ICD-10-CM

## 2019-09-04 RX ORDER — OXYBUTYNIN CHLORIDE 5 MG/1
TABLET ORAL
Qty: 90 TABLET | Refills: 3 | Status: SHIPPED | OUTPATIENT
Start: 2019-09-04 | End: 2020-10-01 | Stop reason: SDUPTHER

## 2019-10-01 DIAGNOSIS — I47.10 SVT (SUPRAVENTRICULAR TACHYCARDIA): ICD-10-CM

## 2019-10-01 RX ORDER — DILTIAZEM HYDROCHLORIDE 240 MG/1
CAPSULE, EXTENDED RELEASE ORAL
Qty: 90 CAPSULE | Refills: 3 | Status: SHIPPED | OUTPATIENT
Start: 2019-10-01 | End: 2019-10-24

## 2019-10-18 ENCOUNTER — TELEPHONE (OUTPATIENT)
Dept: ELECTROPHYSIOLOGY | Facility: CLINIC | Age: 79
End: 2019-10-18

## 2019-10-18 DIAGNOSIS — I48.0 PAF (PAROXYSMAL ATRIAL FIBRILLATION): Primary | ICD-10-CM

## 2019-10-18 NOTE — TELEPHONE ENCOUNTER
Spoke w/ pt & informed her of her echo & holter appts. Pt confirmed & will call to reschedule all appts if she cant make it. I informed her that we would need to reschedule DM appt if she did not have test done.

## 2019-10-18 NOTE — TELEPHONE ENCOUNTER
Spoke w/ pt. She wanted to have test done in [place], but I informed her that they do not do those there. Pt understood & will keep appts as is.   ----- Message from Jerilyn Hendrickson sent at 10/18/2019  4:13 PM CDT -----  Contact: pt  Pt would like to speak to you about the appt for Dr. Dowling that you made for her and can be reached at [phone]..

## 2019-10-21 ENCOUNTER — HOSPITAL ENCOUNTER (OUTPATIENT)
Dept: CARDIOLOGY | Facility: HOSPITAL | Age: 79
Discharge: HOME | End: 2019-10-21
Attending: INTERNAL MEDICINE
Payer: MEDICARE

## 2019-10-21 VITALS — WEIGHT: 111 LBS | BODY MASS INDEX: 20.96 KG/M2 | HEIGHT: 61 IN

## 2019-10-21 DIAGNOSIS — I48.0 PAF (PAROXYSMAL ATRIAL FIBRILLATION): ICD-10-CM

## 2019-10-21 LAB
AORTIC ROOT ANNULUS: 3.09 CM
ASCENDING AORTA: 2.85 CM
AV INDEX (PROSTH): 0.79
AV MEAN GRADIENT: 4 MMHG
AV PEAK GRADIENT: 7 MMHG
AV VALVE AREA: 2.11 CM2
AV VELOCITY RATIO: 0.86
AV VENA CONTRACTA: 389 CM
BSA FOR ECHO PROCEDURE: 1.47 M2
CV ECHO LV RWT: 0.45 CM
DOP CALC AO PEAK VEL: 1.33 M/S
DOP CALC AO VTI: 33.04 CM
DOP CALC LVOT AREA: 2.7 CM2
DOP CALC LVOT DIAMETER: 1.85 CM
DOP CALC LVOT PEAK VEL: 1.14 M/S
DOP CALC LVOT STROKE VOLUME: 69.85 CM3
DOP CALCLVOT PEAK VEL VTI: 26 CM
E WAVE DECELERATION TIME: 142.07 MSEC
E/A RATIO: 0.79
E/E' RATIO: 8.71 M/S
ECHO LV POSTERIOR WALL: 0.9 CM (ref 0.6–1.1)
FRACTIONAL SHORTENING: 40 % (ref 28–44)
INTERVENTRICULAR SEPTUM: 0.99 CM (ref 0.6–1.1)
LA MAJOR: 5.05 CM
LA MINOR: 4.97 CM
LA WIDTH: 3.12 CM
LEFT ATRIUM SIZE: 3.67 CM
LEFT ATRIUM VOLUME INDEX: 33.1 ML/M2
LEFT ATRIUM VOLUME: 48.76 CM3
LEFT INTERNAL DIMENSION IN SYSTOLE: 2.41 CM (ref 2.1–4)
LEFT VENTRICLE DIASTOLIC VOLUME INDEX: 48.83 ML/M2
LEFT VENTRICLE DIASTOLIC VOLUME: 71.82 ML
LEFT VENTRICLE MASS INDEX: 81 G/M2
LEFT VENTRICLE SYSTOLIC VOLUME INDEX: 13.9 ML/M2
LEFT VENTRICLE SYSTOLIC VOLUME: 20.41 ML
LEFT VENTRICULAR INTERNAL DIMENSION IN DIASTOLE: 4.04 CM (ref 3.5–6)
LEFT VENTRICULAR MASS: 119.23 G
LV LATERAL E/E' RATIO: 7.63 M/S
LV SEPTAL E/E' RATIO: 10.17 M/S
MV PEAK A VEL: 0.77 M/S
MV PEAK E VEL: 0.61 M/S
PISA TR MAX VEL: 2.39 M/S
PULM VEIN S/D RATIO: 1.48
PV PEAK D VEL: 0.29 M/S
PV PEAK S VEL: 0.43 M/S
PV PEAK VELOCITY: 0.77 CM/S
RA MAJOR: 4.05 CM
RA PRESSURE: 8 MMHG
RA WIDTH: 3.57 CM
RIGHT VENTRICULAR END-DIASTOLIC DIMENSION: 2.33 CM
STJ: 2.33 CM
TDI LATERAL: 0.08 M/S
TDI SEPTAL: 0.06 M/S
TDI: 0.07 M/S
TR MAX PG: 23 MMHG
TRICUSPID ANNULAR PLANE SYSTOLIC EXCURSION: 1.77 CM
TV REST PULMONARY ARTERY PRESSURE: 31 MMHG

## 2019-10-21 PROCEDURE — 93306 TTE W/DOPPLER COMPLETE: CPT | Mod: 26,HCNC,, | Performed by: INTERNAL MEDICINE

## 2019-10-21 PROCEDURE — 93306 TTE W/DOPPLER COMPLETE: CPT | Mod: HCNC

## 2019-10-21 PROCEDURE — 93226 XTRNL ECG REC<48 HR SCAN A/R: CPT | Mod: HCNC

## 2019-10-21 PROCEDURE — 93306 ECHO (CUPID ONLY): ICD-10-PCS | Mod: 26,HCNC,, | Performed by: INTERNAL MEDICINE

## 2019-10-21 PROCEDURE — 93227 HOLTER MONITOR - 24 HOUR (CUPID ONLY): ICD-10-PCS | Mod: HCNC,,, | Performed by: INTERNAL MEDICINE

## 2019-10-21 PROCEDURE — 93227 XTRNL ECG REC<48 HR R&I: CPT | Mod: HCNC,,, | Performed by: INTERNAL MEDICINE

## 2019-10-22 ENCOUNTER — PATIENT OUTREACH (OUTPATIENT)
Dept: ADMINISTRATIVE | Facility: OTHER | Age: 79
End: 2019-10-22

## 2019-10-22 DIAGNOSIS — I49.8 OTHER SPECIFIED CARDIAC ARRHYTHMIAS: Primary | ICD-10-CM

## 2019-10-24 ENCOUNTER — OFFICE VISIT (OUTPATIENT)
Dept: CARDIOLOGY | Facility: CLINIC | Age: 79
End: 2019-10-24
Payer: MEDICARE

## 2019-10-24 VITALS
HEIGHT: 61 IN | WEIGHT: 110 LBS | DIASTOLIC BLOOD PRESSURE: 60 MMHG | BODY MASS INDEX: 20.77 KG/M2 | HEART RATE: 87 BPM | SYSTOLIC BLOOD PRESSURE: 123 MMHG

## 2019-10-24 DIAGNOSIS — I47.10 SVT (SUPRAVENTRICULAR TACHYCARDIA): ICD-10-CM

## 2019-10-24 DIAGNOSIS — Z79.01 LONG TERM CURRENT USE OF ANTICOAGULANT: ICD-10-CM

## 2019-10-24 DIAGNOSIS — I48.0 PAROXYSMAL ATRIAL FIBRILLATION: ICD-10-CM

## 2019-10-24 DIAGNOSIS — E78.49 OTHER HYPERLIPIDEMIA: Chronic | ICD-10-CM

## 2019-10-24 DIAGNOSIS — I49.8 OTHER SPECIFIED CARDIAC ARRHYTHMIAS: ICD-10-CM

## 2019-10-24 DIAGNOSIS — I48.0 PAF (PAROXYSMAL ATRIAL FIBRILLATION): ICD-10-CM

## 2019-10-24 DIAGNOSIS — I10 ESSENTIAL HYPERTENSION: Primary | Chronic | ICD-10-CM

## 2019-10-24 LAB
OHS CV EVENT MONITOR DAY: 0
OHS CV HOLTER LENGTH DECIMAL HOURS: 24
OHS CV HOLTER LENGTH HOURS: 24
OHS CV HOLTER LENGTH MINUTES: 0

## 2019-10-24 PROCEDURE — 93005 RHYTHM STRIP: ICD-10-PCS | Mod: HCNC,S$GLB,, | Performed by: INTERNAL MEDICINE

## 2019-10-24 PROCEDURE — 99214 OFFICE O/P EST MOD 30 MIN: CPT | Mod: HCNC,S$GLB,, | Performed by: INTERNAL MEDICINE

## 2019-10-24 PROCEDURE — 3074F PR MOST RECENT SYSTOLIC BLOOD PRESSURE < 130 MM HG: ICD-10-PCS | Mod: HCNC,CPTII,S$GLB, | Performed by: INTERNAL MEDICINE

## 2019-10-24 PROCEDURE — 93010 ELECTROCARDIOGRAM REPORT: CPT | Mod: HCNC,S$GLB,, | Performed by: INTERNAL MEDICINE

## 2019-10-24 PROCEDURE — 99999 PR PBB SHADOW E&M-EST. PATIENT-LVL III: ICD-10-PCS | Mod: PBBFAC,HCNC,, | Performed by: INTERNAL MEDICINE

## 2019-10-24 PROCEDURE — 1101F PT FALLS ASSESS-DOCD LE1/YR: CPT | Mod: HCNC,CPTII,S$GLB, | Performed by: INTERNAL MEDICINE

## 2019-10-24 PROCEDURE — 3078F DIAST BP <80 MM HG: CPT | Mod: HCNC,CPTII,S$GLB, | Performed by: INTERNAL MEDICINE

## 2019-10-24 PROCEDURE — 1101F PR PT FALLS ASSESS DOC 0-1 FALLS W/OUT INJ PAST YR: ICD-10-PCS | Mod: HCNC,CPTII,S$GLB, | Performed by: INTERNAL MEDICINE

## 2019-10-24 PROCEDURE — 99214 PR OFFICE/OUTPT VISIT, EST, LEVL IV, 30-39 MIN: ICD-10-PCS | Mod: HCNC,S$GLB,, | Performed by: INTERNAL MEDICINE

## 2019-10-24 PROCEDURE — 99999 PR PBB SHADOW E&M-EST. PATIENT-LVL III: CPT | Mod: PBBFAC,HCNC,, | Performed by: INTERNAL MEDICINE

## 2019-10-24 PROCEDURE — 93005 ELECTROCARDIOGRAM TRACING: CPT | Mod: HCNC,S$GLB,, | Performed by: INTERNAL MEDICINE

## 2019-10-24 PROCEDURE — 93010 RHYTHM STRIP: ICD-10-PCS | Mod: HCNC,S$GLB,, | Performed by: INTERNAL MEDICINE

## 2019-10-24 PROCEDURE — 3074F SYST BP LT 130 MM HG: CPT | Mod: HCNC,CPTII,S$GLB, | Performed by: INTERNAL MEDICINE

## 2019-10-24 PROCEDURE — 3078F PR MOST RECENT DIASTOLIC BLOOD PRESSURE < 80 MM HG: ICD-10-PCS | Mod: HCNC,CPTII,S$GLB, | Performed by: INTERNAL MEDICINE

## 2019-10-24 RX ORDER — CHLORTHALIDONE 25 MG/1
TABLET ORAL
Qty: 90 TABLET | Refills: 3 | Status: SHIPPED | OUTPATIENT
Start: 2019-10-24 | End: 2020-10-01 | Stop reason: SDUPTHER

## 2019-10-24 RX ORDER — DILTIAZEM HYDROCHLORIDE 240 MG/1
240 CAPSULE, EXTENDED RELEASE ORAL DAILY
Qty: 90 CAPSULE | Refills: 3 | Status: SHIPPED | OUTPATIENT
Start: 2019-10-24 | End: 2020-11-09

## 2019-10-24 NOTE — PROGRESS NOTES
Subjective:    Patient ID:  Tigist Murry is a 79 y.o. female who presents for follow-up of paf      HPI   Former pt of JOSE Perry; switched to me to be seen in [place]    79 y.o. 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  Still has PAF, as evidenced on MCOT and Holter monitoring. Asx for the most part. Sometimes fast HR, during which she feels palps.  No CP, no SOB, no LH/presync/sync.  no LH/presync/sync.    Working out (aerobics, yoga) 4x/week! No problems at all.    Holter: 10/21/19: SR  echo 60-65%.    My interpretation of today's ECG is AF at controlled response. Pt feels nothing.    Review of Systems   Constitution: Negative. Negative for malaise/fatigue.   HENT: Negative.  Negative for ear pain and tinnitus.    Eyes: Negative for blurred vision.   Cardiovascular: Negative.  Negative for chest pain, dyspnea on exertion, near-syncope, palpitations and syncope.   Respiratory: Negative.  Negative for shortness of breath.    Endocrine: Negative.  Negative for polyuria.   Hematologic/Lymphatic: Does not bruise/bleed easily.   Skin: Negative.  Negative for rash.   Musculoskeletal: Negative.  Negative for joint pain and muscle weakness.   Gastrointestinal: Negative.  Negative for abdominal pain and change in bowel habit.   Genitourinary: Negative for frequency.   Neurological: Negative.  Negative for dizziness and weakness.   Psychiatric/Behavioral: Negative for depression. The patient is nervous/anxious.    Allergic/Immunologic: Negative for environmental allergies.        Objective:    Physical Exam   Constitutional: She is oriented to person, place, and time. Vital signs are normal. She appears well-developed and well-nourished. She is active and cooperative.   HENT:   Head: Normocephalic and atraumatic.   Eyes: Conjunctivae and EOM are normal.   Neck: Normal range of motion. Carotid bruit is not present. No tracheal deviation and no  edema present. No thyroid mass and no thyromegaly present.   Cardiovascular: Normal rate, regular rhythm, normal heart sounds, intact distal pulses and normal pulses.  No extrasystoles are present. PMI is not displaced. Exam reveals no gallop and no friction rub.   No murmur heard.  Pulmonary/Chest: Effort normal and breath sounds normal. No respiratory distress. She has no wheezes. She has no rales.   Abdominal: Soft. Normal appearance. She exhibits no distension. There is no hepatosplenomegaly.   Musculoskeletal: Normal range of motion.   Neurological: She is alert and oriented to person, place, and time. Coordination normal.   Skin: Skin is warm and dry. No rash noted.   Psychiatric: She has a normal mood and affect. Her speech is normal and behavior is normal. Thought content normal. Cognition and memory are normal.   Nursing note and vitals reviewed.        Assessment:       1. Essential hypertension    2. Other specified cardiac arrhythmias    3. Other hyperlipidemia    4. PAF (paroxysmal atrial fibrillation)    5. Long term current use of anticoagulant    6. Paroxysmal atrial fibrillation    7. SVT (supraventricular tachycardia)         Plan:       Asx PAF: SR on Holter 3 days ago, and no change in sx with AF today.    Continue dilt, eliquis. Decrease dose eliquis due to age, weight.  Return in 1 year, or earlier prn.

## 2019-10-25 ENCOUNTER — IMMUNIZATION (OUTPATIENT)
Dept: PHARMACY | Facility: CLINIC | Age: 79
End: 2019-10-25
Payer: MEDICARE

## 2019-10-30 DIAGNOSIS — I47.10 SVT (SUPRAVENTRICULAR TACHYCARDIA): ICD-10-CM

## 2019-10-30 RX ORDER — DILTIAZEM HYDROCHLORIDE 240 MG/1
240 CAPSULE, EXTENDED RELEASE ORAL DAILY
Qty: 90 CAPSULE | Refills: 3 | Status: CANCELLED | OUTPATIENT
Start: 2019-10-30

## 2019-10-30 NOTE — TELEPHONE ENCOUNTER
----- Message from Be Murillo MA sent at 10/30/2019 12:03 PM CDT -----  Contact: Patient      ----- Message -----  From: Mya Del Rosario  Sent: 10/30/2019  11:11 AM CDT  To: Nitesh ANTOINE Staff    Patient called stating that she needs someone to call  [employer] for an authorization for her prescription.    Please call  [phone] .     Pharmacy is Holding apixaban (ELIQUIS) 2.5 mg Tab and  diltiaZEM (TIAZAC) 240 MG Cs24.    Please call patient to confirm [phone] .

## 2019-10-30 NOTE — TELEPHONE ENCOUNTER
Called pt & she informed me that [facility] called & left her a message, but she didn't need to speak w/ her b/c [employer] had authorized her rx  ----- Message from Chasidy Liao sent at 10/30/2019  3:58 PM CDT -----  Contact: Patient  The Pt is retuning a call. Please call her back @ [phone]. Thanks, Chasidy

## 2019-10-31 ENCOUNTER — TELEPHONE (OUTPATIENT)
Dept: ELECTROPHYSIOLOGY | Facility: CLINIC | Age: 79
End: 2019-10-31

## 2019-10-31 NOTE — TELEPHONE ENCOUNTER
Returned call to pt, no answer. Left message for pt to return call.         ----- Message from Be Murillo MA sent at 10/31/2019 11:06 AM CDT -----  Contact: Patient      ----- Message -----  From: Chasidy Liao  Sent: 10/31/2019  10:59 AM CDT  To: Nitesh ANTOINE Staff    The Pt is calling about her PA for her medication. Please call her back @[phone]. Thanks, Chasidy

## 2019-10-31 NOTE — TELEPHONE ENCOUNTER
Called [employer] and no PA is needed for Eliquis 2.5 mg BID. Tried to reach pt, no answer. Left voice message for her to contact the office.        ----- Message from Ela Kamara sent at 10/31/2019 11:25 AM CDT -----  Contact: Self  Calling for an update of the pre-authorization if can call.  Thanks    [phone]

## 2019-11-01 ENCOUNTER — TELEPHONE (OUTPATIENT)
Dept: ELECTROPHYSIOLOGY | Facility: CLINIC | Age: 79
End: 2019-11-01

## 2019-11-01 NOTE — TELEPHONE ENCOUNTER
Rx resent. Unable to call and leave message because wrong number was taken.        ----- Message from Be Murillo MA sent at 11/1/2019  3:29 PM CDT -----  Contact: [facility]      ----- Message -----  From: Noa Morales  Sent: 11/1/2019   3:20 PM CDT  To: Nitesh ANTOINE Staff    Ms. Carty is calling to speak with Staff regarding a potential error sent on 10/24/19 for Eliquis 2.5 mg, but directions were 2 tablets, 5 mgs twice daily.  Pt says it is wrong it should be Eliquis 2.5 mg one tablet, twice daily.  Please resubmit the prescription and leave a message it has been done.    She can be reached at [phone], [phone].    Thank you.

## 2019-11-01 NOTE — TELEPHONE ENCOUNTER
Returned call to pt. No answer. Left message that she does not need PA to refill her Eliquis. Also left number for pt to return call.     ----- Message from Pennie Malone sent at 11/1/2019  8:24 AM CDT -----  Contact: pt called   Pt calling regarding a PA for Eliquis.Please call pt @ [phone] . Thank you.

## 2020-02-24 ENCOUNTER — TELEPHONE (OUTPATIENT)
Dept: INTERNAL MEDICINE | Facility: CLINIC | Age: 80
End: 2020-02-24

## 2020-02-24 ENCOUNTER — PES CALL (OUTPATIENT)
Dept: ADMINISTRATIVE | Facility: CLINIC | Age: 80
End: 2020-02-24

## 2020-02-24 DIAGNOSIS — I70.0 AORTIC ATHEROSCLEROSIS: ICD-10-CM

## 2020-02-24 DIAGNOSIS — I10 ESSENTIAL HYPERTENSION: Primary | ICD-10-CM

## 2020-02-24 DIAGNOSIS — N18.30 CKD (CHRONIC KIDNEY DISEASE) STAGE 3, GFR 30-59 ML/MIN: ICD-10-CM

## 2020-02-24 NOTE — TELEPHONE ENCOUNTER
----- Message from Rae Barragan sent at 2/24/2020  3:21 PM CST -----  Contact: Self   Pt is requesting orders for labs on 3/12 for annual appt on 3/17

## 2020-03-11 ENCOUNTER — LAB VISIT (OUTPATIENT)
Dept: LAB | Facility: HOSPITAL | Age: 80
End: 2020-03-11
Attending: INTERNAL MEDICINE
Payer: MEDICARE

## 2020-03-11 DIAGNOSIS — N18.30 CKD (CHRONIC KIDNEY DISEASE) STAGE 3, GFR 30-59 ML/MIN: ICD-10-CM

## 2020-03-11 DIAGNOSIS — E78.5 HYPERLIPIDEMIA, UNSPECIFIED HYPERLIPIDEMIA TYPE: ICD-10-CM

## 2020-03-11 DIAGNOSIS — I10 ESSENTIAL HYPERTENSION: ICD-10-CM

## 2020-03-11 DIAGNOSIS — I70.0 AORTIC ATHEROSCLEROSIS: ICD-10-CM

## 2020-03-11 LAB
BACTERIA #/AREA URNS AUTO: ABNORMAL /HPF
BILIRUB UR QL STRIP: NEGATIVE
CLARITY UR REFRACT.AUTO: ABNORMAL
COLOR UR AUTO: YELLOW
GLUCOSE UR QL STRIP: NEGATIVE
HGB UR QL STRIP: NEGATIVE
KETONES UR QL STRIP: NEGATIVE
LEUKOCYTE ESTERASE UR QL STRIP: ABNORMAL
MICROSCOPIC COMMENT: ABNORMAL
NITRITE UR QL STRIP: NEGATIVE
NON-SQ EPI CELLS #/AREA URNS AUTO: 2 /HPF
PH UR STRIP: 6 [PH] (ref 5–8)
PROT UR QL STRIP: NEGATIVE
RBC #/AREA URNS AUTO: 0 /HPF (ref 0–4)
SP GR UR STRIP: 1.01 (ref 1–1.03)
SQUAMOUS #/AREA URNS AUTO: 2 /HPF
URN SPEC COLLECT METH UR: ABNORMAL
WBC #/AREA URNS AUTO: 11 /HPF (ref 0–5)

## 2020-03-11 PROCEDURE — 81001 URINALYSIS AUTO W/SCOPE: CPT | Mod: HCNC

## 2020-03-11 RX ORDER — SIMVASTATIN 20 MG/1
TABLET, FILM COATED ORAL
Qty: 90 TABLET | Refills: 0 | Status: SHIPPED | OUTPATIENT
Start: 2020-03-11 | End: 2020-03-25

## 2020-03-16 ENCOUNTER — PES CALL (OUTPATIENT)
Dept: ADMINISTRATIVE | Facility: CLINIC | Age: 80
End: 2020-03-16

## 2020-03-25 ENCOUNTER — TELEPHONE (OUTPATIENT)
Dept: INTERNAL MEDICINE | Facility: CLINIC | Age: 80
End: 2020-03-25

## 2020-03-25 RX ORDER — SIMVASTATIN 10 MG/1
10 TABLET, FILM COATED ORAL NIGHTLY
Qty: 90 TABLET | Refills: 3 | Status: SHIPPED | OUTPATIENT
Start: 2020-03-25 | End: 2020-10-01 | Stop reason: SDUPTHER

## 2020-03-25 NOTE — TELEPHONE ENCOUNTER
----- Message from Ana Ruiz sent at 3/25/2020  8:40 AM CDT -----  Contact: [facility]/ [email or web address]   Pharmacy is calling to clarify an RX.  RX name:  simvastatin (ZOCOR) 20 MG tablet being used with the diltiaZEM (TIAZAC) 240 MG Cs24  What do they need to clarify:  Caution is advise with the use of Simvastatin over 10 MG a day / increase risk of myopathy, please call and advise.   Comments: [facility] - [place], [place] - [address] [phone] (Phone)  [phone] (Fax)

## 2020-04-27 ENCOUNTER — PES CALL (OUTPATIENT)
Dept: ADMINISTRATIVE | Facility: CLINIC | Age: 80
End: 2020-04-27

## 2020-04-27 ENCOUNTER — TELEPHONE (OUTPATIENT)
Dept: FAMILY MEDICINE | Facility: CLINIC | Age: 80
End: 2020-04-27

## 2020-04-27 NOTE — TELEPHONE ENCOUNTER
----- Message from Suyapa Multani sent at 4/27/2020  2:45 PM CDT -----  Contact: [phone] patient  Patient requesting to speak with you regarding rescheduling a wellness visit. Please advise.

## 2020-06-04 RX ORDER — TRAZODONE HYDROCHLORIDE 50 MG/1
50 TABLET ORAL NIGHTLY
Qty: 90 TABLET | Refills: 0 | Status: SHIPPED | OUTPATIENT
Start: 2020-06-04 | End: 2020-07-27

## 2020-06-12 ENCOUNTER — OFFICE VISIT (OUTPATIENT)
Dept: FAMILY MEDICINE | Facility: CLINIC | Age: 80
End: 2020-06-12
Payer: MEDICARE

## 2020-06-12 ENCOUNTER — TELEPHONE (OUTPATIENT)
Dept: INTERNAL MEDICINE | Facility: CLINIC | Age: 80
End: 2020-06-12

## 2020-06-12 VITALS
HEART RATE: 66 BPM | RESPIRATION RATE: 18 BRPM | DIASTOLIC BLOOD PRESSURE: 60 MMHG | BODY MASS INDEX: 20.73 KG/M2 | TEMPERATURE: 98 F | HEIGHT: 62 IN | OXYGEN SATURATION: 98 % | SYSTOLIC BLOOD PRESSURE: 130 MMHG | WEIGHT: 112.63 LBS

## 2020-06-12 DIAGNOSIS — Z12.31 ENCOUNTER FOR SCREENING MAMMOGRAM FOR MALIGNANT NEOPLASM OF BREAST: ICD-10-CM

## 2020-06-12 DIAGNOSIS — D69.2 SENILE PURPURA: ICD-10-CM

## 2020-06-12 DIAGNOSIS — N18.30 CKD (CHRONIC KIDNEY DISEASE) STAGE 3, GFR 30-59 ML/MIN: Chronic | ICD-10-CM

## 2020-06-12 DIAGNOSIS — Z00.00 ENCOUNTER FOR PREVENTIVE HEALTH EXAMINATION: Primary | ICD-10-CM

## 2020-06-12 DIAGNOSIS — F41.9 ANXIETY: Chronic | ICD-10-CM

## 2020-06-12 DIAGNOSIS — M85.80 OSTEOPENIA, UNSPECIFIED LOCATION: ICD-10-CM

## 2020-06-12 DIAGNOSIS — F13.20 BENZODIAZEPINE DEPENDENCE: Chronic | ICD-10-CM

## 2020-06-12 DIAGNOSIS — H61.23 EXCESSIVE CERUMEN IN BOTH EAR CANALS: ICD-10-CM

## 2020-06-12 DIAGNOSIS — F43.21 SITUATIONAL DEPRESSION: Chronic | ICD-10-CM

## 2020-06-12 DIAGNOSIS — I77.1 ARTERIAL TORTUOSITY: Chronic | ICD-10-CM

## 2020-06-12 DIAGNOSIS — I10 ESSENTIAL HYPERTENSION: Chronic | ICD-10-CM

## 2020-06-12 DIAGNOSIS — N32.81 OVERACTIVE BLADDER: Chronic | ICD-10-CM

## 2020-06-12 DIAGNOSIS — J84.10 CALCIFIED GRANULOMA OF LUNG: Chronic | ICD-10-CM

## 2020-06-12 DIAGNOSIS — I70.0 AORTIC ATHEROSCLEROSIS: Chronic | ICD-10-CM

## 2020-06-12 DIAGNOSIS — K21.9 GASTROESOPHAGEAL REFLUX DISEASE, ESOPHAGITIS PRESENCE NOT SPECIFIED: Chronic | ICD-10-CM

## 2020-06-12 DIAGNOSIS — I48.0 PAF (PAROXYSMAL ATRIAL FIBRILLATION): ICD-10-CM

## 2020-06-12 DIAGNOSIS — E78.5 HYPERLIPIDEMIA, UNSPECIFIED HYPERLIPIDEMIA TYPE: Chronic | ICD-10-CM

## 2020-06-12 PROCEDURE — 99499 UNLISTED E&M SERVICE: CPT | Mod: HCNC,S$GLB,, | Performed by: NURSE PRACTITIONER

## 2020-06-12 PROCEDURE — 99499 RISK ADDL DX/OHS AUDIT: ICD-10-PCS | Mod: HCNC,S$GLB,, | Performed by: NURSE PRACTITIONER

## 2020-06-12 PROCEDURE — 3078F PR MOST RECENT DIASTOLIC BLOOD PRESSURE < 80 MM HG: ICD-10-PCS | Mod: HCNC,CPTII,S$GLB, | Performed by: NURSE PRACTITIONER

## 2020-06-12 PROCEDURE — 3075F SYST BP GE 130 - 139MM HG: CPT | Mod: HCNC,CPTII,S$GLB, | Performed by: NURSE PRACTITIONER

## 2020-06-12 PROCEDURE — 99999 PR PBB SHADOW E&M-EST. PATIENT-LVL V: CPT | Mod: PBBFAC,HCNC,, | Performed by: NURSE PRACTITIONER

## 2020-06-12 PROCEDURE — 3075F PR MOST RECENT SYSTOLIC BLOOD PRESS GE 130-139MM HG: ICD-10-PCS | Mod: HCNC,CPTII,S$GLB, | Performed by: NURSE PRACTITIONER

## 2020-06-12 PROCEDURE — G0439 PR MEDICARE ANNUAL WELLNESS SUBSEQUENT VISIT: ICD-10-PCS | Mod: HCNC,S$GLB,, | Performed by: NURSE PRACTITIONER

## 2020-06-12 PROCEDURE — G0439 PPPS, SUBSEQ VISIT: HCPCS | Mod: HCNC,S$GLB,, | Performed by: NURSE PRACTITIONER

## 2020-06-12 PROCEDURE — 99999 PR PBB SHADOW E&M-EST. PATIENT-LVL V: ICD-10-PCS | Mod: PBBFAC,HCNC,, | Performed by: NURSE PRACTITIONER

## 2020-06-12 PROCEDURE — 3078F DIAST BP <80 MM HG: CPT | Mod: HCNC,CPTII,S$GLB, | Performed by: NURSE PRACTITIONER

## 2020-06-12 NOTE — TELEPHONE ENCOUNTER
Pt not seen by Dr. Giron since 6/2019  She must be seen by prescribing MD w/i the last 6 onths for proxy to prescribe controlled medication  As required by state law

## 2020-06-12 NOTE — Clinical Note
Dr. Franco,I saw this patient today for her Annual Wellness Visit. She scored a 3 on her Mini-Cog Test, but declined a Neuro referral at this time. I also ordered her MMG per her request. She was pleasant.Thanks,Natalia Au NP

## 2020-06-12 NOTE — PROGRESS NOTES
"Tigist Murry presented for a  Medicare AWV and comprehensive Health Risk Assessment today. The following components were reviewed and updated:    · Medical history  · Family History  · Social history  · Allergies and Current Medications  · Health Risk Assessment  · Health Maintenance  · Care Team     ** See Completed Assessments for Annual Wellness Visit within the encounter summary.**       The following assessments were completed:  · Living Situation  · CAGE  · Depression Screening  · Timed Get Up and Go  · Whisper Test  · Cognitive Function Screening      · Nutrition Screening  · ADL Screening  · PAQ Screening    Vitals:    06/12/20 1241   BP: 130/60   BP Location: Right arm   Patient Position: Sitting   Pulse: 66   Resp: 18   Temp: 98.1 °F (36.7 °C)   TempSrc: Oral   SpO2: 98%   Weight: 51.1 kg (112 lb 10.5 oz)   Height: 5' 2" (1.575 m)     Body mass index is 20.6 kg/m².     Physical Exam   Constitutional: She is oriented to person, place, and time. Vital signs are normal. She appears well-developed and well-nourished. She is cooperative. No distress.   HENT:   Head: Normocephalic and atraumatic.   Right Ear: Hearing normal.   Left Ear: Hearing normal.   Nose: Nose normal. No septal deviation.   Mouth/Throat: Uvula is midline, oropharynx is clear and moist and mucous membranes are normal. Mucous membranes are not pale. No oral lesions. No oropharyngeal exudate.   Excessive cerumen noted to both ear canals, ear wash done in-clinic today   Eyes: Pupils are equal, round, and reactive to light. Conjunctivae, EOM and lids are normal. Right eye exhibits no discharge. Left eye exhibits no discharge.   Wears reading glasses   Neck: Trachea normal, normal range of motion and full passive range of motion without pain. Neck supple. No neck rigidity. No tracheal deviation present.   Cardiovascular: Normal rate, regular rhythm, S1 normal, S2 normal, normal heart sounds, intact distal pulses and normal pulses.   No murmur " heard.  Pulmonary/Chest: Effort normal and breath sounds normal. No respiratory distress.   Abdominal: Soft. Normal appearance and bowel sounds are normal. She exhibits no distension. There is no tenderness.   Musculoskeletal: Normal range of motion. She exhibits no edema.   Neurological: She is alert and oriented to person, place, and time. She has normal strength. She is not disoriented. She exhibits normal muscle tone. Coordination and gait normal.   Skin: Skin is warm, dry and intact. Capillary refill takes less than 2 seconds. No rash noted. No pallor.   Psychiatric: She has a normal mood and affect. Her speech is normal and behavior is normal. Thought content normal. Cognition and memory are impaired. She exhibits abnormal recent memory.   Declines Neurology referral today   Vitals reviewed.        Diagnoses and health risks identified today and associated recommendations/orders:    1. Encounter for preventive health examination    2. Essential hypertension  Chronic; stable on medication. Follow up with PCP.    3. PAF (paroxysmal atrial fibrillation)  Chronic; stable on medication. Followed by Cardiology.    4. CKD (chronic kidney disease) stage 3, GFR 30-59 ml/min  Chronic; stable. Follow up with PCP.    5. Aortic atherosclerosis  Chronic; stable on medication. Follow up with PCP.    6. Arterial tortuosity  Chronic; stable on medication. Follow up with PCP.    7. Hyperlipidemia, unspecified hyperlipidemia type  Chronic; stable on medication. Follow up with PCP.    8. Calcified granuloma of lung  Chronic; stable. Followed by Pulmonary Disease.    9. Benzodiazepine dependence  Chronic; stable on medication. Follow up with PCP.    10. Situational depression  Chronic; stable on medication. Follow up with PCP.    11. Anxiety  Chronic; stable on medication. Follow up with PCP.    12. Senile purpura  Chronic; stable. Follow up with PCP.    13. Overactive bladder  Chronic; stable on medication. Follow up with  PCP.    14. Gastroesophageal reflux disease, esophagitis presence not specified  Chronic; stable on medication. Follow up with PCP.    15. Osteopenia, unspecified location  Chronic; stable on medication. Follow up with PCP.    16. Encounter for screening mammogram for malignant neoplasm of breast   - Mammo Digital Screening Bilat w/ Tavon; Future    17. Excessive cerumen in both ear canals  - Ear wax removal    18. Body mass index (BMI) 20.0-20.9, adult  Patient's BMI is WNL. Encouraged patient to continue to eat a low salt/low fat diet and discussed importance of engaging in physical activity at least 5x/week for a minimum of 30 min/day.      Provided Tigist with a 5-10 year written screening schedule and personal prevention plan. Recommendations were developed using the USPSTF age appropriate recommendations. Education, counseling, and referrals were provided as needed. After Visit Summary printed and given to patient which includes a list of additional screenings/tests needed.    I offered to discuss end of life issues, including information on how to make advance directives that the patient could use to name someone who would make medical decisions on their behalf if they became too ill to make themselves.    ___Patient declined  _X_Patient has her own set of Advanced Directives, and she will bring them to her next appointment.      Follow up for your next annual wellness visit.       Natalia Au NP

## 2020-06-12 NOTE — PATIENT INSTRUCTIONS
Counseling and Referral of Other Preventative  (Italic type indicates deductible and co-insurance are waived)    Patient Name: Tigist Murry  Today's Date: 6/12/2020    Health Maintenance       Date Due Completion Date    Mammogram 06/21/2020 6/21/2019    Colonoscopy 07/31/2020 (Originally 1/4/2020) 1/4/2013 (Done)    Override on 1/4/2013: Done    Override on 1/17/2006: Done    Shingles Vaccine (1 of 2) 06/07/2021 (Originally 9/9/1989) ---    Lipid Panel 03/11/2021 3/11/2020    TETANUS VACCINE 09/20/2026 9/20/2016 (Declined)    Override on 9/20/2016: Declined        Orders Placed This Encounter   Procedures    Mammo Digital Screening Bilat w/ Tavon     The following information is provided to all patients.  This information is to help you find resources for any of the problems found today that may be affecting your health:                Living healthy guide: www.[facility].[place].[facility]      Understanding Diabetes: www.diabetes.org      Eating healthy: www.cdc.gov/healthyweight      CDC home safety checklist: www.cdc.gov/steadi/patient.html      Agency on Aging: www.goea.[place].[facility]      Alcoholics anonymous (AA): www.aa.org      Physical Activity: www.jhon.nih.gov/bj5ygce      Tobacco use: www.quitwithusla.org

## 2020-06-12 NOTE — TELEPHONE ENCOUNTER
----- Message from Tequila Reed sent at 6/12/2020  1:34 PM CDT -----  Good afternoon,  The patient was seen today at [place] for her wellness visit and is requesting a message sent for a refill on her ALPRAZolam (XANAX) 0.25 MG tablet. Can you get this refilled for her?    Thank you

## 2020-06-12 NOTE — TELEPHONE ENCOUNTER
Pt was seen at [place] by NP. Unsure if this was an HRA or regular visit. Pt is requesting refill of xanax.     Otherwise, last o/v 7/26/19

## 2020-06-15 ENCOUNTER — TELEPHONE (OUTPATIENT)
Dept: INTERNAL MEDICINE | Facility: CLINIC | Age: 80
End: 2020-06-15

## 2020-06-15 NOTE — TELEPHONE ENCOUNTER
----- Message from Eduardo Prado sent at 6/15/2020  9:05 AM CDT -----  Regarding: Rx  Contact: [phone]  RX request - refill or new RX.  Is this a refill or new RX:  Refill  RX name and strength: ALPRAZolam (XANAX) 0.25 MG tablet  Directions:   Is this a 30 day or 90 day RX:    Pharmacy name and phone # ([facility] - [place], [place] - [address] [phone] (Phone) [phone] (Fax)    Comments:  almost out, stating Is needing PA for the Rx, call to inform has been sent.    Please call an advise  Thank you

## 2020-06-15 NOTE — TELEPHONE ENCOUNTER
Spoke to pt and informed that office visit is needed prior to rx being refilled. Pt agreeable to apt in [place].

## 2020-06-23 NOTE — PROGRESS NOTES
Office Visit    Patient Name: Tigist Murry    : 1940  MRN: [id number]    Subjective:  Tigist is a 79 y.o. female who presents today for:    Medication Refill (Xanax)    78 yo pt of dr Poppy Franco with multiple medical problems including hypertension, history of AFib, diastolic dysfunction with preserved ejection fraction, GERD, CKD stage 3, overdue for PCP follow-up but with unremarkable labs 2020 that included EGFR greater than 60, normal TSH, CBC lipid, who presents today for refill of her Xanax.    She has some intermittent anxiety, largely situational and her low-dose Xanax helps as needed.  She recently was seen for an HRA  but was informed by the nurse practitioner that she was unable to fill her Xanax.  She is looking to establish a new primary care doctor in the [facility] as she lives in this area and is anxious about driving.  She has not had any increased frequency of recent panic attacks and denies chest pain, palpitations, dizziness or lightheadedness.  She is walking regularly with a friend of hers and her exercise tolerance is stable.    Past Medical History  Past Medical History:   Diagnosis Date    Anticoagulant long-term use     Anxiety     Arthritis     Atrial fibrillation     Carpal tunnel syndrome on right     Diverticulosis     Encounter for blood transfusion     HTN (hypertension)     Hyperlipidemia     Osteopenia     Overactive bladder     Palpitations     Patellar tendonitis     saw [place] [facility]     Psoriasis     S/P partial hysterectomy     Situational depression     Supraventricular tachycardia        Past Surgical History  Past Surgical History:   Procedure Laterality Date    ABDOMINAL SURGERY      BREAST BIOPSY Left     excisional    CARPAL TUNNEL RELEASE      HYSTERECTOMY      @27yrs of age    KNEE ARTHROSCOPY W/ DEBRIDEMENT  05    Right    OOPHORECTOMY      @27yrs of age    PARTIAL HYSTERECTOMY         Family History  Family  History   Problem Relation Age of Onset    Heart failure Mother 80    No Known Problems Sister     No Known Problems Brother     No Known Problems Daughter     No Known Problems Daughter     No Known Problems Daughter        Social History  Social History     Socioeconomic History    Marital status:      Spouse name: Not on file    Number of children: Not on file    Years of education: Not on file    Highest education level: Not on file   Occupational History    Not on file   Social Needs    Financial resource strain: Not on file    Food insecurity     Worry: Not on file     Inability: Not on file    Transportation needs     Medical: Not on file     Non-medical: Not on file   Tobacco Use    Smoking status: Never Smoker    Smokeless tobacco: Never Used   Substance and Sexual Activity    Alcohol use: No    Drug use: No    Sexual activity: Not Currently     Partners: Male   Lifestyle    Physical activity     Days per week: Not on file     Minutes per session: Not on file    Stress: Not on file   Relationships    Social connections     Talks on phone: Not on file     Gets together: Not on file     Attends [employer] service: Not on file     Active member of club or organization: Not on file     Attends meetings of clubs or organizations: Not on file     Relationship status: Not on file   Other Topics Concern    Not on file   Social History Narrative    Not on file       Current Medications  Medications reviewed and updated.     Allergies   Review of patient's allergies indicates:   Allergen Reactions    Ciprofloxacin      Other reaction(s): Nausea       Review of Systems (Pertinent positives)  Review of Systems   Constitutional: Negative for fatigue.   Respiratory: Negative for shortness of breath.    Cardiovascular: Negative for chest pain and palpitations.   Neurological: Negative for dizziness and light-headedness.   Psychiatric/Behavioral: The patient is nervous/anxious.        BP  "138/78 (BP Location: Left arm, Patient Position: Sitting)   Pulse 65   Ht 5' 2" (1.575 m)   Wt 50.7 kg (111 lb 12.4 oz)   LMP  (LMP Unknown)   SpO2 97%   BMI 20.44 kg/m²     Physical Exam  Vitals signs reviewed.   Constitutional:       General: She is not in acute distress.     Appearance: Normal appearance. She is well-developed.   HENT:      Head: Normocephalic and atraumatic.   Eyes:      Conjunctiva/sclera: Conjunctivae normal.   Cardiovascular:      Rate and Rhythm: Normal rate and regular rhythm.   Pulmonary:      Effort: Pulmonary effort is normal.      Breath sounds: Normal breath sounds.   Skin:     General: Skin is warm and dry.   Neurological:      Mental Status: She is alert and oriented to person, place, and time.   Psychiatric:         Mood and Affect: Mood normal.         Behavior: Behavior normal.           Assessment/Plan:  Tigist Murry is a 79 y.o. female who presents today for :    Tigist was seen today for medication refill.    Diagnoses and all orders for this visit:    Anxiety  Comments:  Stable with p.r.n. low-dose Xanax 0.25 mg b.i.d. p.r.n., given 90 days plus until she finds new PCP-would like to establish at [place]  Orders:  -     ALPRAZolam (XANAX) 0.25 MG tablet; Take 1 tablet (0.25 mg total) by mouth 2 (two) times daily as needed.    PAF (paroxysmal atrial fibrillation)  Comments:  s/p ablation therapy and follows with Dr Dowling yearly in october.  She has very good exercise tolerance and she is in normal sinus rhythm today    Long term current use of anticoagulant    Essential hypertension  Comments:  Controlled on diltiazem 240 mg daily and chlorthalidone 25 mg daily, plans to see Dr. Dowling in October 2020            ICD-10-CM ICD-9-CM    1. Anxiety  F41.9 300.00 ALPRAZolam (XANAX) 0.25 MG tablet    Stable with p.r.n. low-dose Xanax 0.25 mg b.i.d. p.r.n., given 90 days plus until she finds new PCP-would like to establish at [place]   2. PAF (paroxysmal atrial " fibrillation)  I48.0 427.31     s/p ablation therapy and follows with Dr Dowling yearly in october.  She has very good exercise tolerance and she is in normal sinus rhythm today   3. Long term current use of anticoagulant  Z79.01 V58.61    4. Essential hypertension  I10 401.9     Controlled on diltiazem 240 mg daily and chlorthalidone 25 mg daily, plans to see Dr. Dowling in October 2020       There are no Patient Instructions on file for this visit.      Follow up for return as needed for new concerns.

## 2020-06-24 ENCOUNTER — OFFICE VISIT (OUTPATIENT)
Dept: FAMILY MEDICINE | Facility: CLINIC | Age: 80
End: 2020-06-24
Payer: MEDICARE

## 2020-06-24 VITALS
SYSTOLIC BLOOD PRESSURE: 138 MMHG | OXYGEN SATURATION: 97 % | HEIGHT: 62 IN | WEIGHT: 111.75 LBS | HEART RATE: 65 BPM | DIASTOLIC BLOOD PRESSURE: 78 MMHG | BODY MASS INDEX: 20.56 KG/M2

## 2020-06-24 DIAGNOSIS — I48.0 PAF (PAROXYSMAL ATRIAL FIBRILLATION): ICD-10-CM

## 2020-06-24 DIAGNOSIS — Z79.01 LONG TERM CURRENT USE OF ANTICOAGULANT: ICD-10-CM

## 2020-06-24 DIAGNOSIS — F41.9 ANXIETY: Primary | Chronic | ICD-10-CM

## 2020-06-24 DIAGNOSIS — I10 ESSENTIAL HYPERTENSION: Chronic | ICD-10-CM

## 2020-06-24 PROCEDURE — 99214 PR OFFICE/OUTPT VISIT, EST, LEVL IV, 30-39 MIN: ICD-10-PCS | Mod: HCNC,S$GLB,, | Performed by: FAMILY MEDICINE

## 2020-06-24 PROCEDURE — 99214 OFFICE O/P EST MOD 30 MIN: CPT | Mod: HCNC,S$GLB,, | Performed by: FAMILY MEDICINE

## 2020-06-24 PROCEDURE — 3075F PR MOST RECENT SYSTOLIC BLOOD PRESS GE 130-139MM HG: ICD-10-PCS | Mod: HCNC,CPTII,S$GLB, | Performed by: FAMILY MEDICINE

## 2020-06-24 PROCEDURE — 1126F PR PAIN SEVERITY QUANTIFIED, NO PAIN PRESENT: ICD-10-PCS | Mod: HCNC,S$GLB,, | Performed by: FAMILY MEDICINE

## 2020-06-24 PROCEDURE — 3078F DIAST BP <80 MM HG: CPT | Mod: HCNC,CPTII,S$GLB, | Performed by: FAMILY MEDICINE

## 2020-06-24 PROCEDURE — 1101F PT FALLS ASSESS-DOCD LE1/YR: CPT | Mod: HCNC,CPTII,S$GLB, | Performed by: FAMILY MEDICINE

## 2020-06-24 PROCEDURE — 1126F AMNT PAIN NOTED NONE PRSNT: CPT | Mod: HCNC,S$GLB,, | Performed by: FAMILY MEDICINE

## 2020-06-24 PROCEDURE — 3075F SYST BP GE 130 - 139MM HG: CPT | Mod: HCNC,CPTII,S$GLB, | Performed by: FAMILY MEDICINE

## 2020-06-24 PROCEDURE — 99999 PR PBB SHADOW E&M-EST. PATIENT-LVL IV: CPT | Mod: PBBFAC,HCNC,, | Performed by: FAMILY MEDICINE

## 2020-06-24 PROCEDURE — 1101F PR PT FALLS ASSESS DOC 0-1 FALLS W/OUT INJ PAST YR: ICD-10-PCS | Mod: HCNC,CPTII,S$GLB, | Performed by: FAMILY MEDICINE

## 2020-06-24 PROCEDURE — 3078F PR MOST RECENT DIASTOLIC BLOOD PRESSURE < 80 MM HG: ICD-10-PCS | Mod: HCNC,CPTII,S$GLB, | Performed by: FAMILY MEDICINE

## 2020-06-24 PROCEDURE — 1159F MED LIST DOCD IN RCRD: CPT | Mod: HCNC,S$GLB,, | Performed by: FAMILY MEDICINE

## 2020-06-24 PROCEDURE — 1159F PR MEDICATION LIST DOCUMENTED IN MEDICAL RECORD: ICD-10-PCS | Mod: HCNC,S$GLB,, | Performed by: FAMILY MEDICINE

## 2020-06-24 PROCEDURE — 99999 PR PBB SHADOW E&M-EST. PATIENT-LVL IV: ICD-10-PCS | Mod: PBBFAC,HCNC,, | Performed by: FAMILY MEDICINE

## 2020-06-24 RX ORDER — ALPRAZOLAM 0.25 MG/1
0.25 TABLET ORAL 2 TIMES DAILY PRN
Qty: 180 TABLET | Refills: 0 | Status: SHIPPED | OUTPATIENT
Start: 2020-06-24 | End: 2020-10-01 | Stop reason: SDUPTHER

## 2020-07-01 ENCOUNTER — HOSPITAL ENCOUNTER (OUTPATIENT)
Dept: RADIOLOGY | Facility: HOSPITAL | Age: 80
Discharge: HOME | End: 2020-07-01
Attending: NURSE PRACTITIONER
Payer: MEDICARE

## 2020-07-01 DIAGNOSIS — Z12.31 ENCOUNTER FOR SCREENING MAMMOGRAM FOR MALIGNANT NEOPLASM OF BREAST: ICD-10-CM

## 2020-07-01 PROCEDURE — 77063 MAMMO DIGITAL SCREENING BILAT WITH TOMOSYNTHESIS_CAD: ICD-10-PCS | Mod: 26,HCNC,, | Performed by: RADIOLOGY

## 2020-07-01 PROCEDURE — 77067 SCR MAMMO BI INCL CAD: CPT | Mod: TC,HCNC

## 2020-07-01 PROCEDURE — 77067 SCR MAMMO BI INCL CAD: CPT | Mod: 26,HCNC,, | Performed by: RADIOLOGY

## 2020-07-01 PROCEDURE — 77067 MAMMO DIGITAL SCREENING BILAT WITH TOMOSYNTHESIS_CAD: ICD-10-PCS | Mod: 26,HCNC,, | Performed by: RADIOLOGY

## 2020-07-01 PROCEDURE — 77063 BREAST TOMOSYNTHESIS BI: CPT | Mod: 26,HCNC,, | Performed by: RADIOLOGY

## 2020-08-04 ENCOUNTER — HOSPITAL ENCOUNTER (OUTPATIENT)
Dept: RADIOLOGY | Facility: HOSPITAL | Age: 80
Discharge: HOME | End: 2020-08-04
Attending: NURSE PRACTITIONER
Payer: MEDICARE

## 2020-08-04 DIAGNOSIS — R92.8 ABNORMAL MAMMOGRAM: ICD-10-CM

## 2020-08-04 PROCEDURE — 77061 MAMMO DIGITAL DIAGNOSTIC LEFT WITH TOMOSYNTHESIS_CAD: ICD-10-PCS | Mod: 26,HCNC,LT, | Performed by: RADIOLOGY

## 2020-08-04 PROCEDURE — 77065 MAMMO DIGITAL DIAGNOSTIC LEFT WITH TOMOSYNTHESIS_CAD: ICD-10-PCS | Mod: 26,HCNC,LT, | Performed by: RADIOLOGY

## 2020-08-04 PROCEDURE — 77065 DX MAMMO INCL CAD UNI: CPT | Mod: TC,HCNC,LT

## 2020-08-04 PROCEDURE — 77065 DX MAMMO INCL CAD UNI: CPT | Mod: 26,HCNC,LT, | Performed by: RADIOLOGY

## 2020-08-04 PROCEDURE — 77061 BREAST TOMOSYNTHESIS UNI: CPT | Mod: 26,HCNC,LT, | Performed by: RADIOLOGY

## 2020-08-04 PROCEDURE — 77061 BREAST TOMOSYNTHESIS UNI: CPT | Mod: TC,HCNC,LT

## 2020-09-29 ENCOUNTER — PATIENT MESSAGE (OUTPATIENT)
Dept: OTHER | Facility: OTHER | Age: 80
End: 2020-09-29

## 2020-10-01 ENCOUNTER — OFFICE VISIT (OUTPATIENT)
Dept: INTERNAL MEDICINE | Facility: CLINIC | Age: 80
End: 2020-10-01
Payer: MEDICARE

## 2020-10-01 ENCOUNTER — LAB VISIT (OUTPATIENT)
Dept: LAB | Facility: HOSPITAL | Age: 80
End: 2020-10-01
Attending: INTERNAL MEDICINE
Payer: MEDICARE

## 2020-10-01 VITALS
HEART RATE: 89 BPM | SYSTOLIC BLOOD PRESSURE: 120 MMHG | HEIGHT: 62 IN | WEIGHT: 110.25 LBS | BODY MASS INDEX: 20.29 KG/M2 | DIASTOLIC BLOOD PRESSURE: 60 MMHG | OXYGEN SATURATION: 99 %

## 2020-10-01 DIAGNOSIS — Z76.89 ENCOUNTER TO ESTABLISH CARE: ICD-10-CM

## 2020-10-01 DIAGNOSIS — N32.81 OVERACTIVE BLADDER: ICD-10-CM

## 2020-10-01 DIAGNOSIS — I48.0 PAF (PAROXYSMAL ATRIAL FIBRILLATION): ICD-10-CM

## 2020-10-01 DIAGNOSIS — I10 ESSENTIAL HYPERTENSION: ICD-10-CM

## 2020-10-01 DIAGNOSIS — F41.9 ANXIETY: Chronic | ICD-10-CM

## 2020-10-01 DIAGNOSIS — Z23 NEED FOR INFLUENZA VACCINATION: ICD-10-CM

## 2020-10-01 DIAGNOSIS — R79.89 ABNORMAL CBC MEASUREMENT: ICD-10-CM

## 2020-10-01 DIAGNOSIS — E78.5 HYPERLIPIDEMIA, UNSPECIFIED HYPERLIPIDEMIA TYPE: ICD-10-CM

## 2020-10-01 LAB
ANION GAP SERPL CALC-SCNC: 12 MMOL/L (ref 8–16)
BUN SERPL-MCNC: 25 MG/DL (ref 8–23)
CALCIUM SERPL-MCNC: 9.9 MG/DL (ref 8.7–10.5)
CHLORIDE SERPL-SCNC: 102 MMOL/L (ref 95–110)
CO2 SERPL-SCNC: 28 MMOL/L (ref 23–29)
CREAT SERPL-MCNC: 0.9 MG/DL (ref 0.5–1.4)
EST. GFR  (AFRICAN AMERICAN): >60 ML/MIN/1.73 M^2
EST. GFR  (NON AFRICAN AMERICAN): >60 ML/MIN/1.73 M^2
GLUCOSE SERPL-MCNC: 121 MG/DL (ref 70–110)
POTASSIUM SERPL-SCNC: 3.7 MMOL/L (ref 3.5–5.1)
SODIUM SERPL-SCNC: 142 MMOL/L (ref 136–145)

## 2020-10-01 PROCEDURE — 80048 BASIC METABOLIC PNL TOTAL CA: CPT | Mod: HCNC

## 2020-10-01 PROCEDURE — 99999 PR PBB SHADOW E&M-EST. PATIENT-LVL IV: CPT | Mod: PBBFAC,HCNC,, | Performed by: INTERNAL MEDICINE

## 2020-10-01 PROCEDURE — 1126F PR PAIN SEVERITY QUANTIFIED, NO PAIN PRESENT: ICD-10-PCS | Mod: HCNC,S$GLB,, | Performed by: INTERNAL MEDICINE

## 2020-10-01 PROCEDURE — 3078F DIAST BP <80 MM HG: CPT | Mod: HCNC,CPTII,S$GLB, | Performed by: INTERNAL MEDICINE

## 2020-10-01 PROCEDURE — 99214 PR OFFICE/OUTPT VISIT, EST, LEVL IV, 30-39 MIN: ICD-10-PCS | Mod: 25,HCNC,S$GLB, | Performed by: INTERNAL MEDICINE

## 2020-10-01 PROCEDURE — 1159F MED LIST DOCD IN RCRD: CPT | Mod: HCNC,S$GLB,, | Performed by: INTERNAL MEDICINE

## 2020-10-01 PROCEDURE — 1101F PT FALLS ASSESS-DOCD LE1/YR: CPT | Mod: HCNC,CPTII,S$GLB, | Performed by: INTERNAL MEDICINE

## 2020-10-01 PROCEDURE — G0008 FLU VACCINE - QUADRIVALENT - ADJUVANTED: ICD-10-PCS | Mod: HCNC,S$GLB,, | Performed by: INTERNAL MEDICINE

## 2020-10-01 PROCEDURE — 3074F PR MOST RECENT SYSTOLIC BLOOD PRESSURE < 130 MM HG: ICD-10-PCS | Mod: HCNC,CPTII,S$GLB, | Performed by: INTERNAL MEDICINE

## 2020-10-01 PROCEDURE — 99214 OFFICE O/P EST MOD 30 MIN: CPT | Mod: 25,HCNC,S$GLB, | Performed by: INTERNAL MEDICINE

## 2020-10-01 PROCEDURE — 36415 COLL VENOUS BLD VENIPUNCTURE: CPT | Mod: HCNC,PO

## 2020-10-01 PROCEDURE — 3074F SYST BP LT 130 MM HG: CPT | Mod: HCNC,CPTII,S$GLB, | Performed by: INTERNAL MEDICINE

## 2020-10-01 PROCEDURE — 1159F PR MEDICATION LIST DOCUMENTED IN MEDICAL RECORD: ICD-10-PCS | Mod: HCNC,S$GLB,, | Performed by: INTERNAL MEDICINE

## 2020-10-01 PROCEDURE — 1126F AMNT PAIN NOTED NONE PRSNT: CPT | Mod: HCNC,S$GLB,, | Performed by: INTERNAL MEDICINE

## 2020-10-01 PROCEDURE — 90694 VACC AIIV4 NO PRSRV 0.5ML IM: CPT | Mod: HCNC,S$GLB,, | Performed by: INTERNAL MEDICINE

## 2020-10-01 PROCEDURE — G0008 ADMIN INFLUENZA VIRUS VAC: HCPCS | Mod: HCNC,S$GLB,, | Performed by: INTERNAL MEDICINE

## 2020-10-01 PROCEDURE — 3078F PR MOST RECENT DIASTOLIC BLOOD PRESSURE < 80 MM HG: ICD-10-PCS | Mod: HCNC,CPTII,S$GLB, | Performed by: INTERNAL MEDICINE

## 2020-10-01 PROCEDURE — 90694 FLU VACCINE - QUADRIVALENT - ADJUVANTED: ICD-10-PCS | Mod: HCNC,S$GLB,, | Performed by: INTERNAL MEDICINE

## 2020-10-01 PROCEDURE — 99999 PR PBB SHADOW E&M-EST. PATIENT-LVL IV: ICD-10-PCS | Mod: PBBFAC,HCNC,, | Performed by: INTERNAL MEDICINE

## 2020-10-01 PROCEDURE — 1101F PR PT FALLS ASSESS DOC 0-1 FALLS W/OUT INJ PAST YR: ICD-10-PCS | Mod: HCNC,CPTII,S$GLB, | Performed by: INTERNAL MEDICINE

## 2020-10-01 RX ORDER — ALPRAZOLAM 0.25 MG/1
0.25 TABLET ORAL 2 TIMES DAILY PRN
Qty: 180 TABLET | Refills: 0 | Status: SHIPPED | OUTPATIENT
Start: 2020-10-01 | End: 2021-07-28 | Stop reason: SDUPTHER

## 2020-10-01 RX ORDER — SIMVASTATIN 10 MG/1
10 TABLET, FILM COATED ORAL NIGHTLY
Qty: 90 TABLET | Refills: 3 | Status: SHIPPED | OUTPATIENT
Start: 2020-10-01 | End: 2021-12-28

## 2020-10-01 RX ORDER — CHLORTHALIDONE 25 MG/1
TABLET ORAL
Qty: 90 TABLET | Refills: 3 | Status: SHIPPED | OUTPATIENT
Start: 2020-10-01 | End: 2021-10-12 | Stop reason: SDUPTHER

## 2020-10-01 RX ORDER — OXYBUTYNIN CHLORIDE 5 MG/1
5 TABLET ORAL DAILY
Qty: 90 TABLET | Refills: 3 | Status: SHIPPED | OUTPATIENT
Start: 2020-10-01 | End: 2021-12-21

## 2020-10-01 RX ORDER — TRAZODONE HYDROCHLORIDE 50 MG/1
50 TABLET ORAL NIGHTLY
Qty: 90 TABLET | Refills: 3 | Status: SHIPPED | OUTPATIENT
Start: 2020-10-01 | End: 2021-11-18

## 2020-10-01 NOTE — PROGRESS NOTES
" Patient ID: Tigist Murry is a 80 y.o. female.    Chief Complaint: Establish Care    HPI Tigist is a 80 y.o. female with anxiety, HTN, HLD, OAB, psoriasis, SVT and atrial fibrillation who presents to establish care. Former patient of Dr. Franco.   Reports that she has had worsened anxiety since her   7 years ago. She takes 1/2 xanax in the AM and takes trazodone at night to help with sleep but still only sleeps about 2-4 hours at a time. She doesn't like to take medication. Also states that her "anxiety is in energy and she doesn't want to slow it down".   Says she had ablation procedure and no longer has SVT or atrial fibrillation. However, per her cardiologist's note, this is not accurate and she continues to have afib.   She denied having high cholesterol but takes zocor. Says she takes it 2-3 times per week.   Son in law is gastroenterologist and keeps her UTD with colonoscopies.     Review of Systems   All other systems reviewed and are negative.      Objective:     Vitals:    10/01/20 0759   BP: 120/60   Pulse: 89        Physical Exam  Vitals signs reviewed.   Constitutional:       General: She is not in acute distress.     Appearance: Normal appearance. She is well-developed and normal weight. She is not ill-appearing, toxic-appearing or diaphoretic.   HENT:      Head: Normocephalic and atraumatic.      Right Ear: External ear normal.      Left Ear: External ear normal.      Nose: Nose normal.   Eyes:      General: No scleral icterus.        Right eye: No discharge.         Left eye: No discharge.      Extraocular Movements: Extraocular movements intact.      Conjunctiva/sclera: Conjunctivae normal.   Cardiovascular:      Rate and Rhythm: Normal rate and regular rhythm.      Heart sounds: Normal heart sounds. No murmur. No friction rub. No gallop.    Pulmonary:      Effort: Pulmonary effort is normal. No respiratory distress.      Breath sounds: Normal breath sounds. No stridor. No wheezing, " rhonchi or rales.   Skin:     General: Skin is warm and dry.   Neurological:      General: No focal deficit present.      Mental Status: She is alert and oriented to person, place, and time. Mental status is at baseline.   Psychiatric:         Mood and Affect: Mood normal.         Behavior: Behavior normal.         Thought Content: Thought content normal.         Judgment: Judgment normal.         Assessment:       1. Encounter to establish care    2. Anxiety Sub-optimally controlled   3. Overactive bladder Well controlled   4. Hyperlipidemia, unspecified hyperlipidemia type Well controlled   5. Essential hypertension Well controlled   6. PAF (paroxysmal atrial fibrillation) Well controlled   7. Need for influenza vaccination    8. Abnormal CBC measurement        Plan:         Encounter to establish care    Anxiety  Comments:  Would benefit from SSRI/SNRI but she refuses this type of medication. Also declines counseling. Continue current meds.   Orders:  -     ALPRAZolam (XANAX) 0.25 MG tablet; Take 1 tablet (0.25 mg total) by mouth 2 (two) times daily as needed.  Dispense: 180 tablet; Refill: 0  -     traZODone (DESYREL) 50 MG tablet; Take 1 tablet (50 mg total) by mouth every evening.  Dispense: 90 tablet; Refill: 3    Overactive bladder  Comments:  Continue current medication  Orders:  -     oxybutynin (DITROPAN) 5 MG Tab; Take 1 tablet (5 mg total) by mouth once daily.  Dispense: 90 tablet; Refill: 3    Hyperlipidemia, unspecified hyperlipidemia type  Comments:  Continue current medication  Orders:  -     simvastatin (ZOCOR) 10 MG tablet; Take 1 tablet (10 mg total) by mouth every evening.  Dispense: 90 tablet; Refill: 3  -     Lipid Panel; Future; Expected date: 04/01/2021    Essential hypertension  Comments:  Continue current medication  Orders:  -     chlorthalidone (HYGROTEN) 25 MG Tab; Take 1/2 tab PO q AM.  Dispense: 90 tablet; Refill: 3  -     Basic metabolic panel; Future; Expected date: 10/01/2020  -      Comprehensive metabolic panel; Future; Expected date: 04/01/2021    PAF (paroxysmal atrial fibrillation)  Comments:  Continue current medication    Need for influenza vaccination  -     Influenza (FLUAD) - Quadrivalent (Adjuvanted) *Preferred* (65+) (PF)    Abnormal CBC measurement  -     CBC auto differential; Future; Expected date: 04/01/2021      RTC 3 months      Warning signs discussed, patient to call with any further issues or worsening of symptoms.       Parts of the above note were dictated using a voice dictation software. Please excuse any grammatical or typographical errors.

## 2020-11-04 ENCOUNTER — TELEPHONE (OUTPATIENT)
Dept: ELECTROPHYSIOLOGY | Facility: CLINIC | Age: 80
End: 2020-11-04

## 2020-11-05 ENCOUNTER — OFFICE VISIT (OUTPATIENT)
Dept: CARDIOLOGY | Facility: CLINIC | Age: 80
End: 2020-11-05
Payer: MEDICARE

## 2020-11-05 VITALS
DIASTOLIC BLOOD PRESSURE: 71 MMHG | HEIGHT: 62 IN | WEIGHT: 110 LBS | SYSTOLIC BLOOD PRESSURE: 112 MMHG | HEART RATE: 81 BPM | BODY MASS INDEX: 20.24 KG/M2

## 2020-11-05 DIAGNOSIS — E78.2 MIXED HYPERLIPIDEMIA: Primary | Chronic | ICD-10-CM

## 2020-11-05 DIAGNOSIS — I48.0 PAF (PAROXYSMAL ATRIAL FIBRILLATION): ICD-10-CM

## 2020-11-05 DIAGNOSIS — I49.8 OTHER SPECIFIED CARDIAC ARRHYTHMIAS: ICD-10-CM

## 2020-11-05 DIAGNOSIS — Z79.01 LONG TERM CURRENT USE OF ANTICOAGULANT: ICD-10-CM

## 2020-11-05 DIAGNOSIS — N18.30 STAGE 3 CHRONIC KIDNEY DISEASE, UNSPECIFIED WHETHER STAGE 3A OR 3B CKD: Chronic | ICD-10-CM

## 2020-11-05 PROCEDURE — 1159F MED LIST DOCD IN RCRD: CPT | Mod: HCNC,S$GLB,, | Performed by: INTERNAL MEDICINE

## 2020-11-05 PROCEDURE — 1101F PT FALLS ASSESS-DOCD LE1/YR: CPT | Mod: HCNC,CPTII,S$GLB, | Performed by: INTERNAL MEDICINE

## 2020-11-05 PROCEDURE — 99499 UNLISTED E&M SERVICE: CPT | Mod: S$GLB,,, | Performed by: INTERNAL MEDICINE

## 2020-11-05 PROCEDURE — 93005 ELECTROCARDIOGRAM TRACING: CPT | Mod: HCNC,S$GLB,, | Performed by: INTERNAL MEDICINE

## 2020-11-05 PROCEDURE — 1159F PR MEDICATION LIST DOCUMENTED IN MEDICAL RECORD: ICD-10-PCS | Mod: HCNC,S$GLB,, | Performed by: INTERNAL MEDICINE

## 2020-11-05 PROCEDURE — 99214 OFFICE O/P EST MOD 30 MIN: CPT | Mod: HCNC,S$GLB,, | Performed by: INTERNAL MEDICINE

## 2020-11-05 PROCEDURE — 3074F PR MOST RECENT SYSTOLIC BLOOD PRESSURE < 130 MM HG: ICD-10-PCS | Mod: HCNC,CPTII,S$GLB, | Performed by: INTERNAL MEDICINE

## 2020-11-05 PROCEDURE — 1101F PR PT FALLS ASSESS DOC 0-1 FALLS W/OUT INJ PAST YR: ICD-10-PCS | Mod: HCNC,CPTII,S$GLB, | Performed by: INTERNAL MEDICINE

## 2020-11-05 PROCEDURE — 93005 RHYTHM STRIP: ICD-10-PCS | Mod: HCNC,S$GLB,, | Performed by: INTERNAL MEDICINE

## 2020-11-05 PROCEDURE — 1126F PR PAIN SEVERITY QUANTIFIED, NO PAIN PRESENT: ICD-10-PCS | Mod: HCNC,S$GLB,, | Performed by: INTERNAL MEDICINE

## 2020-11-05 PROCEDURE — 93010 RHYTHM STRIP: ICD-10-PCS | Mod: HCNC,S$GLB,, | Performed by: INTERNAL MEDICINE

## 2020-11-05 PROCEDURE — 99499 RISK ADDL DX/OHS AUDIT: ICD-10-PCS | Mod: S$GLB,,, | Performed by: INTERNAL MEDICINE

## 2020-11-05 PROCEDURE — 99214 PR OFFICE/OUTPT VISIT, EST, LEVL IV, 30-39 MIN: ICD-10-PCS | Mod: HCNC,S$GLB,, | Performed by: INTERNAL MEDICINE

## 2020-11-05 PROCEDURE — 99999 PR PBB SHADOW E&M-EST. PATIENT-LVL IV: CPT | Mod: PBBFAC,HCNC,, | Performed by: INTERNAL MEDICINE

## 2020-11-05 PROCEDURE — 99999 PR PBB SHADOW E&M-EST. PATIENT-LVL IV: ICD-10-PCS | Mod: PBBFAC,HCNC,, | Performed by: INTERNAL MEDICINE

## 2020-11-05 PROCEDURE — 93010 ELECTROCARDIOGRAM REPORT: CPT | Mod: HCNC,S$GLB,, | Performed by: INTERNAL MEDICINE

## 2020-11-05 PROCEDURE — 3078F DIAST BP <80 MM HG: CPT | Mod: HCNC,CPTII,S$GLB, | Performed by: INTERNAL MEDICINE

## 2020-11-05 PROCEDURE — 3078F PR MOST RECENT DIASTOLIC BLOOD PRESSURE < 80 MM HG: ICD-10-PCS | Mod: HCNC,CPTII,S$GLB, | Performed by: INTERNAL MEDICINE

## 2020-11-05 PROCEDURE — 1126F AMNT PAIN NOTED NONE PRSNT: CPT | Mod: HCNC,S$GLB,, | Performed by: INTERNAL MEDICINE

## 2020-11-05 PROCEDURE — 3074F SYST BP LT 130 MM HG: CPT | Mod: HCNC,CPTII,S$GLB, | Performed by: INTERNAL MEDICINE

## 2020-11-05 NOTE — PROGRESS NOTES
Subjective:    Patient ID:  Tigist Murry is a 80 y.o. female who presents for follow-up of No chief complaint on file.      HPI   Former pt of JOSE Perry; switched to me to be seen in [place]    80 y.o. 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  Still has PAF, as evidenced on MCOT and Holter monitoring, as well as ECGs. Asx.  No CP, no SOB, no LH/presync/sync.  no LH/presync/sync.    Working out (aerobics, yoga) 4x/week! No problems at all.    Holter: 10/21/19: SR  echo 60-65%.    My interpretation of today's ECG is AF at controlled response. Pt feels no sx.    Review of Systems   Constitution: Negative. Negative for malaise/fatigue.   HENT: Negative.  Negative for ear pain and tinnitus.    Eyes: Negative for blurred vision.   Cardiovascular: Negative.  Negative for chest pain, dyspnea on exertion, near-syncope, palpitations and syncope.   Respiratory: Negative.  Negative for shortness of breath.    Endocrine: Negative.  Negative for polyuria.   Hematologic/Lymphatic: Does not bruise/bleed easily.   Skin: Negative.  Negative for rash.   Musculoskeletal: Negative.  Negative for joint pain and muscle weakness.   Gastrointestinal: Negative.  Negative for abdominal pain and change in bowel habit.   Genitourinary: Negative for frequency.   Neurological: Negative.  Negative for dizziness and weakness.   Psychiatric/Behavioral: Positive for memory loss. Negative for depression.   Allergic/Immunologic: Negative for environmental allergies.        Objective:    Physical Exam   Constitutional: She is oriented to person, place, and time. Vital signs are normal. She appears well-developed and well-nourished. She is active and cooperative.   HENT:   Head: Normocephalic and atraumatic.   Eyes: Conjunctivae and EOM are normal.   Neck: Normal range of motion. Carotid bruit is not present. No tracheal deviation and no edema present. No thyroid mass and  no thyromegaly present.   Cardiovascular: Normal rate and normal pulses. An irregularly irregular rhythm present.   Pulmonary/Chest: Effort normal and breath sounds normal. No respiratory distress. She has no wheezes. She has no rales.   Abdominal: Soft. Normal appearance. She exhibits no distension. There is no hepatosplenomegaly.   Musculoskeletal: Normal range of motion.   Neurological: She is alert and oriented to person, place, and time. Coordination normal.   Skin: Skin is warm and dry. No rash noted.   Psychiatric: She has a normal mood and affect. Her speech is normal and behavior is normal. Thought content normal. Cognition and memory are normal.   Nursing note and vitals reviewed.        Assessment:       1. Mixed hyperlipidemia    2. PAF (paroxysmal atrial fibrillation)    3. Stage 3 chronic kidney disease, unspecified whether stage 3a or 3b CKD    4. Long term current use of anticoagulant         Plan:       Asx AF.  Continue dilt, eliquis. Lower dose eliquis due to age, weight.  Return in 1 year, or earlier prn.

## 2020-12-11 ENCOUNTER — PATIENT MESSAGE (OUTPATIENT)
Dept: OTHER | Facility: OTHER | Age: 80
End: 2020-12-11

## 2020-12-29 ENCOUNTER — PATIENT MESSAGE (OUTPATIENT)
Dept: FAMILY MEDICINE | Facility: CLINIC | Age: 80
End: 2020-12-29

## 2021-01-22 ENCOUNTER — PATIENT MESSAGE (OUTPATIENT)
Dept: ADMINISTRATIVE | Facility: OTHER | Age: 81
End: 2021-01-22

## 2021-01-28 ENCOUNTER — OFFICE VISIT (OUTPATIENT)
Dept: INTERNAL MEDICINE | Facility: CLINIC | Age: 81
End: 2021-01-28
Payer: MEDICARE

## 2021-01-28 ENCOUNTER — LAB VISIT (OUTPATIENT)
Dept: LAB | Facility: HOSPITAL | Age: 81
End: 2021-01-28
Attending: INTERNAL MEDICINE
Payer: MEDICARE

## 2021-01-28 VITALS
WEIGHT: 108 LBS | OXYGEN SATURATION: 98 % | HEART RATE: 84 BPM | BODY MASS INDEX: 19.88 KG/M2 | HEIGHT: 62 IN | SYSTOLIC BLOOD PRESSURE: 110 MMHG | DIASTOLIC BLOOD PRESSURE: 70 MMHG | TEMPERATURE: 98 F

## 2021-01-28 DIAGNOSIS — R79.89 ABNORMAL CBC MEASUREMENT: ICD-10-CM

## 2021-01-28 DIAGNOSIS — E78.5 HYPERLIPIDEMIA, UNSPECIFIED HYPERLIPIDEMIA TYPE: ICD-10-CM

## 2021-01-28 DIAGNOSIS — R73.9 HYPERGLYCEMIA: ICD-10-CM

## 2021-01-28 DIAGNOSIS — I10 ESSENTIAL HYPERTENSION: Primary | ICD-10-CM

## 2021-01-28 DIAGNOSIS — F41.9 ANXIETY: Chronic | ICD-10-CM

## 2021-01-28 DIAGNOSIS — I70.0 AORTIC ATHEROSCLEROSIS: ICD-10-CM

## 2021-01-28 DIAGNOSIS — I10 ESSENTIAL HYPERTENSION: ICD-10-CM

## 2021-01-28 DIAGNOSIS — N18.31 STAGE 3A CHRONIC KIDNEY DISEASE: ICD-10-CM

## 2021-01-28 DIAGNOSIS — I48.0 PAF (PAROXYSMAL ATRIAL FIBRILLATION): ICD-10-CM

## 2021-01-28 LAB
ALBUMIN SERPL BCP-MCNC: 4 G/DL (ref 3.5–5.2)
ALP SERPL-CCNC: 72 U/L (ref 55–135)
ALT SERPL W/O P-5'-P-CCNC: 17 U/L (ref 10–44)
ANION GAP SERPL CALC-SCNC: 10 MMOL/L (ref 8–16)
AST SERPL-CCNC: 16 U/L (ref 10–40)
BASOPHILS # BLD AUTO: 0.02 K/UL (ref 0–0.2)
BASOPHILS NFR BLD: 0.4 % (ref 0–1.9)
BILIRUB SERPL-MCNC: 0.5 MG/DL (ref 0.1–1)
BUN SERPL-MCNC: 28 MG/DL (ref 8–23)
CALCIUM SERPL-MCNC: 9.9 MG/DL (ref 8.7–10.5)
CHLORIDE SERPL-SCNC: 102 MMOL/L (ref 95–110)
CHOLEST SERPL-MCNC: 177 MG/DL (ref 120–199)
CHOLEST/HDLC SERPL: 3.1 {RATIO} (ref 2–5)
CO2 SERPL-SCNC: 29 MMOL/L (ref 23–29)
CREAT SERPL-MCNC: 0.9 MG/DL (ref 0.5–1.4)
DIFFERENTIAL METHOD: ABNORMAL
EOSINOPHIL # BLD AUTO: 0 K/UL (ref 0–0.5)
EOSINOPHIL NFR BLD: 0.4 % (ref 0–8)
ERYTHROCYTE [DISTWIDTH] IN BLOOD BY AUTOMATED COUNT: 12.6 % (ref 11.5–14.5)
EST. GFR  (AFRICAN AMERICAN): >60 ML/MIN/1.73 M^2
EST. GFR  (NON AFRICAN AMERICAN): >60 ML/MIN/1.73 M^2
ESTIMATED AVG GLUCOSE: 111 MG/DL (ref 68–131)
GLUCOSE SERPL-MCNC: 86 MG/DL (ref 70–110)
HBA1C MFR BLD: 5.5 % (ref 4–5.6)
HCT VFR BLD AUTO: 49.5 % (ref 37–48.5)
HDLC SERPL-MCNC: 58 MG/DL (ref 40–75)
HDLC SERPL: 32.8 % (ref 20–50)
HGB BLD-MCNC: 15.8 G/DL (ref 12–16)
IMM GRANULOCYTES # BLD AUTO: 0.02 K/UL (ref 0–0.04)
IMM GRANULOCYTES NFR BLD AUTO: 0.4 % (ref 0–0.5)
LDLC SERPL CALC-MCNC: 100 MG/DL (ref 63–159)
LYMPHOCYTES # BLD AUTO: 1.3 K/UL (ref 1–4.8)
LYMPHOCYTES NFR BLD: 22.4 % (ref 18–48)
MCH RBC QN AUTO: 29.9 PG (ref 27–31)
MCHC RBC AUTO-ENTMCNC: 31.9 G/DL (ref 32–36)
MCV RBC AUTO: 94 FL (ref 82–98)
MONOCYTES # BLD AUTO: 0.6 K/UL (ref 0.3–1)
MONOCYTES NFR BLD: 10.4 % (ref 4–15)
NEUTROPHILS # BLD AUTO: 3.7 K/UL (ref 1.8–7.7)
NEUTROPHILS NFR BLD: 66 % (ref 38–73)
NONHDLC SERPL-MCNC: 119 MG/DL
NRBC BLD-RTO: 0 /100 WBC
PLATELET # BLD AUTO: 256 K/UL (ref 150–350)
PMV BLD AUTO: 11.7 FL (ref 9.2–12.9)
POTASSIUM SERPL-SCNC: 3.9 MMOL/L (ref 3.5–5.1)
PROT SERPL-MCNC: 6.8 G/DL (ref 6–8.4)
RBC # BLD AUTO: 5.28 M/UL (ref 4–5.4)
SODIUM SERPL-SCNC: 141 MMOL/L (ref 136–145)
TRIGL SERPL-MCNC: 95 MG/DL (ref 30–150)
WBC # BLD AUTO: 5.58 K/UL (ref 3.9–12.7)

## 2021-01-28 PROCEDURE — 1126F AMNT PAIN NOTED NONE PRSNT: CPT | Mod: S$GLB,,, | Performed by: INTERNAL MEDICINE

## 2021-01-28 PROCEDURE — 83036 HEMOGLOBIN GLYCOSYLATED A1C: CPT

## 2021-01-28 PROCEDURE — 1101F PT FALLS ASSESS-DOCD LE1/YR: CPT | Mod: CPTII,S$GLB,, | Performed by: INTERNAL MEDICINE

## 2021-01-28 PROCEDURE — 80053 COMPREHEN METABOLIC PANEL: CPT

## 2021-01-28 PROCEDURE — 99999 PR PBB SHADOW E&M-EST. PATIENT-LVL III: ICD-10-PCS | Mod: PBBFAC,,, | Performed by: INTERNAL MEDICINE

## 2021-01-28 PROCEDURE — 3074F PR MOST RECENT SYSTOLIC BLOOD PRESSURE < 130 MM HG: ICD-10-PCS | Mod: CPTII,S$GLB,, | Performed by: INTERNAL MEDICINE

## 2021-01-28 PROCEDURE — 85025 COMPLETE CBC W/AUTO DIFF WBC: CPT

## 2021-01-28 PROCEDURE — 1159F PR MEDICATION LIST DOCUMENTED IN MEDICAL RECORD: ICD-10-PCS | Mod: S$GLB,,, | Performed by: INTERNAL MEDICINE

## 2021-01-28 PROCEDURE — 80061 LIPID PANEL: CPT

## 2021-01-28 PROCEDURE — 1101F PR PT FALLS ASSESS DOC 0-1 FALLS W/OUT INJ PAST YR: ICD-10-PCS | Mod: CPTII,S$GLB,, | Performed by: INTERNAL MEDICINE

## 2021-01-28 PROCEDURE — 99214 OFFICE O/P EST MOD 30 MIN: CPT | Mod: S$GLB,,, | Performed by: INTERNAL MEDICINE

## 2021-01-28 PROCEDURE — 1159F MED LIST DOCD IN RCRD: CPT | Mod: S$GLB,,, | Performed by: INTERNAL MEDICINE

## 2021-01-28 PROCEDURE — 1126F PR PAIN SEVERITY QUANTIFIED, NO PAIN PRESENT: ICD-10-PCS | Mod: S$GLB,,, | Performed by: INTERNAL MEDICINE

## 2021-01-28 PROCEDURE — 99999 PR PBB SHADOW E&M-EST. PATIENT-LVL III: CPT | Mod: PBBFAC,,, | Performed by: INTERNAL MEDICINE

## 2021-01-28 PROCEDURE — 99499 RISK ADDL DX/OHS AUDIT: ICD-10-PCS | Mod: S$GLB,,, | Performed by: INTERNAL MEDICINE

## 2021-01-28 PROCEDURE — 3078F PR MOST RECENT DIASTOLIC BLOOD PRESSURE < 80 MM HG: ICD-10-PCS | Mod: CPTII,S$GLB,, | Performed by: INTERNAL MEDICINE

## 2021-01-28 PROCEDURE — 3288F PR FALLS RISK ASSESSMENT DOCUMENTED: ICD-10-PCS | Mod: CPTII,S$GLB,, | Performed by: INTERNAL MEDICINE

## 2021-01-28 PROCEDURE — 3078F DIAST BP <80 MM HG: CPT | Mod: CPTII,S$GLB,, | Performed by: INTERNAL MEDICINE

## 2021-01-28 PROCEDURE — 99499 UNLISTED E&M SERVICE: CPT | Mod: S$GLB,,, | Performed by: INTERNAL MEDICINE

## 2021-01-28 PROCEDURE — 3074F SYST BP LT 130 MM HG: CPT | Mod: CPTII,S$GLB,, | Performed by: INTERNAL MEDICINE

## 2021-01-28 PROCEDURE — 3288F FALL RISK ASSESSMENT DOCD: CPT | Mod: CPTII,S$GLB,, | Performed by: INTERNAL MEDICINE

## 2021-01-28 PROCEDURE — 36415 COLL VENOUS BLD VENIPUNCTURE: CPT | Mod: PO

## 2021-01-28 PROCEDURE — 99214 PR OFFICE/OUTPT VISIT, EST, LEVL IV, 30-39 MIN: ICD-10-PCS | Mod: S$GLB,,, | Performed by: INTERNAL MEDICINE

## 2021-04-13 ENCOUNTER — PES CALL (OUTPATIENT)
Dept: ADMINISTRATIVE | Facility: CLINIC | Age: 81
End: 2021-04-13

## 2021-05-20 ENCOUNTER — PES CALL (OUTPATIENT)
Dept: ADMINISTRATIVE | Facility: CLINIC | Age: 81
End: 2021-05-20

## 2021-05-20 ENCOUNTER — TELEPHONE (OUTPATIENT)
Dept: INTERNAL MEDICINE | Facility: CLINIC | Age: 81
End: 2021-05-20

## 2021-05-21 ENCOUNTER — TELEPHONE (OUTPATIENT)
Dept: INTERNAL MEDICINE | Facility: CLINIC | Age: 81
End: 2021-05-21

## 2021-06-04 ENCOUNTER — OFFICE VISIT (OUTPATIENT)
Dept: INTERNAL MEDICINE | Facility: CLINIC | Age: 81
End: 2021-06-04
Payer: MEDICARE

## 2021-06-04 ENCOUNTER — LAB VISIT (OUTPATIENT)
Dept: LAB | Facility: HOSPITAL | Age: 81
End: 2021-06-04
Attending: INTERNAL MEDICINE
Payer: MEDICARE

## 2021-06-04 VITALS
SYSTOLIC BLOOD PRESSURE: 110 MMHG | HEIGHT: 62 IN | BODY MASS INDEX: 19.88 KG/M2 | OXYGEN SATURATION: 98 % | DIASTOLIC BLOOD PRESSURE: 60 MMHG | WEIGHT: 108 LBS | HEART RATE: 98 BPM

## 2021-06-04 DIAGNOSIS — R39.89 SUSPECTED UTI: ICD-10-CM

## 2021-06-04 DIAGNOSIS — R39.89 SUSPECTED UTI: Primary | ICD-10-CM

## 2021-06-04 PROCEDURE — 99213 PR OFFICE/OUTPT VISIT, EST, LEVL III, 20-29 MIN: ICD-10-PCS | Mod: S$GLB,,, | Performed by: INTERNAL MEDICINE

## 2021-06-04 PROCEDURE — 1159F MED LIST DOCD IN RCRD: CPT | Mod: S$GLB,,, | Performed by: INTERNAL MEDICINE

## 2021-06-04 PROCEDURE — 99999 PR PBB SHADOW E&M-EST. PATIENT-LVL III: CPT | Mod: PBBFAC,,, | Performed by: INTERNAL MEDICINE

## 2021-06-04 PROCEDURE — 1159F PR MEDICATION LIST DOCUMENTED IN MEDICAL RECORD: ICD-10-PCS | Mod: S$GLB,,, | Performed by: INTERNAL MEDICINE

## 2021-06-04 PROCEDURE — 81001 URINALYSIS AUTO W/SCOPE: CPT | Performed by: INTERNAL MEDICINE

## 2021-06-04 PROCEDURE — 99213 OFFICE O/P EST LOW 20 MIN: CPT | Mod: S$GLB,,, | Performed by: INTERNAL MEDICINE

## 2021-06-04 PROCEDURE — 87086 URINE CULTURE/COLONY COUNT: CPT | Performed by: INTERNAL MEDICINE

## 2021-06-04 PROCEDURE — 3288F PR FALLS RISK ASSESSMENT DOCUMENTED: ICD-10-PCS | Mod: CPTII,S$GLB,, | Performed by: INTERNAL MEDICINE

## 2021-06-04 PROCEDURE — 1126F PR PAIN SEVERITY QUANTIFIED, NO PAIN PRESENT: ICD-10-PCS | Mod: S$GLB,,, | Performed by: INTERNAL MEDICINE

## 2021-06-04 PROCEDURE — 1126F AMNT PAIN NOTED NONE PRSNT: CPT | Mod: S$GLB,,, | Performed by: INTERNAL MEDICINE

## 2021-06-04 PROCEDURE — 1101F PT FALLS ASSESS-DOCD LE1/YR: CPT | Mod: CPTII,S$GLB,, | Performed by: INTERNAL MEDICINE

## 2021-06-04 PROCEDURE — 1101F PR PT FALLS ASSESS DOC 0-1 FALLS W/OUT INJ PAST YR: ICD-10-PCS | Mod: CPTII,S$GLB,, | Performed by: INTERNAL MEDICINE

## 2021-06-04 PROCEDURE — 99999 PR PBB SHADOW E&M-EST. PATIENT-LVL III: ICD-10-PCS | Mod: PBBFAC,,, | Performed by: INTERNAL MEDICINE

## 2021-06-04 PROCEDURE — 3288F FALL RISK ASSESSMENT DOCD: CPT | Mod: CPTII,S$GLB,, | Performed by: INTERNAL MEDICINE

## 2021-06-05 LAB
BACTERIA #/AREA URNS AUTO: ABNORMAL /HPF
BILIRUB UR QL STRIP: NEGATIVE
CLARITY UR REFRACT.AUTO: CLEAR
COLOR UR AUTO: YELLOW
GLUCOSE UR QL STRIP: NEGATIVE
HGB UR QL STRIP: NEGATIVE
HYALINE CASTS UR QL AUTO: 4 /LPF
KETONES UR QL STRIP: NEGATIVE
LEUKOCYTE ESTERASE UR QL STRIP: ABNORMAL
MICROSCOPIC COMMENT: ABNORMAL
NITRITE UR QL STRIP: NEGATIVE
PH UR STRIP: 5 [PH] (ref 5–8)
PROT UR QL STRIP: NEGATIVE
RBC #/AREA URNS AUTO: 0 /HPF (ref 0–4)
SP GR UR STRIP: 1.01 (ref 1–1.03)
SQUAMOUS #/AREA URNS AUTO: 0 /HPF
URN SPEC COLLECT METH UR: ABNORMAL
WBC #/AREA URNS AUTO: 6 /HPF (ref 0–5)

## 2021-06-06 LAB — BACTERIA UR CULT: NO GROWTH

## 2021-06-18 ENCOUNTER — OFFICE VISIT (OUTPATIENT)
Dept: FAMILY MEDICINE | Facility: CLINIC | Age: 81
End: 2021-06-18
Payer: MEDICARE

## 2021-06-18 ENCOUNTER — TELEPHONE (OUTPATIENT)
Dept: FAMILY MEDICINE | Facility: CLINIC | Age: 81
End: 2021-06-18

## 2021-06-18 ENCOUNTER — TELEPHONE (OUTPATIENT)
Dept: INTERNAL MEDICINE | Facility: CLINIC | Age: 81
End: 2021-06-18

## 2021-06-18 VITALS
DIASTOLIC BLOOD PRESSURE: 70 MMHG | HEART RATE: 64 BPM | BODY MASS INDEX: 19.79 KG/M2 | HEIGHT: 62 IN | SYSTOLIC BLOOD PRESSURE: 120 MMHG | WEIGHT: 107.56 LBS | OXYGEN SATURATION: 96 %

## 2021-06-18 DIAGNOSIS — R39.9 UTI SYMPTOMS: Primary | ICD-10-CM

## 2021-06-18 DIAGNOSIS — R39.89 SUSPECTED UTI: ICD-10-CM

## 2021-06-18 PROCEDURE — 99213 OFFICE O/P EST LOW 20 MIN: CPT | Mod: S$GLB,,, | Performed by: NURSE PRACTITIONER

## 2021-06-18 PROCEDURE — 99999 PR PBB SHADOW E&M-EST. PATIENT-LVL IV: ICD-10-PCS | Mod: PBBFAC,,, | Performed by: NURSE PRACTITIONER

## 2021-06-18 PROCEDURE — 1159F PR MEDICATION LIST DOCUMENTED IN MEDICAL RECORD: ICD-10-PCS | Mod: S$GLB,,, | Performed by: NURSE PRACTITIONER

## 2021-06-18 PROCEDURE — 1101F PR PT FALLS ASSESS DOC 0-1 FALLS W/OUT INJ PAST YR: ICD-10-PCS | Mod: CPTII,S$GLB,, | Performed by: NURSE PRACTITIONER

## 2021-06-18 PROCEDURE — 1126F AMNT PAIN NOTED NONE PRSNT: CPT | Mod: S$GLB,,, | Performed by: NURSE PRACTITIONER

## 2021-06-18 PROCEDURE — 3288F FALL RISK ASSESSMENT DOCD: CPT | Mod: CPTII,S$GLB,, | Performed by: NURSE PRACTITIONER

## 2021-06-18 PROCEDURE — 99999 PR PBB SHADOW E&M-EST. PATIENT-LVL IV: CPT | Mod: PBBFAC,,, | Performed by: NURSE PRACTITIONER

## 2021-06-18 PROCEDURE — 1159F MED LIST DOCD IN RCRD: CPT | Mod: S$GLB,,, | Performed by: NURSE PRACTITIONER

## 2021-06-18 PROCEDURE — 1101F PT FALLS ASSESS-DOCD LE1/YR: CPT | Mod: CPTII,S$GLB,, | Performed by: NURSE PRACTITIONER

## 2021-06-18 PROCEDURE — 1126F PR PAIN SEVERITY QUANTIFIED, NO PAIN PRESENT: ICD-10-PCS | Mod: S$GLB,,, | Performed by: NURSE PRACTITIONER

## 2021-06-18 PROCEDURE — 3288F PR FALLS RISK ASSESSMENT DOCUMENTED: ICD-10-PCS | Mod: CPTII,S$GLB,, | Performed by: NURSE PRACTITIONER

## 2021-06-18 PROCEDURE — 99213 PR OFFICE/OUTPT VISIT, EST, LEVL III, 20-29 MIN: ICD-10-PCS | Mod: S$GLB,,, | Performed by: NURSE PRACTITIONER

## 2021-06-18 RX ORDER — NITROFURANTOIN 25; 75 MG/1; MG/1
100 CAPSULE ORAL 2 TIMES DAILY
Qty: 10 CAPSULE | Refills: 0 | Status: SHIPPED | OUTPATIENT
Start: 2021-06-18 | End: 2021-06-23

## 2021-06-23 ENCOUNTER — TELEPHONE (OUTPATIENT)
Dept: FAMILY MEDICINE | Facility: CLINIC | Age: 81
End: 2021-06-23

## 2021-07-26 ENCOUNTER — LAB VISIT (OUTPATIENT)
Dept: LAB | Facility: HOSPITAL | Age: 81
End: 2021-07-26
Attending: INTERNAL MEDICINE
Payer: MEDICARE

## 2021-07-26 DIAGNOSIS — I10 ESSENTIAL HYPERTENSION: ICD-10-CM

## 2021-07-26 LAB
ALBUMIN SERPL BCP-MCNC: 4 G/DL (ref 3.5–5.2)
ALP SERPL-CCNC: 64 U/L (ref 55–135)
ALT SERPL W/O P-5'-P-CCNC: 21 U/L (ref 10–44)
ANION GAP SERPL CALC-SCNC: 10 MMOL/L (ref 8–16)
AST SERPL-CCNC: 17 U/L (ref 10–40)
BILIRUB SERPL-MCNC: 0.8 MG/DL (ref 0.1–1)
BUN SERPL-MCNC: 27 MG/DL (ref 8–23)
CALCIUM SERPL-MCNC: 10.5 MG/DL (ref 8.7–10.5)
CHLORIDE SERPL-SCNC: 104 MMOL/L (ref 95–110)
CO2 SERPL-SCNC: 27 MMOL/L (ref 23–29)
CREAT SERPL-MCNC: 1 MG/DL (ref 0.5–1.4)
EST. GFR  (AFRICAN AMERICAN): >60 ML/MIN/1.73 M^2
EST. GFR  (NON AFRICAN AMERICAN): 53 ML/MIN/1.73 M^2
GLUCOSE SERPL-MCNC: 108 MG/DL (ref 70–110)
POTASSIUM SERPL-SCNC: 3.4 MMOL/L (ref 3.5–5.1)
PROT SERPL-MCNC: 6.7 G/DL (ref 6–8.4)
SODIUM SERPL-SCNC: 141 MMOL/L (ref 136–145)

## 2021-07-26 PROCEDURE — 36415 COLL VENOUS BLD VENIPUNCTURE: CPT | Mod: PO | Performed by: INTERNAL MEDICINE

## 2021-07-26 PROCEDURE — 80053 COMPREHEN METABOLIC PANEL: CPT | Performed by: INTERNAL MEDICINE

## 2021-07-27 ENCOUNTER — PATIENT OUTREACH (OUTPATIENT)
Dept: ADMINISTRATIVE | Facility: OTHER | Age: 81
End: 2021-07-27

## 2021-07-28 ENCOUNTER — OFFICE VISIT (OUTPATIENT)
Dept: PODIATRY | Facility: CLINIC | Age: 81
End: 2021-07-28
Payer: MEDICARE

## 2021-07-28 ENCOUNTER — OFFICE VISIT (OUTPATIENT)
Dept: INTERNAL MEDICINE | Facility: CLINIC | Age: 81
End: 2021-07-28
Payer: MEDICARE

## 2021-07-28 VITALS
SYSTOLIC BLOOD PRESSURE: 122 MMHG | HEART RATE: 71 BPM | DIASTOLIC BLOOD PRESSURE: 68 MMHG | BODY MASS INDEX: 19.79 KG/M2 | OXYGEN SATURATION: 97 % | HEIGHT: 62 IN | WEIGHT: 107.56 LBS

## 2021-07-28 VITALS — WEIGHT: 107.56 LBS | HEIGHT: 62 IN | BODY MASS INDEX: 19.79 KG/M2

## 2021-07-28 DIAGNOSIS — I48.0 PAF (PAROXYSMAL ATRIAL FIBRILLATION): ICD-10-CM

## 2021-07-28 DIAGNOSIS — M20.40 HAMMER TOE, UNSPECIFIED LATERALITY: ICD-10-CM

## 2021-07-28 DIAGNOSIS — F41.9 ANXIETY: Chronic | ICD-10-CM

## 2021-07-28 DIAGNOSIS — R41.3 MEMORY LOSS: Primary | ICD-10-CM

## 2021-07-28 DIAGNOSIS — H91.90 HEARING LOSS, UNSPECIFIED HEARING LOSS TYPE, UNSPECIFIED LATERALITY: ICD-10-CM

## 2021-07-28 DIAGNOSIS — E87.6 HYPOKALEMIA: ICD-10-CM

## 2021-07-28 DIAGNOSIS — M21.619 BUNION: Primary | ICD-10-CM

## 2021-07-28 DIAGNOSIS — I10 ESSENTIAL HYPERTENSION: Chronic | ICD-10-CM

## 2021-07-28 PROCEDURE — 1159F PR MEDICATION LIST DOCUMENTED IN MEDICAL RECORD: ICD-10-PCS | Mod: CPTII,S$GLB,, | Performed by: STUDENT IN AN ORGANIZED HEALTH CARE EDUCATION/TRAINING PROGRAM

## 2021-07-28 PROCEDURE — 99499 RISK ADDL DX/OHS AUDIT: ICD-10-PCS | Mod: HCNC,S$GLB,, | Performed by: INTERNAL MEDICINE

## 2021-07-28 PROCEDURE — 1126F PR PAIN SEVERITY QUANTIFIED, NO PAIN PRESENT: ICD-10-PCS | Mod: CPTII,S$GLB,, | Performed by: INTERNAL MEDICINE

## 2021-07-28 PROCEDURE — 99203 OFFICE O/P NEW LOW 30 MIN: CPT | Mod: S$GLB,,, | Performed by: STUDENT IN AN ORGANIZED HEALTH CARE EDUCATION/TRAINING PROGRAM

## 2021-07-28 PROCEDURE — 99499 UNLISTED E&M SERVICE: CPT | Mod: HCNC,S$GLB,, | Performed by: INTERNAL MEDICINE

## 2021-07-28 PROCEDURE — 3288F FALL RISK ASSESSMENT DOCD: CPT | Mod: CPTII,S$GLB,, | Performed by: STUDENT IN AN ORGANIZED HEALTH CARE EDUCATION/TRAINING PROGRAM

## 2021-07-28 PROCEDURE — 3288F PR FALLS RISK ASSESSMENT DOCUMENTED: ICD-10-PCS | Mod: CPTII,S$GLB,, | Performed by: STUDENT IN AN ORGANIZED HEALTH CARE EDUCATION/TRAINING PROGRAM

## 2021-07-28 PROCEDURE — 3074F SYST BP LT 130 MM HG: CPT | Mod: CPTII,S$GLB,, | Performed by: INTERNAL MEDICINE

## 2021-07-28 PROCEDURE — 1101F PT FALLS ASSESS-DOCD LE1/YR: CPT | Mod: CPTII,S$GLB,, | Performed by: STUDENT IN AN ORGANIZED HEALTH CARE EDUCATION/TRAINING PROGRAM

## 2021-07-28 PROCEDURE — 3078F PR MOST RECENT DIASTOLIC BLOOD PRESSURE < 80 MM HG: ICD-10-PCS | Mod: CPTII,S$GLB,, | Performed by: INTERNAL MEDICINE

## 2021-07-28 PROCEDURE — 1159F PR MEDICATION LIST DOCUMENTED IN MEDICAL RECORD: ICD-10-PCS | Mod: CPTII,S$GLB,, | Performed by: INTERNAL MEDICINE

## 2021-07-28 PROCEDURE — 1160F RVW MEDS BY RX/DR IN RCRD: CPT | Mod: CPTII,S$GLB,, | Performed by: STUDENT IN AN ORGANIZED HEALTH CARE EDUCATION/TRAINING PROGRAM

## 2021-07-28 PROCEDURE — 1159F MED LIST DOCD IN RCRD: CPT | Mod: CPTII,S$GLB,, | Performed by: INTERNAL MEDICINE

## 2021-07-28 PROCEDURE — 1159F MED LIST DOCD IN RCRD: CPT | Mod: CPTII,S$GLB,, | Performed by: STUDENT IN AN ORGANIZED HEALTH CARE EDUCATION/TRAINING PROGRAM

## 2021-07-28 PROCEDURE — 3074F PR MOST RECENT SYSTOLIC BLOOD PRESSURE < 130 MM HG: ICD-10-PCS | Mod: CPTII,S$GLB,, | Performed by: INTERNAL MEDICINE

## 2021-07-28 PROCEDURE — 1126F AMNT PAIN NOTED NONE PRSNT: CPT | Mod: CPTII,S$GLB,, | Performed by: STUDENT IN AN ORGANIZED HEALTH CARE EDUCATION/TRAINING PROGRAM

## 2021-07-28 PROCEDURE — 99214 PR OFFICE/OUTPT VISIT, EST, LEVL IV, 30-39 MIN: ICD-10-PCS | Mod: S$GLB,,, | Performed by: INTERNAL MEDICINE

## 2021-07-28 PROCEDURE — 99999 PR PBB SHADOW E&M-EST. PATIENT-LVL III: ICD-10-PCS | Mod: PBBFAC,,, | Performed by: STUDENT IN AN ORGANIZED HEALTH CARE EDUCATION/TRAINING PROGRAM

## 2021-07-28 PROCEDURE — 99999 PR PBB SHADOW E&M-EST. PATIENT-LVL III: CPT | Mod: PBBFAC,,, | Performed by: STUDENT IN AN ORGANIZED HEALTH CARE EDUCATION/TRAINING PROGRAM

## 2021-07-28 PROCEDURE — 1126F AMNT PAIN NOTED NONE PRSNT: CPT | Mod: CPTII,S$GLB,, | Performed by: INTERNAL MEDICINE

## 2021-07-28 PROCEDURE — 3078F DIAST BP <80 MM HG: CPT | Mod: CPTII,S$GLB,, | Performed by: INTERNAL MEDICINE

## 2021-07-28 PROCEDURE — 99203 PR OFFICE/OUTPT VISIT, NEW, LEVL III, 30-44 MIN: ICD-10-PCS | Mod: S$GLB,,, | Performed by: STUDENT IN AN ORGANIZED HEALTH CARE EDUCATION/TRAINING PROGRAM

## 2021-07-28 PROCEDURE — 1160F PR REVIEW ALL MEDS BY PRESCRIBER/CLIN PHARMACIST DOCUMENTED: ICD-10-PCS | Mod: CPTII,S$GLB,, | Performed by: STUDENT IN AN ORGANIZED HEALTH CARE EDUCATION/TRAINING PROGRAM

## 2021-07-28 PROCEDURE — 99999 PR PBB SHADOW E&M-EST. PATIENT-LVL V: ICD-10-PCS | Mod: PBBFAC,,, | Performed by: INTERNAL MEDICINE

## 2021-07-28 PROCEDURE — 99214 OFFICE O/P EST MOD 30 MIN: CPT | Mod: S$GLB,,, | Performed by: INTERNAL MEDICINE

## 2021-07-28 PROCEDURE — 99999 PR PBB SHADOW E&M-EST. PATIENT-LVL V: CPT | Mod: PBBFAC,,, | Performed by: INTERNAL MEDICINE

## 2021-07-28 PROCEDURE — 1126F PR PAIN SEVERITY QUANTIFIED, NO PAIN PRESENT: ICD-10-PCS | Mod: CPTII,S$GLB,, | Performed by: STUDENT IN AN ORGANIZED HEALTH CARE EDUCATION/TRAINING PROGRAM

## 2021-07-28 PROCEDURE — 1101F PR PT FALLS ASSESS DOC 0-1 FALLS W/OUT INJ PAST YR: ICD-10-PCS | Mod: CPTII,S$GLB,, | Performed by: STUDENT IN AN ORGANIZED HEALTH CARE EDUCATION/TRAINING PROGRAM

## 2021-07-28 RX ORDER — ESCITALOPRAM OXALATE 5 MG/1
5 TABLET ORAL DAILY
Qty: 90 TABLET | Refills: 1 | Status: SHIPPED | OUTPATIENT
Start: 2021-07-28 | End: 2021-10-25

## 2021-07-28 RX ORDER — ALPRAZOLAM 0.25 MG/1
0.25 TABLET ORAL 2 TIMES DAILY PRN
Qty: 180 TABLET | Refills: 0 | Status: SHIPPED | OUTPATIENT
Start: 2021-07-28 | End: 2021-11-01 | Stop reason: SDUPTHER

## 2021-07-28 RX ORDER — POTASSIUM CHLORIDE 20 MEQ/1
20 TABLET, EXTENDED RELEASE ORAL DAILY
Qty: 4 TABLET | Refills: 0 | Status: SHIPPED | OUTPATIENT
Start: 2021-07-28 | End: 2022-04-07

## 2021-07-30 ENCOUNTER — TELEPHONE (OUTPATIENT)
Dept: ADMINISTRATIVE | Facility: OTHER | Age: 81
End: 2021-07-30

## 2021-08-03 ENCOUNTER — TELEPHONE (OUTPATIENT)
Dept: INTERNAL MEDICINE | Facility: CLINIC | Age: 81
End: 2021-08-03

## 2021-08-03 DIAGNOSIS — Z12.31 VISIT FOR SCREENING MAMMOGRAM: Primary | ICD-10-CM

## 2021-08-10 ENCOUNTER — TELEPHONE (OUTPATIENT)
Dept: INTERNAL MEDICINE | Facility: CLINIC | Age: 81
End: 2021-08-10

## 2021-08-10 ENCOUNTER — OFFICE VISIT (OUTPATIENT)
Dept: INTERNAL MEDICINE | Facility: CLINIC | Age: 81
End: 2021-08-10
Payer: MEDICARE

## 2021-08-10 VITALS
WEIGHT: 107.13 LBS | DIASTOLIC BLOOD PRESSURE: 66 MMHG | SYSTOLIC BLOOD PRESSURE: 132 MMHG | BODY MASS INDEX: 19.6 KG/M2 | HEART RATE: 76 BPM | OXYGEN SATURATION: 96 %

## 2021-08-10 DIAGNOSIS — N30.01 ACUTE CYSTITIS WITH HEMATURIA: ICD-10-CM

## 2021-08-10 DIAGNOSIS — R31.9 HEMATURIA, UNSPECIFIED TYPE: ICD-10-CM

## 2021-08-10 PROCEDURE — 1125F AMNT PAIN NOTED PAIN PRSNT: CPT | Mod: CPTII,S$GLB,, | Performed by: INTERNAL MEDICINE

## 2021-08-10 PROCEDURE — 99999 PR PBB SHADOW E&M-EST. PATIENT-LVL IV: ICD-10-PCS | Mod: PBBFAC,,, | Performed by: INTERNAL MEDICINE

## 2021-08-10 PROCEDURE — 3078F DIAST BP <80 MM HG: CPT | Mod: CPTII,S$GLB,, | Performed by: INTERNAL MEDICINE

## 2021-08-10 PROCEDURE — 3078F PR MOST RECENT DIASTOLIC BLOOD PRESSURE < 80 MM HG: ICD-10-PCS | Mod: CPTII,S$GLB,, | Performed by: INTERNAL MEDICINE

## 2021-08-10 PROCEDURE — 3075F PR MOST RECENT SYSTOLIC BLOOD PRESS GE 130-139MM HG: ICD-10-PCS | Mod: CPTII,S$GLB,, | Performed by: INTERNAL MEDICINE

## 2021-08-10 PROCEDURE — 3075F SYST BP GE 130 - 139MM HG: CPT | Mod: CPTII,S$GLB,, | Performed by: INTERNAL MEDICINE

## 2021-08-10 PROCEDURE — 1101F PR PT FALLS ASSESS DOC 0-1 FALLS W/OUT INJ PAST YR: ICD-10-PCS | Mod: CPTII,S$GLB,, | Performed by: INTERNAL MEDICINE

## 2021-08-10 PROCEDURE — 1159F PR MEDICATION LIST DOCUMENTED IN MEDICAL RECORD: ICD-10-PCS | Mod: CPTII,S$GLB,, | Performed by: INTERNAL MEDICINE

## 2021-08-10 PROCEDURE — 99214 PR OFFICE/OUTPT VISIT, EST, LEVL IV, 30-39 MIN: ICD-10-PCS | Mod: S$GLB,,, | Performed by: INTERNAL MEDICINE

## 2021-08-10 PROCEDURE — 99999 PR PBB SHADOW E&M-EST. PATIENT-LVL IV: CPT | Mod: PBBFAC,,, | Performed by: INTERNAL MEDICINE

## 2021-08-10 PROCEDURE — 1159F MED LIST DOCD IN RCRD: CPT | Mod: CPTII,S$GLB,, | Performed by: INTERNAL MEDICINE

## 2021-08-10 PROCEDURE — 1101F PT FALLS ASSESS-DOCD LE1/YR: CPT | Mod: CPTII,S$GLB,, | Performed by: INTERNAL MEDICINE

## 2021-08-10 PROCEDURE — 1125F PR PAIN SEVERITY QUANTIFIED, PAIN PRESENT: ICD-10-PCS | Mod: CPTII,S$GLB,, | Performed by: INTERNAL MEDICINE

## 2021-08-10 PROCEDURE — 99214 OFFICE O/P EST MOD 30 MIN: CPT | Mod: S$GLB,,, | Performed by: INTERNAL MEDICINE

## 2021-08-10 PROCEDURE — 3288F FALL RISK ASSESSMENT DOCD: CPT | Mod: CPTII,S$GLB,, | Performed by: INTERNAL MEDICINE

## 2021-08-10 PROCEDURE — 3288F PR FALLS RISK ASSESSMENT DOCUMENTED: ICD-10-PCS | Mod: CPTII,S$GLB,, | Performed by: INTERNAL MEDICINE

## 2021-08-10 RX ORDER — CEPHALEXIN 500 MG/1
500 CAPSULE ORAL EVERY 8 HOURS
Qty: 21 CAPSULE | Refills: 0 | Status: SHIPPED | OUTPATIENT
Start: 2021-08-10 | End: 2021-08-17

## 2021-08-11 ENCOUNTER — HOSPITAL ENCOUNTER (OUTPATIENT)
Dept: RADIOLOGY | Facility: HOSPITAL | Age: 81
Discharge: HOME | End: 2021-08-11
Attending: INTERNAL MEDICINE
Payer: MEDICARE

## 2021-08-11 DIAGNOSIS — R31.9 HEMATURIA, UNSPECIFIED TYPE: ICD-10-CM

## 2021-08-11 DIAGNOSIS — N30.01 ACUTE CYSTITIS WITH HEMATURIA: ICD-10-CM

## 2021-08-11 PROCEDURE — 74176 CT ABD & PELVIS W/O CONTRAST: CPT | Mod: 26,,, | Performed by: RADIOLOGY

## 2021-08-11 PROCEDURE — 74176 CT RENAL STONE STUDY ABD PELVIS WO: ICD-10-PCS | Mod: 26,,, | Performed by: RADIOLOGY

## 2021-08-11 PROCEDURE — 74176 CT ABD & PELVIS W/O CONTRAST: CPT | Mod: TC

## 2021-08-12 ENCOUNTER — PES CALL (OUTPATIENT)
Dept: ADMINISTRATIVE | Facility: CLINIC | Age: 81
End: 2021-08-12

## 2021-08-12 ENCOUNTER — TELEPHONE (OUTPATIENT)
Dept: INTERNAL MEDICINE | Facility: CLINIC | Age: 81
End: 2021-08-12

## 2021-08-27 ENCOUNTER — PATIENT OUTREACH (OUTPATIENT)
Dept: ADMINISTRATIVE | Facility: OTHER | Age: 81
End: 2021-08-27

## 2021-09-09 ENCOUNTER — TELEPHONE (OUTPATIENT)
Dept: INTERNAL MEDICINE | Facility: CLINIC | Age: 81
End: 2021-09-09

## 2021-09-15 ENCOUNTER — LAB VISIT (OUTPATIENT)
Dept: LAB | Facility: HOSPITAL | Age: 81
End: 2021-09-15
Attending: INTERNAL MEDICINE
Payer: MEDICARE

## 2021-09-15 DIAGNOSIS — E87.6 HYPOKALEMIA: ICD-10-CM

## 2021-09-15 LAB
ANION GAP SERPL CALC-SCNC: 12 MMOL/L (ref 8–16)
BUN SERPL-MCNC: 20 MG/DL (ref 8–23)
CALCIUM SERPL-MCNC: 10 MG/DL (ref 8.7–10.5)
CHLORIDE SERPL-SCNC: 101 MMOL/L (ref 95–110)
CO2 SERPL-SCNC: 27 MMOL/L (ref 23–29)
CREAT SERPL-MCNC: 0.8 MG/DL (ref 0.5–1.4)
EST. GFR  (AFRICAN AMERICAN): >60 ML/MIN/1.73 M^2
EST. GFR  (NON AFRICAN AMERICAN): >60 ML/MIN/1.73 M^2
GLUCOSE SERPL-MCNC: 91 MG/DL (ref 70–110)
POTASSIUM SERPL-SCNC: 4 MMOL/L (ref 3.5–5.1)
SODIUM SERPL-SCNC: 140 MMOL/L (ref 136–145)

## 2021-09-15 PROCEDURE — 36415 COLL VENOUS BLD VENIPUNCTURE: CPT | Mod: PO | Performed by: INTERNAL MEDICINE

## 2021-09-15 PROCEDURE — 80048 BASIC METABOLIC PNL TOTAL CA: CPT | Performed by: INTERNAL MEDICINE

## 2021-09-17 ENCOUNTER — OFFICE VISIT (OUTPATIENT)
Dept: INTERNAL MEDICINE | Facility: CLINIC | Age: 81
End: 2021-09-17
Payer: MEDICARE

## 2021-09-17 VITALS
RESPIRATION RATE: 16 BRPM | WEIGHT: 106.25 LBS | SYSTOLIC BLOOD PRESSURE: 118 MMHG | BODY MASS INDEX: 19.55 KG/M2 | OXYGEN SATURATION: 99 % | DIASTOLIC BLOOD PRESSURE: 78 MMHG | HEIGHT: 62 IN | HEART RATE: 113 BPM

## 2021-09-17 DIAGNOSIS — R41.3 MEMORY LOSS: ICD-10-CM

## 2021-09-17 DIAGNOSIS — E87.6 HYPOKALEMIA: ICD-10-CM

## 2021-09-17 DIAGNOSIS — R31.9 HEMATURIA, UNSPECIFIED TYPE: ICD-10-CM

## 2021-09-17 DIAGNOSIS — H61.23 BILATERAL IMPACTED CERUMEN: ICD-10-CM

## 2021-09-17 DIAGNOSIS — F41.1 GENERALIZED ANXIETY DISORDER: ICD-10-CM

## 2021-09-17 PROCEDURE — 3288F PR FALLS RISK ASSESSMENT DOCUMENTED: ICD-10-PCS | Mod: CPTII,S$GLB,, | Performed by: INTERNAL MEDICINE

## 2021-09-17 PROCEDURE — 1101F PT FALLS ASSESS-DOCD LE1/YR: CPT | Mod: CPTII,S$GLB,, | Performed by: INTERNAL MEDICINE

## 2021-09-17 PROCEDURE — 3078F PR MOST RECENT DIASTOLIC BLOOD PRESSURE < 80 MM HG: ICD-10-PCS | Mod: CPTII,S$GLB,, | Performed by: INTERNAL MEDICINE

## 2021-09-17 PROCEDURE — 1126F PR PAIN SEVERITY QUANTIFIED, NO PAIN PRESENT: ICD-10-PCS | Mod: CPTII,S$GLB,, | Performed by: INTERNAL MEDICINE

## 2021-09-17 PROCEDURE — 1126F AMNT PAIN NOTED NONE PRSNT: CPT | Mod: CPTII,S$GLB,, | Performed by: INTERNAL MEDICINE

## 2021-09-17 PROCEDURE — 1101F PR PT FALLS ASSESS DOC 0-1 FALLS W/OUT INJ PAST YR: ICD-10-PCS | Mod: CPTII,S$GLB,, | Performed by: INTERNAL MEDICINE

## 2021-09-17 PROCEDURE — 3074F PR MOST RECENT SYSTOLIC BLOOD PRESSURE < 130 MM HG: ICD-10-PCS | Mod: CPTII,S$GLB,, | Performed by: INTERNAL MEDICINE

## 2021-09-17 PROCEDURE — 3288F FALL RISK ASSESSMENT DOCD: CPT | Mod: CPTII,S$GLB,, | Performed by: INTERNAL MEDICINE

## 2021-09-17 PROCEDURE — 99214 PR OFFICE/OUTPT VISIT, EST, LEVL IV, 30-39 MIN: ICD-10-PCS | Mod: S$GLB,,, | Performed by: INTERNAL MEDICINE

## 2021-09-17 PROCEDURE — 99999 PR PBB SHADOW E&M-EST. PATIENT-LVL III: CPT | Mod: PBBFAC,,, | Performed by: INTERNAL MEDICINE

## 2021-09-17 PROCEDURE — 1159F MED LIST DOCD IN RCRD: CPT | Mod: CPTII,S$GLB,, | Performed by: INTERNAL MEDICINE

## 2021-09-17 PROCEDURE — 3074F SYST BP LT 130 MM HG: CPT | Mod: CPTII,S$GLB,, | Performed by: INTERNAL MEDICINE

## 2021-09-17 PROCEDURE — 3078F DIAST BP <80 MM HG: CPT | Mod: CPTII,S$GLB,, | Performed by: INTERNAL MEDICINE

## 2021-09-17 PROCEDURE — 99999 PR PBB SHADOW E&M-EST. PATIENT-LVL III: ICD-10-PCS | Mod: PBBFAC,,, | Performed by: INTERNAL MEDICINE

## 2021-09-17 PROCEDURE — 99214 OFFICE O/P EST MOD 30 MIN: CPT | Mod: S$GLB,,, | Performed by: INTERNAL MEDICINE

## 2021-09-17 PROCEDURE — 1159F PR MEDICATION LIST DOCUMENTED IN MEDICAL RECORD: ICD-10-PCS | Mod: CPTII,S$GLB,, | Performed by: INTERNAL MEDICINE

## 2021-10-01 ENCOUNTER — OFFICE VISIT (OUTPATIENT)
Dept: INTERNAL MEDICINE | Facility: CLINIC | Age: 81
End: 2021-10-01
Payer: MEDICARE

## 2021-10-01 ENCOUNTER — LAB VISIT (OUTPATIENT)
Dept: LAB | Facility: HOSPITAL | Age: 81
End: 2021-10-01
Attending: INTERNAL MEDICINE
Payer: MEDICARE

## 2021-10-01 VITALS
HEART RATE: 65 BPM | OXYGEN SATURATION: 97 % | BODY MASS INDEX: 19.36 KG/M2 | DIASTOLIC BLOOD PRESSURE: 60 MMHG | WEIGHT: 105.19 LBS | SYSTOLIC BLOOD PRESSURE: 104 MMHG | HEIGHT: 62 IN

## 2021-10-01 DIAGNOSIS — R31.0 GROSS HEMATURIA: Primary | ICD-10-CM

## 2021-10-01 DIAGNOSIS — R31.0 GROSS HEMATURIA: ICD-10-CM

## 2021-10-01 LAB
BACTERIA #/AREA URNS AUTO: ABNORMAL /HPF
BILIRUB UR QL STRIP: NEGATIVE
CLARITY UR REFRACT.AUTO: ABNORMAL
COLOR UR AUTO: ABNORMAL
GLUCOSE UR QL STRIP: NEGATIVE
HGB UR QL STRIP: ABNORMAL
HYALINE CASTS UR QL AUTO: 0 /LPF
KETONES UR QL STRIP: NEGATIVE
LEUKOCYTE ESTERASE UR QL STRIP: ABNORMAL
MICROSCOPIC COMMENT: ABNORMAL
NITRITE UR QL STRIP: POSITIVE
PH UR STRIP: 6 [PH] (ref 5–8)
PROT UR QL STRIP: ABNORMAL
RBC #/AREA URNS AUTO: >100 /HPF (ref 0–4)
SP GR UR STRIP: 1.02 (ref 1–1.03)
URN SPEC COLLECT METH UR: ABNORMAL
WBC #/AREA URNS AUTO: >100 /HPF (ref 0–5)

## 2021-10-01 PROCEDURE — 3288F PR FALLS RISK ASSESSMENT DOCUMENTED: ICD-10-PCS | Mod: HCNC,CPTII,S$GLB, | Performed by: INTERNAL MEDICINE

## 2021-10-01 PROCEDURE — 99213 OFFICE O/P EST LOW 20 MIN: CPT | Mod: HCNC,S$GLB,, | Performed by: INTERNAL MEDICINE

## 2021-10-01 PROCEDURE — 1159F PR MEDICATION LIST DOCUMENTED IN MEDICAL RECORD: ICD-10-PCS | Mod: HCNC,CPTII,S$GLB, | Performed by: INTERNAL MEDICINE

## 2021-10-01 PROCEDURE — 1159F MED LIST DOCD IN RCRD: CPT | Mod: HCNC,CPTII,S$GLB, | Performed by: INTERNAL MEDICINE

## 2021-10-01 PROCEDURE — 99213 PR OFFICE/OUTPT VISIT, EST, LEVL III, 20-29 MIN: ICD-10-PCS | Mod: HCNC,S$GLB,, | Performed by: INTERNAL MEDICINE

## 2021-10-01 PROCEDURE — 99999 PR PBB SHADOW E&M-EST. PATIENT-LVL IV: ICD-10-PCS | Mod: PBBFAC,HCNC,, | Performed by: INTERNAL MEDICINE

## 2021-10-01 PROCEDURE — 1125F PR PAIN SEVERITY QUANTIFIED, PAIN PRESENT: ICD-10-PCS | Mod: HCNC,CPTII,S$GLB, | Performed by: INTERNAL MEDICINE

## 2021-10-01 PROCEDURE — 87186 SC STD MICRODIL/AGAR DIL: CPT | Mod: HCNC | Performed by: INTERNAL MEDICINE

## 2021-10-01 PROCEDURE — 3074F PR MOST RECENT SYSTOLIC BLOOD PRESSURE < 130 MM HG: ICD-10-PCS | Mod: HCNC,CPTII,S$GLB, | Performed by: INTERNAL MEDICINE

## 2021-10-01 PROCEDURE — 1101F PT FALLS ASSESS-DOCD LE1/YR: CPT | Mod: HCNC,CPTII,S$GLB, | Performed by: INTERNAL MEDICINE

## 2021-10-01 PROCEDURE — 3288F FALL RISK ASSESSMENT DOCD: CPT | Mod: HCNC,CPTII,S$GLB, | Performed by: INTERNAL MEDICINE

## 2021-10-01 PROCEDURE — 3078F PR MOST RECENT DIASTOLIC BLOOD PRESSURE < 80 MM HG: ICD-10-PCS | Mod: HCNC,CPTII,S$GLB, | Performed by: INTERNAL MEDICINE

## 2021-10-01 PROCEDURE — 87088 URINE BACTERIA CULTURE: CPT | Mod: HCNC | Performed by: INTERNAL MEDICINE

## 2021-10-01 PROCEDURE — 87077 CULTURE AEROBIC IDENTIFY: CPT | Mod: HCNC | Performed by: INTERNAL MEDICINE

## 2021-10-01 PROCEDURE — 81001 URINALYSIS AUTO W/SCOPE: CPT | Mod: HCNC | Performed by: INTERNAL MEDICINE

## 2021-10-01 PROCEDURE — 87086 URINE CULTURE/COLONY COUNT: CPT | Mod: HCNC | Performed by: INTERNAL MEDICINE

## 2021-10-01 PROCEDURE — 3074F SYST BP LT 130 MM HG: CPT | Mod: HCNC,CPTII,S$GLB, | Performed by: INTERNAL MEDICINE

## 2021-10-01 PROCEDURE — 99999 PR PBB SHADOW E&M-EST. PATIENT-LVL IV: CPT | Mod: PBBFAC,HCNC,, | Performed by: INTERNAL MEDICINE

## 2021-10-01 PROCEDURE — 1125F AMNT PAIN NOTED PAIN PRSNT: CPT | Mod: HCNC,CPTII,S$GLB, | Performed by: INTERNAL MEDICINE

## 2021-10-01 PROCEDURE — 3078F DIAST BP <80 MM HG: CPT | Mod: HCNC,CPTII,S$GLB, | Performed by: INTERNAL MEDICINE

## 2021-10-01 PROCEDURE — 1101F PR PT FALLS ASSESS DOC 0-1 FALLS W/OUT INJ PAST YR: ICD-10-PCS | Mod: HCNC,CPTII,S$GLB, | Performed by: INTERNAL MEDICINE

## 2021-10-01 RX ORDER — CEPHALEXIN 500 MG/1
500 CAPSULE ORAL EVERY 8 HOURS
Qty: 21 CAPSULE | Refills: 0 | Status: SHIPPED | OUTPATIENT
Start: 2021-10-01 | End: 2021-10-08

## 2021-10-03 LAB — BACTERIA UR CULT: ABNORMAL

## 2021-10-12 DIAGNOSIS — I10 ESSENTIAL HYPERTENSION: ICD-10-CM

## 2021-10-12 RX ORDER — CHLORTHALIDONE 25 MG/1
TABLET ORAL
Qty: 90 TABLET | Refills: 1 | Status: SHIPPED | OUTPATIENT
Start: 2021-10-12 | End: 2021-10-19 | Stop reason: SDUPTHER

## 2021-10-14 ENCOUNTER — PATIENT OUTREACH (OUTPATIENT)
Dept: ADMINISTRATIVE | Facility: OTHER | Age: 81
End: 2021-10-14

## 2021-10-15 ENCOUNTER — OFFICE VISIT (OUTPATIENT)
Dept: UROLOGY | Facility: CLINIC | Age: 81
End: 2021-10-15
Attending: UROLOGY
Payer: MEDICARE

## 2021-10-15 VITALS
DIASTOLIC BLOOD PRESSURE: 84 MMHG | SYSTOLIC BLOOD PRESSURE: 135 MMHG | HEART RATE: 126 BPM | BODY MASS INDEX: 19.32 KG/M2 | HEIGHT: 62 IN | WEIGHT: 105 LBS

## 2021-10-15 DIAGNOSIS — R31.0 GROSS HEMATURIA: ICD-10-CM

## 2021-10-15 PROCEDURE — 1159F MED LIST DOCD IN RCRD: CPT | Mod: HCNC,CPTII,S$GLB, | Performed by: UROLOGY

## 2021-10-15 PROCEDURE — 1101F PR PT FALLS ASSESS DOC 0-1 FALLS W/OUT INJ PAST YR: ICD-10-PCS | Mod: HCNC,CPTII,S$GLB, | Performed by: UROLOGY

## 2021-10-15 PROCEDURE — 1160F RVW MEDS BY RX/DR IN RCRD: CPT | Mod: HCNC,CPTII,S$GLB, | Performed by: UROLOGY

## 2021-10-15 PROCEDURE — 3079F PR MOST RECENT DIASTOLIC BLOOD PRESSURE 80-89 MM HG: ICD-10-PCS | Mod: HCNC,CPTII,S$GLB, | Performed by: UROLOGY

## 2021-10-15 PROCEDURE — 3075F SYST BP GE 130 - 139MM HG: CPT | Mod: HCNC,CPTII,S$GLB, | Performed by: UROLOGY

## 2021-10-15 PROCEDURE — 99204 OFFICE O/P NEW MOD 45 MIN: CPT | Mod: HCNC,S$GLB,, | Performed by: UROLOGY

## 2021-10-15 PROCEDURE — 3288F PR FALLS RISK ASSESSMENT DOCUMENTED: ICD-10-PCS | Mod: HCNC,CPTII,S$GLB, | Performed by: UROLOGY

## 2021-10-15 PROCEDURE — 3288F FALL RISK ASSESSMENT DOCD: CPT | Mod: HCNC,CPTII,S$GLB, | Performed by: UROLOGY

## 2021-10-15 PROCEDURE — 99499 UNLISTED E&M SERVICE: CPT | Mod: HCNC,S$GLB,, | Performed by: UROLOGY

## 2021-10-15 PROCEDURE — 99999 PR PBB SHADOW E&M-EST. PATIENT-LVL III: CPT | Mod: PBBFAC,HCNC,, | Performed by: UROLOGY

## 2021-10-15 PROCEDURE — 3075F PR MOST RECENT SYSTOLIC BLOOD PRESS GE 130-139MM HG: ICD-10-PCS | Mod: HCNC,CPTII,S$GLB, | Performed by: UROLOGY

## 2021-10-15 PROCEDURE — 99204 PR OFFICE/OUTPT VISIT, NEW, LEVL IV, 45-59 MIN: ICD-10-PCS | Mod: HCNC,S$GLB,, | Performed by: UROLOGY

## 2021-10-15 PROCEDURE — 99999 PR PBB SHADOW E&M-EST. PATIENT-LVL III: ICD-10-PCS | Mod: PBBFAC,HCNC,, | Performed by: UROLOGY

## 2021-10-15 PROCEDURE — 99499 RISK ADDL DX/OHS AUDIT: ICD-10-PCS | Mod: HCNC,S$GLB,, | Performed by: UROLOGY

## 2021-10-15 PROCEDURE — 1160F PR REVIEW ALL MEDS BY PRESCRIBER/CLIN PHARMACIST DOCUMENTED: ICD-10-PCS | Mod: HCNC,CPTII,S$GLB, | Performed by: UROLOGY

## 2021-10-15 PROCEDURE — 1159F PR MEDICATION LIST DOCUMENTED IN MEDICAL RECORD: ICD-10-PCS | Mod: HCNC,CPTII,S$GLB, | Performed by: UROLOGY

## 2021-10-15 PROCEDURE — 1101F PT FALLS ASSESS-DOCD LE1/YR: CPT | Mod: HCNC,CPTII,S$GLB, | Performed by: UROLOGY

## 2021-10-15 PROCEDURE — 3079F DIAST BP 80-89 MM HG: CPT | Mod: HCNC,CPTII,S$GLB, | Performed by: UROLOGY

## 2021-10-19 DIAGNOSIS — I10 ESSENTIAL HYPERTENSION: ICD-10-CM

## 2021-10-19 RX ORDER — CHLORTHALIDONE 25 MG/1
TABLET ORAL
Qty: 90 TABLET | Refills: 2 | Status: SHIPPED | OUTPATIENT
Start: 2021-10-19 | End: 2022-03-18

## 2021-11-01 DIAGNOSIS — F41.9 ANXIETY: Chronic | ICD-10-CM

## 2021-11-02 RX ORDER — ALPRAZOLAM 0.25 MG/1
0.25 TABLET ORAL 2 TIMES DAILY PRN
Qty: 180 TABLET | Refills: 0 | Status: SHIPPED | OUTPATIENT
Start: 2021-11-02 | End: 2022-04-19

## 2021-12-03 DIAGNOSIS — R41.3 MEMORY LOSS: ICD-10-CM

## 2021-12-06 RX ORDER — ESCITALOPRAM OXALATE 5 MG/1
5 TABLET ORAL DAILY
Qty: 90 TABLET | Refills: 3 | Status: SHIPPED | OUTPATIENT
Start: 2021-12-06 | End: 2022-01-07 | Stop reason: SDUPTHER

## 2021-12-17 ENCOUNTER — OFFICE VISIT (OUTPATIENT)
Dept: INTERNAL MEDICINE | Facility: CLINIC | Age: 81
End: 2021-12-17
Payer: MEDICARE

## 2021-12-17 VITALS
SYSTOLIC BLOOD PRESSURE: 122 MMHG | HEART RATE: 69 BPM | DIASTOLIC BLOOD PRESSURE: 68 MMHG | WEIGHT: 107.13 LBS | HEIGHT: 63 IN | BODY MASS INDEX: 18.98 KG/M2 | OXYGEN SATURATION: 98 %

## 2021-12-17 DIAGNOSIS — Z23 NEED FOR INFLUENZA VACCINATION: ICD-10-CM

## 2021-12-17 DIAGNOSIS — J84.10 CALCIFIED GRANULOMA OF LUNG: ICD-10-CM

## 2021-12-17 DIAGNOSIS — I48.0 PAF (PAROXYSMAL ATRIAL FIBRILLATION): ICD-10-CM

## 2021-12-17 DIAGNOSIS — F41.9 ANXIETY: Chronic | ICD-10-CM

## 2021-12-17 DIAGNOSIS — I10 ESSENTIAL HYPERTENSION: ICD-10-CM

## 2021-12-17 DIAGNOSIS — E78.5 HYPERLIPIDEMIA, UNSPECIFIED HYPERLIPIDEMIA TYPE: ICD-10-CM

## 2021-12-17 DIAGNOSIS — L91.8 SKIN TAG: ICD-10-CM

## 2021-12-17 DIAGNOSIS — L82.1 SEBORRHEIC KERATOSIS: ICD-10-CM

## 2021-12-17 DIAGNOSIS — R53.83 FATIGUE, UNSPECIFIED TYPE: ICD-10-CM

## 2021-12-17 DIAGNOSIS — L98.9 SKIN LESION: ICD-10-CM

## 2021-12-17 DIAGNOSIS — D18.01 CHERRY ANGIOMA: ICD-10-CM

## 2021-12-17 DIAGNOSIS — D69.2 SENILE PURPURA: ICD-10-CM

## 2021-12-17 PROCEDURE — G0008 ADMIN INFLUENZA VIRUS VAC: HCPCS | Mod: HCNC,S$GLB,, | Performed by: INTERNAL MEDICINE

## 2021-12-17 PROCEDURE — 99214 OFFICE O/P EST MOD 30 MIN: CPT | Mod: HCNC,S$GLB,, | Performed by: INTERNAL MEDICINE

## 2021-12-17 PROCEDURE — 90694 VACC AIIV4 NO PRSRV 0.5ML IM: CPT | Mod: HCNC,S$GLB,, | Performed by: INTERNAL MEDICINE

## 2021-12-17 PROCEDURE — 99214 PR OFFICE/OUTPT VISIT, EST, LEVL IV, 30-39 MIN: ICD-10-PCS | Mod: HCNC,S$GLB,, | Performed by: INTERNAL MEDICINE

## 2021-12-17 PROCEDURE — 90694 FLU VACCINE - QUADRIVALENT - ADJUVANTED: ICD-10-PCS | Mod: HCNC,S$GLB,, | Performed by: INTERNAL MEDICINE

## 2021-12-17 PROCEDURE — G0008 FLU VACCINE - QUADRIVALENT - ADJUVANTED: ICD-10-PCS | Mod: HCNC,S$GLB,, | Performed by: INTERNAL MEDICINE

## 2021-12-17 PROCEDURE — 99999 PR PBB SHADOW E&M-EST. PATIENT-LVL III: ICD-10-PCS | Mod: PBBFAC,HCNC,, | Performed by: INTERNAL MEDICINE

## 2021-12-17 PROCEDURE — 99999 PR PBB SHADOW E&M-EST. PATIENT-LVL III: CPT | Mod: PBBFAC,HCNC,, | Performed by: INTERNAL MEDICINE

## 2021-12-17 PROCEDURE — 99499 UNLISTED E&M SERVICE: CPT | Mod: HCNC,S$GLB,, | Performed by: INTERNAL MEDICINE

## 2021-12-17 PROCEDURE — 99499 RISK ADDL DX/OHS AUDIT: ICD-10-PCS | Mod: HCNC,S$GLB,, | Performed by: INTERNAL MEDICINE

## 2022-01-07 DIAGNOSIS — R41.3 MEMORY LOSS: ICD-10-CM

## 2022-01-07 RX ORDER — ESCITALOPRAM OXALATE 5 MG/1
5 TABLET ORAL DAILY
Qty: 90 TABLET | Refills: 3 | Status: SHIPPED | OUTPATIENT
Start: 2022-01-07 | End: 2022-01-09 | Stop reason: SDUPTHER

## 2022-01-07 NOTE — TELEPHONE ENCOUNTER
No new care gaps identified.  Powered by [employer]. Reference number: [id number].   1/07/2022 11:24:39 AM CST

## 2022-01-19 ENCOUNTER — PATIENT MESSAGE (OUTPATIENT)
Dept: NEUROLOGY | Facility: CLINIC | Age: 82
End: 2022-01-19
Payer: MEDICARE

## 2022-02-09 ENCOUNTER — PATIENT OUTREACH (OUTPATIENT)
Dept: ADMINISTRATIVE | Facility: OTHER | Age: 82
End: 2022-02-09
Payer: MEDICARE

## 2022-02-09 NOTE — PROGRESS NOTES
Health Maintenance Due   Topic Date Due    Shingles Vaccine (1 of 2) Never done    Mammogram  08/04/2021    Lipid Panel  01/28/2022     Updates were requested from care everywhere.  Chart was reviewed for overdue Proactive [facility] Encounters ([facility]) topics (CRS, Breast Cancer Screening, Eye exam)  Health Maintenance has been updated.  LINKS immunization registry triggered.  Immunizations were reconciled.

## 2022-02-10 ENCOUNTER — OFFICE VISIT (OUTPATIENT)
Dept: CARDIOLOGY | Facility: CLINIC | Age: 82
End: 2022-02-10
Payer: MEDICARE

## 2022-02-10 VITALS
HEART RATE: 67 BPM | DIASTOLIC BLOOD PRESSURE: 81 MMHG | SYSTOLIC BLOOD PRESSURE: 155 MMHG | WEIGHT: 105.81 LBS | HEIGHT: 63 IN | BODY MASS INDEX: 18.75 KG/M2

## 2022-02-10 DIAGNOSIS — E78.00 PURE HYPERCHOLESTEROLEMIA: Chronic | ICD-10-CM

## 2022-02-10 DIAGNOSIS — I48.0 PAF (PAROXYSMAL ATRIAL FIBRILLATION): ICD-10-CM

## 2022-02-10 DIAGNOSIS — I10 ESSENTIAL HYPERTENSION: Primary | Chronic | ICD-10-CM

## 2022-02-10 PROCEDURE — 1126F AMNT PAIN NOTED NONE PRSNT: CPT | Mod: HCNC,CPTII,S$GLB, | Performed by: INTERNAL MEDICINE

## 2022-02-10 PROCEDURE — 3077F PR MOST RECENT SYSTOLIC BLOOD PRESSURE >= 140 MM HG: ICD-10-PCS | Mod: HCNC,CPTII,S$GLB, | Performed by: INTERNAL MEDICINE

## 2022-02-10 PROCEDURE — 99999 PR PBB SHADOW E&M-EST. PATIENT-LVL III: ICD-10-PCS | Mod: PBBFAC,HCNC,, | Performed by: INTERNAL MEDICINE

## 2022-02-10 PROCEDURE — 1101F PR PT FALLS ASSESS DOC 0-1 FALLS W/OUT INJ PAST YR: ICD-10-PCS | Mod: HCNC,CPTII,S$GLB, | Performed by: INTERNAL MEDICINE

## 2022-02-10 PROCEDURE — 1126F PR PAIN SEVERITY QUANTIFIED, NO PAIN PRESENT: ICD-10-PCS | Mod: HCNC,CPTII,S$GLB, | Performed by: INTERNAL MEDICINE

## 2022-02-10 PROCEDURE — 99214 PR OFFICE/OUTPT VISIT, EST, LEVL IV, 30-39 MIN: ICD-10-PCS | Mod: HCNC,S$GLB,, | Performed by: INTERNAL MEDICINE

## 2022-02-10 PROCEDURE — 3288F FALL RISK ASSESSMENT DOCD: CPT | Mod: HCNC,CPTII,S$GLB, | Performed by: INTERNAL MEDICINE

## 2022-02-10 PROCEDURE — 99214 OFFICE O/P EST MOD 30 MIN: CPT | Mod: HCNC,S$GLB,, | Performed by: INTERNAL MEDICINE

## 2022-02-10 PROCEDURE — 1160F PR REVIEW ALL MEDS BY PRESCRIBER/CLIN PHARMACIST DOCUMENTED: ICD-10-PCS | Mod: HCNC,CPTII,S$GLB, | Performed by: INTERNAL MEDICINE

## 2022-02-10 PROCEDURE — 3079F DIAST BP 80-89 MM HG: CPT | Mod: HCNC,CPTII,S$GLB, | Performed by: INTERNAL MEDICINE

## 2022-02-10 PROCEDURE — 1159F MED LIST DOCD IN RCRD: CPT | Mod: HCNC,CPTII,S$GLB, | Performed by: INTERNAL MEDICINE

## 2022-02-10 PROCEDURE — 1160F RVW MEDS BY RX/DR IN RCRD: CPT | Mod: HCNC,CPTII,S$GLB, | Performed by: INTERNAL MEDICINE

## 2022-02-10 PROCEDURE — 1101F PT FALLS ASSESS-DOCD LE1/YR: CPT | Mod: HCNC,CPTII,S$GLB, | Performed by: INTERNAL MEDICINE

## 2022-02-10 PROCEDURE — 3077F SYST BP >= 140 MM HG: CPT | Mod: HCNC,CPTII,S$GLB, | Performed by: INTERNAL MEDICINE

## 2022-02-10 PROCEDURE — 99999 PR PBB SHADOW E&M-EST. PATIENT-LVL III: CPT | Mod: PBBFAC,HCNC,, | Performed by: INTERNAL MEDICINE

## 2022-02-10 PROCEDURE — 99499 UNLISTED E&M SERVICE: CPT | Mod: HCNC,S$GLB,, | Performed by: INTERNAL MEDICINE

## 2022-02-10 PROCEDURE — 3288F PR FALLS RISK ASSESSMENT DOCUMENTED: ICD-10-PCS | Mod: HCNC,CPTII,S$GLB, | Performed by: INTERNAL MEDICINE

## 2022-02-10 PROCEDURE — 99499 RISK ADDL DX/OHS AUDIT: ICD-10-PCS | Mod: HCNC,S$GLB,, | Performed by: INTERNAL MEDICINE

## 2022-02-10 PROCEDURE — 3079F PR MOST RECENT DIASTOLIC BLOOD PRESSURE 80-89 MM HG: ICD-10-PCS | Mod: HCNC,CPTII,S$GLB, | Performed by: INTERNAL MEDICINE

## 2022-02-10 PROCEDURE — 1159F PR MEDICATION LIST DOCUMENTED IN MEDICAL RECORD: ICD-10-PCS | Mod: HCNC,CPTII,S$GLB, | Performed by: INTERNAL MEDICINE

## 2022-02-10 RX ORDER — NEOMYCIN SULFATE, POLYMYXIN B SULFATE AND DEXAMETHASONE 3.5; 10000; 1 MG/G; [USP'U]/G; MG/G
OINTMENT OPHTHALMIC
COMMUNITY
Start: 2022-01-04 | End: 2022-04-07

## 2022-02-10 NOTE — PROGRESS NOTES
Subjective:    Patient ID:  Tigist Murry is a 81 y.o. female who presents for follow-up of PAF    HPI   Former pt of JOSE Perry; switched to me to be seen in [place]    81 y.o. 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  Still has PAF, as evidenced on MCOT and Holter monitoring, as well as ECGs. Asx.  No CP, no SOB, no LH/presync/sync.  no LH/presync/sync.  Working out (aerobics, yoga) 4x/week! No problems at all.    Only taking eliquis q AM. Some unclarity about med dosing (all meds; she lives alone; her children are increasing involvement). She decreased to qAM only after there was some hematuria (improved).  Would like to decrease # of meds.    Holter: 10/21/19: SR  echo 60-65%.    My interpretation of today's ECG is SR at 68 bpm.     Review of Systems   Constitutional: Negative. Negative for malaise/fatigue.   HENT: Negative.  Negative for ear pain and tinnitus.    Eyes: Negative for blurred vision.   Cardiovascular: Negative.  Negative for chest pain, dyspnea on exertion, near-syncope, palpitations and syncope.   Respiratory: Negative.  Negative for shortness of breath.    Endocrine: Negative.  Negative for polyuria.   Hematologic/Lymphatic: Does not bruise/bleed easily.   Skin: Negative.  Negative for rash.   Musculoskeletal: Negative.  Negative for joint pain and muscle weakness.   Gastrointestinal: Negative.  Negative for abdominal pain and change in bowel habit.   Genitourinary: Negative for frequency.   Neurological: Negative.  Negative for dizziness and weakness.   Psychiatric/Behavioral: Positive for memory loss. Negative for depression. The patient is not nervous/anxious.    Allergic/Immunologic: Negative for environmental allergies.        Objective:    Physical Exam  Vitals and nursing note reviewed.   Constitutional:       Appearance: Normal appearance. She is well-developed.   HENT:      Head: Normocephalic and  atraumatic.   Eyes:      Conjunctiva/sclera: Conjunctivae normal.   Neck:      Thyroid: No thyroid mass or thyromegaly.      Trachea: No tracheal deviation.   Cardiovascular:      Rate and Rhythm: Normal rate and regular rhythm.      Pulses: Normal pulses.   Pulmonary:      Effort: Pulmonary effort is normal.      Breath sounds: Normal breath sounds.   Abdominal:      General: There is no distension.   Musculoskeletal:         General: Normal range of motion.      Cervical back: Normal range of motion. No edema.   Skin:     General: Skin is warm and dry.      Findings: No rash.   Neurological:      Mental Status: She is alert and oriented to person, place, and time.      Coordination: Coordination normal.   Psychiatric:         Speech: Speech normal.         Behavior: Behavior normal. Behavior is cooperative.         Thought Content: Thought content normal.         Cognition and Memory: Memory is impaired.           Assessment:       1. Essential hypertension    2. Pure hypercholesterolemia    3. PAF (paroxysmal atrial fibrillation)         Plan:       Asx AF.  Continue dilt.  Change eliquis to xarelto (to decrease total # prescribed pills per day).  If hematuria recurs, f/u with urology and/or call. ? Watchman prn.  Return in 1 year, or earlier prn.

## 2022-03-10 ENCOUNTER — TELEPHONE (OUTPATIENT)
Dept: INTERNAL MEDICINE | Facility: CLINIC | Age: 82
End: 2022-03-10
Payer: MEDICARE

## 2022-03-10 NOTE — TELEPHONE ENCOUNTER
----- Message from Tara Pitts sent at 3/10/2022  1:49 PM CST -----  Type:  Needs Medical Advice    Who Called: pt  Symptoms (please be specific): pt would like to know what this medication is for-chlorthalidone (HYGROTEN) 25 MG TabTake 1/2 tab PO q AM.  How long has patient had these symptoms:    Pharmacy name and phone #:    Would the patient rather a call back or a response via [facility]? Please call  Best Call Back Number: [phone]  Additional Information:

## 2022-03-14 ENCOUNTER — LAB VISIT (OUTPATIENT)
Dept: LAB | Facility: HOSPITAL | Age: 82
End: 2022-03-14
Attending: INTERNAL MEDICINE
Payer: MEDICARE

## 2022-03-14 DIAGNOSIS — I10 ESSENTIAL HYPERTENSION: ICD-10-CM

## 2022-03-14 DIAGNOSIS — R53.83 FATIGUE, UNSPECIFIED TYPE: ICD-10-CM

## 2022-03-14 DIAGNOSIS — E78.5 HYPERLIPIDEMIA, UNSPECIFIED HYPERLIPIDEMIA TYPE: ICD-10-CM

## 2022-03-14 LAB
ALBUMIN SERPL BCP-MCNC: 3.9 G/DL (ref 3.5–5.2)
ALP SERPL-CCNC: 83 U/L (ref 55–135)
ALT SERPL W/O P-5'-P-CCNC: 21 U/L (ref 10–44)
ANION GAP SERPL CALC-SCNC: 14 MMOL/L (ref 8–16)
AST SERPL-CCNC: 20 U/L (ref 10–40)
BASOPHILS # BLD AUTO: 0.05 K/UL (ref 0–0.2)
BASOPHILS NFR BLD: 0.7 % (ref 0–1.9)
BILIRUB SERPL-MCNC: 0.6 MG/DL (ref 0.1–1)
BUN SERPL-MCNC: 20 MG/DL (ref 8–23)
CALCIUM SERPL-MCNC: 10.2 MG/DL (ref 8.7–10.5)
CHLORIDE SERPL-SCNC: 106 MMOL/L (ref 95–110)
CHOLEST SERPL-MCNC: 226 MG/DL (ref 120–199)
CHOLEST/HDLC SERPL: 3.4 {RATIO} (ref 2–5)
CO2 SERPL-SCNC: 22 MMOL/L (ref 23–29)
CREAT SERPL-MCNC: 0.9 MG/DL (ref 0.5–1.4)
DIFFERENTIAL METHOD: ABNORMAL
EOSINOPHIL # BLD AUTO: 0.1 K/UL (ref 0–0.5)
EOSINOPHIL NFR BLD: 1.3 % (ref 0–8)
ERYTHROCYTE [DISTWIDTH] IN BLOOD BY AUTOMATED COUNT: 13.2 % (ref 11.5–14.5)
EST. GFR  (AFRICAN AMERICAN): >60 ML/MIN/1.73 M^2
EST. GFR  (NON AFRICAN AMERICAN): 59.7 ML/MIN/1.73 M^2
GLUCOSE SERPL-MCNC: 98 MG/DL (ref 70–110)
HCT VFR BLD AUTO: 55.2 % (ref 37–48.5)
HDLC SERPL-MCNC: 67 MG/DL (ref 40–75)
HDLC SERPL: 29.6 % (ref 20–50)
HGB BLD-MCNC: 16.7 G/DL (ref 12–16)
IMM GRANULOCYTES # BLD AUTO: 0.01 K/UL (ref 0–0.04)
IMM GRANULOCYTES NFR BLD AUTO: 0.1 % (ref 0–0.5)
LDLC SERPL CALC-MCNC: 143 MG/DL (ref 63–159)
LYMPHOCYTES # BLD AUTO: 1.6 K/UL (ref 1–4.8)
LYMPHOCYTES NFR BLD: 21.6 % (ref 18–48)
MCH RBC QN AUTO: 30.3 PG (ref 27–31)
MCHC RBC AUTO-ENTMCNC: 30.3 G/DL (ref 32–36)
MCV RBC AUTO: 100 FL (ref 82–98)
MONOCYTES # BLD AUTO: 0.8 K/UL (ref 0.3–1)
MONOCYTES NFR BLD: 11 % (ref 4–15)
NEUTROPHILS # BLD AUTO: 4.9 K/UL (ref 1.8–7.7)
NEUTROPHILS NFR BLD: 65.3 % (ref 38–73)
NONHDLC SERPL-MCNC: 159 MG/DL
NRBC BLD-RTO: 0 /100 WBC
PLATELET # BLD AUTO: 215 K/UL (ref 150–450)
PMV BLD AUTO: 11 FL (ref 9.2–12.9)
POTASSIUM SERPL-SCNC: 4.3 MMOL/L (ref 3.5–5.1)
PROT SERPL-MCNC: 6.9 G/DL (ref 6–8.4)
RBC # BLD AUTO: 5.52 M/UL (ref 4–5.4)
SODIUM SERPL-SCNC: 142 MMOL/L (ref 136–145)
TRIGL SERPL-MCNC: 80 MG/DL (ref 30–150)
TSH SERPL DL<=0.005 MIU/L-ACNC: 1.75 UIU/ML (ref 0.4–4)
WBC # BLD AUTO: 7.46 K/UL (ref 3.9–12.7)

## 2022-03-14 PROCEDURE — 84443 ASSAY THYROID STIM HORMONE: CPT | Performed by: INTERNAL MEDICINE

## 2022-03-14 PROCEDURE — 85025 COMPLETE CBC W/AUTO DIFF WBC: CPT | Performed by: INTERNAL MEDICINE

## 2022-03-14 PROCEDURE — 80053 COMPREHEN METABOLIC PANEL: CPT | Performed by: INTERNAL MEDICINE

## 2022-03-14 PROCEDURE — 80061 LIPID PANEL: CPT | Performed by: INTERNAL MEDICINE

## 2022-03-14 PROCEDURE — 36415 COLL VENOUS BLD VENIPUNCTURE: CPT | Mod: PO | Performed by: INTERNAL MEDICINE

## 2022-03-18 ENCOUNTER — OFFICE VISIT (OUTPATIENT)
Dept: INTERNAL MEDICINE | Facility: CLINIC | Age: 82
End: 2022-03-18
Payer: MEDICARE

## 2022-03-18 VITALS
HEART RATE: 112 BPM | HEIGHT: 63 IN | DIASTOLIC BLOOD PRESSURE: 58 MMHG | SYSTOLIC BLOOD PRESSURE: 120 MMHG | BODY MASS INDEX: 19.26 KG/M2 | WEIGHT: 108.69 LBS | OXYGEN SATURATION: 97 %

## 2022-03-18 DIAGNOSIS — N18.30 STAGE 3 CHRONIC KIDNEY DISEASE, UNSPECIFIED WHETHER STAGE 3A OR 3B CKD: Chronic | ICD-10-CM

## 2022-03-18 DIAGNOSIS — R41.3 MEMORY LOSS: Primary | ICD-10-CM

## 2022-03-18 DIAGNOSIS — Z79.01 LONG TERM CURRENT USE OF ANTICOAGULANT: ICD-10-CM

## 2022-03-18 DIAGNOSIS — D58.2 ELEVATED HEMOGLOBIN: ICD-10-CM

## 2022-03-18 DIAGNOSIS — F41.9 ANXIETY: Chronic | ICD-10-CM

## 2022-03-18 DIAGNOSIS — E78.00 PURE HYPERCHOLESTEROLEMIA: Chronic | ICD-10-CM

## 2022-03-18 DIAGNOSIS — I70.0 ATHEROSCLEROSIS OF AORTA: ICD-10-CM

## 2022-03-18 DIAGNOSIS — I48.0 PAF (PAROXYSMAL ATRIAL FIBRILLATION): ICD-10-CM

## 2022-03-18 DIAGNOSIS — J84.10 CALCIFIED GRANULOMA OF LUNG: ICD-10-CM

## 2022-03-18 DIAGNOSIS — I10 ESSENTIAL HYPERTENSION: Chronic | ICD-10-CM

## 2022-03-18 PROCEDURE — 1159F MED LIST DOCD IN RCRD: CPT | Mod: CPTII,S$GLB,, | Performed by: INTERNAL MEDICINE

## 2022-03-18 PROCEDURE — 99999 PR PBB SHADOW E&M-EST. PATIENT-LVL V: CPT | Mod: PBBFAC,,, | Performed by: INTERNAL MEDICINE

## 2022-03-18 PROCEDURE — 1126F AMNT PAIN NOTED NONE PRSNT: CPT | Mod: CPTII,S$GLB,, | Performed by: INTERNAL MEDICINE

## 2022-03-18 PROCEDURE — 99214 OFFICE O/P EST MOD 30 MIN: CPT | Mod: S$GLB,,, | Performed by: INTERNAL MEDICINE

## 2022-03-18 PROCEDURE — 3288F FALL RISK ASSESSMENT DOCD: CPT | Mod: CPTII,S$GLB,, | Performed by: INTERNAL MEDICINE

## 2022-03-18 PROCEDURE — 99999 PR PBB SHADOW E&M-EST. PATIENT-LVL V: ICD-10-PCS | Mod: PBBFAC,,, | Performed by: INTERNAL MEDICINE

## 2022-03-18 PROCEDURE — 3074F SYST BP LT 130 MM HG: CPT | Mod: CPTII,S$GLB,, | Performed by: INTERNAL MEDICINE

## 2022-03-18 PROCEDURE — 3074F PR MOST RECENT SYSTOLIC BLOOD PRESSURE < 130 MM HG: ICD-10-PCS | Mod: CPTII,S$GLB,, | Performed by: INTERNAL MEDICINE

## 2022-03-18 PROCEDURE — 1159F PR MEDICATION LIST DOCUMENTED IN MEDICAL RECORD: ICD-10-PCS | Mod: CPTII,S$GLB,, | Performed by: INTERNAL MEDICINE

## 2022-03-18 PROCEDURE — 3078F DIAST BP <80 MM HG: CPT | Mod: CPTII,S$GLB,, | Performed by: INTERNAL MEDICINE

## 2022-03-18 PROCEDURE — 3288F PR FALLS RISK ASSESSMENT DOCUMENTED: ICD-10-PCS | Mod: CPTII,S$GLB,, | Performed by: INTERNAL MEDICINE

## 2022-03-18 PROCEDURE — 99499 RISK ADDL DX/OHS AUDIT: ICD-10-PCS | Mod: HCNC,S$GLB,, | Performed by: INTERNAL MEDICINE

## 2022-03-18 PROCEDURE — 99499 UNLISTED E&M SERVICE: CPT | Mod: HCNC,S$GLB,, | Performed by: INTERNAL MEDICINE

## 2022-03-18 PROCEDURE — 99214 PR OFFICE/OUTPT VISIT, EST, LEVL IV, 30-39 MIN: ICD-10-PCS | Mod: S$GLB,,, | Performed by: INTERNAL MEDICINE

## 2022-03-18 PROCEDURE — 1101F PT FALLS ASSESS-DOCD LE1/YR: CPT | Mod: CPTII,S$GLB,, | Performed by: INTERNAL MEDICINE

## 2022-03-18 PROCEDURE — 3078F PR MOST RECENT DIASTOLIC BLOOD PRESSURE < 80 MM HG: ICD-10-PCS | Mod: CPTII,S$GLB,, | Performed by: INTERNAL MEDICINE

## 2022-03-18 PROCEDURE — 1126F PR PAIN SEVERITY QUANTIFIED, NO PAIN PRESENT: ICD-10-PCS | Mod: CPTII,S$GLB,, | Performed by: INTERNAL MEDICINE

## 2022-03-18 PROCEDURE — 1101F PR PT FALLS ASSESS DOC 0-1 FALLS W/OUT INJ PAST YR: ICD-10-PCS | Mod: CPTII,S$GLB,, | Performed by: INTERNAL MEDICINE

## 2022-03-18 RX ORDER — INFLUENZA VACCINE, ADJUVANTED 15; 15; 15; 15 UG/.5ML; UG/.5ML; UG/.5ML; UG/.5ML
INJECTION, SUSPENSION INTRAMUSCULAR
COMMUNITY
Start: 2021-12-17 | End: 2022-04-07

## 2022-03-18 NOTE — PROGRESS NOTES
Patient ID: Tigist Murry is a 81 y.o. female.    Chief Complaint: Follow-up and Hypertension    HPI Tigist is a 81 y.o. female with   anxiety, HTN, HLD, OAB, psoriasis, SVT, atrial fibrillation and memory loss who presents for routine follow-up of medical conditions.  She presents with another daughter today (Sarah Moreno). Previously she has been seen in clinic with daughter Iliana.    Sarah Moreno is concerned about the patient's medications. Patient does not know what she is taking or should be taking.  Sarah Moreno brought a list as well as pictures of the medications that the patient has at home. It is unclear if she has her statin medication or not.  She was only taking her eliquis daily (not twice daily as prescribed).  She recently saw her cardiologist who changed her to Xarelto for easier once daily dosing. She is no longer taking chlorthalidone; unclear how long she has been off of it.    Patient lives alone. She has limited her driving. She pays her bills but daughter checks on her accounts. She has 3 daughters and they live in [facility].     Patient missed her neurology appt for memory loss and they have not rescheduled.      She still has episodes of anxiety, during which she wants to use xanax for treatment.    Reviewed labs from 3/14/22 with them today.     Review of Systems   Neurological:        Memory loss     Psychiatric/Behavioral: The patient is nervous/anxious.    All other systems reviewed and are negative.       Objective:     Vitals:    03/18/22 0927   BP: (!) 120/58   Pulse: (!) 112        Physical Exam  Vitals reviewed.   Constitutional:       General: She is not in acute distress.     Appearance: Normal appearance. She is well-developed. She is not ill-appearing, toxic-appearing or diaphoretic.   HENT:      Head: Normocephalic and atraumatic.      Right Ear: External ear normal.      Left Ear: External ear normal.      Nose: Nose normal.   Eyes:      General: No scleral  icterus.        Right eye: No discharge.         Left eye: No discharge.      Extraocular Movements: Extraocular movements intact.      Conjunctiva/sclera: Conjunctivae normal.   Cardiovascular:      Rate and Rhythm: Tachycardia present. Rhythm irregular.      Heart sounds: Normal heart sounds. No murmur heard.    No friction rub. No gallop.   Pulmonary:      Effort: Pulmonary effort is normal. No respiratory distress.      Breath sounds: Normal breath sounds. No stridor. No wheezing, rhonchi or rales.   Skin:     General: Skin is warm and dry.   Neurological:      General: No focal deficit present.      Mental Status: She is alert and oriented to person, place, and time. Mental status is at baseline.   Psychiatric:         Mood and Affect: Mood normal.         Behavior: Behavior normal.         Thought Content: Thought content normal.         Judgment: Judgment normal.         Assessment:       1. Memory loss Chronic   2. Elevated hemoglobin Active   3. Anxiety Sub-optimally controlled   4. Essential hypertension Well controlled   5. Pure hypercholesterolemia Sub-optimally controlled   6. PAF (paroxysmal atrial fibrillation) Chronic   7. Stage 3 chronic kidney disease, unspecified whether stage 3a or 3b CKD Chronic   8. Long term current use of anticoagulant Chronic   9. Atherosclerosis of aorta Chronic   10. Calcified granuloma of lung        Plan:     Discussed with daughter that she or one of her siblings needs to ensure patient has the appropriate meds (based on the active med list we give her today); they need to choose one pharmacy and ensure all the meds go there and they need to help her administer meds either themselves or with pill boxes. Will consult case management and social work to assist.     Memory loss  -     Ambulatory referral/consult to Neurology; Future; Expected date: 03/25/2022  -     Ambulatory referral/consult to Social Work; Future; Expected date: 03/25/2022  -     Ambulatory  referral/consult to Outpatient Case Management    Elevated hemoglobin  -     Ambulatory referral/consult to Hematology / Oncology; Future; Expected date: 03/25/2022    Anxiety  Comments:  Increase lexapro to 10 mg daily. Goal is to eliminate need for xanax    Essential hypertension  Comments:  Cont current medication. Will stay off chlorthalidone    Pure hypercholesterolemia  Comments:  She should re-start her statin    PAF (paroxysmal atrial fibrillation)  Comments:  Management per electrophysiology. Contact him for elevated or abnormal heart rate/rhythm    Stage 3 chronic kidney disease, unspecified whether stage 3a or 3b CKD  Comments:  Continue to monitor     Long term current use of anticoagulant    Atherosclerosis of aorta  Comments:  Cont statin    Calcified granuloma of lung        RTC one month    Warning signs discussed, patient to call with any further issues or worsening of symptoms.       Parts of the above note were dictated using a voice dictation software. Please excuse any grammatical or typographical errors.

## 2022-03-22 ENCOUNTER — OUTPATIENT CASE MANAGEMENT (OUTPATIENT)
Dept: ADMINISTRATIVE | Facility: OTHER | Age: 82
End: 2022-03-22
Payer: MEDICARE

## 2022-03-22 NOTE — PROGRESS NOTES
Outpatient Care Management   - Low Risk Patient Assessment    Patient: Tigist Murry  MRN:  [id number]  Date of Service:  3/22/2022  Completed by:  Marlin Evangelista LMSW  Referral Date: 03/18/2022  Program: OPCM Low Risk    Reason for Visit   Patient presents with    Social Work Assessment - Low/Mod Risk    Plan Of Care     03/22/2022    Case Closure     03/22/2022       Brief Summary:  GAYE completed assessment with patient daughter Iliana. SW explained to daughter reason for referral to assist with community resources. Iliana reports patient resides alone. Iliana reports she and her other two sisters check on patient daily. Iliana reports patient can complete ADLs without assistance. Iliana denied patient uses any assisted devices to ambulate. Iliana denied patient needs assistance with food, shelter, medication or medical. Iliana reports her other sister accompanied patient on lasts appointments. Iliana reports family just thought patient was taking too many medications. Iliana reports thats the concern medication. Iliana reports family is looking into pill boxes and or pill packaging. Iliana reports medication may be an overwhelming for patient especially with these changes. Iliana reports family trying to be proactive with making sure patient has the care she needs. Iliana reports she and sisters dont reside close to patient but willing to do anything to help pt. Iliana seeking resources for possible /caregiver to assist with needs such as medication mgmt./reminders or to run errands. Iliana seeking more socialization for the patient as well. GAYE provided Iliana with information on [facility] as well as [facility] in [facility] that can assist with more socialization. Iliana reports patient doesnt drive far and seeking additional resources with transportation. GAYE provided Iliana with an application for [employer]. Daughter reports she and sister is MPOA but will provide a copy on next office visit. GAYE also encouraged  family to complete an involvement of care form Daughter reports current symptoms are memory and confusion Patient scheduled to see neurology in May for memory concerns. SW provided daughter with all available resources via email [email or web address]    Patient Summary     OPCM Social Work Assessment (Low/Moderate Risk)    General  Level of Caregiver support: Member independent and does not need caregiver assistance  Have you had to make a decision between paying for any of the following in the last 2 months?: None  Transportation means: Family, Self  Employment status: Retired and not working  Current symptoms: Memory problems  Assessments  Was the PHQ Depression Screening completed this visit?: No  Was the ARAVIND-7 Screening completed this visit?: No         Complex Care Plan     Care plan was discussed and completed today with input from patient and/or caregiver.    Patient Instructions     No follow-ups on file.    Todays OPCM Self-Management Care Plan was developed with the patients/caregivers input and was based on identified barriers from todays assessment.  Goals were written today with the patient/caregiver and the patient has agreed to work towards these goals to improve his/her overall well-being. Patient verbalized understanding of the care plan, goals, and all of today's instructions. Encouraged patient/caregiver to communicate with his/her physician and health care team about health conditions and the treatment plan.  Provided my contact information today and encouraged patient/caregiver to call me with any questions as needed.

## 2022-03-31 ENCOUNTER — PATIENT OUTREACH (OUTPATIENT)
Dept: ADMINISTRATIVE | Facility: OTHER | Age: 82
End: 2022-03-31
Payer: MEDICARE

## 2022-03-31 NOTE — PROGRESS NOTES
Health Maintenance Due   Topic Date Due    Shingles Vaccine (1 of 2) Never done    Mammogram  08/04/2021     Updates were requested from care everywhere.  Chart was reviewed for overdue Proactive [facility] ([facility]) topics (CRS, Breast Cancer Screening, Eye exam)  Health Maintenance has been updated.  LINKS immunization registry triggered.  Immunizations were reconciled.

## 2022-03-31 NOTE — PROGRESS NOTES
Chief Complaint   Patient presents with    Hearing Loss     Hearing screening    .     HPI:  Tigist Murry is a very pleasant 81 y.o. female who is referred here to see me today for the first time for evaluation of hearing loss.  She feels that she hears well. .  She has not noted any difference in hearing between the ears, with both ears being the worse hearing ear.  She has not noted any tinnitus in either ear.  She has not had any recent issues with ear pain or ear drainage.  She denies a family history of hearing loss, and has not had any previous otologic surgery.  She denies any history of significant loud noise exposure. She does report that she gets cerumen impactions from time to time. She does report increased nasal congestion. Relays that she has history of allergies and does have worse symptoms in the spring.         Past Medical History:   Diagnosis Date    Anticoagulant long-term use     Anxiety     Arthritis     Atrial fibrillation     Carpal tunnel syndrome on right     Diverticulosis     Encounter for blood transfusion     HTN (hypertension)     Hyperlipidemia     Osteopenia     Overactive bladder     Palpitations     Patellar tendonitis 4/13    saw [facility] [facility]     Psoriasis     S/P partial hysterectomy     Situational depression     Supraventricular tachycardia      Social History     Socioeconomic History    Marital status:    Tobacco Use    Smoking status: Never Smoker    Smokeless tobacco: Never Used   Substance and Sexual Activity    Alcohol use: No    Drug use: No    Sexual activity: Not Currently     Partners: Male     Past Surgical History:   Procedure Laterality Date    ABDOMINAL SURGERY      BREAST BIOPSY Left     excisional    CARPAL TUNNEL RELEASE      HYSTERECTOMY      @27yrs of age    KNEE ARTHROSCOPY W/ DEBRIDEMENT  '05    Right    OOPHORECTOMY      @27yrs of age    PARTIAL HYSTERECTOMY       Family History   Problem Relation Age of  Onset    Heart failure Mother 80    No Known Problems Sister     No Known Problems Brother     No Known Problems Daughter     No Known Problems Daughter     No Known Problems Daughter          Review of Systems  General: negative for chills, fever or weight loss  Psychological: negative for mood changes or depression  Ophthalmic: negative for blurry vision, photophobia or eye pain  ENT: see HPI  Respiratory: no cough, shortness of breath, or wheezing  Cardiovascular: no chest pain or dyspnea on exertion  Gastrointestinal: no abdominal pain, change in bowel habits, or black/ bloody stools  Musculoskeletal: negative for gait disturbance or muscular weakness  Neurological: no syncope or seizures; no ataxia  Dermatological: negative for puritis,  rash and jaundice  Hematologic/lymphatic: no easy bruising, no new lumps or bumps      Physical Exam:    Vitals:    04/01/22 0857   BP: (!) 150/72   Pulse: 73       Constitutional: Well appearing / communicating without difficutly.  NAD.  Eyes: EOM I Bilaterally  Head/Face: Normocephalic.  Negative paranasal sinus pressure/tenderness.  Salivary glands WNL.  House Brackmann I Bilaterally.    Right Ear: Auricle normal appearance. External Auditory Canal within normal limits no lesions or masses,TM w/o masses/lesions/perforations. TM mobility noted.   Left Ear: Auricle normal appearance. External Auditory Canal within normal limits no lesions or masses,TM w/o masses/lesions/perforations. TM mobility noted.  Rinne Air conduction >bone conduction bilaterally, Mullen midline.   Nose: No gross nasal septal deviation. Inferior Turbinates 3+ bilaterally. No septal perforation. No masses/lesions. External nasal skin appears normal without masses/lesions.  Oral Cavity: Gingiva/lips within normal limits.  Dentition/gingiva healthy appearing. Mucus membranes moist. Floor of mouth soft, no masses palpated. Oral Tongue mobile. Hard Palate appears normal.    Oropharynx: Base of tongue  appears normal. No masses/lesions noted. Tonsillar fossa/pharyngeal wall without lesions. Posterior oropharynx WNL.  Soft palate without masses. Midline uvula.   Neck/Lymphatic: No LAD I-VI bilaterally.  No thyromegaly.  No masses noted on exam.      Diagnostic studies:  Audiogram interpreted personally by me and discussed in detail with the patient today. Tympanometry revealed a Type A tympanogram for both ears.  Audiogram results revealed a mild to moderate sensorineural hearing loss bilaterally.  Speech reception thresholds were obtained at 40dB for both ears and speech discrimination scores were 76% for the right ear and 80% for the left ear.          Assessment:    ICD-10-CM ICD-9-CM    1. Chronic allergic rhinitis  J30.9 477.9    2. Sensorineural hearing loss (SNHL) of both ears Active H90.3 389.18 Ambulatory referral/consult to ENT     The primary encounter diagnosis was Chronic allergic rhinitis. A diagnosis of Sensorineural hearing loss (SNHL) of both ears was also pertinent to this visit.      Plan:  No orders of the defined types were placed in this encounter.    We reviewed the patient's recent audiogram and hearing loss in detail.  We also discussed that she is a good candidate for hearing aids, if and when she the patient is motivated.    We also discussed the use hearing protection when exposed to loud noise, including lawn equipment. Recommend audiogram in 1 year.     Start nasal saline rinses BID  Start Flonase 2 sprays per nostril daily    Thank you kindly for allowing me to participate in the patient's care.       Savannah Pryor MD

## 2022-04-01 ENCOUNTER — CLINICAL SUPPORT (OUTPATIENT)
Dept: OTOLARYNGOLOGY | Facility: CLINIC | Age: 82
End: 2022-04-01
Payer: MEDICARE

## 2022-04-01 ENCOUNTER — OFFICE VISIT (OUTPATIENT)
Dept: OTOLARYNGOLOGY | Facility: CLINIC | Age: 82
End: 2022-04-01
Payer: MEDICARE

## 2022-04-01 VITALS
SYSTOLIC BLOOD PRESSURE: 150 MMHG | HEART RATE: 73 BPM | BODY MASS INDEX: 19.67 KG/M2 | HEIGHT: 63 IN | DIASTOLIC BLOOD PRESSURE: 72 MMHG | WEIGHT: 111 LBS

## 2022-04-01 DIAGNOSIS — H90.3 SENSORINEURAL HEARING LOSS (SNHL) OF BOTH EARS: Chronic | ICD-10-CM

## 2022-04-01 DIAGNOSIS — J30.9 CHRONIC ALLERGIC RHINITIS: Primary | Chronic | ICD-10-CM

## 2022-04-01 DIAGNOSIS — H90.3 SENSORINEURAL HEARING LOSS, BILATERAL: Primary | ICD-10-CM

## 2022-04-01 PROCEDURE — 1126F PR PAIN SEVERITY QUANTIFIED, NO PAIN PRESENT: ICD-10-PCS | Mod: CPTII,S$GLB,, | Performed by: OTOLARYNGOLOGY

## 2022-04-01 PROCEDURE — 3077F PR MOST RECENT SYSTOLIC BLOOD PRESSURE >= 140 MM HG: ICD-10-PCS | Mod: CPTII,S$GLB,, | Performed by: OTOLARYNGOLOGY

## 2022-04-01 PROCEDURE — 3288F FALL RISK ASSESSMENT DOCD: CPT | Mod: CPTII,S$GLB,, | Performed by: OTOLARYNGOLOGY

## 2022-04-01 PROCEDURE — 3078F PR MOST RECENT DIASTOLIC BLOOD PRESSURE < 80 MM HG: ICD-10-PCS | Mod: CPTII,S$GLB,, | Performed by: OTOLARYNGOLOGY

## 2022-04-01 PROCEDURE — 3077F SYST BP >= 140 MM HG: CPT | Mod: CPTII,S$GLB,, | Performed by: OTOLARYNGOLOGY

## 2022-04-01 PROCEDURE — 1159F PR MEDICATION LIST DOCUMENTED IN MEDICAL RECORD: ICD-10-PCS | Mod: CPTII,S$GLB,, | Performed by: OTOLARYNGOLOGY

## 2022-04-01 PROCEDURE — 99204 OFFICE O/P NEW MOD 45 MIN: CPT | Mod: S$GLB,,, | Performed by: OTOLARYNGOLOGY

## 2022-04-01 PROCEDURE — 92567 TYMPANOMETRY: CPT | Mod: S$GLB,,, | Performed by: AUDIOLOGIST

## 2022-04-01 PROCEDURE — 92567 PR TYMPA2METRY: ICD-10-PCS | Mod: S$GLB,,, | Performed by: AUDIOLOGIST

## 2022-04-01 PROCEDURE — 99999 PR PBB SHADOW E&M-EST. PATIENT-LVL IV: ICD-10-PCS | Mod: PBBFAC,,, | Performed by: OTOLARYNGOLOGY

## 2022-04-01 PROCEDURE — 92557 PR COMPREHENSIVE HEARING TEST: ICD-10-PCS | Mod: S$GLB,,, | Performed by: AUDIOLOGIST

## 2022-04-01 PROCEDURE — 3078F DIAST BP <80 MM HG: CPT | Mod: CPTII,S$GLB,, | Performed by: OTOLARYNGOLOGY

## 2022-04-01 PROCEDURE — 92557 COMPREHENSIVE HEARING TEST: CPT | Mod: S$GLB,,, | Performed by: AUDIOLOGIST

## 2022-04-01 PROCEDURE — 3288F PR FALLS RISK ASSESSMENT DOCUMENTED: ICD-10-PCS | Mod: CPTII,S$GLB,, | Performed by: OTOLARYNGOLOGY

## 2022-04-01 PROCEDURE — 99204 PR OFFICE/OUTPT VISIT, NEW, LEVL IV, 45-59 MIN: ICD-10-PCS | Mod: S$GLB,,, | Performed by: OTOLARYNGOLOGY

## 2022-04-01 PROCEDURE — 1126F AMNT PAIN NOTED NONE PRSNT: CPT | Mod: CPTII,S$GLB,, | Performed by: OTOLARYNGOLOGY

## 2022-04-01 PROCEDURE — 1101F PR PT FALLS ASSESS DOC 0-1 FALLS W/OUT INJ PAST YR: ICD-10-PCS | Mod: CPTII,S$GLB,, | Performed by: OTOLARYNGOLOGY

## 2022-04-01 PROCEDURE — 99999 PR PBB SHADOW E&M-EST. PATIENT-LVL IV: CPT | Mod: PBBFAC,,, | Performed by: OTOLARYNGOLOGY

## 2022-04-01 PROCEDURE — 1159F MED LIST DOCD IN RCRD: CPT | Mod: CPTII,S$GLB,, | Performed by: OTOLARYNGOLOGY

## 2022-04-01 PROCEDURE — 1101F PT FALLS ASSESS-DOCD LE1/YR: CPT | Mod: CPTII,S$GLB,, | Performed by: OTOLARYNGOLOGY

## 2022-04-01 RX ORDER — FLUTICASONE PROPIONATE 50 MCG
2 SPRAY, SUSPENSION (ML) NASAL DAILY
Qty: 9.9 ML | Refills: 11 | Status: SHIPPED | OUTPATIENT
Start: 2022-04-01 | End: 2022-05-31 | Stop reason: ALTCHOICE

## 2022-04-01 NOTE — PROGRESS NOTES
Tigist Murry was seen today in the clinic for an audiologic evaluation for hearing loss.    Tympanometry revealed a Type A tympanogram for both ears.  Audiogram results revealed a mild to moderate sensorineural hearing loss bilaterally.  Speech reception thresholds were obtained at 40dB for both ears and speech discrimination scores were 76% for the right ear and 80% for the left ear.    Recommendations:  1. Otologic evaluation  2. Annual evaluation  3. Hearing aid consult when ready  4. Hearing protection in noise

## 2022-04-06 NOTE — PROGRESS NOTES
PATIENT: Tigist Murry  MRN: [id number]  DATE: 4/7/2022    Diagnosis:   1. Elevated hemoglobin    2. Elevated hemoglobin Active   3. Macrocytosis    4. Atherosclerosis of aorta    5. Other abnormality of red blood cells     6. Sequelae of protein-calorie malnutrition     7. Advance care planning      Chief Complaint: elevated hemoglobin    Oncologic History:      Oncologic History     Oncologic Treatment     Pathology       Subjective:    History of Present Illness: Ms. Murry is a 81 y.o. female who presents for evaluation and management of elevated hemoglobin/hematocrit. She is referred by Dr. Porter.    - labs since 2007 reveal an intermittent polycythemia.  - today, she is doing well. She denies shortness of breath, chest pain, nausea, vomiting, diarrhea, constipation.  - she denies smoking, sleep apnea.    Past medical, surgical, family, and social histories have been reviewed and updated below.    Past Medical History:   Past Medical History:   Diagnosis Date    Anticoagulant long-term use     Anxiety     Arthritis     Atrial fibrillation     Carpal tunnel syndrome on right     Diverticulosis     Encounter for blood transfusion     HTN (hypertension)     Hyperlipidemia     Osteopenia     Overactive bladder     Palpitations     Patellar tendonitis 4/13    saw [place] [facility]     Psoriasis     S/P partial hysterectomy     Situational depression     Supraventricular tachycardia        Past Surgical History:   Past Surgical History:   Procedure Laterality Date    ABDOMINAL SURGERY      BREAST BIOPSY Left     excisional    CARPAL TUNNEL RELEASE      HYSTERECTOMY      @27yrs of age    KNEE ARTHROSCOPY W/ DEBRIDEMENT  '05    Right    OOPHORECTOMY      @27yrs of age    PARTIAL HYSTERECTOMY         Family History:   Family History   Problem Relation Age of Onset    Heart failure Mother 80    No Known Problems Sister     No Known Problems Brother     No Known Problems Daughter     No  Known Problems Daughter     No Known Problems Daughter        Social History:  reports that she has never smoked. She has never used smokeless tobacco. She reports that she does not drink alcohol and does not use drugs.    Allergies:  Review of patient's allergies indicates:   Allergen Reactions    Ciprofloxacin      Other reaction(s): Nausea       Medications:  Current Outpatient Medications   Medication Sig Dispense Refill    acetaminophen (TYLENOL) 500 MG tablet Take 500 mg by mouth every 12 (twelve) hours as needed for Pain.      ALPRAZolam (XANAX) 0.25 MG tablet Take 1 tablet (0.25 mg total) by mouth 2 (two) times daily as needed for Anxiety. (Patient not taking: No sig reported) 180 tablet 0    ascorbic acid, vitamin C, (VITAMIN C) 500 MG tablet Take 500 mg by mouth once daily.      calcium citrate-vitamin D3 315-200 mg (CITRACAL+D) 315-200 mg-unit per tablet Take 1 tablet by mouth 2 (two) times daily.      co-enzyme Q-10 30 mg capsule Take 100 mg by mouth once daily.      dexlansoprazole (DEXILANT) 60 mg capsule Take 60 mg by mouth once daily.      diltiaZEM (TIAZAC) 240 MG Cs24 TAKE 1 CAPSULE (240 MG TOTAL) BY MOUTH ONCE DAILY. (Patient taking differently: Take 240 mg by mouth daily as needed.) 90 capsule 3    EScitalopram oxalate (LEXAPRO) 5 MG Tab Take 1 tablet (5 mg total) by mouth once daily. 90 tablet 3    FLUAD QUAD 2021-22,65Y UP,,PF, 60 mcg (15 mcg x 4)/0.5 mL Syrg       fluticasone propionate (FLONASE) 50 mcg/actuation nasal spray 2 sprays (100 mcg total) by Each Nostril route once daily. 9.9 mL 11    MAXITROL 3.5 mg/g-10,000 unit/g-0.1 % Oint 0.25 inch Right Upper Lid twice a day      multivitamin capsule Take 1 capsule by mouth once daily.      oxybutynin (DITROPAN) 5 MG Tab TAKE 1 TABLET EVERY DAY (Patient not taking: No sig reported) 90 tablet 3    potassium chloride SA (K-DUR,KLOR-CON) 20 MEQ tablet Take 1 tablet (20 mEq total) by mouth once daily. (Patient not taking: No sig  "reported) 4 tablet 0    rivaroxaban (XARELTO) 20 mg Tab Take 1 tablet (20 mg total) by mouth daily with dinner or evening meal. 90 tablet 3    simvastatin (ZOCOR) 10 MG tablet TAKE 1 TABLET EVERY EVENING (Patient not taking: No sig reported) 90 tablet 3    traZODone (DESYREL) 50 MG tablet TAKE 1 TABLET EVERY EVENING 90 tablet 3     No current facility-administered medications for this visit.       Review of Systems   Constitutional: Positive for fatigue.   HENT: Negative for sore throat.    Eyes: Negative for visual disturbance.   Respiratory: Negative for cough and shortness of breath.    Cardiovascular: Negative for chest pain.   Gastrointestinal: Negative for abdominal pain, constipation, diarrhea, nausea and vomiting.   Genitourinary: Negative for dysuria.   Musculoskeletal: Negative for back pain.   Skin: Negative for rash.   Neurological: Negative for headaches.   Hematological: Negative for adenopathy.   Psychiatric/Behavioral: The patient is not nervous/anxious.        ECOG Performance Status:   ECOG SCORE    1 - Restricted in strenuous activity-ambulatory and able to carry out work of a light nature         Objective:      Vitals:   Vitals:    04/07/22 1311   BP: 137/65   BP Location: Right arm   Patient Position: Sitting   BP Method: Medium (Automatic)   Pulse: 77   Resp: 18   Temp: 98.6 °F (37 °C)   TempSrc: Oral   SpO2: 97%   Weight: 50.2 kg (110 lb 10.7 oz)   Height: 5' 3" (1.6 m)     BMI: Body mass index is 19.6 kg/m².    Physical Exam  Vitals and nursing note reviewed.   Constitutional:       Appearance: She is well-developed.   HENT:      Head: Normocephalic and atraumatic.   Eyes:      Pupils: Pupils are equal, round, and reactive to light.   Cardiovascular:      Rate and Rhythm: Normal rate and regular rhythm.   Pulmonary:      Effort: Pulmonary effort is normal.      Breath sounds: Normal breath sounds.   Abdominal:      General: Bowel sounds are normal.      Palpations: Abdomen is soft. "   Musculoskeletal:         General: Normal range of motion.      Cervical back: Normal range of motion and neck supple.   Skin:     General: Skin is warm and dry.   Neurological:      Mental Status: She is alert and oriented to person, place, and time.   Psychiatric:         Behavior: Behavior normal.         Thought Content: Thought content normal.         Judgment: Judgment normal.         Laboratory Data:  Labs have been reviewed.    Lab Results   Component Value Date    WBC 7.46 03/14/2022    HGB 16.7 (H) 03/14/2022    HCT 55.2 (H) 03/14/2022     (H) 03/14/2022     03/14/2022           Imaging:     CT renal stone study (8/11/21):    There is atelectasis at the lung bases.     The liver is normal in size and contour.  Evaluation for focal lesions is limited by lack of intravenous contrast.  There are cholecystectomy clips in the fossa.  The pancreas and spleen have a normal noncontrast appearance.  There is a 1.7 cm left nodule with attenuation characteristics consistent with benign adenoma.  The right adrenal gland is not thickened.  Both kidneys demonstrate cortical thinning and numerous low-density lesions are present in the renal pelves bilaterally favored to reflect parapelvic cysts.  Evaluation for solid lesions is limited by lack of intravenous contrast.  No calculi are detected.  No hydronephrosis or hydroureter.     Stomach and small bowel are nondistended.  There is a small amount high density material within the distal stomach and proximal small bowel which could reflect high density medication administration or less likely previously administered enteric contrast.  There is no evidence of colonic obstruction.     There is athero sclerotic calcification of the abdominal aorta.     Bladder is nondistended and not evaluated.  Uterus is absent     There is no evidence of free air or free fluid in the abdomen or pelvis.     There is osteopenia, degenerative change, scoliosis spine.   Dystrophic calcification is present in the subcutaneous fat overlying right gluteus muscles.  There is diastasis recti without evidence of tien hernia.     Impression:     1.  No evidence of nephrolithiasis or obstructive uropathy.     2.  Bilateral fluid density lesions in the renal pelves bilaterally likely parapelvic cysts.  Definite solid lesion is detected although to by lack contrast.     3.  Bilateral renal cortical thinning     4.  Stable left adrenal nodule consistent with adenoma.     5.  Atherosclerosis    Assessment:       1. Elevated hemoglobin    2. Elevated hemoglobin Active   3. Macrocytosis    4. Atherosclerosis of aorta    5. Other abnormality of red blood cells     6. Sequelae of protein-calorie malnutrition     7. Advance care planning           Plan:     1. Polycythemia  - I have reviewed her chart  - labs since 2007 reveal an intermittent polycythemia.  - symptoms  - she does not smoke.  - sleep apnea  - I will perform a workup to rule in/out myeloproliferative neoplasm  - check CBC, ferritin, iron/tibc, JAK2 with reflex testing.  - I will contact her with results of testing. If needed, I will schedule a follow-up appointment.    2. Macrocytosis  - unclear etiology  - check folate, B12, reticulocytes    3. Atherosclerosis of aorta  - seen on CT renal stone study (8/11/21)  - continue diltiazem, simvastatin    4. Advance Care Planning     Date: 04/07/2022    Power of   I initiated the process of advance care planning today and explained the importance of this process to the patient.  I introduced the concept of advance directives to the patient, as well. Then the patient received detailed information about the importance of designating a Health Care Power of  (HCPOA). She was also instructed to communicate with this person about their wishes for future healthcare, should she become sick and lose decision-making capacity. The patient has not previously appointed a HCPOA. After  our discussion, the patient has decided to complete a HCPOA and has appointed her children Dahlia Roubion ([phone]), Iliana Tolbert ([phone]), and Rachel Quintero ([phone]). I spent a total time of 16 minutes discussing this issue with the patient.        - I will contact her with results of testing. If needed, I will schedule a follow-up appointment.    Freedom Thornton M.D.  Hematology/Oncology  [facility] [address], [address]  [place] [address]  Phone: [phone]  Fax: [phone]

## 2022-04-07 ENCOUNTER — OFFICE VISIT (OUTPATIENT)
Dept: HEMATOLOGY/ONCOLOGY | Facility: CLINIC | Age: 82
End: 2022-04-07
Payer: MEDICARE

## 2022-04-07 ENCOUNTER — TELEPHONE (OUTPATIENT)
Dept: HEMATOLOGY/ONCOLOGY | Facility: CLINIC | Age: 82
End: 2022-04-07

## 2022-04-07 ENCOUNTER — LAB VISIT (OUTPATIENT)
Dept: LAB | Facility: HOSPITAL | Age: 82
End: 2022-04-07
Attending: INTERNAL MEDICINE
Payer: MEDICARE

## 2022-04-07 VITALS
HEART RATE: 77 BPM | OXYGEN SATURATION: 97 % | HEIGHT: 63 IN | SYSTOLIC BLOOD PRESSURE: 137 MMHG | BODY MASS INDEX: 19.61 KG/M2 | RESPIRATION RATE: 18 BRPM | TEMPERATURE: 99 F | DIASTOLIC BLOOD PRESSURE: 65 MMHG | WEIGHT: 110.69 LBS

## 2022-04-07 DIAGNOSIS — E64.0 SEQUELAE OF PROTEIN-CALORIE MALNUTRITION: ICD-10-CM

## 2022-04-07 DIAGNOSIS — D75.89 MACROCYTOSIS: ICD-10-CM

## 2022-04-07 DIAGNOSIS — D58.2 ELEVATED HEMOGLOBIN: Primary | ICD-10-CM

## 2022-04-07 DIAGNOSIS — R71.8 OTHER ABNORMALITY OF RED BLOOD CELLS: ICD-10-CM

## 2022-04-07 DIAGNOSIS — I70.0 ATHEROSCLEROSIS OF AORTA: ICD-10-CM

## 2022-04-07 DIAGNOSIS — D58.2 ELEVATED HEMOGLOBIN: ICD-10-CM

## 2022-04-07 DIAGNOSIS — Z71.89 ADVANCE CARE PLANNING: ICD-10-CM

## 2022-04-07 LAB
BASOPHILS # BLD AUTO: 0.03 K/UL (ref 0–0.2)
BASOPHILS NFR BLD: 0.7 % (ref 0–1.9)
DIFFERENTIAL METHOD: ABNORMAL
EOSINOPHIL # BLD AUTO: 0.1 K/UL (ref 0–0.5)
EOSINOPHIL NFR BLD: 1.1 % (ref 0–8)
ERYTHROCYTE [DISTWIDTH] IN BLOOD BY AUTOMATED COUNT: 13.1 % (ref 11.5–14.5)
FERRITIN SERPL-MCNC: 75 NG/ML (ref 20–300)
FOLATE SERPL-MCNC: >40 NG/ML (ref 4–24)
HCT VFR BLD AUTO: 44.6 % (ref 37–48.5)
HGB BLD-MCNC: 14.4 G/DL (ref 12–16)
IMM GRANULOCYTES # BLD AUTO: 0.01 K/UL (ref 0–0.04)
IMM GRANULOCYTES NFR BLD AUTO: 0.2 % (ref 0–0.5)
IRON SERPL-MCNC: 74 UG/DL (ref 30–160)
LYMPHOCYTES # BLD AUTO: 0.9 K/UL (ref 1–4.8)
LYMPHOCYTES NFR BLD: 19.2 % (ref 18–48)
MCH RBC QN AUTO: 30.5 PG (ref 27–31)
MCHC RBC AUTO-ENTMCNC: 32.3 G/DL (ref 32–36)
MCV RBC AUTO: 95 FL (ref 82–98)
MONOCYTES # BLD AUTO: 0.5 K/UL (ref 0.3–1)
MONOCYTES NFR BLD: 11.3 % (ref 4–15)
NEUTROPHILS # BLD AUTO: 3.1 K/UL (ref 1.8–7.7)
NEUTROPHILS NFR BLD: 67.5 % (ref 38–73)
NRBC BLD-RTO: 0 /100 WBC
PLATELET # BLD AUTO: 190 K/UL (ref 150–450)
PMV BLD AUTO: 11.2 FL (ref 9.2–12.9)
RBC # BLD AUTO: 4.72 M/UL (ref 4–5.4)
RETICS/RBC NFR AUTO: 1.4 % (ref 0.5–2.5)
SATURATED IRON: 20 % (ref 20–50)
TOTAL IRON BINDING CAPACITY: 376 UG/DL (ref 250–450)
TRANSFERRIN SERPL-MCNC: 254 MG/DL (ref 200–375)
VIT B12 SERPL-MCNC: 482 PG/ML (ref 210–950)
WBC # BLD AUTO: 4.52 K/UL (ref 3.9–12.7)

## 2022-04-07 PROCEDURE — 81219 CALR GENE COM VARIANTS: CPT | Performed by: INTERNAL MEDICINE

## 2022-04-07 PROCEDURE — 3075F SYST BP GE 130 - 139MM HG: CPT | Mod: CPTII,S$GLB,, | Performed by: INTERNAL MEDICINE

## 2022-04-07 PROCEDURE — 3078F PR MOST RECENT DIASTOLIC BLOOD PRESSURE < 80 MM HG: ICD-10-PCS | Mod: CPTII,S$GLB,, | Performed by: INTERNAL MEDICINE

## 2022-04-07 PROCEDURE — 1126F PR PAIN SEVERITY QUANTIFIED, NO PAIN PRESENT: ICD-10-PCS | Mod: CPTII,S$GLB,, | Performed by: INTERNAL MEDICINE

## 2022-04-07 PROCEDURE — 99499 UNLISTED E&M SERVICE: CPT | Mod: S$GLB,,, | Performed by: INTERNAL MEDICINE

## 2022-04-07 PROCEDURE — 99204 OFFICE O/P NEW MOD 45 MIN: CPT | Mod: S$GLB,,, | Performed by: INTERNAL MEDICINE

## 2022-04-07 PROCEDURE — 81270 JAK2 GENE: CPT | Performed by: INTERNAL MEDICINE

## 2022-04-07 PROCEDURE — 1159F PR MEDICATION LIST DOCUMENTED IN MEDICAL RECORD: ICD-10-PCS | Mod: CPTII,S$GLB,, | Performed by: INTERNAL MEDICINE

## 2022-04-07 PROCEDURE — 84466 ASSAY OF TRANSFERRIN: CPT | Performed by: INTERNAL MEDICINE

## 2022-04-07 PROCEDURE — 3288F FALL RISK ASSESSMENT DOCD: CPT | Mod: CPTII,S$GLB,, | Performed by: INTERNAL MEDICINE

## 2022-04-07 PROCEDURE — 85025 COMPLETE CBC W/AUTO DIFF WBC: CPT | Performed by: INTERNAL MEDICINE

## 2022-04-07 PROCEDURE — 1101F PT FALLS ASSESS-DOCD LE1/YR: CPT | Mod: CPTII,S$GLB,, | Performed by: INTERNAL MEDICINE

## 2022-04-07 PROCEDURE — 85045 AUTOMATED RETICULOCYTE COUNT: CPT | Performed by: INTERNAL MEDICINE

## 2022-04-07 PROCEDURE — 99497 PR ADVNCD CARE PLAN 30 MIN: ICD-10-PCS | Mod: S$GLB,,, | Performed by: INTERNAL MEDICINE

## 2022-04-07 PROCEDURE — 81339 MPL GENE SEQ ALYS EXON 10: CPT | Performed by: INTERNAL MEDICINE

## 2022-04-07 PROCEDURE — 1158F PR ADVANCE CARE PLANNING DISCUSS DOCUMENTED IN MEDICAL RECORD: ICD-10-PCS | Mod: CPTII,S$GLB,, | Performed by: INTERNAL MEDICINE

## 2022-04-07 PROCEDURE — 1159F MED LIST DOCD IN RCRD: CPT | Mod: CPTII,S$GLB,, | Performed by: INTERNAL MEDICINE

## 2022-04-07 PROCEDURE — 82607 VITAMIN B-12: CPT | Performed by: INTERNAL MEDICINE

## 2022-04-07 PROCEDURE — 1126F AMNT PAIN NOTED NONE PRSNT: CPT | Mod: CPTII,S$GLB,, | Performed by: INTERNAL MEDICINE

## 2022-04-07 PROCEDURE — 99499 RISK ADDL DX/OHS AUDIT: ICD-10-PCS | Mod: S$GLB,,, | Performed by: INTERNAL MEDICINE

## 2022-04-07 PROCEDURE — 36415 COLL VENOUS BLD VENIPUNCTURE: CPT | Performed by: INTERNAL MEDICINE

## 2022-04-07 PROCEDURE — 99497 ADVNCD CARE PLAN 30 MIN: CPT | Mod: S$GLB,,, | Performed by: INTERNAL MEDICINE

## 2022-04-07 PROCEDURE — 99999 PR PBB SHADOW E&M-EST. PATIENT-LVL IV: CPT | Mod: PBBFAC,,, | Performed by: INTERNAL MEDICINE

## 2022-04-07 PROCEDURE — 3078F DIAST BP <80 MM HG: CPT | Mod: CPTII,S$GLB,, | Performed by: INTERNAL MEDICINE

## 2022-04-07 PROCEDURE — 82746 ASSAY OF FOLIC ACID SERUM: CPT | Performed by: INTERNAL MEDICINE

## 2022-04-07 PROCEDURE — 1101F PR PT FALLS ASSESS DOC 0-1 FALLS W/OUT INJ PAST YR: ICD-10-PCS | Mod: CPTII,S$GLB,, | Performed by: INTERNAL MEDICINE

## 2022-04-07 PROCEDURE — 82728 ASSAY OF FERRITIN: CPT | Performed by: INTERNAL MEDICINE

## 2022-04-07 PROCEDURE — 99204 PR OFFICE/OUTPT VISIT, NEW, LEVL IV, 45-59 MIN: ICD-10-PCS | Mod: S$GLB,,, | Performed by: INTERNAL MEDICINE

## 2022-04-07 PROCEDURE — 1158F ADVNC CARE PLAN TLK DOCD: CPT | Mod: CPTII,S$GLB,, | Performed by: INTERNAL MEDICINE

## 2022-04-07 PROCEDURE — 99999 PR PBB SHADOW E&M-EST. PATIENT-LVL IV: ICD-10-PCS | Mod: PBBFAC,,, | Performed by: INTERNAL MEDICINE

## 2022-04-07 PROCEDURE — 3288F PR FALLS RISK ASSESSMENT DOCUMENTED: ICD-10-PCS | Mod: CPTII,S$GLB,, | Performed by: INTERNAL MEDICINE

## 2022-04-07 PROCEDURE — 3075F PR MOST RECENT SYSTOLIC BLOOD PRESS GE 130-139MM HG: ICD-10-PCS | Mod: CPTII,S$GLB,, | Performed by: INTERNAL MEDICINE

## 2022-04-07 NOTE — TELEPHONE ENCOUNTER
I called and reviewed her labs. Hemoglobin is normal today. Follow up rest of studies, but I suspect JAK2 with reflex testing will be negative.    Freedom Thornton M.D.  Hematology/Oncology  [facility] [address], [address]  [place] [address]  Phone: [phone]  Fax: [phone]

## 2022-04-20 LAB
MPNR  FINAL DIAGNOSIS: NORMAL
MPNR  SPECIMEN TYPE: NORMAL
MPNR RESULT: NORMAL

## 2022-04-22 ENCOUNTER — TELEPHONE (OUTPATIENT)
Dept: HEMATOLOGY/ONCOLOGY | Facility: CLINIC | Age: 82
End: 2022-04-22
Payer: MEDICARE

## 2022-05-31 ENCOUNTER — OFFICE VISIT (OUTPATIENT)
Dept: INTERNAL MEDICINE | Facility: CLINIC | Age: 82
End: 2022-05-31
Payer: MEDICARE

## 2022-05-31 VITALS
HEART RATE: 97 BPM | SYSTOLIC BLOOD PRESSURE: 120 MMHG | WEIGHT: 110.25 LBS | HEIGHT: 63 IN | DIASTOLIC BLOOD PRESSURE: 60 MMHG | OXYGEN SATURATION: 98 % | BODY MASS INDEX: 19.54 KG/M2

## 2022-05-31 DIAGNOSIS — D58.2 ELEVATED HEMOGLOBIN: ICD-10-CM

## 2022-05-31 DIAGNOSIS — F41.9 ANXIETY: Chronic | ICD-10-CM

## 2022-05-31 DIAGNOSIS — I48.0 PAF (PAROXYSMAL ATRIAL FIBRILLATION): ICD-10-CM

## 2022-05-31 DIAGNOSIS — I10 ESSENTIAL HYPERTENSION: Chronic | ICD-10-CM

## 2022-05-31 DIAGNOSIS — H90.3 SENSORINEURAL HEARING LOSS (SNHL) OF BOTH EARS: ICD-10-CM

## 2022-05-31 DIAGNOSIS — R41.3 MEMORY LOSS: ICD-10-CM

## 2022-05-31 PROCEDURE — 3288F FALL RISK ASSESSMENT DOCD: CPT | Mod: CPTII,S$GLB,, | Performed by: INTERNAL MEDICINE

## 2022-05-31 PROCEDURE — 99214 PR OFFICE/OUTPT VISIT, EST, LEVL IV, 30-39 MIN: ICD-10-PCS | Mod: S$GLB,,, | Performed by: INTERNAL MEDICINE

## 2022-05-31 PROCEDURE — 99999 PR PBB SHADOW E&M-EST. PATIENT-LVL III: CPT | Mod: PBBFAC,,, | Performed by: INTERNAL MEDICINE

## 2022-05-31 PROCEDURE — 99999 PR PBB SHADOW E&M-EST. PATIENT-LVL III: ICD-10-PCS | Mod: PBBFAC,,, | Performed by: INTERNAL MEDICINE

## 2022-05-31 PROCEDURE — 1159F MED LIST DOCD IN RCRD: CPT | Mod: CPTII,S$GLB,, | Performed by: INTERNAL MEDICINE

## 2022-05-31 PROCEDURE — 1101F PR PT FALLS ASSESS DOC 0-1 FALLS W/OUT INJ PAST YR: ICD-10-PCS | Mod: CPTII,S$GLB,, | Performed by: INTERNAL MEDICINE

## 2022-05-31 PROCEDURE — 1101F PT FALLS ASSESS-DOCD LE1/YR: CPT | Mod: CPTII,S$GLB,, | Performed by: INTERNAL MEDICINE

## 2022-05-31 PROCEDURE — 3288F PR FALLS RISK ASSESSMENT DOCUMENTED: ICD-10-PCS | Mod: CPTII,S$GLB,, | Performed by: INTERNAL MEDICINE

## 2022-05-31 PROCEDURE — 1126F AMNT PAIN NOTED NONE PRSNT: CPT | Mod: CPTII,S$GLB,, | Performed by: INTERNAL MEDICINE

## 2022-05-31 PROCEDURE — 1157F ADVNC CARE PLAN IN RCRD: CPT | Mod: CPTII,S$GLB,, | Performed by: INTERNAL MEDICINE

## 2022-05-31 PROCEDURE — 1159F PR MEDICATION LIST DOCUMENTED IN MEDICAL RECORD: ICD-10-PCS | Mod: CPTII,S$GLB,, | Performed by: INTERNAL MEDICINE

## 2022-05-31 PROCEDURE — 3078F DIAST BP <80 MM HG: CPT | Mod: CPTII,S$GLB,, | Performed by: INTERNAL MEDICINE

## 2022-05-31 PROCEDURE — 3078F PR MOST RECENT DIASTOLIC BLOOD PRESSURE < 80 MM HG: ICD-10-PCS | Mod: CPTII,S$GLB,, | Performed by: INTERNAL MEDICINE

## 2022-05-31 PROCEDURE — 1126F PR PAIN SEVERITY QUANTIFIED, NO PAIN PRESENT: ICD-10-PCS | Mod: CPTII,S$GLB,, | Performed by: INTERNAL MEDICINE

## 2022-05-31 PROCEDURE — 99214 OFFICE O/P EST MOD 30 MIN: CPT | Mod: S$GLB,,, | Performed by: INTERNAL MEDICINE

## 2022-05-31 PROCEDURE — 3074F PR MOST RECENT SYSTOLIC BLOOD PRESSURE < 130 MM HG: ICD-10-PCS | Mod: CPTII,S$GLB,, | Performed by: INTERNAL MEDICINE

## 2022-05-31 PROCEDURE — 3074F SYST BP LT 130 MM HG: CPT | Mod: CPTII,S$GLB,, | Performed by: INTERNAL MEDICINE

## 2022-05-31 PROCEDURE — 1157F PR ADVANCE CARE PLAN OR EQUIV PRESENT IN MEDICAL RECORD: ICD-10-PCS | Mod: CPTII,S$GLB,, | Performed by: INTERNAL MEDICINE

## 2022-05-31 NOTE — PROGRESS NOTES
Patient ID: Tigist Murry is a 81 y.o. female.    Chief Complaint: Follow-up, Hypertension, and Memory Loss    HPI Tigist is a 81 y.o. female with   anxiety, HTN, HLD, OAB, psoriasis, SVT, atrial fibrillation and memory loss who presents for routine follow up of medical conditions. She presents with another daughter today (not the same daughter as last visit).  Patient is still managing her own meds, but with help from family. She still lives alone, although daughters have been offering to help her move into assisted living facility near one of them.   She is no longer driving.   She is no longer taking xanax for anxiety. Takes only lexapro. Daughter reports this is working well for her mood. And she thinking is clearer without the xanax. She has upcoming appt with neurology next week for memory loss. (Pt cancelled last appt with neurology).  Saw heme-onc for elevated hemoglobin. Work up negative. No further work up recommended.   Saw ENT and confirmed hearing loss. Patient does not want hearing aids.   No new acute complaints today.     Review of Systems   All other systems reviewed and are negative.      Objective:     Vitals:    05/31/22 0839   BP: 120/60   Pulse: 97        Physical Exam  Vitals reviewed.   Constitutional:       General: She is not in acute distress.     Appearance: Normal appearance. She is well-developed. She is not ill-appearing, toxic-appearing or diaphoretic.   HENT:      Head: Normocephalic and atraumatic.      Right Ear: External ear normal.      Left Ear: External ear normal.      Nose: Nose normal.   Eyes:      General: No scleral icterus.        Right eye: No discharge.         Left eye: No discharge.      Extraocular Movements: Extraocular movements intact.      Conjunctiva/sclera: Conjunctivae normal.   Cardiovascular:      Rate and Rhythm: Normal rate and regular rhythm.      Heart sounds: Normal heart sounds. No murmur heard.    No friction rub. No gallop.   Pulmonary:       Effort: Pulmonary effort is normal. No respiratory distress.      Breath sounds: Normal breath sounds. No stridor. No wheezing, rhonchi or rales.   Musculoskeletal:      Right lower leg: No edema.      Left lower leg: No edema.   Skin:     General: Skin is warm and dry.   Neurological:      General: No focal deficit present.      Mental Status: She is alert and oriented to person, place, and time. Mental status is at baseline.   Psychiatric:         Mood and Affect: Mood normal.         Behavior: Behavior normal.         Thought Content: Thought content normal.         Judgment: Judgment normal.         Assessment:       1. Memory loss Chronic   2. Anxiety Well controlled   3. Essential hypertension Well controlled   4. PAF (paroxysmal atrial fibrillation) Well controlled   5. Sensorineural hearing loss (SNHL) of both ears Chronic   6. Elevated hemoglobin Chronic       Plan:     Discussed with daughter the importance of ensuring proper med management in the home. She understands.    Memory loss  Comments:  F/u with neurology next week    Anxiety  Comments:  Cont lexapro. No longer taking xanax    Essential hypertension  Comments:  Cont current medication    PAF (paroxysmal atrial fibrillation)  Comments:  Rate controlled. Cont anticoagulation. Management per EP    Sensorineural hearing loss (SNHL) of both ears  Comments:  Cont to follow yearly with ENT    Elevated hemoglobin  Comments:  Seen by Heme-onc. No further eval needed.       RTC 3-4 months       Warning signs discussed, patient to call with any further issues or worsening of symptoms.       Parts of the above note were dictated using a voice dictation software. Please excuse any grammatical or typographical errors.

## 2022-06-08 ENCOUNTER — LAB VISIT (OUTPATIENT)
Dept: LAB | Facility: HOSPITAL | Age: 82
End: 2022-06-08
Attending: PSYCHIATRY & NEUROLOGY
Payer: MEDICARE

## 2022-06-08 ENCOUNTER — OFFICE VISIT (OUTPATIENT)
Dept: NEUROLOGY | Facility: CLINIC | Age: 82
End: 2022-06-08
Payer: MEDICARE

## 2022-06-08 VITALS
BODY MASS INDEX: 19.66 KG/M2 | DIASTOLIC BLOOD PRESSURE: 82 MMHG | WEIGHT: 111 LBS | SYSTOLIC BLOOD PRESSURE: 135 MMHG | HEART RATE: 82 BPM

## 2022-06-08 DIAGNOSIS — G31.84 MCI (MILD COGNITIVE IMPAIRMENT): ICD-10-CM

## 2022-06-08 DIAGNOSIS — G47.00 INSOMNIA, UNSPECIFIED TYPE: ICD-10-CM

## 2022-06-08 DIAGNOSIS — G20.C PARKINSONISM, UNSPECIFIED PARKINSONISM TYPE: ICD-10-CM

## 2022-06-08 DIAGNOSIS — F51.05 INSOMNIA DUE TO MENTAL CONDITION: ICD-10-CM

## 2022-06-08 DIAGNOSIS — E72.19 OTHER DISORDERS OF SULFUR-BEARING AMINO-ACID METABOLISM: ICD-10-CM

## 2022-06-08 DIAGNOSIS — G31.84 MCI (MILD COGNITIVE IMPAIRMENT): Primary | ICD-10-CM

## 2022-06-08 LAB
ALBUMIN SERPL BCP-MCNC: 4.3 G/DL (ref 3.5–5.2)
ALP SERPL-CCNC: 80 U/L (ref 55–135)
ALT SERPL W/O P-5'-P-CCNC: 20 U/L (ref 10–44)
ANION GAP SERPL CALC-SCNC: 13 MMOL/L (ref 8–16)
AST SERPL-CCNC: 17 U/L (ref 10–40)
BASOPHILS # BLD AUTO: 0.02 K/UL (ref 0–0.2)
BASOPHILS NFR BLD: 0.4 % (ref 0–1.9)
BILIRUB SERPL-MCNC: 0.6 MG/DL (ref 0.1–1)
BUN SERPL-MCNC: 21 MG/DL (ref 8–23)
CALCIUM SERPL-MCNC: 10.4 MG/DL (ref 8.7–10.5)
CHLORIDE SERPL-SCNC: 101 MMOL/L (ref 95–110)
CO2 SERPL-SCNC: 26 MMOL/L (ref 23–29)
CREAT SERPL-MCNC: 0.9 MG/DL (ref 0.5–1.4)
DIFFERENTIAL METHOD: ABNORMAL
EOSINOPHIL # BLD AUTO: 0 K/UL (ref 0–0.5)
EOSINOPHIL NFR BLD: 0.4 % (ref 0–8)
ERYTHROCYTE [DISTWIDTH] IN BLOOD BY AUTOMATED COUNT: 12.3 % (ref 11.5–14.5)
EST. GFR  (AFRICAN AMERICAN): >60 ML/MIN/1.73 M^2
EST. GFR  (NON AFRICAN AMERICAN): 59.7 ML/MIN/1.73 M^2
FOLATE SERPL-MCNC: 15.6 NG/ML (ref 4–24)
GLUCOSE SERPL-MCNC: 88 MG/DL (ref 70–110)
HCT VFR BLD AUTO: 50.3 % (ref 37–48.5)
HGB BLD-MCNC: 16.5 G/DL (ref 12–16)
IMM GRANULOCYTES # BLD AUTO: 0.02 K/UL (ref 0–0.04)
IMM GRANULOCYTES NFR BLD AUTO: 0.4 % (ref 0–0.5)
LYMPHOCYTES # BLD AUTO: 1 K/UL (ref 1–4.8)
LYMPHOCYTES NFR BLD: 20.8 % (ref 18–48)
MAGNESIUM SERPL-MCNC: 2 MG/DL (ref 1.6–2.6)
MCH RBC QN AUTO: 30.7 PG (ref 27–31)
MCHC RBC AUTO-ENTMCNC: 32.8 G/DL (ref 32–36)
MCV RBC AUTO: 94 FL (ref 82–98)
MONOCYTES # BLD AUTO: 0.5 K/UL (ref 0.3–1)
MONOCYTES NFR BLD: 9.6 % (ref 4–15)
NEUTROPHILS # BLD AUTO: 3.2 K/UL (ref 1.8–7.7)
NEUTROPHILS NFR BLD: 68.4 % (ref 38–73)
NRBC BLD-RTO: 0 /100 WBC
PLATELET # BLD AUTO: 169 K/UL (ref 150–450)
PMV BLD AUTO: 12.1 FL (ref 9.2–12.9)
POTASSIUM SERPL-SCNC: 3.5 MMOL/L (ref 3.5–5.1)
PROT SERPL-MCNC: 7.3 G/DL (ref 6–8.4)
RBC # BLD AUTO: 5.38 M/UL (ref 4–5.4)
SODIUM SERPL-SCNC: 140 MMOL/L (ref 136–145)
T4 SERPL-MCNC: 8.6 UG/DL (ref 4.5–11.5)
TSH SERPL DL<=0.005 MIU/L-ACNC: 0.64 UIU/ML (ref 0.4–4)
WBC # BLD AUTO: 4.71 K/UL (ref 3.9–12.7)

## 2022-06-08 PROCEDURE — 1157F PR ADVANCE CARE PLAN OR EQUIV PRESENT IN MEDICAL RECORD: ICD-10-PCS | Mod: CPTII,S$GLB,, | Performed by: PSYCHIATRY & NEUROLOGY

## 2022-06-08 PROCEDURE — 84425 ASSAY OF VITAMIN B-1: CPT | Performed by: PSYCHIATRY & NEUROLOGY

## 2022-06-08 PROCEDURE — 3288F FALL RISK ASSESSMENT DOCD: CPT | Mod: CPTII,S$GLB,, | Performed by: PSYCHIATRY & NEUROLOGY

## 2022-06-08 PROCEDURE — 96132 NRPSYC TST EVAL PHYS/QHP 1ST: CPT | Mod: 59,S$GLB,, | Performed by: PSYCHIATRY & NEUROLOGY

## 2022-06-08 PROCEDURE — 96132 PR NEUROPSYCHOLOGIC TEST EVAL SVCS, 1ST HR: ICD-10-PCS | Mod: 59,S$GLB,, | Performed by: PSYCHIATRY & NEUROLOGY

## 2022-06-08 PROCEDURE — 86780 TREPONEMA PALLIDUM: CPT | Performed by: PSYCHIATRY & NEUROLOGY

## 2022-06-08 PROCEDURE — 1160F PR REVIEW ALL MEDS BY PRESCRIBER/CLIN PHARMACIST DOCUMENTED: ICD-10-PCS | Mod: CPTII,S$GLB,, | Performed by: PSYCHIATRY & NEUROLOGY

## 2022-06-08 PROCEDURE — 1101F PR PT FALLS ASSESS DOC 0-1 FALLS W/OUT INJ PAST YR: ICD-10-PCS | Mod: CPTII,S$GLB,, | Performed by: PSYCHIATRY & NEUROLOGY

## 2022-06-08 PROCEDURE — 99205 PR OFFICE/OUTPT VISIT, NEW, LEVL V, 60-74 MIN: ICD-10-PCS | Mod: S$GLB,,, | Performed by: PSYCHIATRY & NEUROLOGY

## 2022-06-08 PROCEDURE — 3079F PR MOST RECENT DIASTOLIC BLOOD PRESSURE 80-89 MM HG: ICD-10-PCS | Mod: CPTII,S$GLB,, | Performed by: PSYCHIATRY & NEUROLOGY

## 2022-06-08 PROCEDURE — 84446 ASSAY OF VITAMIN E: CPT | Performed by: PSYCHIATRY & NEUROLOGY

## 2022-06-08 PROCEDURE — 82746 ASSAY OF FOLIC ACID SERUM: CPT | Performed by: PSYCHIATRY & NEUROLOGY

## 2022-06-08 PROCEDURE — 99999 PR PBB SHADOW E&M-EST. PATIENT-LVL III: ICD-10-PCS | Mod: PBBFAC,,, | Performed by: PSYCHIATRY & NEUROLOGY

## 2022-06-08 PROCEDURE — 87389 HIV-1 AG W/HIV-1&-2 AB AG IA: CPT | Performed by: PSYCHIATRY & NEUROLOGY

## 2022-06-08 PROCEDURE — 1159F PR MEDICATION LIST DOCUMENTED IN MEDICAL RECORD: ICD-10-PCS | Mod: CPTII,S$GLB,, | Performed by: PSYCHIATRY & NEUROLOGY

## 2022-06-08 PROCEDURE — 36415 COLL VENOUS BLD VENIPUNCTURE: CPT | Performed by: PSYCHIATRY & NEUROLOGY

## 2022-06-08 PROCEDURE — 99999 PR PBB SHADOW E&M-EST. PATIENT-LVL III: CPT | Mod: PBBFAC,,, | Performed by: PSYCHIATRY & NEUROLOGY

## 2022-06-08 PROCEDURE — 1126F AMNT PAIN NOTED NONE PRSNT: CPT | Mod: CPTII,S$GLB,, | Performed by: PSYCHIATRY & NEUROLOGY

## 2022-06-08 PROCEDURE — 1159F MED LIST DOCD IN RCRD: CPT | Mod: CPTII,S$GLB,, | Performed by: PSYCHIATRY & NEUROLOGY

## 2022-06-08 PROCEDURE — 3288F PR FALLS RISK ASSESSMENT DOCUMENTED: ICD-10-PCS | Mod: CPTII,S$GLB,, | Performed by: PSYCHIATRY & NEUROLOGY

## 2022-06-08 PROCEDURE — 84443 ASSAY THYROID STIM HORMONE: CPT | Performed by: PSYCHIATRY & NEUROLOGY

## 2022-06-08 PROCEDURE — 99499 UNLISTED E&M SERVICE: CPT | Mod: S$GLB,,, | Performed by: PSYCHIATRY & NEUROLOGY

## 2022-06-08 PROCEDURE — 1160F RVW MEDS BY RX/DR IN RCRD: CPT | Mod: CPTII,S$GLB,, | Performed by: PSYCHIATRY & NEUROLOGY

## 2022-06-08 PROCEDURE — 99499 RISK ADDL DX/OHS AUDIT: ICD-10-PCS | Mod: S$GLB,,, | Performed by: PSYCHIATRY & NEUROLOGY

## 2022-06-08 PROCEDURE — 1126F PR PAIN SEVERITY QUANTIFIED, NO PAIN PRESENT: ICD-10-PCS | Mod: CPTII,S$GLB,, | Performed by: PSYCHIATRY & NEUROLOGY

## 2022-06-08 PROCEDURE — 99205 OFFICE O/P NEW HI 60 MIN: CPT | Mod: S$GLB,,, | Performed by: PSYCHIATRY & NEUROLOGY

## 2022-06-08 PROCEDURE — 82607 VITAMIN B-12: CPT | Performed by: PSYCHIATRY & NEUROLOGY

## 2022-06-08 PROCEDURE — 80053 COMPREHEN METABOLIC PANEL: CPT | Performed by: PSYCHIATRY & NEUROLOGY

## 2022-06-08 PROCEDURE — 85025 COMPLETE CBC W/AUTO DIFF WBC: CPT | Performed by: PSYCHIATRY & NEUROLOGY

## 2022-06-08 PROCEDURE — 83735 ASSAY OF MAGNESIUM: CPT | Performed by: PSYCHIATRY & NEUROLOGY

## 2022-06-08 PROCEDURE — 3079F DIAST BP 80-89 MM HG: CPT | Mod: CPTII,S$GLB,, | Performed by: PSYCHIATRY & NEUROLOGY

## 2022-06-08 PROCEDURE — 96116 NUBHVL XM PHYS/QHP 1ST HR: CPT | Mod: 59,S$GLB,, | Performed by: PSYCHIATRY & NEUROLOGY

## 2022-06-08 PROCEDURE — 3075F PR MOST RECENT SYSTOLIC BLOOD PRESS GE 130-139MM HG: ICD-10-PCS | Mod: CPTII,S$GLB,, | Performed by: PSYCHIATRY & NEUROLOGY

## 2022-06-08 PROCEDURE — 3075F SYST BP GE 130 - 139MM HG: CPT | Mod: CPTII,S$GLB,, | Performed by: PSYCHIATRY & NEUROLOGY

## 2022-06-08 PROCEDURE — 1101F PT FALLS ASSESS-DOCD LE1/YR: CPT | Mod: CPTII,S$GLB,, | Performed by: PSYCHIATRY & NEUROLOGY

## 2022-06-08 PROCEDURE — 96116 PR NEUROBEHAVIORAL STATUS EXAM BY PSYCH/PHYS: ICD-10-PCS | Mod: 59,S$GLB,, | Performed by: PSYCHIATRY & NEUROLOGY

## 2022-06-08 PROCEDURE — 83921 ORGANIC ACID SINGLE QUANT: CPT | Mod: 91 | Performed by: PSYCHIATRY & NEUROLOGY

## 2022-06-08 PROCEDURE — 1157F ADVNC CARE PLAN IN RCRD: CPT | Mod: CPTII,S$GLB,, | Performed by: PSYCHIATRY & NEUROLOGY

## 2022-06-08 PROCEDURE — 84436 ASSAY OF TOTAL THYROXINE: CPT | Performed by: PSYCHIATRY & NEUROLOGY

## 2022-06-08 RX ORDER — SUVOREXANT 5 MG/1
1 TABLET, FILM COATED ORAL NIGHTLY
Qty: 30 TABLET | Refills: 1 | Status: SHIPPED | OUTPATIENT
Start: 2022-06-08 | End: 2022-08-29 | Stop reason: SDUPTHER

## 2022-06-08 NOTE — PROGRESS NOTES
[facility]  [facility]     PATIENT: Tigist Murry  VISIT DATE: 2022  MRN: [id number]  PRIMARY PROVIDER: Gwen Porter MD  : 1940       Chief complaint: Progressive Cognitive Impairment     History of present illness:      Ms. Murry is a 81-year-old right-handed female who presents today to the [facility] due to concerns related to progressive neurocognitive impairment.    Ms. Murry is accompanied by granddaughter who participates in providing history.   Additional information is obtained by reviewing available medical records.     Relevant Background/Context   Known Relevant Genetics:  o There is no known relevant genetic testing available.   Known Relevant Family history:  o Patient's brother developed Lewy body dementia  o Lewy body dementia in her brother  o Lewy body dementia in her brother  o The family denies a history of motor neuron disease (ALS).  o The family denies a history of developmental learning disorder (Dyslexia, ADHD, ASD, etc.).  o The family denies a history of mood/substance abuse disorder (MDD, ARAVIND, Schizophrenia, etc.).   Developmental/Milestones:  o The patient/family report no known birth complications or early life problems. The patient met all developmental milestones.   Learning Disorders:  o The patient/family report no signs or symptoms suggestive of developmental learning disorder.   Education:  o 12 years of formal education.  o HS.   Relevant Medical History:  o Carpal tunnel syndrome on right  o Anxiety  o Situational depression  o Benzodiazepine dependence  o Psoriasis  o Calcified granuloma of lung  o Essential hypertension  o Hyperlipidemia  o Aortic atherosclerosis  o Arterial tortuosity  o Left ventricular diastolic dysfunction with preserved systolic function  o PAF (paroxysmal atrial fibrillation)  o Overactive bladder  o CKD (chronic kidney disease) stage 3,  GFR 30-59 ml/min  o Senile purpura  o ndex (BMI) 20.0-20.9, adult  o Diverticulosis  o Gastroesophageal reflux disease   Relevant Exposure/Trauma to CNS:  o No History of Traumatic Brain Injury or Concussions  o No History of Chronic Mood Disorder  o No History of Chronic Stress  o No History of Chronic Substance Abuse  o No History of Malnutrition  o No History of Toxic Exposure     Neurocognitive Disorder Features   Onset/Duration:  o Jun 2020 (~2-year)   First Symptom:  o Memory impairment   Progression:  o Gradually Progressive   Clinical Course:  o Primary Care Provider (07/28/2021)  - Type: Chart Review. She presents with her daughter today. her daughter mentions that the patient and her family have concerns about patient's memory. She has been more forgetful recently. And her family is concerned about her driving. The patient has no issues dressing, bathing or feeding herself. She has multiple children who check on her frequently. We discussed the issue of her anxiety today. She takes xanax PRN anxiety and trazodone PRN insomnia. She repeatedly and numerous times today said that xanax is the only thing that helps her anxiety and she does not take it often. Does not take it every day. She does not recall being on daily medication for anxiety in the past but per chart review, was prescribed lexapro. Follows with cardiologist Dr. Dowling, last seen in Nov 2020 and due to return in Nov 2021. her daughter would like patient's hearing checked. We also reviewed recent CMP results today.  .  o Primary Care Provider (09/17/2021)  - Type: Chart Review. ENT/audiology; she was referred as her daughter had concerns regarding her hearing. She is feeling better in terms of anxiety. She is taking lexapro daily. No longer taking xanax, but has some on hand in case it is needed. Has not followed up with neurology for memory loss and does not think memory loss is a major issue. Feels that she has wax in ears and would like  them cleaned today.  o Primary Care Provider (03/18/2022)  - Type: Chart Review. Sarah Moreno is concerned about the patient's medications. The patient does not know what she is taking or should be taking. Sarah Moreno brought a list as well as pictures of the medications that the patient has at home. It is unclear if she has her statin medication or not. She was only taking her eliquis daily (not twice daily as prescribed). She recently saw her cardiologist who changed her to Xarelto for easier once daily dosing. She is no longer taking chlorthalidone; unclear how long she has been off of it. The patient lives alone. She has limited her driving. She pays her bills but her daughter checks on her accounts. She has 3 her daughters and they live in [facility]. The patient missed her neurology appt for memory loss and they have not rescheduled. She still has episodes of anxiety, during which she wants to use xanax for treatment.  o Primary Care Provider (05/31/2022)  - Type: Chart Review. The patient is still managing her own meds, but with help from her family. She still lives alone, although her daughters have been offering to help her move into assisted living facility near one of them. She is no longer driving. She is no longer taking xanax for anxiety. Takes only lexapro. her daughter reports this is working well for her mood. And she thinking is clearer without the xanax. She has upcoming appt with neurology next week for memory loss. (Pt cancelled last appt with neurology). Saw heme-onc for elevated hemoglobin. Work up negative. No further work up recommended.     Current Presentation   Recent/Interim History:  o The patient presents with granddaughter. The patient has limited insight into disease and history and granddaughter is not fully aware of her background history or contacts. Patient's her daughter briefly phones in to elaborate aspects of history. The remainder history is gathered from  previous medical records. The patient has been living alone since as early as 2015 following the death of her . Over last 7 years the patient reports progressive social isolation though she does maintain multiple social and community groups to say active. She reports that she attends her Jew multiple days per week and she is Yarsani with her attendance at the [facility]. She reports that the [facility] as a better life multiple occasions through its capacity to foster ongoing friendships and maintains social interaction. Despite this though she remains isolated at home. In the setting of COVID-19 restrictions over the last 2 years the patient has become progressively more isolated with symptoms suggestive of depression anxiety. Over the same 2 year. her family has recognized that she has been having more bothersome memory concentration and thought deficits. In no certain order her family report during the same timeframe she a began recognize any new tremor. As her family does not live within the [facility] these observations are piecemeal as such no reliable series of events or time course can be gathered this time. Review of medical records indicate that in early 2021 her family became concerned to the point where they asked her primary care to evaluate the patient. Her last year the patient has been evaluated multiple times for mild cognitive impairment. We recommended that the patient gradually limit her use of the car and consider transitioning to a senior living facility/community the patient has been reticent to leave for community home or why [facility]. her family has gradually taken over certain responsibilities including monitoring finances, bills the patient does have a  that comes in once a week for a deep cleaning. The patient however is able to maintain all other instrumental activities of daily living. There is no evidence at this time the patient is having difficulty handling small sums of money,  cooking, shopping, or basic chores. Patient's driving has declined in recent years. There are scratches on the car and her family has recognized that she has mounted curves. Vent the patient does report that if she drives outside of her familiar zone she will get lost however within her familiar territory neighborhood she feels confident in her driving capacity and her family is in agreement. her family reports the patient has a 1 brother who developed Lewy body dementia around the same age. her family deny any evidence of fixed false beliefs, delusions hallucinations or significant fluctuating level of arousal. On presentation the patient has limited insight, is quite perseverative tangential and circumstantial in her speech and thought. The patient is prone to bouts of anxiety during the encounter at which point previous symptoms tend to be exacerbated. The patient has not received any previous brain imaging or completed any reversible causes of cognitive impairment. The patient does have a relevant history of atrial fibrillation with supraventricular tachycardia. The patient has an elevated fall risk however does attend the [facility] on a regular basis maintains good strength though postural instability is notably impaired on presentation.   Unresolved Concern(s) reported by patient/family:  o Social Isolation - Lives alone, family does not live nearby  o Family history - LBD  o Long-term benzo-dependence - recently d/c'ed        Review of cognitive, visuospatial, motor, sensory, and behavioral systems:     Memory:    Ms. Gonzalezs memory has worsened in the past few years.   She does repeat statements or asks the same question repeatedly.   She does have difficulty remembering recent important conversations.   She does have difficulty remembering recent events.   She does forget information within minutes.   Her remote memory is intact.   Her recent retrograde memory is intact.  Attention:    Her attention  and concentration are impaired.   She does not have attentional fluctuations.   She does not have difficulty maintaining selective attention.   She does become easily distracted.   She does have difficulty with divided attention.  Executive:    Ms. Nguyen cognitive processing speed is slower.   She does have difficulty with working memory.   She does misplace personal items (e.g., keys, cell phone, wallet) more frequently.   She does have difficulty keeping track of her medications.   She does not have difficulty with planning/organizing/completing multistep tasks.   She does not have difficulty with executive attention.   She does have difficulty with flexible thinking.   She does not have difficulty with response inhibition.   She denies new impulsivity or rash/careless actions.   Her judgment is intact.  Language:    Her speech output is affected.   She does forget people's names more frequently.   She does have word-finding difficulties.   Ms. Nguyen speech is fluent and non-effortful.   Ms. Nguyen speech is grammatically intact.   She does not make word substitutions.   She does not have difficulty reading.   She does not appear to have impaired comprehension.  Visuospatial:    She has new visuospatial problems.   She has become confused or disoriented in *new*, unfamiliar places.   She does not have trouble with navigation.   She does not get lost in familiar places.   Ms. Murry does not have visuospatial disorientation.   She does not have difficulty recognizing objects or faces.   She denies problems with driving or parking.  Motor/Coordination:    Ms. Murry does have difficulty with walking.   She does feel imbalanced.   She denies having fallen.   She reports new muscle weakness.   She does have difficulty buttoning shirts, operating zippers, or manipulating tools/utensils.   Her handwriting has become micrographic.   She does have a resting tremor.   She denies  having any new involuntary movements and/or muscle jerking.   She does not have swallowing difficulty.   She denies new muscle cramps and twitching.  Sensory:    Ms. Murry denies new numbness, tingling, paresthesias, or pain.   Ms. Murry denies a loss of vision, blurry vision, or double vision.   Ms. Murry denies new loss of hearing or worsening tinnitus.   Ms. Murry denies anosmia.  Sleep:    Ms. Murry reports difficulty sleeping.   Ms. Murry does have difficulty going to sleep.   Ms. Murry reports difficulty staying asleep and/or frequently awakening at night.   Ms. Murry does not snore or have witnessed apneas while sleeping.   When she wakes up in the morning, she does feel well-rested.   She denies dream-enactment behavior.   She denies symptoms suggestive of restless leg syndrome.  Behavior:    Ms. Gonzalezs personality has changed.   She does not have symptoms of disinhibition and social inappropriateness.   She does not have symptoms to suggest a loss of manners or decorum.   She does not appear apathetic or has decreased motivation.   She does not appear to have a change in inertia.   There is no report that Ms. Murry has had a change in their emotional expression.   She does not have emotional blunting or lability.   She does not have symptoms of irritability and mood lability.   She does not have symptoms of agitation, aggression, or violent outbursts.   Her insight into his disease and situation is impaired.   Her personal hygiene is intact.   She is not exhibiting a diminished response to other people's needs and feelings.   She is not exhibiting a diminished social interest, interrelatedness, or personal warmth.   She denies restlessness.   She denies new and/or worsening simple repetitive behaviors.   Her speech has not become simplified or become repetitive/stereotyped.   She denies new/worsening complex repetitive/ritualistic compulsions and behaviors.   She  does not have symptoms of hyper-religiosity or dogmatism.   Her interests/pleasures have not become restrictive, simplified, interrupting, or repetitive.   She denies a change of self-stimulating behavior.   She denies any changes in eating behavior.   She denies increased consumption of food or substances.   She denies oral exploration or consumption of inedible objects.  Psychiatric:    She does feel depressed.   She is exhibiting symptoms of social withdrawal/indifference.   She does have anxiety.   She does not exhibit cycling behavior.   She does not exhibit hyperactive behavior.   She is not exhibited symptoms of paranoia.   She does not have delusions.   She does not have hallucinations.   She does have a history of sensitivity to neuroleptic/psychotropic medications.  Medical Review of Systems:    Ms. Murry does have constipation.   Ms. Murry does not have urinary incontinence.   Ms. Murry denies orthostatic lightheadedness.   Ms. Murry's weight is stable.  Functional status:   Difficulty performing the following Instrumental ADLs:  o Housekeeping: No  o Food Preparation: No  o Shopping: No  o Ability to Handle Finances: Yes Comment: unclear  o Transportation/Driving: No  o Household Appliances/Stove: No  o Laundry: No   Difficulty performing the following Basic ADLs:  o Dressing: No  o Bathing: No  o Toileting: No  o Personal hygiene and grooming: No  o Feeding: No  Care Management:   Patient/Family Safety Concerns:  o Medication Adherence: Yes  o Home Safety: Yes  o Wandered: No  o Firearms: No  o Fall Risk: Yes  o Home Alone: Yes       Past Medical History:   Diagnosis Date    Anticoagulant long-term use     Anxiety     Arthritis     Atrial fibrillation     Carpal tunnel syndrome on right     Diverticulosis     Encounter for blood transfusion     HTN (hypertension)     Hyperlipidemia     Osteopenia     Overactive bladder     Palpitations     Patellar tendonitis 4/13     saw [place] [facility]     Psoriasis     S/P partial hysterectomy     Situational depression     Supraventricular tachycardia        Past Surgical History:   Procedure Laterality Date    ABDOMINAL SURGERY      BREAST BIOPSY Left     excisional    CARPAL TUNNEL RELEASE      HYSTERECTOMY      @27yrs of age    KNEE ARTHROSCOPY W/ DEBRIDEMENT  '05    Right    OOPHORECTOMY      @27yrs of age    PARTIAL HYSTERECTOMY         Family History   Problem Relation Age of Onset    Heart failure Mother 80    No Known Problems Sister     No Known Problems Brother     No Known Problems Daughter     No Known Problems Daughter     No Known Problems Daughter        Social History     Socioeconomic History    Marital status:    Tobacco Use    Smoking status: Never Smoker    Smokeless tobacco: Never Used   Substance and Sexual Activity    Alcohol use: No    Drug use: No    Sexual activity: Not Currently     Partners: Male       Medication:     Current Outpatient Medications on File Prior to Visit   Medication Sig Dispense Refill    dexlansoprazole (DEXILANT) 60 mg capsule Take 60 mg by mouth once daily.      diltiaZEM (TIAZAC) 240 MG Cs24 TAKE 1 CAPSULE (240 MG TOTAL) BY MOUTH ONCE DAILY. (Patient taking differently: Take 240 mg by mouth daily as needed.) 90 capsule 3    EScitalopram oxalate (LEXAPRO) 5 MG Tab Take 1 tablet (5 mg total) by mouth once daily. 90 tablet 3    oxybutynin (DITROPAN) 5 MG Tab TAKE 1 TABLET EVERY DAY 90 tablet 3    rivaroxaban (XARELTO) 20 mg Tab Take 1 tablet (20 mg total) by mouth before evening meal. 90 tablet 3    simvastatin (ZOCOR) 10 MG tablet TAKE 1 TABLET EVERY EVENING 90 tablet 3    traZODone (DESYREL) 50 MG tablet TAKE 1 TABLET EVERY EVENING 90 tablet 3     No current facility-administered medications on file prior to visit.        Review of patient's allergies indicates:   Allergen Reactions    Ciprofloxacin      Other reaction(s): Nausea       Medications  Reconciliation:   I have reconciled the patient's home medications and discharge medications with the patient/family. I have updated all changes.  Refer to After-Visit Medication List.    Objective:  Vital Signs:  Vitals:    06/08/22 1013   BP: 135/82   Pulse: 82     Wt Readings from Last 3 Encounters:   06/08/22 1013 50.4 kg (111 lb)   05/31/22 0839 50 kg (110 lb 3.7 oz)   04/07/22 1311 50.2 kg (110 lb 10.7 oz)     Body mass index is 19.66 kg/m².     Neurological examination:  Mental Status:    Her appearance was abnormal.   Throughout the interview, she behaved abnormally and was not cooperative.   Ms. Gonzalezs energy level is abnormal.   Ms. Murry was not hyperactive.   Ms. Gonzalezs energy level is low/hypoactive.   Her orientation is normal; Spatial 5/5 (location, the floor of building, city, county, state) and temporal 5/5 (month, day, year, CHRISTOPHER) dimensions are accurate.   Her attention/concentration is impaired.   She can complete three-step commands.   Her thought process is not logical or goal-oriented.   She demonstrated impaired insight based on actions, awareness of her illness, plans for the future.   She demonstrated impaired judgment based on actions and plans for the future.   She has no evidence of hallucinations (auditory, visual, olfactory).   She has no evidence of delusions (paranoid, grandiose, bizarre).  Cranial Nerves:    Her pupils were normal.   Her visual fields were full to confrontation in all quadrants.   Her ocular pursuit was impaired.   Her saccades were abnormal.    Comment: decreased saccadic initiation, velocity and amplitude   Her eyelid assessment showed no apraxia. There was no eyelid dysfunction, retraction, or billingsley sign.   Her facial strength was normal.   Her facial expression was abnormal.   Her facial expression was restricted suggestive of hypomimia.   Her hearing was normal bilaterally.   Her oropharynx was non-obstructed with a Mallapati score  "1/4. The soft palate elevates symmetrically.   Her uvula is mid-line.   Her tongue showed no evidence of scalloping.   She can protrude their tongue beyond Her lips for >10 sec.  Speech/Language:    Ms. Nguyen speech was fluent, non-effortful, and her rate was appropriate to the context.   Her speech volume is within normal range and appropriate to the context.   Her speech rate is abnormal.    Comment: slow   Her respirations are within normal range and appropriate to context.   Her speech timbre is abnormal.    Comment: grainy   She made articulation (segmental features) errors.   She has no speech dysdiadochokinesia with repetition of syllables such as "/PA/, /TA/, /KA/, /OM/".    Comment: '/PA/, /TA/, /KA/, /OM/.'   She made no errors during the repetition of rapid syllables and or words such as "caterpillar" "", and "huckleberry"   She made errors during the repetition of rapid sequences of consonants.    Comment: 'Gnosticist Oriental orthodox' or '[employer]'.   She has no prosody (suprasegmental features) errors.   Her stress assessment showed no repetition errors in linguistically complex words, including multisyllabic words ("planetarium," "questionable," "accomplishment," "phonetic.   Ms. Nguyen speech is dysarthric.   She has hypokinetic dysarthria.    Comment: Slow rate, articulation impairment with reduced range   Ms. Murry showed evidence of anomia.   She showed evidence of anomia during spontaneous speech.   She showed evidence of anomia during confrontational naming.    Comment: 12/12 - correct chocolate bar, kangaroo, theater, Confucianist, doctor, potato, battery, ice cube tray, thermometer, flower, bomb, and calendar.   She makes phonological loop errors.   She makes no errors during the repetition of gibberish words (e.g., "Supercalifragilisticexpialidocious," "Pigglywiggly," "[place]osp[place]oo," "Zowzy," "Bazinga").   She makes errors during the repetition of " complex meaningless phrases.    Comment: 'The horse raced past the barn fell.', 'The complex houses  and single soldiers and their families,' 'Wishes are hopping, and trees are west,' and 'Brushing liked to ashley barclay's direction'.   She can comprehend commands that cross the midline (e.g., with your left thumb, touch your right ear).    Comment: 'with your left thumb touch your right ear'.   She has difficulty comprehending commands that depend on syntax.    Comment: 'point to the ceiling after you point to the floor'.   Her speech is grammatically intact; (no function/semantic word substitutions, phonemic/semantic paraphasias, or binary confusion).  Motor:    Ms. Gonzalezs bilateral upper/lower extremity muscles bulk is abnormal.    Comment: atrophy   Ms. Gonzalezs upper extremity muscle tone is increased.    Comment: moderate degree of b/l R>L   There is evidence of rigidity/cogwheeling.    Comment: Muscle tone is increased and there is evidence of rigidity/cogwheeling.   Assessment of motor strength was symmetric and at minimal anti-gravity.   There is no pronator or downward drift.  Coordination:    She has no bilateral upper extremity limb dysmetria or past pointing on finger-nose-finger bilaterally.   She has no limb dysdiadochokinesia of the upper extremity on the pronation/supination test and screwing in a light bulb or lower extremity during tapping ball of each foot bilaterally.   She has a visible tremor.   She has a kinetic tremor.   She has a postural tremor.    Comment: B/L   She has a resting tremor.    Comment: R>L   She has evidence of interhemispheric motor control deficits.   She demonstrates evidence of motor overflow.   Ms. Nguyen upper extremity fine motor coordination was abnormal.    Comment: R>L   Ms. Nguyen upper extremity fine motor coordination was not slow.    Comment: finger tapping, pronation/supination, and the open-close fist was slow.   Ms.  Lovely's upper extremity fine motor coordination was not hypometric.    Comment: finger tapping, pronation/supination, and the open-close fist showed hypometria.   Ms. Gonzalezs upper extremity fine motor coordination was not dysrhythmic.    Comment: finger tapping, pronation/supination, and the open-close fist showed dysrhythmia.  Higher Cortical Function:    She demonstrates no evidence of dorsal simultanagnosia (overlapping objects).   She demonstrates no evidence of ventral simultanagnosia (complex picture synthesis).   Ms. Murry showed evidence of visuospatial constructional dysfunction.   Ms. Murry showed no evidence of agnosia.   Ms. Murry showed evidence of angular gyrus disconnection (insular-operculum).   She has evidence of dysgraphia.   Ms. Murry showed evidence of apraxia.   She showed no evidence of ideomotor apraxia performing tool-use pantomimes (e.g., use a hammer, use a screwdriver, use a comb, flip a coin, waving goodbye).   She showed no evidence of ideational apraxia (e.g., taking off and putting on shoes, folding paper into an envelope).   She showed evidence of limb-motor apraxia during mimicking complex bimanual hand shapes.   She showed no evidence of buccofacial apraxia (e.g., blow out a candle, puff out cheeks, and whistle).   She showed dysexecutive behavior.   She showed utilization and/or imitation behavior.   She has evidence of perseverative or stereotyped behavior.   She demonstrated stimulus-bound behavior.  Sensory:    Her sensation was intact to light touch, and vibratory sense in the bilateral upper and lower extremities.  Reflexes:    Reflexes were symmetric and 2+ at biceps, 2+ triceps, and 2+ brachioradialis, 2+ at the knees bilaterally, there was no cross-abductor sign, 2+ in the bilateral ankles.  Gait:    She has normal posture sitting unaided.   She is unable to rise from a chair and sit back down without using their arms.   Her gait was  abnormal.   Her posture while walking is abnormal.    Comment: hunched, bent, asymmetrical right shoulder   Her gait initiation/inhibition was abnormal.    Comment: blockage   Her stance while walking is abnormal.    Comment: narrow   Her gait speed was abnormal (70-80 F 1.13 m/s M 1.26 m/s, >80 F 0.94 m/sec, M 0.97 m/sec).    Comment: slow   Her stride (gait cycle) was abnormal.    Comment: decreased step-time, step-width, and step-length.   Her arms swing is abnormal.    Comment: asymmetric and decreased amplitude on right   She takes turns in >4 steps.    Comment: en block   When attempting to walk abnormally (heels, tiptoes, tandem), she makes errors.   While walking on her tiptoes for ten steps, she makes deviations.   While walking on her heels for ten steps, she makes deviations.   While walking tandem for ten steps, she makes deviations.   While walking with eyes closed for ten steps, she makes deviations.    Comment: veers to right   She has evidence of posture/balance impairment.   Retropulsion testing showed abnormal recovery.   She has evidence of a specific gait disorder.   She has evidence of parkinsonism gait disorder.    Comment: Gait is rigid akinetic with a short step length, a narrow base, and a stooped posture involving the neck, shoulders, and trunk. Arm swing is reduced. Steppage height is reduced (shuffling). Stride variability is increased. When asked to walk faster, patients increase the step frequency rather than step length.       Neuropsychological Evaluation Summary:     Prior Neurocognitive/Neuropsychological Evaluations   Summary from EMR:    Neurocognitive Evaluation completed on 06/08/2022:  Memory   Registration-3 2/3 Impairment: Moderate.   Recall-3 1/3 Impairment: Significant.   Recall-5 1/5 Impairment: Significant.   Executive   Three-step command 3/3 Within Normal Limits.   Trials-1 0/1 Impairment: Significant.   WORLD Backward 5/5 Within Normal Limits.    Digit Span - 2 1/2 Impairment: Moderate.   Serial Sevens 1/3 Impairment: Significant.   Fluency 0/1 Impairment: Significant.   Digit Span Backwards 2 Impairment: -2.4 STDs below the average score based on age and education.   Lexical Fluency - D 7 Impairment: -1.8 STDs below the average score based on age and education.   Lexical Fluency - F 8 Impairment: -1.6 STDs below the average score based on age and education.   Semantic Fluency - Animals 6 Impairment: -2.9 STDs below the average score based on age and education.   Visuospatial   Intersecting Pentagons 0/1 Impairment: Significant.   3D Cube Copy 0/1 Impairment: Significant.   Clock Draw 1/3 Impairment: Significant.   Gaitan Copy 10/17 Impairment: Moderate.   Overlapping Images 5/5 Within Normal Limits.   Picture Synthesis 3/3 Within Normal Limits.   Noise Pareidolia Test 5/5 Within Normal Limits.   Object Decision 4/4 Within Normal Limits.   Attention   Orientation-10 10/10 Within Normal Limits.   Orientation-6 6/6 Within Normal Limits.   Alternating Sequence 1/1 Within Normal Limits.   Digit Span Forwards 6 Within Normal Limits.   Language   Repetition-1 1/1 Within Normal Limits.   Naming-2 2/2 Within Normal Limits.   Following written command 1/1 Within Normal Limits.   Writing a complete sentence 1/1 Within Normal Limits.   Naming-3 3/3 Within Normal Limits.   Repetition-2 1/2 Impairment: Moderate.   Abstraction 1/2 Impairment: Moderate.   15-Item BNT 14/15 Within Normal Limits.   Repetition of Phrases 5/5 Within Normal Limits.   Verbal Agility 6/6 Within Normal Limits.   SYDBAT - Semantic Association 30/30 Within Normal Limits.   Repeat & Point - Nonfluent 10/10 Within Normal Limits.   Repeat & Point - Semantics 10/10 Within Normal Limits.   Aggregate Scores   MMSE 26/30 MMSE Score suggestive of normal to questionable cognitive impairment.   MOCA 16/30 MOCA Score suggestive of moderate cognitive impairment.   Neuropsychiatric/Behavioral Focused  Evaluation Assessment   BEHAV5+ 2/6 See ROS section for a full description   Laboratories:     Lab Date Value [Reference]   Metabolic Screening           Ferritin 2022, Apr-07    75 [20.0 - 300.0 ng/mL]      Hemoglobin A1C External 03/14/2022  5.5 [4.0 - 5.6 %]      TSH 03/11/20202020, Mar-11    1.748 [0.400 - 4.000 uIU/mL]  1.058 [0.400 - 4.000 uIU/mL]      Iron 2022, Apr-07    74 [30 - 160 ug/dL]      Saturated Iron 2022, Apr-07    20 [20 - 50 %]      TIBC 2022, Apr-07    376 [250 - 450 ug/dL]      Transferrin 2022, Apr-07    254 [200 - 375 mg/dL]      Cholesterol 2022, Mar-14    226 (H) [120 - 199 mg/dL]      HDL 2022, Mar-14    67 [40 - 75 mg/dL]      Non-HDL Cholesterol 2022, Mar-14    159 [mg/dL]      Triglycerides 04/07/2022  80 [30 - 150 mg/dL]      Folate 04/07/2022  >40.0 (H) [4.0 - 24.0 ng/mL]      Vitamin B-12 2022, Apr-07    482 [210 - 950 pg/mL]           Neuroimaging:   No brain imaging is currently available for review.     Procedures:    Electrocardiogram on 2/18/2022   Formal interpretation:   Vent. Rate : 068 BPM     Atrial Rate : 068 BPM    P-R Int : 122 ms          QRS Dur : 070 ms     QT Int : 384 ms       P-R-T Axes : 050 010 192 degrees    QTc Int : 408 ms Normal sinus rhythm Possible Left atrial enlargement Septal infarct (cited on or before 11-SEP-2017) T wave abnormality, consider inferolateral ischemia Abnormal ECG   Independently reviewed Electrocardiogram by Tani Herrera MD. MPH. Behavioral Neurologist   Impression: : Received ECG has no evidence of sinus node disease. HR (>=50-60). Prolonged IN interval (>0.22 s). Broad QRS complex (> 0.12 s).     Clinical Summary:     Ms. Murry is a 81-year-old right-handed female with a relevant past medical history of Anxiety, HTN, HLD, OAB, psoriasis, SVT and atrial fibrillation, who presents reporting a 2-year history of gradually progressive neurocognitive impairment.       The clinical history is suggestive of:   Memory Impairment:  STM encoding impairment, LTM encoding-retrieval impairment, Amnesia of fixation   Attention Impairment: Attention, Sustained attention, Shifting attention   Executive Impairment: Energization, Working Memory   Language Impairment: Language Dysfunction   Visuospatial Impairment: Allocentric Spatial Processing   Motor/Coordination Impairment: Sensory motor integration, Motor weakness, Central pattern generators dysfunction   Behavior Impairment: Response Inhibition, Self-Preservation Dysregulation   Psychiatric Impairment: Neurovegetative, Social Coherence, Signal-Noise Dysregulation, Neuroleptic Sensitivity   Medical Review of Systems Impairment: Autonomic Dysfunction   iADL Impairment: [place] Instrumental Activities of Daily Living Scale  The neurological examination is significant for:   Cerebellar Dysfunction: gait imbalance (tandem, sensory ataxia)   Cortical Frontal Dysfunction: agrammatic aphasia (syntax comprehension)   Cortical Frontal-Parietal Disconnection: apraxia (limb-motor)   Cortical Temporal Dysfunction: anomic aphasia (spontaneous, confrontational)   Cortical Temporal-Parietal Dysfunction: logopenic aphasia (phrase phonological loop), dysgraphia   Cortical Transcallosal Disconnection: interhemispheric motor control (interhemispheric motor control ), motor efference (motor overflow)   Executive Impairment: thought disorder, judgment, dysexecutive behavior (utilization and/or imitation, perseverative/stereotyped, stimulus-bound)   Motor Coordination: gait imbalance (tiptoes)   Motor Dysfunction: muscle atrophy   Movement Disorder (Gait): strength (difficulty rising), abnormal features (abnormal posture, initiation/inhibition, stance, speed, stride/cycle, abnormal swing, difficulty turning), dorsiflexion weakness (heel walking), gait syndrome (rigid-akinetic)   Movement Disorder (Hyperkinetic): tremor (kinetic, postural)   Movement Disorder (Hypokinetic): parkinsonism (diminished  facial expression, diminished facial expression, tone, resting tremor, bradykinesia), dyskinesia (slowing, hypometria, dysrhythmia)   Movement Disorder (Ocular): abnormal ocular movement (pursuit, abnormal saccades)   Movement Disorder (Speech): abnormal vocal features (rate, timbre, articulation), AMR/Dysdiadochokinesia (consonants), dysarthria (hypokinetic)  Informal neuropsychology battery is positive (based on age and education) for:   Amnestic predominant multidomain MCI   Moderate Memory Impairment: encoding, recall.   Moderate Visuospatial Impairment: visuospatial construction.   Moderate Executive Impairment: She scored >2 standard deviations below the norm on at least one measure. She had difficulty with semantic fluency, lexical fluency, semantic fluency.   Mild Language Impairment: phonological loop, abstract, naming.   MMSE 26/30: MMSE Score suggestive of normal to questionable cognitive impairment.   MOCA 16/30: MOCA Score suggestive of moderate cognitive impairment.   BEHAV5+ 2/6: See ROS section for a full description  Neurological imaging   No imaging is available for secondary review        Assessment:        Ms. 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     Ms. Gonzalezs clinical syndrome is best described as Mild Cognitive Impairment (MCI-ADRC) (Fito MS, et al. 2011 Alzheimer's & Dementia).      Concern regarding an intraindividual change in cognition   Impairment in one or more cognitive domains   Preservation of independence in functional abilities.   Not demented     However patient does have features strongly suggestive of  early Lewy body disease namely idiopathic parkinsonism and a family history of Lewy body dementia diagnosed in a sibling.   At this time there are no significant observed  fluctuations in daily awareness nor are there vivid spontaneous hallucinations.  Patient does however have mild symptoms of dysautonomia, constipation, and prolonged insomnia.  The pathology underlying Ms. Murry's neurocognitive impairment is likely a mixture of pathologies (Alzheimer's Disease Related Pathology, Lewy body disease/alpha-synucleinopathy, Vascular Contributions to Cognitive Impairment and Dementia).     The observations made above, were discussed with the patient and her family. The the patient presents with her family reporting at minimum a 2 year history of progressive multi domain cognitive impairment  that has not yet definitively interfered with instrumental activities of daily living however her family has proactively taken over certain household responsibilities. her family report at that in addition she has developed a mild tremor over the last 6-12 months following the onset of her unspecified memory deficits. Neuropsychological evaluation does show amnestic predominant multi domain moderate cognitive impairment. Neurological examination is consistent with bilateral right greater than left parkinsonism. her family insists there are no significant daytime fluctuations in all arousal fixed false beliefs delusions or hallucinations at this time. The patient has a relevant her family history of Lewy body dementia in 1 brother. At this time the patient does not meet research criteria for Lewy body disease however multiple features strongly suggest this may develop in the future. We discussed this observation with the family. Recommend by the screening for reversible cause of cognitive impairment, EKG, MRI before recommending any medications. The patient reports longstanding insomnia for which she takes trazodone 25 mg cure. Given the sedating side effects  and possibility of worsening confusion in the setting of Lewy body disease strongly recommend switching trazodone to Belsomra. We have  prescribed a low-dose Belsomra.     AASM recommends just a few medications for insomnia. Triazolam, temazepam, zaleplon, zolpidem, and doxepin are not recommended in those over 65 and are all on Beers List published by the American Geriatric Society; ramelteon is only indicated for sleep-onset insomnia, often not the problem in memory disorders/dementia; no OTC medications are recommended for sleep; and trazodone does not separate from placebo on any metric (time to sleep onset, total sleep time, sleep quality, etc. Given patient's diagnosis of amnestic moderate cognitive impairment with strong possibility of early stages of Lewy body disease this is the most appropriate medication for patient's medical condition. Following basic workup and starting Belsomra will recommend the addition of such agents as Namenda or donepezil for memory impairment.        Care Management Plan:     #Diagnostic Workup:    Laboratories: Hcy, Free T4, TSH, B1, B9, B12, MMA, HIV Ab/Ag, RPR, D   Procedures: MRI brain without contrast - Dementia Protocol  #Neurocognitive Disorder Treatment:   Next appt may consider starting donepezil   Referral to social work for care planning  #Behavioral Disorder Treatment:   Next appt may consider starting Namenda   Continue lexapro 5 mg  #Insomnia Treatment:   D/C trazodone   We recommend a trial of Belsomra 5 mg qHS  #Microvascular Disease Management:   Continue simvastatin 10mg   Continue aspirin 81 mg  #Behavioral/Environmental Treatment   We recommend engaging in activities that stimulate cognitively and socially while avoiding excessive stimulation and fatigue in overwhelmingly complex situations.   We recommend integrating routine and schedule into your daily life. https://www.alzheimersproject.org/news/the-importance-of-routine-and-familiarity-to-persons-with-dementia/  #Health Maintenance/Lifestyle Advice   We have discussed the value in aggressively controlling vascular risk factors  "like hypertension, hyperlipidemia, and Diabetes SBP<130, LDL<100, A1C<7.0.   We discussed the need to optimize lifestyle choices including a heart-healthy diet (e.g., Mediterranean or DASH), increased cardiovascular exercise (goal 150 minutes of moderate-intensity per week), and stay cognitively and socially active.  #Support   We all need support sometimes. Get easy access to local resources, community programs, and services. https://www.communityresourcefinder.org/   Learn more about Cognitive Impairment in [place]: https://www.alz.org/professionals/public-health/state-overview/[place]  #Safety   [place] has no laws against driving with dementia specifically but obviously has laws about medical conditions which impact a person's ability to drive safely. If you believe your loved one's driving capacity has diminished, please reach out to either your primary care physician or our office to discuss driving restrictions or revocation of their license. To learn more: https://www.dementiacarecentral.com/caregiverinfo/driving-problems/   The Alzheimer's Association administers the nationwide "Safe Return" program with identification bracelets, necklaces, or clothing tags and 24-hour assistance. More information is available online at https://www.alz.org/help-support/caregiving/safety/medicalert-with-24-7-wandering-support  #Follow up:   Follow-up in 4 weeks (Jul 2022).    Thank you for allowing us to participate in the care of your patient. Please do not hesitate to contact us with any questions or concerns.     It was a pleasure seeing Ms. Murry and we look forward to seeing them at their follow-up visit.     This note is dictated on M*Modal Fluency Direct word recognition program. There are word recognition mistakes that are occasionally missed on review.      Scheduled Follow-up :  Future Appointments   Date Time Provider Department Center   6/29/2022 10:00 AM [facility] MRI1 500 LB LIMIT [facility] MRI Irlanda Chaudhry "       After Visit Medication List :     Medication List          Accurate as of June 8, 2022  5:04 PM. If you have any questions, ask your nurse or doctor.            START taking these medications    BELSOMRA 5 mg Tab  Generic drug: suvorexant  Take 1 tablet by mouth every evening.  Started by: Tani Cheatham MD        CHANGE how you take these medications    diltiaZEM 240 MG Cs24  Commonly known as: TIAZAC  TAKE 1 CAPSULE (240 MG TOTAL) BY MOUTH ONCE DAILY.  What changed:   · when to take this  · reasons to take this        CONTINUE taking these medications    dexlansoprazole 60 mg capsule  Commonly known as: DEXILANT     EScitalopram oxalate 5 MG Tab  Commonly known as: LEXAPRO  Take 1 tablet (5 mg total) by mouth once daily.     oxybutynin 5 MG Tab  Commonly known as: DITROPAN  TAKE 1 TABLET EVERY DAY     rivaroxaban 20 mg Tab  Commonly known as: XARELTO  Take 1 tablet (20 mg total) by mouth before evening meal.     simvastatin 10 MG tablet  Commonly known as: ZOCOR  TAKE 1 TABLET EVERY EVENING     traZODone 50 MG tablet  Commonly known as: DESYREL  TAKE 1 TABLET EVERY EVENING           Where to Get Your Medications      These medications were sent to [id number]  [place], [place] - [address]  [address] [place] [address]    Phone: [phone]   · BELSOMRA 5 mg Tab         Signing Physician:  Tani Cheatham MD    Billing:    -----------------------------------------------------------------------------    I spent a total of 100 minutes (time-in: 10:30 AM; time-out: 12:10 PM) on 06/08/2022, in-person face-to-face with the patient and caregiver(s), >50% of that time was spent counseling regarding the symptoms, treatment plan, risks, therapeutic options, lifestyle modifications, and/or safety issues for the diagnoses above.    10/14 Review of Systems completed and is negative except as stated above in HPI (Systems reviewed: Const, Eyes, ENT, Resp, CV, GI, , MSK, Skin, Neuro)    I  reviewed previous labs for a total of 5 minutes on 06/08/2022. This is directly related to the face-to-face encounter. Review of previous labs was performed all negative except as stated above in HPI    I performed a neurobehavioral status examination that included a clinical assessment of thinking, reasoning, and judgment. Please see above HPI and ROS for full details. This exam was performed on 06/08/2022 and included 12 minutes spent on direct face-to-face clinical observation and interview with the patient and 20 minutes spent interpreting test results and preparing the report. The total time of 32 minutes spent on the neurobehavioral status examination is not included in the time spent on evaluation and management coding.    I performed a neuropsychological evaluation that included the application of a series of standardized neurocognitive tests. Please see the informal neuropsychological assessment above for full details. This evaluation was performed on 06/08/2022 and included 14 minutes spent on direct face-to-face clinical standardized test administration with the patient and 18 minutes spent on interpreting standardized test results, integrating patient data into a treatment plan, and providing feedback to the patient and caregiver. The total time of 32 minutes spent on the neuropsychological evaluation is not included in the time spent on evaluation and management coding.    Total Billing time spent on encounter/documentation for this patient's evaluation and management, not including the neurobehavioral status examination and neuropsychological evaluation: 79 minutes.

## 2022-06-09 LAB — HIV 1+2 AB+HIV1 P24 AG SERPL QL IA: NEGATIVE

## 2022-06-10 LAB
TREPONEMA PALLIDUM IGG+IGM AB [PRESENCE] IN SERUM OR PLASMA BY IMMUNOASSAY: NONREACTIVE
VIT B12 SERPL-MCNC: 350 NG/L (ref 180–914)

## 2022-06-11 LAB — METHYLMALONATE SERPL-SCNC: 0.22 NMOL/ML

## 2022-06-14 LAB
METHYLMALONATE SERPL-SCNC: 0.27 UMOL/L
VIT B1 BLD-MCNC: 87 UG/L (ref 38–122)

## 2022-06-15 LAB — A-TOCOPHEROL VIT E SERPL-MCNC: 1970 UG/DL (ref 500–1800)

## 2022-06-29 ENCOUNTER — HOSPITAL ENCOUNTER (OUTPATIENT)
Dept: RADIOLOGY | Facility: HOSPITAL | Age: 82
Discharge: HOME | End: 2022-06-29
Attending: PSYCHIATRY & NEUROLOGY
Payer: MEDICARE

## 2022-06-29 DIAGNOSIS — G20.C PARKINSONISM, UNSPECIFIED PARKINSONISM TYPE: ICD-10-CM

## 2022-06-29 PROCEDURE — 70551 MRI BRAIN STEM W/O DYE: CPT | Mod: TC

## 2022-06-29 PROCEDURE — 70551 MRI BRAIN WITHOUT CONTRAST: ICD-10-PCS | Mod: 26,,, | Performed by: RADIOLOGY

## 2022-06-29 PROCEDURE — 70551 MRI BRAIN STEM W/O DYE: CPT | Mod: 26,,, | Performed by: RADIOLOGY

## 2022-07-05 ENCOUNTER — TELEPHONE (OUTPATIENT)
Dept: NEUROLOGY | Facility: CLINIC | Age: 82
End: 2022-07-05
Payer: MEDICARE

## 2022-07-05 NOTE — TELEPHONE ENCOUNTER
----- Message from Tani Cheatham MD sent at 6/29/2022  4:36 PM CDT -----  Shreyas Barrera,  Please schedule the patient for a video follow-up to discuss test results and care management.  Tani Vaughn

## 2022-07-05 NOTE — TELEPHONE ENCOUNTER
LVM for patient to contact the clinic to be scheduled for a follow up appointment to discuss test results. Left contact information on the voicemail.

## 2022-07-07 ENCOUNTER — TELEPHONE (OUTPATIENT)
Dept: NEUROLOGY | Facility: CLINIC | Age: 82
End: 2022-07-07
Payer: MEDICARE

## 2022-07-07 NOTE — TELEPHONE ENCOUNTER
----- Message from Johana Booker sent at 7/7/2022  9:39 AM CDT -----  Regarding: appt  Contact: Iliana @ [phone]  Caller returning missed call from 's office regarding scheduling a follow up appt, no available appts in epic. Please call.

## 2022-07-07 NOTE — TELEPHONE ENCOUNTER
Called and spoke with patient daughter Iliana and scheduled patient to see Dr. Cheatham virtually. Patient daughter accepted appointment.

## 2022-07-26 ENCOUNTER — OFFICE VISIT (OUTPATIENT)
Dept: NEUROLOGY | Facility: CLINIC | Age: 82
End: 2022-07-26
Payer: MEDICARE

## 2022-07-26 DIAGNOSIS — G31.9 POSTERIOR CORTICAL ATROPHY: ICD-10-CM

## 2022-07-26 DIAGNOSIS — E53.8 B12 DEFICIENCY: ICD-10-CM

## 2022-07-26 DIAGNOSIS — R06.83 SNORING: ICD-10-CM

## 2022-07-26 DIAGNOSIS — G31.84 MCI (MILD COGNITIVE IMPAIRMENT): Primary | ICD-10-CM

## 2022-07-26 DIAGNOSIS — G20.C PARKINSONISM, UNSPECIFIED PARKINSONISM TYPE: ICD-10-CM

## 2022-07-26 DIAGNOSIS — N18.30 STAGE 3 CHRONIC KIDNEY DISEASE, UNSPECIFIED WHETHER STAGE 3A OR 3B CKD: ICD-10-CM

## 2022-07-26 DIAGNOSIS — G47.01 INSOMNIA DUE TO MEDICAL CONDITION: ICD-10-CM

## 2022-07-26 PROCEDURE — 1160F RVW MEDS BY RX/DR IN RCRD: CPT | Mod: CPTII,95,, | Performed by: PSYCHIATRY & NEUROLOGY

## 2022-07-26 PROCEDURE — 1159F PR MEDICATION LIST DOCUMENTED IN MEDICAL RECORD: ICD-10-PCS | Mod: CPTII,95,, | Performed by: PSYCHIATRY & NEUROLOGY

## 2022-07-26 PROCEDURE — 96116 NUBHVL XM PHYS/QHP 1ST HR: CPT | Mod: 95,59,, | Performed by: PSYCHIATRY & NEUROLOGY

## 2022-07-26 PROCEDURE — 1160F PR REVIEW ALL MEDS BY PRESCRIBER/CLIN PHARMACIST DOCUMENTED: ICD-10-PCS | Mod: CPTII,95,, | Performed by: PSYCHIATRY & NEUROLOGY

## 2022-07-26 PROCEDURE — 1157F PR ADVANCE CARE PLAN OR EQUIV PRESENT IN MEDICAL RECORD: ICD-10-PCS | Mod: CPTII,95,, | Performed by: PSYCHIATRY & NEUROLOGY

## 2022-07-26 PROCEDURE — 99215 OFFICE O/P EST HI 40 MIN: CPT | Mod: 95,,, | Performed by: PSYCHIATRY & NEUROLOGY

## 2022-07-26 PROCEDURE — 1157F ADVNC CARE PLAN IN RCRD: CPT | Mod: CPTII,95,, | Performed by: PSYCHIATRY & NEUROLOGY

## 2022-07-26 PROCEDURE — 96116 PR NEUROBEHAVIORAL STATUS EXAM BY PSYCH/PHYS: ICD-10-PCS | Mod: 95,59,, | Performed by: PSYCHIATRY & NEUROLOGY

## 2022-07-26 PROCEDURE — 99215 PR OFFICE/OUTPT VISIT, EST, LEVL V, 40-54 MIN: ICD-10-PCS | Mod: 95,,, | Performed by: PSYCHIATRY & NEUROLOGY

## 2022-07-26 PROCEDURE — 1159F MED LIST DOCD IN RCRD: CPT | Mod: CPTII,95,, | Performed by: PSYCHIATRY & NEUROLOGY

## 2022-07-26 RX ORDER — DONEPEZIL HYDROCHLORIDE 5 MG/1
TABLET, FILM COATED ORAL
Qty: 30 TABLET | Refills: 0 | Status: SHIPPED | OUTPATIENT
Start: 2022-07-26 | End: 2022-08-29

## 2022-07-26 RX ORDER — TALC
3 POWDER (GRAM) TOPICAL NIGHTLY PRN
Qty: 30 TABLET | Refills: 1 | Status: SHIPPED | OUTPATIENT
Start: 2022-07-26 | End: 2022-09-20

## 2022-07-26 RX ORDER — ACETAMINOPHEN, DIPHENHYDRAMINE HCL, PHENYLEPHRINE HCL 325; 25; 5 MG/1; MG/1; MG/1
TABLET ORAL
Qty: 30 TABLET | Refills: 3 | Status: ON HOLD | OUTPATIENT
Start: 2022-07-26 | End: 2022-12-02 | Stop reason: HOSPADM

## 2022-07-26 NOTE — PROGRESS NOTES
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   She has no prosody (suprasegmental features) errors.   Her stress assessment showed no repetition errors in linguistically complex words, including multisyllabic words ("planetarium," "questionable," "accomplishment," "phonetic.   Ms. Gonzalezs speech is dysarthric.   She has hypokinetic dysarthria.    Comment: Slow rate, articulation impairment with reduced range   Ms. Murry showed evidence of anomia.   She showed evidence of anomia during spontaneous speech.   She showed evidence of anomia during confrontational naming.    Comment: 12/12 - correct chocolate bar, kangaroo, theater, Zoroastrianism, doctor, potato, battery, ice cube tray, thermometer, flower, bomb, and calendar.   She makes phonological loop errors.   She makes no errors during the repetition of gibberish words (e.g., "Supercalifragilisticexpialidocious," "Pigglywiggly," "Woospiedoo," "Zowzy," "Bazinga").   She makes errors during the repetition of complex meaningless phrases.    Comment: 'The horse raced past the barn fell.', 'The complex houses  and single soldiers and their families,' 'Wishes are hopping, and trees are west,' and 'Brushing liked to ashley barclay's direction'.   She can " comprehend commands that cross the midline (e.g., with your left thumb, touch your right ear).    Comment: 'with your left thumb touch your right ear'.   She has difficulty comprehending commands that depend on syntax.    Comment: 'point to the ceiling after you point to the floor'.   Her speech is grammatically intact; (no function/semantic word substitutions, phonemic/semantic paraphasias, or binary confusion).  Motor:    Assessment of motor strength was symmetric and at minimal anti-gravity.   There is no pronator or downward drift.  Coordination:    She has no bilateral upper extremity limb dysmetria or past pointing on finger-nose-finger bilaterally.   She has no limb dysdiadochokinesia of the upper extremity on the pronation/supination test and screwing in a light bulb or lower extremity during tapping ball of each foot bilaterally.   She has a visible tremor.   She has a kinetic tremor.   She has a postural tremor.    Comment: B/L   She has a resting tremor.    Comment: R>L   She has evidence of interhemispheric motor control deficits.   She demonstrates evidence of motor overflow.   Ms. Nguyen upper extremity fine motor coordination was abnormal.    Comment: R>L   Ms. Nguyen upper extremity fine motor coordination was not slow.    Comment: finger tapping, pronation/supination, and the open-close fist was slow.   Ms. Nguyen upper extremity fine motor coordination was not hypometric.    Comment: finger tapping, pronation/supination, and the open-close fist showed hypometria.   Ms. Nguyen upper extremity fine motor coordination was not dysrhythmic.    Comment: finger tapping, pronation/supination, and the open-close fist showed dysrhythmia.  Neuropsychological Evaluation Summary:     Prior Neurocognitive/Neuropsychological Evaluations   Summary from EMR:   2022-06-08:  o Amnestic predominant multidomain MCI  o Moderate Memory Impairment: encoding, recall.  o Moderate  Visuospatial Impairment: visuospatial construction.  o Moderate Executive Impairment: She scored >2 standard deviations below the norm on at least one measure. She had difficulty with semantic fluency, lexical fluency, semantic fluency.  o Mild Language Impairment: phonological loop, abstract, naming.  o MMSE 26/30: MMSE Score suggestive of normal to questionable cognitive impairment.  o MOCA 16/30: MOCA Score suggestive of moderate cognitive impairment.  o BEHAV5+ 2/6: See ROS section for a full description    Neurocognitive Evaluation completed on 07/26/2022:  Neuropsychiatric/Behavioral Focused Evaluation Assessment   BEHAV5+ 2/6 See ROS section for a full description   Laboratories:     Lab Date Value [Reference]   Metabolic Screening           Ferritin 2022, Apr-07 2022, Apr-07    75 [20.0 - 300.0 ng/mL]  75 [20.0 - 300.0 ng/mL]      Hemoglobin A1C External 06/08/2022  5.5 [4.0 - 5.6 %]      Methlymalonic Acid 06/08/2022  0.27      T4 Total 06/08/2022  8.6 [4.5 - 11.5 ug/dL]      TSH 03/11/2020  0.635 [0.400 - 4.000 uIU/mL]  1.748 [0.400 - 4.000 uIU/mL]  1.748 [0.400 - 4.000 uIU/mL]      Glucose 2022, Jun-08    88 [70 - 110 mg/dL]      Iron 2022, Apr-07 2022, Apr-07    74 [30 - 160 ug/dL]  74 [30 - 160 ug/dL]      Saturated Iron 2022, Apr-07 2022, Apr-07    20 [20 - 50 %]  20 [20 - 50 %]      TIBC 2022, Apr-07 2022, Apr-07    376 [250 - 450 ug/dL]  376 [250 - 450 ug/dL]      Transferrin 2022, Apr-07 2022, Apr-07    254 [200 - 375 mg/dL]  254 [200 - 375 mg/dL]      Albumin 2022, Matthew-08  2022, Mar-14    4.3 [3.5 - 5.2 g/dL]  3.9 [3.5 - 5.2 g/dL]      Alkaline Phosphatase 2022, Jun-08 2022, Mar-14    80 [55 - 135 U/L]  83 [55 - 135 U/L]      ALT 2022, Jun-08 2022, Mar-14    20 [10 - 44 U/L]  21 [10 - 44 U/L]      AST 2022, Jun-08 2022, Mar-14    17 [10 - 40 U/L]  20 [10 - 40 U/L]      BILIRUBIN TOTAL 2022, Jun-08 2022, Mar-14    0.6 [0.1 - 1.0 mg/dL]  0.6 [0.1 - 1.0 mg/dL]      PROTEIN TOTAL 2022,  Jun-08 2022, Mar-14    7.3 [6.0 - 8.4 g/dL]  6.9 [6.0 - 8.4 g/dL]      Cholesterol 2022, Mar-14    226 (H) [120 - 199 mg/dL]      HDL 2022, Mar-14    67 [40 - 75 mg/dL]      Non-HDL Cholesterol 2022, Mar-14    159 [mg/dL]      Triglycerides 06/08/2022  80 [30 - 150 mg/dL]      B12 Def. Methylmalonic Acid 06/08/2022  0.22      Folate 06/08/2022  15.6 [4.0 - 24.0 ng/mL]  >40.0 (H) [4.0 - 24.0 ng/mL]  >40.0 (H) [4.0 - 24.0 ng/mL]      Thiamine 06/08/2022  87 [38 - 122 ug/L]      Vitamin B-12 04/07/20222022, Apr-07    350 [180 - 914 ng/L]  482 [180 - 914 ng/L]  482 [210 - 950 pg/mL]      Vitamin E 06/08/2022  1970 (H) [500 - 1800 ug/dL]      Neuroendocrine/Electrolyte Screening   Magnesium 2022, Jun-08    2.0 [1.6 - 2.6 mg/dL]      BUN 2022, Jun-08    21 [8 - 23 mg/dL]      Chloride 2022, Jun-08    101 [95 - 110 mmol/L]      Creatinine 2022, Jun-08    0.9 [0.5 - 1.4 mg/dL]      Potassium 2022, Jun-08    3.5 [3.5 - 5.1 mmol/L]      Sodium 2022, Jun-08    140 [136 - 145 mmol/L]      Standard Hematology Screen   Hematocrit 2022, Jun-08    50.3 (H) [37.0 - 48.5 %]      Hemoglobin 2022, Jun-08    16.5 (H) [12.0 - 16.0 g/dL]      MCV 2022, Jun-08    94 [82 - 98 fL]      Platelets 06/08/2022  169 [150 - 450 K/uL]      Infectious Disease/Immunocompromised Screening   HIV 1/2 Ag/Ab 06/08/2022  Negative      Syphilis Treponemal Ab 2022, Jun-08    Nonreactive [Nonreactive]      Delirium Screening   Bacteria, UA 2021, Oct-01    Occasional [None-Occ /hpf]      Glucose, UA 2021, Oct-01    Negative      Ketones, UA 2021, Oct-01    Negative      Leukocytes, UA 2021, Oct-01    2+ (A)      NITRITE UA 2021, Oct-01    Positive (A)      Protein, UA 2021, Oct-01    2+ (A)      RBC, UA 2021, Oct-01    >100 (H) [0 - 4 /hpf]      WBC, UA 2021, Oct-01    >100 (H) [0 - 5 /hpf]           Neuroimaging:    MRI brain/head without contrast on 6/29/2022   Formal interpretation by Radiology:   Remote small area of infarction involving the postcentral  gyrus of the right parietal lobe. Small cutaneous lesion noted midline posterior vertex region, could be palpated at physical exam.   Independently reviewed radiological imaging by Tani Herrera MD. MPH. Behavioral Neurologist   T1: Mild-to-moderate generalized cortical atrophy dorsal greater than ventral posterior greater than frontal. moderate degree of posterior cortical atrophy with sulcal widening of the marginal and parietal occipital sulci with regional atrophy most well appreciated in the cuneus. generalized thinning of the corpus callosum. Midbrain is full. Bilateral hippocampi show only mild degree of atrophy.   T2/FLAIR: Mild degree of subcortical white matter disease with bilateral periventricular capping anterior and posterior with subtle hyperintensities most well appreciated in the right greater than left watershed territories with wispy stranding likely suggestive of wallerian degeneration. gliosis of the bilateral hippocampi with mild atrophy for age. Age indeterminate right dorsal motor cortex stroke   DWI/ADC: No Significant DWI hyperintensities/hypointensities. No ADC correlation.   SWI/GRE: No Significant hypointensities to suggest cortical/subcortical hemosiderin deposition.   Impression: : Generalized cortical atrophy with posterior parietal and occipital predominance suggestive of early signs of Alzheimer's disease/PCA possibly with a Lewy body.     Procedures:    Electrocardiogram on 2/18/2022   Formal interpretation:   Vent. Rate : 068 BPM     Atrial Rate : 068 BPM    P-R Int : 122 ms          QRS Dur : 070 ms     QT Int : 384 ms       P-R-T Axes : 050 010 192 degrees    QTc Int : 408 ms Normal sinus rhythm Possible Left atrial enlargement Septal infarct (cited on or before 11-SEP-2017) T wave abnormality, consider inferolateral ischemia Abnormal ECG   Independently reviewed Electrocardiogram by Tani Herrera MD. MPH. Behavioral Neurologist   Impression: : Received ECG  has no evidence of sinus node disease. HR (>=50-60). Prolonged AZ interval (>0.22 s). Broad QRS complex (> 0.12 s).     Clinical Summary:     Ms. Murry is a 81-year-old right-handed female with a relevant past medical history of Anxiety, HTN, HLD, OAB, psoriasis, SVT and atrial fibrillation, who presents reporting a 2-year history of gradually progressive neurocognitive impairment.       The clinical history is suggestive of:   Memory Impairment: STM encoding impairment, LTM encoding-retrieval impairment, Amnesia of fixation   Attention Impairment: Attention, Sustained attention, Shifting attention   Executive Impairment: Energization, Working Memory   Language Impairment: Language Dysfunction   Visuospatial Impairment: Allocentric Spatial Processing   Motor/Coordination Impairment: Sensory motor integration, Motor weakness, Central pattern generators dysfunction   Behavior Impairment: Response Inhibition, Self-Preservation Dysregulation   Psychiatric Impairment: Neurovegetative, Social Coherence, Signal-Noise Dysregulation, Neuroleptic Sensitivity   Medical Review of Systems Impairment: Autonomic Dysfunction   iADL Impairment: Anupama Instrumental Activities of Daily Living Scale  The neurological examination is significant for:   Cerebellar Dysfunction: gait imbalance (tandem, sensory ataxia)   Cortical Frontal Dysfunction: agrammatic aphasia (syntax comprehension)   Cortical Frontal-Parietal Disconnection: apraxia (limb-motor)   Cortical Temporal Dysfunction: anomic aphasia (spontaneous, confrontational)   Cortical Temporal-Parietal Dysfunction: logopenic aphasia (phrase phonological loop), dysgraphia   Cortical Transcallosal Disconnection: interhemispheric motor control (interhemispheric motor control ), motor efference (motor overflow)   Executive Impairment: thought disorder, judgment, dysexecutive behavior (utilization and/or imitation, perseverative/stereotyped, stimulus-bound)   Motor  Coordination: gait imbalance (tiptoes)   Motor Dysfunction: muscle atrophy   Movement Disorder (Gait): strength (difficulty rising), abnormal features (abnormal posture, initiation/inhibition, stance, speed, stride/cycle, abnormal swing, difficulty turning), dorsiflexion weakness (heel walking), gait syndrome (rigid-akinetic)   Movement Disorder (Hyperkinetic): tremor (kinetic, postural)   Movement Disorder (Hypokinetic): parkinsonism (diminished facial expression, diminished facial expression, tone, resting tremor, bradykinesia), dyskinesia (slowing, hypometria, dysrhythmia)   Movement Disorder (Ocular): abnormal ocular movement (pursuit, abnormal saccades)   Movement Disorder (Speech): abnormal vocal features (rate, timbre, articulation), AMR/Dysdiadochokinesia (consonants), dysarthria (hypokinetic)  Informal neuropsychology battery is positive (based on age and education) for:   Amnestic predominant multidomain MCI   BEHAV5+ 2/6: See ROS section for a full description  Neurological imaging   MRI brain/head without contrast (6/29/2022): Generalized cortical atrophy with posterior parietal and occipital predominance suggestive of early signs of Alzheimer's disease/PCA possibly with a Lewy body.        Assessment:        Ms. 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    Ms. Gonzalezs clinical presentation meets the criteria for Mild Cognitive Impairment (MCI-ADRC) (Fito EDWARDS, et al. 2011 Alzheimer's & Dementia).      Concern regarding an intraindividual change in cognition   Impairment in one or more cognitive domains   Preservation of independence in functional abilities.   Not demented     Ms. Murry's clinical presentation does not meet criteria for any specific neurodegenerative syndrome. The patient  does have features suggestive of prodromal Lewy body disease namely idiopathic parkinsonism and a family history of Lewy body dementia diagnosed in a sibling alongside mild symptoms of dysautonomia, constipation, and prolonged insomnia.  At this time there are no significant observed fluctuations in daily awareness nor are there vivid spontaneous hallucinations.  Brain imaging does show notable posterior cortical atrophy likely suggesting clinical presentation will evolve into a PCA+ syndrome.     The pathology underlying Ms. Murry's neurocognitive impairment is likely a mixture of pathologies (Alzheimer's Disease Related Pathology, Lewy body disease/alpha-synucleinopathy, Vascular Contributions to Cognitive Impairment and Dementia).        The observations made above, were discussed with the patient and her family. The patient continues to take melatonin 5 mg. Recommend decreasing to 3 mg given and starting Belsomra for nocturnal fluctuations and insomnia. Recommend increasing her caregiver awareness of day-to-day life as the patient has been living alone. Recommend starting donepezil 5 mg and titrating upwards. Recommend starting B12 supplement given borderline low B12. Otherwise the patient is doing relatively well and is currently exhibiting only symptoms suggestive of mild cognitive impairment/ prodrome of dementia. We discussed that patient's clinical syndrome does not yet meet research criteria for Lewy body disease or posterior cortical atrophy however has early symptoms of both. Recommend surveillance and watching for evolution as time moves on.        Care Management Plan:     #Neurocognitive Disorder Treatment:   Start donepezil 5mg and titrate up to 10mg   F/U social work for care planning   Next appt may consider starting Namenda   Next appt may consider addition of Sinemet    Continue lexapro 5 mg  #Insomnia Treatment:   Decrease melatonin to 3mg with 6 week cycles   We recommend a trial of  "Belsomra 5 mg qHS  #Microvascular Disease Management:   Continue simvastatin 10mg   Continue aspirin 81 mg  #Behavioral/Environmental Treatment   We recommend engaging in activities that stimulate cognitively and socially while avoiding excessive stimulation and fatigue in overwhelmingly complex situations.   We recommend integrating routine and schedule into your daily life. https://www.alzheimersproject.org/news/the-importance-of-routine-and-familiarity-to-persons-with-dementia/  #Health Maintenance/Lifestyle Advice   We have discussed the value in aggressively controlling vascular risk factors like hypertension, hyperlipidemia, and Diabetes SBP<130, LDL<100, A1C<7.0.   We discussed the need to optimize lifestyle choices including a heart-healthy diet (e.g., Mediterranean or DASH), increased cardiovascular exercise (goal 150 minutes of moderate-intensity per week), and stay cognitively and socially active.  #Support   We all need support sometimes. Get easy access to local resources, community programs, and services. https://www.communityresourcefinder.org/   Learn more about Cognitive Impairment in [place]: https://www.alz.org/professionals/public-health/state-overview/[place]  #Safety   [place] has no laws against driving with dementia specifically but obviously has laws about medical conditions which impact a person's ability to drive safely. If you believe your loved one's driving capacity has diminished, please reach out to either your primary care physician or our office to discuss driving restrictions or revocation of their license. To learn more: https://www.dementiacarecentral.com/caregiverinfo/driving-problems/   The Alzheimer's Association administers the nationwide "Safe Return" program with identification bracelets, necklaces, or clothing tags and 24-hour assistance. More information is available online at " https://www.alz.org/help-support/caregiving/safety/medicalert-with-24-7-wandering-support  #Follow up:   Follow-up in 24 weeks (Jan 2023).    Thank you for allowing us to participate in the care of your patient. Please do not hesitate to contact us with any questions or concerns.     It was a pleasure seeing Ms. Murry and we look forward to seeing them at their follow-up visit.     This note is dictated on M*Modal Fluency Direct word recognition program. There are word recognition mistakes that are occasionally missed on review.    Scheduled Follow-up :  No future appointments.    After Visit Medication List :     Medication List          Accurate as of July 26, 2022  2:38 PM. If you have any questions, ask your nurse or doctor.            START taking these medications    donepeziL 5 MG tablet  Commonly known as: ARICEPT  Take 1/2 tablet (2.5 mg) orally once a day at bedtime for 2 weeks, them 1 tablet for 2 weeks. On Week 4, switch to 10 mg prescription and take 1 tablet (10mg) orally once a day  Started by: Tani Cheatham MD     melatonin 3 mg tablet  Commonly known as: MELATIN  Take 1 tablet (3 mg total) by mouth nightly as needed for Insomnia.  Started by: Tani Cheatham MD        CHANGE how you take these medications    diltiaZEM 240 MG Cs24  Commonly known as: TIAZAC  TAKE 1 CAPSULE (240 MG TOTAL) BY MOUTH ONCE DAILY.  What changed:   · when to take this  · reasons to take this        CONTINUE taking these medications    BELSOMRA 5 mg Tab  Generic drug: suvorexant  Take 1 tablet by mouth every evening.     EScitalopram oxalate 5 MG Tab  Commonly known as: LEXAPRO  Take 1 tablet (5 mg total) by mouth once daily.     rivaroxaban 20 mg Tab  Commonly known as: XARELTO  Take 1 tablet (20 mg total) by mouth before evening meal.     simvastatin 10 MG tablet  Commonly known as: ZOCOR  TAKE 1 TABLET EVERY EVENING           Where to Get Your Medications      These medications were sent to [facility]  (Now [facility]) - [place], [place] - [address]  [address] [facility] [address]    Phone: [phone]   · donepeziL 5 MG tablet  · melatonin 3 mg tablet         Signing Physician:  Tani Cheatham MD    Billing:  -----------------------------------------------------------------------------    I performed this consultation using real-time Telehealth tools, including a live video connection between my location and the patient's location. Prior to initiating the consultation, I obtained informed verbal consent to perform this consultation using Telehealth tools and answered all the questions about the Telehealth interaction. The participants understand that only a limited neurological exam and limited neuropsychological testing can be performed using Telehealth tools.    I spent a total of 30 minutes (time-in: 14:00 PM; time-out: 14:30 PM) on 07/26/2022, in-person face-to-face with the patient and caregiver(s), >50% of that time was spent counseling regarding the symptoms, treatment plan, risks, therapeutic options, lifestyle modifications, and/or safety issues for the diagnoses above.    10/14 Review of Systems completed and is negative except as stated above in HPI (Systems reviewed: Const, Eyes, ENT, Resp, CV, GI, , MSK, Skin, Neuro)    I reviewed previous labs for a total of 5 minutes on 07/26/2022. This is directly related to the face-to-face encounter. Review of previous labs was performed all negative except as stated above in HPI    I reviewed previous diagnostic testing for a total of 5 minutes on 07/26/2022. This is directly related to the face-to-face encounter. A review of previous diagnostic testing was performed was noted to be within normal limits except as is stated above in HPI    I independently reviewed radiological imaging, specimen, and tracing for a total of 15 minutes on 07/26/2022. This is directly related to the face-to-face encounter. Independent review  of radiological imaging, specimen, and tracing was noted to be within normal limits except as stated above in HPI    I performed a neurobehavioral status examination that included a clinical assessment of thinking, reasoning, and judgment. Please see above HPI and ROS for full details. This exam was performed on 07/26/2022 and included 13 minutes spent on direct face-to-face clinical observation and interview with the patient and 22 minutes spent interpreting test results and preparing the report. The total time of 35 minutes spent on the neurobehavioral status examination is not included in the time spent on evaluation and management coding.    Total Billing time spent on encounter/documentation for this patient's evaluation and management, not including the neurobehavioral status examination: 42 minutes.

## 2022-07-30 RX ORDER — DONEPEZIL HYDROCHLORIDE 5 MG/1
TABLET, FILM COATED ORAL
Qty: 30 TABLET | Refills: 0 | Status: CANCELLED | OUTPATIENT
Start: 2022-07-30

## 2022-08-29 ENCOUNTER — TELEPHONE (OUTPATIENT)
Dept: NEUROLOGY | Facility: CLINIC | Age: 82
End: 2022-08-29

## 2022-08-29 ENCOUNTER — TELEPHONE (OUTPATIENT)
Dept: NEUROLOGY | Facility: CLINIC | Age: 82
End: 2022-08-29
Payer: MEDICARE

## 2022-08-29 DIAGNOSIS — G47.01 INSOMNIA DUE TO MEDICAL CONDITION: Primary | ICD-10-CM

## 2022-08-29 RX ORDER — SUVOREXANT 5 MG/1
1 TABLET, FILM COATED ORAL NIGHTLY
Qty: 30 TABLET | Refills: 2 | Status: ON HOLD | OUTPATIENT
Start: 2022-08-29 | End: 2022-12-02 | Stop reason: HOSPADM

## 2022-08-29 RX ORDER — DONEPEZIL HYDROCHLORIDE 10 MG/1
10 TABLET, FILM COATED ORAL NIGHTLY
Qty: 30 TABLET | Refills: 11 | Status: SHIPPED | OUTPATIENT
Start: 2022-08-29 | End: 2022-11-30 | Stop reason: DRUGHIGH

## 2022-08-29 RX ORDER — DONEPEZIL HYDROCHLORIDE 10 MG/1
TABLET, FILM COATED ORAL
Qty: 30 TABLET | Refills: 6 | Status: CANCELLED | OUTPATIENT
Start: 2022-08-29

## 2022-08-29 NOTE — TELEPHONE ENCOUNTER
Refill request received by [facility] and routed to Dr. Cheatham.

## 2022-08-29 NOTE — TELEPHONE ENCOUNTER
Received fax from [facility] pharmacy that patient is requesting that her Belsomra be filled there.  Request sent to Dr. Cheahtam.

## 2022-09-06 ENCOUNTER — OFFICE VISIT (OUTPATIENT)
Dept: INTERNAL MEDICINE | Facility: CLINIC | Age: 82
End: 2022-09-06
Payer: MEDICARE

## 2022-09-06 ENCOUNTER — LAB VISIT (OUTPATIENT)
Dept: LAB | Facility: HOSPITAL | Age: 82
End: 2022-09-06
Attending: INTERNAL MEDICINE
Payer: MEDICARE

## 2022-09-06 VITALS
WEIGHT: 110.25 LBS | HEIGHT: 63 IN | DIASTOLIC BLOOD PRESSURE: 66 MMHG | BODY MASS INDEX: 19.54 KG/M2 | OXYGEN SATURATION: 95 % | HEART RATE: 93 BPM | SYSTOLIC BLOOD PRESSURE: 110 MMHG

## 2022-09-06 DIAGNOSIS — R31.9 HEMATURIA, UNSPECIFIED TYPE: Primary | ICD-10-CM

## 2022-09-06 DIAGNOSIS — R31.0 GROSS HEMATURIA: ICD-10-CM

## 2022-09-06 DIAGNOSIS — R31.9 HEMATURIA, UNSPECIFIED TYPE: ICD-10-CM

## 2022-09-06 LAB
BILIRUB UR QL STRIP: ABNORMAL
CLARITY UR: ABNORMAL
COLOR UR: ABNORMAL
GLUCOSE UR QL STRIP: ABNORMAL
HGB UR QL STRIP: ABNORMAL
KETONES UR QL STRIP: ABNORMAL
LEUKOCYTE ESTERASE UR QL STRIP: ABNORMAL
NITRITE UR QL STRIP: ABNORMAL
PH UR STRIP: ABNORMAL [PH] (ref 5–8)
PROT UR QL STRIP: ABNORMAL
SP GR UR STRIP: ABNORMAL (ref 1–1.03)
URN SPEC COLLECT METH UR: ABNORMAL
UROBILINOGEN UR STRIP-ACNC: ABNORMAL EU/DL

## 2022-09-06 PROCEDURE — 1101F PT FALLS ASSESS-DOCD LE1/YR: CPT | Mod: CPTII,S$GLB,, | Performed by: INTERNAL MEDICINE

## 2022-09-06 PROCEDURE — 87088 URINE BACTERIA CULTURE: CPT | Performed by: INTERNAL MEDICINE

## 2022-09-06 PROCEDURE — 1160F PR REVIEW ALL MEDS BY PRESCRIBER/CLIN PHARMACIST DOCUMENTED: ICD-10-PCS | Mod: CPTII,S$GLB,, | Performed by: INTERNAL MEDICINE

## 2022-09-06 PROCEDURE — 3074F SYST BP LT 130 MM HG: CPT | Mod: CPTII,S$GLB,, | Performed by: INTERNAL MEDICINE

## 2022-09-06 PROCEDURE — 3078F PR MOST RECENT DIASTOLIC BLOOD PRESSURE < 80 MM HG: ICD-10-PCS | Mod: CPTII,S$GLB,, | Performed by: INTERNAL MEDICINE

## 2022-09-06 PROCEDURE — 87077 CULTURE AEROBIC IDENTIFY: CPT | Performed by: INTERNAL MEDICINE

## 2022-09-06 PROCEDURE — 1159F MED LIST DOCD IN RCRD: CPT | Mod: CPTII,S$GLB,, | Performed by: INTERNAL MEDICINE

## 2022-09-06 PROCEDURE — 1157F ADVNC CARE PLAN IN RCRD: CPT | Mod: CPTII,S$GLB,, | Performed by: INTERNAL MEDICINE

## 2022-09-06 PROCEDURE — 3074F PR MOST RECENT SYSTOLIC BLOOD PRESSURE < 130 MM HG: ICD-10-PCS | Mod: CPTII,S$GLB,, | Performed by: INTERNAL MEDICINE

## 2022-09-06 PROCEDURE — 3288F PR FALLS RISK ASSESSMENT DOCUMENTED: ICD-10-PCS | Mod: CPTII,S$GLB,, | Performed by: INTERNAL MEDICINE

## 2022-09-06 PROCEDURE — 3078F DIAST BP <80 MM HG: CPT | Mod: CPTII,S$GLB,, | Performed by: INTERNAL MEDICINE

## 2022-09-06 PROCEDURE — 1157F PR ADVANCE CARE PLAN OR EQUIV PRESENT IN MEDICAL RECORD: ICD-10-PCS | Mod: CPTII,S$GLB,, | Performed by: INTERNAL MEDICINE

## 2022-09-06 PROCEDURE — 99214 OFFICE O/P EST MOD 30 MIN: CPT | Mod: S$GLB,,, | Performed by: INTERNAL MEDICINE

## 2022-09-06 PROCEDURE — 3288F FALL RISK ASSESSMENT DOCD: CPT | Mod: CPTII,S$GLB,, | Performed by: INTERNAL MEDICINE

## 2022-09-06 PROCEDURE — 81000 URINALYSIS NONAUTO W/SCOPE: CPT | Mod: PO | Performed by: INTERNAL MEDICINE

## 2022-09-06 PROCEDURE — 1101F PR PT FALLS ASSESS DOC 0-1 FALLS W/OUT INJ PAST YR: ICD-10-PCS | Mod: CPTII,S$GLB,, | Performed by: INTERNAL MEDICINE

## 2022-09-06 PROCEDURE — 1126F PR PAIN SEVERITY QUANTIFIED, NO PAIN PRESENT: ICD-10-PCS | Mod: CPTII,S$GLB,, | Performed by: INTERNAL MEDICINE

## 2022-09-06 PROCEDURE — 99214 PR OFFICE/OUTPT VISIT, EST, LEVL IV, 30-39 MIN: ICD-10-PCS | Mod: S$GLB,,, | Performed by: INTERNAL MEDICINE

## 2022-09-06 PROCEDURE — 87186 SC STD MICRODIL/AGAR DIL: CPT | Performed by: INTERNAL MEDICINE

## 2022-09-06 PROCEDURE — 1160F RVW MEDS BY RX/DR IN RCRD: CPT | Mod: CPTII,S$GLB,, | Performed by: INTERNAL MEDICINE

## 2022-09-06 PROCEDURE — 99999 PR PBB SHADOW E&M-EST. PATIENT-LVL III: CPT | Mod: PBBFAC,,, | Performed by: INTERNAL MEDICINE

## 2022-09-06 PROCEDURE — 87086 URINE CULTURE/COLONY COUNT: CPT | Performed by: INTERNAL MEDICINE

## 2022-09-06 PROCEDURE — 99999 PR PBB SHADOW E&M-EST. PATIENT-LVL III: ICD-10-PCS | Mod: PBBFAC,,, | Performed by: INTERNAL MEDICINE

## 2022-09-06 PROCEDURE — 1126F AMNT PAIN NOTED NONE PRSNT: CPT | Mod: CPTII,S$GLB,, | Performed by: INTERNAL MEDICINE

## 2022-09-06 PROCEDURE — 1159F PR MEDICATION LIST DOCUMENTED IN MEDICAL RECORD: ICD-10-PCS | Mod: CPTII,S$GLB,, | Performed by: INTERNAL MEDICINE

## 2022-09-06 NOTE — PROGRESS NOTES
"  On xarelto     Assessment:       1. Hematuria, unspecified type    2. Gross hematuria          Plan:         Tigist was seen today for hematuria.    Diagnoses and all orders for this visit:    Hematuria, unspecified type  -     Urine culture; Future  -     Urinalysis; Future    Gross hematuria  -     Ambulatory referral/consult to Urology; Future          Subjective:       Patient ID: Tigist Murry is a 81 y.o. female.    Chief Complaint: Hematuria    Brought by friend for hematuria today, hematuira has occurred since 1yr      Hematuria    Onset was 1  year prior to presentation     Duration/duration/timing in stream    Associated  Denies any assoicated symptoms     Denies   abdominal pain, dysuria, flank pain, and trauma    no recent abnormal bleeding otherwise   She is on blood thinner      Otherwise rhonda Blood clots, hematospermia    Seen by urology in 10/2021 , had +UTI and no further work u p      Tobacco Use: Low Risk     Smoking Tobacco Use: Never    Smokeless Tobacco Use: Never                     HPI    Review of Systems   All other systems reviewed and are negative.          Health Maintenance Due   Topic Date Due    Shingles Vaccine (1 of 2) Never done    Mammogram  08/04/2021    COVID-19 Vaccine (4 - Booster for Pfizer series) 02/08/2022    Influenza Vaccine (1) 09/01/2022       Objective:     /66 (BP Location: Right arm, Patient Position: Sitting, BP Method: Medium (Manual))   Pulse 93   Ht 5' 2.5" (1.588 m)   Wt 50 kg (110 lb 3.7 oz)   LMP  (LMP Unknown)   SpO2 95%   BMI 19.84 kg/m²     Vitals 9/6/2022 6/8/2022 5/31/2022 4/7/2022 4/1/2022   Height 62.5 - 63 63 62.5   Weight (lbs) 110.23 111 110.23 110.67 111   BMI (kg/m2) 19.8 - 19.5 19.6 20            Physical Exam  Nursing note reviewed.   Constitutional:       General: She is not in acute distress.     Appearance: Normal appearance. She is not ill-appearing, toxic-appearing or diaphoretic.   HENT:      Head: Normocephalic.   Eyes: "      Conjunctiva/sclera: Conjunctivae normal.   Pulmonary:      Effort: Pulmonary effort is normal. No respiratory distress.   Abdominal:      General: There is no distension.      Palpations: There is no mass.      Tenderness: There is no abdominal tenderness. There is no right CVA tenderness, left CVA tenderness, guarding or rebound.      Hernia: No hernia is present.   Neurological:      General: No focal deficit present.      Mental Status: She is alert and oriented to person, place, and time.   Psychiatric:         Mood and Affect: Mood normal.         Behavior: Behavior normal.         Thought Content: Thought content normal.         Judgment: Judgment normal.           Future Appointments   Date Time Provider Department Center   9/6/2022  2:[facility] [place]   10/4/2022  8:45 AM Rachel Abreu MD [facility] UROLOGY [facility]         Medication List with Changes/Refills   Current Medications    CYANOCOBALAMIN, VITAMIN B-12, 5,000 MCG SUBL    Place one under tongue once a day for 1 month, then continue to take under tongue per week    DILTIAZEM (TIAZAC) 240 MG CS24    TAKE 1 CAPSULE (240 MG TOTAL) BY MOUTH ONCE DAILY.    DONEPEZIL (ARICEPT) 10 MG TABLET    Take 1 tablet (10 mg total) by mouth every evening.    ESCITALOPRAM OXALATE (LEXAPRO) 5 MG TAB    Take 1 tablet (5 mg total) by mouth once daily.    MELATONIN (MELATIN) 3 MG TABLET    Take 1 tablet (3 mg total) by mouth nightly as needed for Insomnia.    RIVAROXABAN (XARELTO) 20 MG TAB    Take 1 tablet (20 mg total) by mouth before evening meal.    SIMVASTATIN (ZOCOR) 10 MG TABLET    TAKE 1 TABLET EVERY EVENING    SUVOREXANT (BELSOMRA) 5 MG TAB    Take 1 tablet by mouth every evening.         Disclaimer:  This note has been generated using voice-recognition software. There may be grammatical or spelling errors that have been missed during proof-reading

## 2022-09-07 ENCOUNTER — PATIENT MESSAGE (OUTPATIENT)
Dept: INTERNAL MEDICINE | Facility: CLINIC | Age: 82
End: 2022-09-07
Payer: MEDICARE

## 2022-09-07 ENCOUNTER — TELEPHONE (OUTPATIENT)
Dept: INTERNAL MEDICINE | Facility: CLINIC | Age: 82
End: 2022-09-07
Payer: MEDICARE

## 2022-09-07 DIAGNOSIS — N30.01 ACUTE CYSTITIS WITH HEMATURIA: Primary | ICD-10-CM

## 2022-09-07 LAB
BACTERIA #/AREA URNS AUTO: ABNORMAL /HPF
MICROSCOPIC COMMENT: ABNORMAL
RBC #/AREA URNS AUTO: >100 /HPF (ref 0–4)
WBC #/AREA URNS AUTO: 2 /HPF (ref 0–5)

## 2022-09-07 RX ORDER — NITROFURANTOIN 25; 75 MG/1; MG/1
100 CAPSULE ORAL 2 TIMES DAILY
Qty: 10 CAPSULE | Refills: 0 | Status: SHIPPED | OUTPATIENT
Start: 2022-09-07 | End: 2022-09-12

## 2022-09-07 NOTE — TELEPHONE ENCOUNTER
----- Message from Samanta Adams sent at 9/7/2022 11:02 AM CDT -----  Contact: [phone]  Type:  Needs Medical Advice    Who Called: Pt  Reason for call: regarding her results from her Urine culture on Yesterday and regarding if the Dr is calling in any medications to help  Would the patient rather a call back or a response via [facility]? Call back  Best Call Back Number: [phone]

## 2022-09-07 NOTE — TELEPHONE ENCOUNTER
Lab Results   Component Value Date    LABURIN (A) 09/06/2022     GRAM NEGATIVE CARMINE  50,000 - 99,999 cfu/ml  Identification and susceptibility pending             BMP  Lab Results   Component Value Date     06/08/2022    K 3.5 06/08/2022     06/08/2022    CO2 26 06/08/2022    BUN 21 06/08/2022    CREATININE 0.9 06/08/2022    CALCIUM 10.4 06/08/2022    ANIONGAP 13 06/08/2022    ESTGFRAFRICA >60.0 06/08/2022    EGFRNONAA 59.7 (A) 06/08/2022       Review of patient's allergies indicates:   Allergen Reactions    Ciprofloxacin      Other reaction(s): Nausea         Diagnoses and all orders for this visit:    Acute cystitis with hematuria  -     nitrofurantoin, macrocrystal-monohydrate, (MACROBID) 100 MG capsule; Take 1 capsule (100 mg total) by mouth 2 (two) times daily. for 5 days        Future Appointments   Date Time Provider Department Center   10/4/2022  8:45 AM Rachel Abreu [place] [facility]     Attemtped to call patient with urine resutls  Will send macrobid given Ucx resutls

## 2022-09-08 ENCOUNTER — PATIENT MESSAGE (OUTPATIENT)
Dept: INTERNAL MEDICINE | Facility: CLINIC | Age: 82
End: 2022-09-08
Payer: MEDICARE

## 2022-09-09 LAB — BACTERIA UR CULT: ABNORMAL

## 2022-09-12 ENCOUNTER — LAB VISIT (OUTPATIENT)
Dept: LAB | Facility: HOSPITAL | Age: 82
End: 2022-09-12
Attending: UROLOGY
Payer: MEDICARE

## 2022-09-12 ENCOUNTER — OFFICE VISIT (OUTPATIENT)
Dept: UROLOGY | Facility: CLINIC | Age: 82
End: 2022-09-12
Payer: MEDICARE

## 2022-09-12 VITALS
HEART RATE: 90 BPM | HEIGHT: 63 IN | SYSTOLIC BLOOD PRESSURE: 102 MMHG | DIASTOLIC BLOOD PRESSURE: 69 MMHG | BODY MASS INDEX: 18.47 KG/M2 | WEIGHT: 104.25 LBS

## 2022-09-12 DIAGNOSIS — N39.41 URGENCY INCONTINENCE: ICD-10-CM

## 2022-09-12 DIAGNOSIS — R31.0 GROSS HEMATURIA: ICD-10-CM

## 2022-09-12 DIAGNOSIS — R31.9 URINARY TRACT INFECTION WITH HEMATURIA, SITE UNSPECIFIED: Primary | ICD-10-CM

## 2022-09-12 DIAGNOSIS — N39.0 URINARY TRACT INFECTION WITH HEMATURIA, SITE UNSPECIFIED: Primary | ICD-10-CM

## 2022-09-12 LAB
CREAT SERPL-MCNC: 1 MG/DL (ref 0.5–1.4)
EST. GFR  (NO RACE VARIABLE): 56 ML/MIN/1.73 M^2

## 2022-09-12 PROCEDURE — 3074F SYST BP LT 130 MM HG: CPT | Mod: CPTII,S$GLB,, | Performed by: UROLOGY

## 2022-09-12 PROCEDURE — 99205 OFFICE O/P NEW HI 60 MIN: CPT | Mod: S$GLB,,, | Performed by: UROLOGY

## 2022-09-12 PROCEDURE — 99205 PR OFFICE/OUTPT VISIT, NEW, LEVL V, 60-74 MIN: ICD-10-PCS | Mod: S$GLB,,, | Performed by: UROLOGY

## 2022-09-12 PROCEDURE — 3074F PR MOST RECENT SYSTOLIC BLOOD PRESSURE < 130 MM HG: ICD-10-PCS | Mod: CPTII,S$GLB,, | Performed by: UROLOGY

## 2022-09-12 PROCEDURE — 3288F FALL RISK ASSESSMENT DOCD: CPT | Mod: CPTII,S$GLB,, | Performed by: UROLOGY

## 2022-09-12 PROCEDURE — 99499 UNLISTED E&M SERVICE: CPT | Mod: HCNC,S$GLB,, | Performed by: UROLOGY

## 2022-09-12 PROCEDURE — 3288F PR FALLS RISK ASSESSMENT DOCUMENTED: ICD-10-PCS | Mod: CPTII,S$GLB,, | Performed by: UROLOGY

## 2022-09-12 PROCEDURE — 1160F PR REVIEW ALL MEDS BY PRESCRIBER/CLIN PHARMACIST DOCUMENTED: ICD-10-PCS | Mod: CPTII,S$GLB,, | Performed by: UROLOGY

## 2022-09-12 PROCEDURE — 1159F PR MEDICATION LIST DOCUMENTED IN MEDICAL RECORD: ICD-10-PCS | Mod: CPTII,S$GLB,, | Performed by: UROLOGY

## 2022-09-12 PROCEDURE — 1126F PR PAIN SEVERITY QUANTIFIED, NO PAIN PRESENT: ICD-10-PCS | Mod: CPTII,S$GLB,, | Performed by: UROLOGY

## 2022-09-12 PROCEDURE — 1101F PT FALLS ASSESS-DOCD LE1/YR: CPT | Mod: CPTII,S$GLB,, | Performed by: UROLOGY

## 2022-09-12 PROCEDURE — 3078F PR MOST RECENT DIASTOLIC BLOOD PRESSURE < 80 MM HG: ICD-10-PCS | Mod: CPTII,S$GLB,, | Performed by: UROLOGY

## 2022-09-12 PROCEDURE — 82565 ASSAY OF CREATININE: CPT | Performed by: UROLOGY

## 2022-09-12 PROCEDURE — 36415 COLL VENOUS BLD VENIPUNCTURE: CPT | Performed by: UROLOGY

## 2022-09-12 PROCEDURE — 99999 PR PBB SHADOW E&M-EST. PATIENT-LVL III: ICD-10-PCS | Mod: PBBFAC,,, | Performed by: UROLOGY

## 2022-09-12 PROCEDURE — 1101F PR PT FALLS ASSESS DOC 0-1 FALLS W/OUT INJ PAST YR: ICD-10-PCS | Mod: CPTII,S$GLB,, | Performed by: UROLOGY

## 2022-09-12 PROCEDURE — 1157F PR ADVANCE CARE PLAN OR EQUIV PRESENT IN MEDICAL RECORD: ICD-10-PCS | Mod: CPTII,S$GLB,, | Performed by: UROLOGY

## 2022-09-12 PROCEDURE — 1126F AMNT PAIN NOTED NONE PRSNT: CPT | Mod: CPTII,S$GLB,, | Performed by: UROLOGY

## 2022-09-12 PROCEDURE — 3078F DIAST BP <80 MM HG: CPT | Mod: CPTII,S$GLB,, | Performed by: UROLOGY

## 2022-09-12 PROCEDURE — 1157F ADVNC CARE PLAN IN RCRD: CPT | Mod: CPTII,S$GLB,, | Performed by: UROLOGY

## 2022-09-12 PROCEDURE — 99999 PR PBB SHADOW E&M-EST. PATIENT-LVL III: CPT | Mod: PBBFAC,,, | Performed by: UROLOGY

## 2022-09-12 PROCEDURE — 1159F MED LIST DOCD IN RCRD: CPT | Mod: CPTII,S$GLB,, | Performed by: UROLOGY

## 2022-09-12 PROCEDURE — 1160F RVW MEDS BY RX/DR IN RCRD: CPT | Mod: CPTII,S$GLB,, | Performed by: UROLOGY

## 2022-09-12 RX ORDER — NITROFURANTOIN 25; 75 MG/1; MG/1
100 CAPSULE ORAL 2 TIMES DAILY
Qty: 10 CAPSULE | Refills: 0 | Status: SHIPPED | OUTPATIENT
Start: 2022-09-12 | End: 2022-09-17

## 2022-09-12 NOTE — PROGRESS NOTES
[place] - Urology   Clinic Note    SUBJECTIVE:     Chief Complaint:  Gross hematuria    Referral from: Mark Saleh III, MD.    History of Present Illness:  Tigist Murry is a 82 y.o. female who presents to clinic for evaluation of gross hematuria.    Patient states that she has had hematuria once or twice this month.  She has previously been seen for gross hematuria which has occurred at the time of urinary tract infection.  Urinary tract infection was present at this time with a culture growing 50-04120 colonies of E coli.  She has not been treated.  She has had 1 or 2 infections per year for the past few years.  She previously saw Dr. Parish who ordered a CT scan stone protocol with no significant findings.  A cystoscopy was now performed.  She does take Eliquis for AFib.    Additionally she had been taking oxybutynin but was discontinued due to her dementia.  She does have bothersome urgency, urge incontinence, and frequency.    Patient endorses no additional complaints at this time.    Anticoagulation/Antiplatelets:  Yes eliquis    Past Medical History:   Diagnosis Date    Anticoagulant long-term use     Anxiety     Arthritis     Atrial fibrillation     Carpal tunnel syndrome on right     Diverticulosis     Encounter for blood transfusion     HTN (hypertension)     Hyperlipidemia     Osteopenia     Overactive bladder     Palpitations     Patellar tendonitis 4/13    saw [place] bone     Psoriasis     S/P partial hysterectomy     Situational depression     Supraventricular tachycardia        Past Surgical History:   Procedure Laterality Date    ABDOMINAL SURGERY      BREAST BIOPSY Left     excisional    CARPAL TUNNEL RELEASE      HYSTERECTOMY      @27yrs of age    KNEE ARTHROSCOPY W/ DEBRIDEMENT  '05    Right    OOPHORECTOMY      @27yrs of age    PARTIAL HYSTERECTOMY         Family History   Problem Relation Age of Onset    Heart failure Mother 80    No Known Problems Sister     No Known Problems  Brother     No Known Problems Daughter     No Known Problems Daughter     No Known Problems Daughter        Social History     Tobacco Use    Smoking status: Never    Smokeless tobacco: Never   Substance Use Topics    Alcohol use: No    Drug use: No       Current Outpatient Medications on File Prior to Visit   Medication Sig Dispense Refill    diltiaZEM (TIAZAC) 240 MG Cs24 TAKE 1 CAPSULE (240 MG TOTAL) BY MOUTH ONCE DAILY. (Patient taking differently: Take 240 mg by mouth daily as needed.) 90 capsule 3    EScitalopram oxalate (LEXAPRO) 5 MG Tab Take 1 tablet (5 mg total) by mouth once daily. 90 tablet 3    melatonin (MELATIN) 3 mg tablet Take 1 tablet (3 mg total) by mouth nightly as needed for Insomnia. 30 tablet 1    rivaroxaban (XARELTO) 20 mg Tab Take 1 tablet (20 mg total) by mouth before evening meal. 90 tablet 3    simvastatin (ZOCOR) 10 MG tablet TAKE 1 TABLET EVERY EVENING 90 tablet 3    suvorexant (BELSOMRA) 5 mg Tab Take 1 tablet by mouth every evening. 30 tablet 2    cyanocobalamin, vitamin B-12, 5,000 mcg Subl Place one under tongue once a day for 1 month, then continue to take under tongue per week (Patient not taking: Reported on 9/12/2022) 30 tablet 3    donepeziL (ARICEPT) 10 MG tablet Take 1 tablet (10 mg total) by mouth every evening. 30 tablet 11    [DISCONTINUED] nitrofurantoin, macrocrystal-monohydrate, (MACROBID) 100 MG capsule Take 1 capsule (100 mg total) by mouth 2 (two) times daily. for 5 days (Patient not taking: Reported on 9/12/2022) 10 capsule 0     No current facility-administered medications on file prior to visit.       Review of patient's allergies indicates:   Allergen Reactions    Ciprofloxacin      Other reaction(s): Nausea       Review of Systems:  A review of 10+ systems was conducted with pertinent positive and negative findings documented in HPI with all other systems reviewed and negative.    OBJECTIVE:     Estimated body mass index is 18.47 kg/m² as calculated from the  "following:    Height as of this encounter: 5' 3" (1.6 m).    Weight as of this encounter: 47.3 kg (104 lb 4.4 oz).    Vital Signs (Most Recent)  Vitals:    09/12/22 1412   BP: 102/69   Pulse: 90       Physical Exam:  GENERAL: patient sitting comfortably  HEENT: normocephalic  NECK: supple, no JVD  PULM: normal chest rise, no increased WOB  HEART: non-diaphoretic  ABDO: soft, nondistended, nontender  BACK: no CVA tenderness bilaterally  SKIN: warm, dry, well perfused  EXT: no bruising or edema  NEURO: grossly normal with no focal deficits  PSYCH: appropriate mood and affect    Genitourinary Exam:  deferred    Lab Results   Component Value Date    BUN 21 06/08/2022    CREATININE 0.9 06/08/2022    WBC 4.71 06/08/2022    HGB 16.5 (H) 06/08/2022    HCT 50.3 (H) 06/08/2022     06/08/2022    AST 17 06/08/2022    ALT 20 06/08/2022    ALKPHOS 80 06/08/2022    ALBUMIN 4.3 06/08/2022    HGBA1C 5.5 01/28/2021    INR 1.2 06/01/2015        Urinalysis:  Urinalysis not performed today.    Imaging:  No recent imaging to review    No results found for this or any previous visit (from the past 2160 hour(s)).  Results for orders placed or performed during the hospital encounter of 06/29/22 (from the past 2160 hour(s))   MRI Brain Without Contrast    Impression    No acute intracranial process seen.    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    Small cutaneous lesion noted midline posterior vertex region, could be palpated at physical exam.      Electronically signed by: Sandra Linn MD  Date:    06/29/2022  Time:    11:40         ASSESSMENT     1. Urinary tract infection with hematuria, site unspecified    2. Gross hematuria    3. Urgency incontinence        PLAN:     Urinary tract infection     - we will treat her with Macrobid 100 mg b.i.d. x5 days    2. Gross hematuria    - She has gross hematuria.    I counseled the patient on the AUA Guidelines for a hematuria " workup. An evaluation is recommend on all patients with gross hematuria. The goal of upper tract imaging in patients is to identify malignancies of the renal parenchyma and upper tract urothelium, as well as to identify actionable non-malignant diagnoses of the kidney, collecting system, and ureters. The choice of imaging modality involves tradeoffs between diagnostic accuracy versus risk. The evaluation also involves a flexible cystoscopy performed in the clinic. A urine cytology may also be sent at the time of cystoscopy.    - Plan for flexible cystoscopy, urine cytology, and CT urogram.      3. Urgency incontinence    - we will initiate her Mirabegron 25 mg daily    Roby rC MD  Urology  [facility] [place]

## 2022-09-16 ENCOUNTER — HOSPITAL ENCOUNTER (OUTPATIENT)
Dept: RADIOLOGY | Facility: HOSPITAL | Age: 82
Discharge: HOME | End: 2022-09-16
Attending: UROLOGY
Payer: MEDICARE

## 2022-09-16 DIAGNOSIS — R31.0 GROSS HEMATURIA: ICD-10-CM

## 2022-09-16 PROCEDURE — 25500020 PHARM REV CODE 255: Performed by: UROLOGY

## 2022-09-16 PROCEDURE — 74178 CT ABD&PLV WO CNTR FLWD CNTR: CPT | Mod: TC

## 2022-09-16 PROCEDURE — 74178 CT ABD&PLV WO CNTR FLWD CNTR: CPT | Mod: 26,,, | Performed by: RADIOLOGY

## 2022-09-16 PROCEDURE — 74178 CT UROGRAM ABD PELVIS W WO: ICD-10-PCS | Mod: 26,,, | Performed by: RADIOLOGY

## 2022-09-16 RX ADMIN — IOHEXOL 150 ML: 350 INJECTION, SOLUTION INTRAVENOUS at 02:09

## 2022-09-21 ENCOUNTER — PROCEDURE VISIT (OUTPATIENT)
Dept: UROLOGY | Facility: CLINIC | Age: 82
End: 2022-09-21
Payer: MEDICARE

## 2022-09-21 VITALS
HEART RATE: 83 BPM | DIASTOLIC BLOOD PRESSURE: 65 MMHG | BODY MASS INDEX: 18.77 KG/M2 | HEIGHT: 63 IN | WEIGHT: 105.94 LBS | SYSTOLIC BLOOD PRESSURE: 109 MMHG

## 2022-09-21 DIAGNOSIS — R31.0 GROSS HEMATURIA: ICD-10-CM

## 2022-09-21 DIAGNOSIS — N39.41 URGENCY INCONTINENCE: Primary | ICD-10-CM

## 2022-09-21 PROCEDURE — 88112 CYTOPATH CELL ENHANCE TECH: CPT | Mod: 26,,, | Performed by: PATHOLOGY

## 2022-09-21 PROCEDURE — 52000 PR CYSTOURETHROSCOPY: ICD-10-PCS | Mod: ,,, | Performed by: UROLOGY

## 2022-09-21 PROCEDURE — 88112 PR  CYTOPATH, CELL ENHANCE TECH: ICD-10-PCS | Mod: 26,,, | Performed by: PATHOLOGY

## 2022-09-21 PROCEDURE — 88112 CYTOPATH CELL ENHANCE TECH: CPT | Performed by: PATHOLOGY

## 2022-09-21 PROCEDURE — 52000 CYSTOURETHROSCOPY: CPT | Mod: ,,, | Performed by: UROLOGY

## 2022-09-21 NOTE — PROCEDURES
Cystoscopy Procedure Note    Procedure:   Flexible cysto-uretheroscopy    Pre Procedure Diagnosis:  gross hematuria    Post Procedure Diagnosis:  Same    Surgeon: Roby Cr MD     Anesthesia: 2% uro-jet lidocaine jelly for local analgesia    Procedure note:  A flexible cysto-urethroscopy was performed after consent was obtained.  The risks and benefits were explained. 2% lidocaine urojet was used for local analgesia. The genitalia was prepped and draped in the sterile fashion.     The flexible scope was advanced into the urethra and into the bladder. The bilateral ureteral orifices were identified in their normal orthotopic locations and bilateral ureteral efflux was identified. The bladder was completely surveyed in a systematic fashion. No bladder tumors or lesions suspicious for malignancy were seen. A bladder barbotage was obtained and a urine specimen for cytology was sent.    As the flexible cystoscope was being removed, the urethra was evaluated and noted to be normal.    The patient tolerated the procedure well without complication.     Findings in summary:  Normal cystoscopy    Assessment: 82 y.o. female with gross hematuria with a normal cystoscopy today    Plan:  Follow up cytology  Follow up 1 year    Roby Cr MD  Urology  [facility] [place]

## 2022-09-21 NOTE — PROGRESS NOTES
[place] - Urology   Clinic Note    SUBJECTIVE:     Chief Complaint: gross hematuria    Referral from: Roby Cr MD.    History of Present Illness:  Tigist Murry is a 82 y.o. female who presents to clinic for gross hematuria.    Patient has not had any episodes of gross hematuria since she last saw me.  Overall is doing well.  She started Mirabegron and so far her incontinence episodes has improved.  No other complaints complaints regarding hematuria or incontinence.    Patient endorses no additional complaints at this time.    Past Medical History:   Diagnosis Date    Anticoagulant long-term use     Anxiety     Arthritis     Atrial fibrillation     Carpal tunnel syndrome on right     Diverticulosis     Encounter for blood transfusion     HTN (hypertension)     Hyperlipidemia     Osteopenia     Overactive bladder     Palpitations     Patellar tendonitis 4/13    saw [place] bone     Psoriasis     S/P partial hysterectomy     Situational depression     Supraventricular tachycardia        Past Surgical History:   Procedure Laterality Date    ABDOMINAL SURGERY      BREAST BIOPSY Left     excisional    CARPAL TUNNEL RELEASE      HYSTERECTOMY      @27yrs of age    KNEE ARTHROSCOPY W/ DEBRIDEMENT  '05    Right    OOPHORECTOMY      @27yrs of age    PARTIAL HYSTERECTOMY         Family History   Problem Relation Age of Onset    Heart failure Mother 80    No Known Problems Sister     No Known Problems Brother     No Known Problems Daughter     No Known Problems Daughter     No Known Problems Daughter        Social History     Tobacco Use    Smoking status: Never    Smokeless tobacco: Never   Substance Use Topics    Alcohol use: No    Drug use: No       Current Outpatient Medications on File Prior to Visit   Medication Sig Dispense Refill    donepeziL (ARICEPT) 10 MG tablet Take 1 tablet (10 mg total) by mouth every evening. 30 tablet 11    EScitalopram oxalate (LEXAPRO) 5 MG Tab Take 1 tablet (5 mg total) by  "mouth once daily. 90 tablet 3    melatonin (MELATIN) 3 mg tablet Take 1 tablet (3 mg total) by mouth nightly. 90 tablet 3    mirabegron (MYRBETRIQ) 25 mg Tb24 ER tablet Take 1 tablet (25 mg total) by mouth once daily. 30 tablet 11    rivaroxaban (XARELTO) 20 mg Tab Take 1 tablet (20 mg total) by mouth before evening meal. 90 tablet 3    simvastatin (ZOCOR) 10 MG tablet TAKE 1 TABLET EVERY EVENING 90 tablet 3    suvorexant (BELSOMRA) 5 mg Tab Take 1 tablet by mouth every evening. 30 tablet 2    cyanocobalamin, vitamin B-12, 5,000 mcg Subl Place one under tongue once a day for 1 month, then continue to take under tongue per week 30 tablet 3    diltiaZEM (TIAZAC) 240 MG Cs24 TAKE 1 CAPSULE (240 MG TOTAL) BY MOUTH ONCE DAILY. (Patient taking differently: Take 240 mg by mouth daily as needed.) 90 capsule 3     No current facility-administered medications on file prior to visit.       Review of patient's allergies indicates:   Allergen Reactions    Ciprofloxacin      Other reaction(s): Nausea       Review of Systems:  A review of 10+ systems was conducted with pertinent positive and negative findings documented in HPI with all other systems reviewed and negative.    OBJECTIVE:     Estimated body mass index is 18.76 kg/m² as calculated from the following:    Height as of this encounter: 5' 3" (1.6 m).    Weight as of this encounter: 48.1 kg (105 lb 14.9 oz).    Vital Signs (Most Recent)  Vitals:    09/21/22 1319   BP: 109/65   Pulse: 83       Physical Exam:  GENERAL: patient sitting comfortably  HEENT: normocephalic  NECK: supple, no JVD  PULM: normal chest rise, no increased WOB  HEART: non-diaphoretic  ABDO: soft, nondistended, nontender  BACK: no CVA tenderness bilaterally  SKIN: warm, dry, well perfused  EXT: no bruising or edema  NEURO: grossly normal with no focal deficits  PSYCH: appropriate mood and affect    Genitourinary Exam:  deferred    Lab Results   Component Value Date    BUN 21 06/08/2022    CREATININE 1.0 " 09/12/2022    WBC 4.71 06/08/2022    HGB 16.5 (H) 06/08/2022    HCT 50.3 (H) 06/08/2022     06/08/2022    AST 17 06/08/2022    ALT 20 06/08/2022    ALKPHOS 80 06/08/2022    ALBUMIN 4.3 06/08/2022    HGBA1C 5.5 01/28/2021    INR 1.2 06/01/2015          ASSESSMENT     1. Urgency incontinence    2. Gross hematuria        PLAN:     Urgency incontinence     - patient can continue Mirabegron 25 mg.  She can titrate up to 50 mg if needed.  Will see her back in 1 year    2. Gross hematuria    - no obvious cause.  It is likely that she is on a blood thinner and may have hematuria when she has an infection.  If she does have hematuria I recommended she contact her PCP to order a urine culture.  If hematuria persists for the next year we can rediscuss repeating the workup based on shared decision making.    Roby Cr MD  Urology  [facility]    Disclaimer: This note has been generated using voice-recognition software. There may be typographical errors that have been missed during proof-reading.

## 2022-09-21 NOTE — Clinical Note
Hello,  Thank you for this referral. Her hematuria workup was negative. I started Myrbetriq for urge incontinence.   Let me know if you have questions

## 2022-09-22 LAB
FINAL PATHOLOGIC DIAGNOSIS: NORMAL
Lab: NORMAL

## 2022-10-09 DIAGNOSIS — R41.3 MEMORY LOSS: ICD-10-CM

## 2022-10-09 RX ORDER — ESCITALOPRAM OXALATE 5 MG/1
5 TABLET ORAL DAILY
Qty: 90 TABLET | Refills: 2 | Status: SHIPPED | OUTPATIENT
Start: 2022-10-09 | End: 2023-03-09

## 2022-10-09 NOTE — TELEPHONE ENCOUNTER
No new care gaps identified.  [facility] Embedded Care Gaps. Reference number: [id number]. 10/09/2022   2:41:41 AM CARRIET

## 2022-10-09 NOTE — TELEPHONE ENCOUNTER
Refill Decision Note   Tigist Murry  is requesting a refill authorization.  Brief Assessment and Rationale for Refill:  Approve     Medication Therapy Plan:       Medication Reconciliation Completed: No   Comments:     No Care Gaps recommended.     Note composed:3:17 PM 10/09/2022

## 2022-10-19 ENCOUNTER — PATIENT OUTREACH (OUTPATIENT)
Dept: ADMINISTRATIVE | Facility: CLINIC | Age: 82
End: 2022-10-19
Payer: MEDICARE

## 2022-10-19 ENCOUNTER — EXTERNAL HOSPITAL ADMISSION (OUTPATIENT)
Dept: ADMINISTRATIVE | Facility: CLINIC | Age: 82
End: 2022-10-19
Payer: MEDICARE

## 2022-10-19 DIAGNOSIS — I48.92 ATRIAL FIBRILLATION AND FLUTTER: ICD-10-CM

## 2022-10-19 DIAGNOSIS — M62.82 NON-TRAUMATIC RHABDOMYOLYSIS: Primary | ICD-10-CM

## 2022-10-19 DIAGNOSIS — R55 SYNCOPE AND COLLAPSE: ICD-10-CM

## 2022-10-19 DIAGNOSIS — I48.91 ATRIAL FIBRILLATION AND FLUTTER: ICD-10-CM

## 2022-10-19 NOTE — PROGRESS NOTES
C3 nurse spoke with Tigist Murry's daughter, Iliana for a TCC post hospital discharge follow up call. The patient does not have a scheduled HOSFU appointment with Gwen Porter MD within 5-7 days post hospital discharge date 10/18/22. C3 nurse was unable to schedule HOSFU appointment in [facility].    Message sent to PCP staff requesting they contact patient and schedule follow up appointment.      Per daughter's request, referral placed for NP home visit hospfu.

## 2022-10-20 ENCOUNTER — PES CALL (OUTPATIENT)
Dept: ADMINISTRATIVE | Facility: CLINIC | Age: 82
End: 2022-10-20
Payer: MEDICARE

## 2022-10-25 ENCOUNTER — PATIENT MESSAGE (OUTPATIENT)
Dept: UROLOGY | Facility: CLINIC | Age: 82
End: 2022-10-25
Payer: MEDICARE

## 2022-10-25 DIAGNOSIS — R39.15 URINARY URGENCY: Primary | ICD-10-CM

## 2022-10-25 RX ORDER — NITROFURANTOIN 25; 75 MG/1; MG/1
100 CAPSULE ORAL 2 TIMES DAILY
Qty: 10 CAPSULE | Refills: 0 | Status: SHIPPED | OUTPATIENT
Start: 2022-10-25 | End: 2022-10-30

## 2022-10-26 ENCOUNTER — OFFICE VISIT (OUTPATIENT)
Dept: HOME HEALTH SERVICES | Facility: CLINIC | Age: 82
End: 2022-10-26
Payer: MEDICARE

## 2022-10-26 VITALS
SYSTOLIC BLOOD PRESSURE: 120 MMHG | DIASTOLIC BLOOD PRESSURE: 68 MMHG | TEMPERATURE: 98 F | RESPIRATION RATE: 16 BRPM | HEART RATE: 88 BPM | OXYGEN SATURATION: 97 %

## 2022-10-26 DIAGNOSIS — Z79.01 LONG TERM CURRENT USE OF ANTICOAGULANT: ICD-10-CM

## 2022-10-26 DIAGNOSIS — R55 SYNCOPE AND COLLAPSE: ICD-10-CM

## 2022-10-26 DIAGNOSIS — R41.3 MEMORY IMPAIRMENT: ICD-10-CM

## 2022-10-26 DIAGNOSIS — I48.0 PAF (PAROXYSMAL ATRIAL FIBRILLATION): ICD-10-CM

## 2022-10-26 DIAGNOSIS — M62.82 NON-TRAUMATIC RHABDOMYOLYSIS: ICD-10-CM

## 2022-10-26 DIAGNOSIS — R53.1 WEAKNESS: Primary | ICD-10-CM

## 2022-10-26 PROCEDURE — 1159F PR MEDICATION LIST DOCUMENTED IN MEDICAL RECORD: ICD-10-PCS | Mod: CPTII,S$GLB,, | Performed by: NURSE PRACTITIONER

## 2022-10-26 PROCEDURE — 1126F PR PAIN SEVERITY QUANTIFIED, NO PAIN PRESENT: ICD-10-PCS | Mod: CPTII,S$GLB,, | Performed by: NURSE PRACTITIONER

## 2022-10-26 PROCEDURE — 1126F AMNT PAIN NOTED NONE PRSNT: CPT | Mod: CPTII,S$GLB,, | Performed by: NURSE PRACTITIONER

## 2022-10-26 PROCEDURE — 3078F PR MOST RECENT DIASTOLIC BLOOD PRESSURE < 80 MM HG: ICD-10-PCS | Mod: CPTII,S$GLB,, | Performed by: NURSE PRACTITIONER

## 2022-10-26 PROCEDURE — 1160F PR REVIEW ALL MEDS BY PRESCRIBER/CLIN PHARMACIST DOCUMENTED: ICD-10-PCS | Mod: CPTII,S$GLB,, | Performed by: NURSE PRACTITIONER

## 2022-10-26 PROCEDURE — 1159F MED LIST DOCD IN RCRD: CPT | Mod: CPTII,S$GLB,, | Performed by: NURSE PRACTITIONER

## 2022-10-26 PROCEDURE — 3078F DIAST BP <80 MM HG: CPT | Mod: CPTII,S$GLB,, | Performed by: NURSE PRACTITIONER

## 2022-10-26 PROCEDURE — 1157F ADVNC CARE PLAN IN RCRD: CPT | Mod: CPTII,S$GLB,, | Performed by: NURSE PRACTITIONER

## 2022-10-26 PROCEDURE — 1100F PTFALLS ASSESS-DOCD GE2>/YR: CPT | Mod: CPTII,S$GLB,, | Performed by: NURSE PRACTITIONER

## 2022-10-26 PROCEDURE — 99495 TCM SERVICES (MODERATE COMPLEXITY): ICD-10-PCS | Mod: S$GLB,,, | Performed by: NURSE PRACTITIONER

## 2022-10-26 PROCEDURE — 1100F PR PT FALLS ASSESS DOC 2+ FALLS/FALL W/INJURY/YR: ICD-10-PCS | Mod: CPTII,S$GLB,, | Performed by: NURSE PRACTITIONER

## 2022-10-26 PROCEDURE — 3288F PR FALLS RISK ASSESSMENT DOCUMENTED: ICD-10-PCS | Mod: CPTII,S$GLB,, | Performed by: NURSE PRACTITIONER

## 2022-10-26 PROCEDURE — 3074F SYST BP LT 130 MM HG: CPT | Mod: CPTII,S$GLB,, | Performed by: NURSE PRACTITIONER

## 2022-10-26 PROCEDURE — 1157F PR ADVANCE CARE PLAN OR EQUIV PRESENT IN MEDICAL RECORD: ICD-10-PCS | Mod: CPTII,S$GLB,, | Performed by: NURSE PRACTITIONER

## 2022-10-26 PROCEDURE — 99495 TRANSJ CARE MGMT MOD F2F 14D: CPT | Mod: S$GLB,,, | Performed by: NURSE PRACTITIONER

## 2022-10-26 PROCEDURE — 3288F FALL RISK ASSESSMENT DOCD: CPT | Mod: CPTII,S$GLB,, | Performed by: NURSE PRACTITIONER

## 2022-10-26 PROCEDURE — 1160F RVW MEDS BY RX/DR IN RCRD: CPT | Mod: CPTII,S$GLB,, | Performed by: NURSE PRACTITIONER

## 2022-10-26 PROCEDURE — 3074F PR MOST RECENT SYSTOLIC BLOOD PRESSURE < 130 MM HG: ICD-10-PCS | Mod: CPTII,S$GLB,, | Performed by: NURSE PRACTITIONER

## 2022-10-26 NOTE — PROGRESS NOTES
[facility]  Transition of Care Home Visit    Visit Date: 10/26/2022  Encounter Provider: Sveta Pitts   PCP:  Gwen Porter MD    PRESENTING HISTORY      Patient ID: Tigist Murry is a 82 y.o. female.    Consult Requested By:  Karrie Draper  Reason for Consult:  Hospital Follow Up.    Tigist is being seen at home due to being seen at home due to physical debility that presents a taxing effort to leave the home, to mitigate high risk of hospital readmission and/or due to the limited availability of reliable or safe options for transportation to the point of access to the provider. Prior to treatment on this visit the chart was reviewed and patient verbal consent was obtained.      Chief Complaint: Transitional Care        History of Present Illness: Ms. Tigist Murry is a 82 y.o. female who was recently admitted to the hospital.    Admit: 9/28/22   Discharge: 10/18/22  Hospital Course:   Tigist Murry is a 82 y.o. female with PMH of paroxysmal A fib (on Xarelto), SVT, HTN, anxiety, dementia, and AVNRT s/p ablation (2015) who presented to the ED on 9/28 after being found down for an unknown period of time. She was found to have A flutter to HR 140s and she also sustained a left scalp hematoma and R occipital lacteration. Pt was admitted for rhabdomyolysis, mild ARS, syncope, head injury, and generalized weakness. Pt denied any preceding symptoms, besides some possible lightheadedness earlier that day. Negative CT head for acute findings. Pt was evaluated by Cardiology, Neurology, and EP while inpatient, as it was felt that syncope could be due to arrhythmia. However, due to underlying dementia, there was uncertainty surrounding the exact etiology of the event, as pt may have had a mechanical fall. Cardizem was increased, and rhabdo and RAS improved with IVF. Pt had a negative work up, with WNL facial/maxillary CT, Echo, and MRI brain. Pt slowly improved and continued to work well with therapy.  "She was deemed an appropriate candidate for post-acute services, and pt was transferred to inpatient [facility]. Pt was deemed stable for discharge to  SNF on 10/5. She is to follow up with cardiology outpatient for event monitor, as well as follow up with Neurology and PCP on discharge.     Consults: Neurology, Cardiology, EP     Significant Diagnostic Studies: imaging, labs     Treatments: IVF, anticoagulation, PT/OT    SNF Admit: 10/5/22    Discharge: 10/18/22    ____Hospital Course: Per Dr. Blanton's H and P:  "Tigist Murry is a 82 y.o. female with PMH of paroxysmal A fib (on Xarelto), SVT, HTN, anxiety, dementia, and AVNRT s/p ablation (2015) who presented to the ED on 9/28 after being found down for an unknown period of time. She was found to have A flutter to HR 140s and she also sustained a left scalp hematoma and R occipital lacteration. Pt was admitted for rhabdomyolysis, mild RAS, syncope, head injury, and generalized weakness. Pt denied any preceding symptoms, besides some possible lightheadedness earlier that day. Negative CT head for acute findings. Pt was evaluated by Cardiology, Neurology, and EP while inpatient, as it was felt that syncope could be due to arrhythmia. However, due to underlying dementia, there was uncertainty surrounding the exact etiology of the event, as pt may have had a mechanical fall. Cardizem was increased, and rhabdo and RAS improved with IVF. Pt slowly improved and continued to work well with therapy. "    Consults: neurology, cardiology    Significant Diagnostic Studies: Labs and other diagnostic studies    Treatments: Physical therapy and occupational therapy_____________________________________________________________    Today:    HPI:      Tigist is being seen and examined at home today for transitional care visit in the home environment post-discharge from inpatient hospitalization encounter described above. Patient presents at baseline state of health as reported by " "patient and caregiver.     Tigist is found at home today with private sitter and daughter, Iliana (in from [place]) today. Patient is now with 24/7 caregivers since being home from SNF following her fall. Her memory is impaired slightly but she is very attentive today and participates in the exam. She does not recall her fall at home on 9/28/22. She is ambulatory and able to toilet herself and dress herself. Family understands the importance of patient not being home alone anymore as they all live out of town but check on her regularly. Patient complains of "soreness" in her lower back since being in the hospital. Iliana is requesting HH OT be reordered as they came out initially and signed off. She feels patient needs OT and sitters need to observe OT being done so they can help patient better. Family has taken patient off of aricept, doesn't feel it helps. At end of visit patient walks down the block with her sitter, gait is steady.     VSS. Denies fever, chest pain, shortness of breath, nausea, vomiting, diarrhea. Denies any acute issues, concerns or complaints to address on today's visit. Reports taking all medications as prescribed. No other needs identified at this time       Review of Systems   Constitutional: Negative for fatigue.  HENT: Negative for sore throat.    Eyes: Negative for visual disturbance.   Respiratory: Negative for cough and shortness of breath.    Cardiovascular: Negative for chest pain.   Gastrointestinal: Negative for abdominal pain, constipation, diarrhea, nausea and vomiting.   Genitourinary: Negative for dysuria.   Musculoskeletal: Positive for some generalized soreness in the lower back.  Skin: Negative for rash.   Neurological: Negative for headaches.   Hematological: Negative for adenopathy.   Psychiatric/Behavioral: The patient is not nervous/anxious.Positive for confusion at times.         Assessments:  Environmental: lives in her own home alone but now has private sitters " "24/7.  Functional Status: needs assistance with IADLs, most ADLs she can perform herself but needs supervision  Safety: fall risk  Nutritional: has adequate access, reports "good" appetite  Home Health/DME/Supplies: [facility]. DME: [facility]ator    PAST HISTORY:     Past Medical History:   Diagnosis Date    Anticoagulant long-term use     Anxiety     Arthritis     Atrial fibrillation     Carpal tunnel syndrome on right     Diverticulosis     Encounter for blood transfusion     HTN (hypertension)     Hyperlipidemia     Osteopenia     Overactive bladder     Palpitations     Patellar tendonitis 4/13    saw [place] bone     Psoriasis     S/P partial hysterectomy     Situational depression     Supraventricular tachycardia        Past Surgical History:   Procedure Laterality Date    ABDOMINAL SURGERY      BREAST BIOPSY Left     excisional    CARPAL TUNNEL RELEASE      HYSTERECTOMY      @27yrs of age    KNEE ARTHROSCOPY W/ DEBRIDEMENT  '05    Right    OOPHORECTOMY      @27yrs of age    PARTIAL HYSTERECTOMY         Family History   Problem Relation Age of Onset    Heart failure Mother 80    No Known Problems Sister     No Known Problems Brother     No Known Problems Daughter     No Known Problems Daughter     No Known Problems Daughter        Social History     Socioeconomic History    Marital status:    Tobacco Use    Smoking status: Never    Smokeless tobacco: Never   Substance and Sexual Activity    Alcohol use: No    Drug use: No    Sexual activity: Not Currently     Partners: Male       MEDICATIONS & ALLERGIES:     Current Outpatient Medications on File Prior to Visit   Medication Sig Dispense Refill    diltiaZEM (TIAZAC) 240 MG Cs24 TAKE 1 CAPSULE (240 MG TOTAL) BY MOUTH ONCE DAILY. 90 capsule 3    EScitalopram oxalate (LEXAPRO) 5 MG Tab TAKE 1 TABLET (5 MG TOTAL) BY MOUTH ONCE DAILY. 90 tablet 2    melatonin (MELATIN) 3 mg tablet Take 1 tablet (3 mg total) by mouth nightly. 90 tablet 3    mirabegron " (MYRBETRIQ) 25 mg Tb24 ER tablet Take 2 tablets (50 mg total) by mouth once daily. 60 tablet 11    rivaroxaban (XARELTO) 20 mg Tab Take 1 tablet (20 mg total) by mouth before evening meal. 90 tablet 3    cyanocobalamin, vitamin B-12, 5,000 mcg Subl Place one under tongue once a day for 1 month, then continue to take under tongue per week (Patient not taking: No sig reported) 30 tablet 3    donepeziL (ARICEPT) 10 MG tablet Take 1 tablet (10 mg total) by mouth every evening. (Patient not taking: No sig reported) 30 tablet 11    simvastatin (ZOCOR) 10 MG tablet TAKE 1 TABLET EVERY EVENING (Patient not taking: No sig reported) 90 tablet 3    suvorexant (BELSOMRA) 5 mg Tab Take 1 tablet by mouth every evening. (Patient not taking: No sig reported) 30 tablet 2     No current facility-administered medications on file prior to visit.        Review of patient's allergies indicates:   Allergen Reactions    Ciprofloxacin      Other reaction(s): Nausea       OBJECTIVE:     Vital Signs:  Vitals:    10/26/22 1020   BP: 120/68   Pulse: 88   Resp: 16   Temp: 98.1 °F (36.7 °C)     There is no height or weight on file to calculate BMI.     Physical Exam:    GENERAL: Frail elderly female sitting up on the sofa in no acute distress.  HEAD: Head is normocephalic and atraumatic.   EYES: Pupils are equal. Extraocular motions intact.   EARS: Hearing grossly intact.   MOUTH: Oropharynx is normal, with moist oral mucosa   NECK: No adenopathy, supple, no JVD.   CHEST: Chest with clear breath sounds bilaterally. No wheezes, rales, or rhonchi.   CARDIAC: Regular rate and rhythm. S1 and S2, without murmurs, gallops, or rubs.   VASCULAR: No Edema. Peripheral pulses normal and equal in all extremities.   ABDOMEN: Soft, without detectable tenderness. No sign of distention.  MUSCULOSKELETAL: Good range of motion of all major joints.  NEUROLOGIC EXAM: Alert. Speech normal. Follows commands.   PSYCHIATRIC: Mood appropriate with a normal  affect    Laboratory  Lab Results   Component Value Date    WBC 4.71 06/08/2022    HGB 16.5 (H) 06/08/2022    HCT 50.3 (H) 06/08/2022    MCV 94 06/08/2022     06/08/2022     Lab Results   Component Value Date    INR 1.2 06/01/2015    INR 1.0 04/15/2015     Lab Results   Component Value Date    HGBA1C 5.5 01/28/2021     No results for input(s): POCTGLUCOSE in the last 72 hours.    Diagnostic Results:  reviewed    TRANSITION OF CARE:     [facility] Contact Note: 10/19/22    Family and/or Caretaker present at visit?  Yes.  Diagnostic tests reviewed/disposition: No diagnosic tests pending after this hospitalization.  Disease/illness education: fall, rhabdomyolysis, memory impairment, afib, anticoagulant use  Home health/community services discussion/referrals: Patient has home health established at [facility] .   Establishment or re-establishment of referral orders for community resources: No other necessary community resources.   Discussion with other health care providers: No discussion with other health care providers necessary.     Transition of Care Visit:  I have reviewed and updated the history and problem list.  I have reconciled the medication list.  I have discussed the hospitalization and current medical issues, prognosis and plans with the patient/family.  I  spent more than 50% of time discussing the care with the patient/family.  Total Face-to-Face Encounter: 60 minutes.    Medications Reconciliation:   I have reconciled the patient's home medications and discharge medications with the patient/family. I have updated all changes.  Refer to After-Visit Medication List.    ASSESSMENT & PLAN:     HIGH RISK CONDITION(S):    1. Weakness  Assessment & Plan:  Improving since recent hospitalization and SNF stay.  Continue working with  PT/OT.    Orders:  -     Ambulatory referral/consult to Physical/Occupational Therapy; Future; Expected date: 11/02/2022    2. Non-traumatic rhabdomyolysis  Assessment &  Plan:  Following fall at home 9/28/22 and down for numerous hours before found.  Stable now.     Orders:  -     Ambulatory referral/consult to [facility]    3. Syncope and collapse  Assessment & Plan:  9/28/22 found down at home.  Stable now.     Orders:  -     Ambulatory referral/consult to [facility]  -     Ambulatory referral/consult to Physical/Occupational Therapy; Future; Expected date: 11/02/2022    4. Memory impairment  Assessment & Plan:  Chronic.  Family has taken pt off of aricept.  Has 24/7 sitters now.      Orders:  -     Ambulatory referral/consult to Physical/Occupational Therapy; Future; Expected date: 11/02/2022    5. PAF (paroxysmal atrial fibrillation)  Assessment & Plan:  Chronic and stable.  Continue xarelto and diltiazem.      6. Long term current use of anticoagulant  Assessment & Plan:  On xarelto for afib.         Were controlled substances prescribed?  No        Scheduled Follow-up :  No future appointments.    After Visit Medication List :     Medication List            Accurate as of October 26, 2022 11:59 PM. If you have any questions, ask your nurse or doctor.                CONTINUE taking these medications      BELSOMRA 5 mg Tab  Generic drug: suvorexant  Take 1 tablet by mouth every evening.     cyanocobalamin (vitamin B-12) 5,000 mcg Subl  Place one under tongue once a day for 1 month, then continue to take under tongue per week     diltiaZEM 240 MG Cs24  Commonly known as: TIAZAC  TAKE 1 CAPSULE (240 MG TOTAL) BY MOUTH ONCE DAILY.     donepeziL 10 MG tablet  Commonly known as: ARICEPT  Take 1 tablet (10 mg total) by mouth every evening.     EScitalopram oxalate 5 MG Tab  Commonly known as: LEXAPRO  TAKE 1 TABLET (5 MG TOTAL) BY MOUTH ONCE DAILY.     melatonin 3 mg tablet  Commonly known as: MELATIN  Take 1 tablet (3 mg total) by mouth nightly.     mirabegron 25 mg Tb24 ER tablet  Commonly known as: MYRBETRIQ  Take 2 tablets (50 mg total) by mouth  once daily.     nitrofurantoin (macrocrystal-monohydrate) 100 MG capsule  Commonly known as: MACROBID  Take 1 capsule (100 mg total) by mouth 2 (two) times daily. for 5 days     rivaroxaban 20 mg Tab  Commonly known as: XARELTO  Take 1 tablet (20 mg total) by mouth before evening meal.     simvastatin 10 MG tablet  Commonly known as: ZOCOR  TAKE 1 TABLET EVERY EVENING              Signature:  Sveta Pitts NP

## 2022-11-02 PROBLEM — I48.92 ATRIAL FIBRILLATION AND FLUTTER: Status: ACTIVE | Noted: 2022-11-02

## 2022-11-02 PROBLEM — I48.91 ATRIAL FIBRILLATION AND FLUTTER: Status: ACTIVE | Noted: 2022-11-02

## 2022-11-02 PROBLEM — M62.82 NON-TRAUMATIC RHABDOMYOLYSIS: Status: ACTIVE | Noted: 2022-11-02

## 2022-11-02 PROBLEM — R55 SYNCOPE AND COLLAPSE: Status: ACTIVE | Noted: 2022-11-02

## 2022-11-02 PROBLEM — R53.1 WEAKNESS: Status: ACTIVE | Noted: 2022-11-02

## 2022-11-02 PROBLEM — R41.3 MEMORY IMPAIRMENT: Status: ACTIVE | Noted: 2022-11-02

## 2022-11-21 ENCOUNTER — TELEPHONE (OUTPATIENT)
Dept: ELECTROPHYSIOLOGY | Facility: CLINIC | Age: 82
End: 2022-11-21
Payer: MEDICARE

## 2022-11-21 NOTE — TELEPHONE ENCOUNTER
Spoke with pharmacy, aware of potential interaction, pt has been on both medications since prior to 2021, zocor dose is 10mg

## 2022-11-21 NOTE — TELEPHONE ENCOUNTER
----- Message from Rose Pablo MA sent at 11/21/2022  3:50 PM CST -----  Regarding: FW: drug interaction    ----- Message -----  From: Chasidy Liao  Sent: 11/21/2022   3:44 PM CST  To: [facility] Staff  Subject: drug interaction                                 [facility] is calling to report a drug interaction between simvastatin (ZOCOR) 10 MG tablet and TIADYLT  mg Cs24 and call [facility] - [facility] [address]   Phone:  [phone]  Fax:  [phone]. Thanks, Chasidy

## 2022-11-28 ENCOUNTER — PES CALL (OUTPATIENT)
Dept: ADMINISTRATIVE | Facility: CLINIC | Age: 82
End: 2022-11-28
Payer: MEDICARE

## 2022-11-29 ENCOUNTER — HOSPITAL ENCOUNTER (INPATIENT)
Facility: HOSPITAL | Age: 82
LOS: 3 days | Discharge: HOME HEALTH | DRG: 193 | End: 2022-12-03
Attending: EMERGENCY MEDICINE | Admitting: FAMILY MEDICINE
Payer: MEDICARE

## 2022-11-29 DIAGNOSIS — R65.10 SIRS (SYSTEMIC INFLAMMATORY RESPONSE SYNDROME): ICD-10-CM

## 2022-11-29 DIAGNOSIS — A41.9 SEPSIS: ICD-10-CM

## 2022-11-29 DIAGNOSIS — J10.1 INFLUENZA A: Primary | ICD-10-CM

## 2022-11-29 DIAGNOSIS — I48.91 ATRIAL FIBRILLATION WITH RVR: ICD-10-CM

## 2022-11-29 PROBLEM — J96.01 ACUTE HYPOXEMIC RESPIRATORY FAILURE: Status: ACTIVE | Noted: 2022-11-29

## 2022-11-29 LAB
ALBUMIN SERPL BCP-MCNC: 4.3 G/DL (ref 3.5–5.2)
ALP SERPL-CCNC: 86 U/L (ref 55–135)
ALT SERPL W/O P-5'-P-CCNC: 36 U/L (ref 10–44)
ANION GAP SERPL CALC-SCNC: 17 MMOL/L (ref 8–16)
AST SERPL-CCNC: 37 U/L (ref 10–40)
BACTERIA #/AREA URNS HPF: ABNORMAL /HPF
BASOPHILS # BLD AUTO: 0.02 K/UL (ref 0–0.2)
BASOPHILS NFR BLD: 0.2 % (ref 0–1.9)
BILIRUB SERPL-MCNC: 0.7 MG/DL (ref 0.1–1)
BILIRUB UR QL STRIP: NEGATIVE
BUN SERPL-MCNC: 15 MG/DL (ref 8–23)
CALCIUM SERPL-MCNC: 10.1 MG/DL (ref 8.7–10.5)
CHLORIDE SERPL-SCNC: 102 MMOL/L (ref 95–110)
CK SERPL-CCNC: 335 U/L (ref 20–180)
CLARITY UR: CLEAR
CO2 SERPL-SCNC: 18 MMOL/L (ref 23–29)
COLOR UR: YELLOW
CREAT SERPL-MCNC: 0.9 MG/DL (ref 0.5–1.4)
DIFFERENTIAL METHOD: ABNORMAL
EOSINOPHIL # BLD AUTO: 0 K/UL (ref 0–0.5)
EOSINOPHIL NFR BLD: 0 % (ref 0–8)
ERYTHROCYTE [DISTWIDTH] IN BLOOD BY AUTOMATED COUNT: 13.3 % (ref 11.5–14.5)
EST. GFR  (NO RACE VARIABLE): >60 ML/MIN/1.73 M^2
GLUCOSE SERPL-MCNC: 115 MG/DL (ref 70–110)
GLUCOSE UR QL STRIP: NEGATIVE
HCT VFR BLD AUTO: 47 % (ref 37–48.5)
HGB BLD-MCNC: 15.4 G/DL (ref 12–16)
HGB UR QL STRIP: ABNORMAL
HYALINE CASTS #/AREA URNS LPF: 0 /LPF
IMM GRANULOCYTES # BLD AUTO: 0.03 K/UL (ref 0–0.04)
IMM GRANULOCYTES NFR BLD AUTO: 0.3 % (ref 0–0.5)
INFLUENZA A, MOLECULAR: POSITIVE
INFLUENZA B, MOLECULAR: NEGATIVE
KETONES UR QL STRIP: ABNORMAL
LACTATE SERPL-SCNC: 1.8 MMOL/L (ref 0.5–2.2)
LEUKOCYTE ESTERASE UR QL STRIP: ABNORMAL
LYMPHOCYTES # BLD AUTO: 0.5 K/UL (ref 1–4.8)
LYMPHOCYTES NFR BLD: 6 % (ref 18–48)
MAGNESIUM SERPL-MCNC: 2 MG/DL (ref 1.6–2.6)
MCH RBC QN AUTO: 30.5 PG (ref 27–31)
MCHC RBC AUTO-ENTMCNC: 32.8 G/DL (ref 32–36)
MCV RBC AUTO: 93 FL (ref 82–98)
MICROSCOPIC COMMENT: ABNORMAL
MONOCYTES # BLD AUTO: 0.8 K/UL (ref 0.3–1)
MONOCYTES NFR BLD: 9.2 % (ref 4–15)
NEUTROPHILS # BLD AUTO: 7.3 K/UL (ref 1.8–7.7)
NEUTROPHILS NFR BLD: 84.3 % (ref 38–73)
NITRITE UR QL STRIP: POSITIVE
NRBC BLD-RTO: 0 /100 WBC
PH UR STRIP: 6 [PH] (ref 5–8)
PHOSPHATE SERPL-MCNC: 2.7 MG/DL (ref 2.7–4.5)
PLATELET # BLD AUTO: 180 K/UL (ref 150–450)
PMV BLD AUTO: 11.3 FL (ref 9.2–12.9)
POTASSIUM SERPL-SCNC: 4.5 MMOL/L (ref 3.5–5.1)
PROCALCITONIN SERPL IA-MCNC: 0.06 NG/ML
PROT SERPL-MCNC: 7.4 G/DL (ref 6–8.4)
PROT UR QL STRIP: ABNORMAL
RBC # BLD AUTO: 5.05 M/UL (ref 4–5.4)
RBC #/AREA URNS HPF: 1 /HPF (ref 0–4)
SARS-COV-2 RDRP RESP QL NAA+PROBE: NEGATIVE
SODIUM SERPL-SCNC: 137 MMOL/L (ref 136–145)
SP GR UR STRIP: 1.02 (ref 1–1.03)
SPECIMEN SOURCE: ABNORMAL
SQUAMOUS #/AREA URNS HPF: 1 /HPF
URN SPEC COLLECT METH UR: ABNORMAL
UROBILINOGEN UR STRIP-ACNC: NEGATIVE EU/DL
WBC # BLD AUTO: 8.61 K/UL (ref 3.9–12.7)
WBC #/AREA URNS HPF: 9 /HPF (ref 0–5)

## 2022-11-29 PROCEDURE — 84100 ASSAY OF PHOSPHORUS: CPT | Performed by: EMERGENCY MEDICINE

## 2022-11-29 PROCEDURE — 81000 URINALYSIS NONAUTO W/SCOPE: CPT | Performed by: EMERGENCY MEDICINE

## 2022-11-29 PROCEDURE — 25000242 PHARM REV CODE 250 ALT 637 W/ HCPCS: Performed by: PHYSICIAN ASSISTANT

## 2022-11-29 PROCEDURE — 84145 PROCALCITONIN (PCT): CPT | Performed by: EMERGENCY MEDICINE

## 2022-11-29 PROCEDURE — U0002 COVID-19 LAB TEST NON-CDC: HCPCS | Performed by: EMERGENCY MEDICINE

## 2022-11-29 PROCEDURE — 96361 HYDRATE IV INFUSION ADD-ON: CPT

## 2022-11-29 PROCEDURE — 93005 ELECTROCARDIOGRAM TRACING: CPT

## 2022-11-29 PROCEDURE — 96365 THER/PROPH/DIAG IV INF INIT: CPT

## 2022-11-29 PROCEDURE — 85025 COMPLETE CBC W/AUTO DIFF WBC: CPT | Performed by: EMERGENCY MEDICINE

## 2022-11-29 PROCEDURE — 94761 N-INVAS EAR/PLS OXIMETRY MLT: CPT

## 2022-11-29 PROCEDURE — 87040 BLOOD CULTURE FOR BACTERIA: CPT | Mod: 59 | Performed by: EMERGENCY MEDICINE

## 2022-11-29 PROCEDURE — 25000003 PHARM REV CODE 250: Performed by: EMERGENCY MEDICINE

## 2022-11-29 PROCEDURE — 63600175 PHARM REV CODE 636 W HCPCS: Performed by: PHYSICIAN ASSISTANT

## 2022-11-29 PROCEDURE — 94640 AIRWAY INHALATION TREATMENT: CPT

## 2022-11-29 PROCEDURE — 99285 EMERGENCY DEPT VISIT HI MDM: CPT | Mod: 25

## 2022-11-29 PROCEDURE — 93010 EKG 12-LEAD: ICD-10-PCS | Mod: ,,, | Performed by: INTERNAL MEDICINE

## 2022-11-29 PROCEDURE — 63600175 PHARM REV CODE 636 W HCPCS: Performed by: EMERGENCY MEDICINE

## 2022-11-29 PROCEDURE — 93010 ELECTROCARDIOGRAM REPORT: CPT | Mod: ,,, | Performed by: INTERNAL MEDICINE

## 2022-11-29 PROCEDURE — 99900035 HC TECH TIME PER 15 MIN (STAT)

## 2022-11-29 PROCEDURE — 87502 INFLUENZA DNA AMP PROBE: CPT | Performed by: EMERGENCY MEDICINE

## 2022-11-29 PROCEDURE — 27000221 HC OXYGEN, UP TO 24 HOURS

## 2022-11-29 PROCEDURE — 83735 ASSAY OF MAGNESIUM: CPT | Performed by: EMERGENCY MEDICINE

## 2022-11-29 PROCEDURE — 80053 COMPREHEN METABOLIC PANEL: CPT | Performed by: EMERGENCY MEDICINE

## 2022-11-29 PROCEDURE — 25000003 PHARM REV CODE 250: Performed by: PHYSICIAN ASSISTANT

## 2022-11-29 PROCEDURE — G0378 HOSPITAL OBSERVATION PER HR: HCPCS

## 2022-11-29 PROCEDURE — 82550 ASSAY OF CK (CPK): CPT | Performed by: EMERGENCY MEDICINE

## 2022-11-29 PROCEDURE — 83605 ASSAY OF LACTIC ACID: CPT | Performed by: EMERGENCY MEDICINE

## 2022-11-29 RX ORDER — ACETAMINOPHEN 325 MG/1
650 TABLET ORAL EVERY 4 HOURS PRN
Status: DISCONTINUED | OUTPATIENT
Start: 2022-11-29 | End: 2022-12-03 | Stop reason: HOSPADM

## 2022-11-29 RX ORDER — NALOXONE HCL 0.4 MG/ML
0.02 VIAL (ML) INJECTION
Status: DISCONTINUED | OUTPATIENT
Start: 2022-11-29 | End: 2022-12-03 | Stop reason: HOSPADM

## 2022-11-29 RX ORDER — OSELTAMIVIR PHOSPHATE 75 MG/1
75 CAPSULE ORAL
Status: COMPLETED | OUTPATIENT
Start: 2022-11-29 | End: 2022-11-29

## 2022-11-29 RX ORDER — OSELTAMIVIR PHOSPHATE 75 MG/1
75 CAPSULE ORAL 2 TIMES DAILY
Status: DISCONTINUED | OUTPATIENT
Start: 2022-11-30 | End: 2022-11-29

## 2022-11-29 RX ORDER — ESCITALOPRAM OXALATE 5 MG/1
5 TABLET ORAL DAILY
Status: DISCONTINUED | OUTPATIENT
Start: 2022-11-30 | End: 2022-12-03 | Stop reason: HOSPADM

## 2022-11-29 RX ORDER — ACETAMINOPHEN 325 MG/1
650 TABLET ORAL
Status: COMPLETED | OUTPATIENT
Start: 2022-11-29 | End: 2022-11-29

## 2022-11-29 RX ORDER — OSELTAMIVIR PHOSPHATE 30 MG/1
30 CAPSULE ORAL 2 TIMES DAILY
Status: DISCONTINUED | OUTPATIENT
Start: 2022-11-30 | End: 2022-12-03 | Stop reason: HOSPADM

## 2022-11-29 RX ORDER — BISACODYL 10 MG
10 SUPPOSITORY, RECTAL RECTAL DAILY PRN
Status: DISCONTINUED | OUTPATIENT
Start: 2022-11-29 | End: 2022-12-03 | Stop reason: HOSPADM

## 2022-11-29 RX ORDER — ACETAMINOPHEN 325 MG/1
650 TABLET ORAL EVERY 8 HOURS PRN
Status: DISCONTINUED | OUTPATIENT
Start: 2022-11-29 | End: 2022-12-03 | Stop reason: HOSPADM

## 2022-11-29 RX ORDER — GLUCAGON 1 MG
1 KIT INJECTION
Status: DISCONTINUED | OUTPATIENT
Start: 2022-11-29 | End: 2022-12-03 | Stop reason: HOSPADM

## 2022-11-29 RX ORDER — ONDANSETRON 2 MG/ML
4 INJECTION INTRAMUSCULAR; INTRAVENOUS EVERY 8 HOURS PRN
Status: DISCONTINUED | OUTPATIENT
Start: 2022-11-29 | End: 2022-12-03 | Stop reason: HOSPADM

## 2022-11-29 RX ORDER — IPRATROPIUM BROMIDE AND ALBUTEROL SULFATE 2.5; .5 MG/3ML; MG/3ML
3 SOLUTION RESPIRATORY (INHALATION) EVERY 4 HOURS PRN
Status: DISCONTINUED | OUTPATIENT
Start: 2022-11-29 | End: 2022-12-03 | Stop reason: HOSPADM

## 2022-11-29 RX ORDER — DILTIAZEM HYDROCHLORIDE 120 MG/1
240 CAPSULE, COATED, EXTENDED RELEASE ORAL DAILY
Status: DISCONTINUED | OUTPATIENT
Start: 2022-11-30 | End: 2022-12-02

## 2022-11-29 RX ORDER — TALC
3 POWDER (GRAM) TOPICAL NIGHTLY
Status: DISCONTINUED | OUTPATIENT
Start: 2022-11-29 | End: 2022-12-03 | Stop reason: HOSPADM

## 2022-11-29 RX ORDER — IBUPROFEN 200 MG
24 TABLET ORAL
Status: DISCONTINUED | OUTPATIENT
Start: 2022-11-29 | End: 2022-12-03 | Stop reason: HOSPADM

## 2022-11-29 RX ORDER — POLYETHYLENE GLYCOL 3350 17 G/17G
17 POWDER, FOR SOLUTION ORAL 2 TIMES DAILY PRN
Status: DISCONTINUED | OUTPATIENT
Start: 2022-11-29 | End: 2022-12-03 | Stop reason: HOSPADM

## 2022-11-29 RX ORDER — SODIUM CHLORIDE, SODIUM LACTATE, POTASSIUM CHLORIDE, CALCIUM CHLORIDE 600; 310; 30; 20 MG/100ML; MG/100ML; MG/100ML; MG/100ML
INJECTION, SOLUTION INTRAVENOUS CONTINUOUS
Status: DISCONTINUED | OUTPATIENT
Start: 2022-11-29 | End: 2022-12-02

## 2022-11-29 RX ORDER — IBUPROFEN 200 MG
16 TABLET ORAL
Status: DISCONTINUED | OUTPATIENT
Start: 2022-11-29 | End: 2022-12-03 | Stop reason: HOSPADM

## 2022-11-29 RX ORDER — OXYBUTYNIN CHLORIDE 5 MG/1
10 TABLET, EXTENDED RELEASE ORAL DAILY
Status: DISCONTINUED | OUTPATIENT
Start: 2022-11-30 | End: 2022-12-03 | Stop reason: HOSPADM

## 2022-11-29 RX ADMIN — SODIUM CHLORIDE, SODIUM LACTATE, POTASSIUM CHLORIDE, AND CALCIUM CHLORIDE 1443 ML: .6; .31; .03; .02 INJECTION, SOLUTION INTRAVENOUS at 05:11

## 2022-11-29 RX ADMIN — ACETAMINOPHEN 650 MG: 325 TABLET ORAL at 06:11

## 2022-11-29 RX ADMIN — ACETAMINOPHEN 650 MG: 325 TABLET ORAL at 11:11

## 2022-11-29 RX ADMIN — Medication 3 MG: at 11:11

## 2022-11-29 RX ADMIN — SODIUM CHLORIDE, SODIUM LACTATE, POTASSIUM CHLORIDE, AND CALCIUM CHLORIDE: .6; .31; .03; .02 INJECTION, SOLUTION INTRAVENOUS at 08:11

## 2022-11-29 RX ADMIN — IPRATROPIUM BROMIDE AND ALBUTEROL SULFATE 3 ML: .5; 2.5 SOLUTION RESPIRATORY (INHALATION) at 11:11

## 2022-11-29 RX ADMIN — CEFTRIAXONE SODIUM 2 G: 2 INJECTION, POWDER, FOR SOLUTION INTRAMUSCULAR; INTRAVENOUS at 05:11

## 2022-11-29 RX ADMIN — OSELTAMIVIR PHOSPHATE 75 MG: 75 CAPSULE ORAL at 08:11

## 2022-11-29 NOTE — Clinical Note
Diagnosis: SIRS (systemic inflammatory response syndrome) [841546]   Future Attending Provider: MICAH RIBEIRO [[id number]]   Admitting Provider:: MICAH RIBEIRO [[id number]]

## 2022-11-30 PROBLEM — N39.0 UTI (URINARY TRACT INFECTION): Status: ACTIVE | Noted: 2022-11-30

## 2022-11-30 LAB
ANION GAP SERPL CALC-SCNC: 12 MMOL/L (ref 8–16)
BASOPHILS # BLD AUTO: 0.02 K/UL (ref 0–0.2)
BASOPHILS NFR BLD: 0.4 % (ref 0–1.9)
BUN SERPL-MCNC: 12 MG/DL (ref 8–23)
CALCIUM SERPL-MCNC: 9.2 MG/DL (ref 8.7–10.5)
CHLORIDE SERPL-SCNC: 106 MMOL/L (ref 95–110)
CK SERPL-CCNC: 975 U/L (ref 20–180)
CO2 SERPL-SCNC: 22 MMOL/L (ref 23–29)
CREAT SERPL-MCNC: 0.8 MG/DL (ref 0.5–1.4)
DIFFERENTIAL METHOD: ABNORMAL
EOSINOPHIL # BLD AUTO: 0 K/UL (ref 0–0.5)
EOSINOPHIL NFR BLD: 0 % (ref 0–8)
ERYTHROCYTE [DISTWIDTH] IN BLOOD BY AUTOMATED COUNT: 13.6 % (ref 11.5–14.5)
EST. GFR  (NO RACE VARIABLE): >60 ML/MIN/1.73 M^2
GLUCOSE SERPL-MCNC: 104 MG/DL (ref 70–110)
HCT VFR BLD AUTO: 43 % (ref 37–48.5)
HGB BLD-MCNC: 14.1 G/DL (ref 12–16)
IMM GRANULOCYTES # BLD AUTO: 0.02 K/UL (ref 0–0.04)
IMM GRANULOCYTES NFR BLD AUTO: 0.4 % (ref 0–0.5)
LYMPHOCYTES # BLD AUTO: 0.6 K/UL (ref 1–4.8)
LYMPHOCYTES NFR BLD: 11.1 % (ref 18–48)
MCH RBC QN AUTO: 30.6 PG (ref 27–31)
MCHC RBC AUTO-ENTMCNC: 32.8 G/DL (ref 32–36)
MCV RBC AUTO: 93 FL (ref 82–98)
MONOCYTES # BLD AUTO: 0.7 K/UL (ref 0.3–1)
MONOCYTES NFR BLD: 12.4 % (ref 4–15)
NEUTROPHILS # BLD AUTO: 4.2 K/UL (ref 1.8–7.7)
NEUTROPHILS NFR BLD: 75.7 % (ref 38–73)
NRBC BLD-RTO: 0 /100 WBC
PLATELET # BLD AUTO: 163 K/UL (ref 150–450)
PMV BLD AUTO: 10.1 FL (ref 9.2–12.9)
POTASSIUM SERPL-SCNC: 3.9 MMOL/L (ref 3.5–5.1)
RBC # BLD AUTO: 4.61 M/UL (ref 4–5.4)
SODIUM SERPL-SCNC: 140 MMOL/L (ref 136–145)
WBC # BLD AUTO: 5.57 K/UL (ref 3.9–12.7)

## 2022-11-30 PROCEDURE — 97166 OT EVAL MOD COMPLEX 45 MIN: CPT

## 2022-11-30 PROCEDURE — 25000003 PHARM REV CODE 250: Performed by: PHYSICIAN ASSISTANT

## 2022-11-30 PROCEDURE — 99900035 HC TECH TIME PER 15 MIN (STAT)

## 2022-11-30 PROCEDURE — 97162 PT EVAL MOD COMPLEX 30 MIN: CPT

## 2022-11-30 PROCEDURE — 27000221 HC OXYGEN, UP TO 24 HOURS

## 2022-11-30 PROCEDURE — 11000001 HC ACUTE MED/SURG PRIVATE ROOM

## 2022-11-30 PROCEDURE — 25000003 PHARM REV CODE 250: Performed by: FAMILY MEDICINE

## 2022-11-30 PROCEDURE — 97530 THERAPEUTIC ACTIVITIES: CPT

## 2022-11-30 PROCEDURE — 80048 BASIC METABOLIC PNL TOTAL CA: CPT | Performed by: PHYSICIAN ASSISTANT

## 2022-11-30 PROCEDURE — 94761 N-INVAS EAR/PLS OXIMETRY MLT: CPT

## 2022-11-30 PROCEDURE — 97110 THERAPEUTIC EXERCISES: CPT

## 2022-11-30 PROCEDURE — 87086 URINE CULTURE/COLONY COUNT: CPT | Performed by: FAMILY MEDICINE

## 2022-11-30 PROCEDURE — 25000003 PHARM REV CODE 250: Performed by: NURSE PRACTITIONER

## 2022-11-30 PROCEDURE — 85025 COMPLETE CBC W/AUTO DIFF WBC: CPT | Performed by: PHYSICIAN ASSISTANT

## 2022-11-30 PROCEDURE — 97116 GAIT TRAINING THERAPY: CPT

## 2022-11-30 PROCEDURE — 82550 ASSAY OF CK (CPK): CPT | Performed by: PHYSICIAN ASSISTANT

## 2022-11-30 PROCEDURE — 63600175 PHARM REV CODE 636 W HCPCS: Performed by: NURSE PRACTITIONER

## 2022-11-30 PROCEDURE — 96361 HYDRATE IV INFUSION ADD-ON: CPT

## 2022-11-30 RX ORDER — MIRABEGRON 50 MG/1
50 TABLET, FILM COATED, EXTENDED RELEASE ORAL DAILY
COMMUNITY
Start: 2022-11-21 | End: 2022-12-21 | Stop reason: SDUPTHER

## 2022-11-30 RX ORDER — APIXABAN 2.5 MG/1
2.5 TABLET, FILM COATED ORAL 2 TIMES DAILY
COMMUNITY
Start: 2022-10-19 | End: 2022-11-30

## 2022-11-30 RX ORDER — SULFAMETHOXAZOLE AND TRIMETHOPRIM 800; 160 MG/1; MG/1
1 TABLET ORAL 2 TIMES DAILY
COMMUNITY
Start: 2022-09-26 | End: 2022-11-30

## 2022-11-30 RX ORDER — CHLORTHALIDONE 25 MG/1
TABLET ORAL
Status: ON HOLD | COMMUNITY
Start: 2022-08-29 | End: 2022-11-30

## 2022-11-30 RX ORDER — MULTIVITAMIN
1 TABLET ORAL DAILY
COMMUNITY
Start: 2022-10-19 | End: 2023-10-19

## 2022-11-30 RX ORDER — DILTIAZEM HYDROCHLORIDE 240 MG/1
1 CAPSULE, EXTENDED RELEASE ORAL
COMMUNITY
Start: 2022-08-29 | End: 2022-11-30 | Stop reason: ALTCHOICE

## 2022-11-30 RX ORDER — DONEPEZIL HYDROCHLORIDE 5 MG/1
5 TABLET, FILM COATED ORAL DAILY
Status: ON HOLD | COMMUNITY
Start: 2022-10-11 | End: 2022-12-02 | Stop reason: HOSPADM

## 2022-11-30 RX ORDER — TALC
6 POWDER (GRAM) TOPICAL NIGHTLY PRN
Status: DISCONTINUED | OUTPATIENT
Start: 2022-11-30 | End: 2022-12-03 | Stop reason: HOSPADM

## 2022-11-30 RX ADMIN — RIVAROXABAN 15 MG: 15 TABLET, FILM COATED ORAL at 05:11

## 2022-11-30 RX ADMIN — DILTIAZEM HYDROCHLORIDE 240 MG: 120 CAPSULE, COATED, EXTENDED RELEASE ORAL at 09:11

## 2022-11-30 RX ADMIN — OSELTAMIVIR PHOSPHATE 30 MG: 30 CAPSULE ORAL at 09:11

## 2022-11-30 RX ADMIN — OXYBUTYNIN CHLORIDE 10 MG: 5 TABLET, EXTENDED RELEASE ORAL at 10:11

## 2022-11-30 RX ADMIN — Medication 3 MG: at 09:11

## 2022-11-30 RX ADMIN — OSELTAMIVIR PHOSPHATE 30 MG: 30 CAPSULE ORAL at 10:11

## 2022-11-30 RX ADMIN — CEFTRIAXONE 1 G: 1 INJECTION, SOLUTION INTRAVENOUS at 12:11

## 2022-11-30 RX ADMIN — ESCITALOPRAM OXALATE 5 MG: 5 TABLET, FILM COATED ORAL at 10:11

## 2022-11-30 NOTE — ED NOTES
Pt lying with eyes closed resp even unlabored. Pt on airborne and droplet precautions with sign on door. Pt on CM cont pulse ox and automatic BP cuff. SR up Bed locked and low CB in reach.

## 2022-11-30 NOTE — ASSESSMENT & PLAN NOTE
Patient with mild UTI, nitrite +  Confusion, weakness   Continue IV rocephin for now   Urine cx added

## 2022-11-30 NOTE — PT/OT/SLP EVAL
"Physical Therapy Evaluation    Patient Name:  Tigist Murry   MRN:  [id number]    Recommendations:     Discharge Recommendations:  nursing facility, skilled, then return home with continued daily caregiver assistance  Discharge Equipment Recommendations: other (see comments) (defer to next level of care)   Barriers to discharge:  current functional mobility requires assist, family lives out of town    Assessment:     Tigist Murry is a 82 y.o. female admitted with a medical diagnosis of Acute hypoxemic respiratory failure.  She presents with the following impairments/functional limitations:  weakness, gait instability, impaired endurance, impaired balance, decreased lower extremity function, decreased safety awareness, impaired self care skills, impaired cognition, pain, impaired functional mobility Patient seen for physical therapy evaluation on this date.  Patient's family member present during evaluation as well as daughter on speaker phone.  Patient was A & O x 4, followed all commands.  Patient participated well with therapy.  Patient will benefit from inpatient physical therapy to address decreased balance, decreased endurance, decreased strength, and decreased overall independence with functional mobility.  Anticipate patient will benefit from skilled nursing facility  at discharge.  Full report to follow..    Rehab Prognosis: Good; patient would benefit from acute skilled PT services to address these deficits and reach maximum level of function.    Recent Surgery: * No surgery found *      Plan:     During this hospitalization, patient to be seen 5 x/week to address the identified rehab impairments via gait training, therapeutic activities, therapeutic exercises, neuromuscular re-education and progress toward the following goals:    Plan of Care Expires:  12/30/22    Subjective     Chief Complaint: "I was doing well walking with my walker before"  Patient/Family Comments/goals: To return home after getting " stronger  Pain/Comfort:  Pain Rating 1: 0/10 (at rest)  Pain Rating Post-Intervention 1: 0/10  Pain Rating 2:  (Does not rate, but states  her back hurts with sitting and standing)  Pain Addressed 2: Distraction, Reposition, Cessation of Activity  Pain Rating Post-Intervention 2:  (Does not rate)    Patients cultural, spiritual, Restorationism conflicts given the current situation: no    Living Environment:  Patient lives alone in a 1 SH, 1 FER.  Bathroom has a T/S and WIS with built in bench and GB.    Prior to admission, patients level of function was patient was recently discharged from [facility] unit x 6 weeks ago due to fall in September.  She has daily caregivers (12 hours/day) to assist with ADLs and IADLs.  Patient was walking with a RW.  Does not drive.  Patient was living alone independently prior to fall in September.  Equipment used at home: walker, rolling, bedside commode, bath bench.  DME owned (not currently used): none.  Upon discharge, patient will have assistance from caregivers (one is a family member).    Objective:     Communicated with nurse Ellis prior to session.  Patient found supine with bed alarm, peripheral IV, PureWick  upon PT entry to room.    General Precautions: Standard, fall, contact, droplet, airborne   Orthopedic Precautions:N/A   Braces: N/A  Respiratory Status: Nasal cannula, flow 4 L/min    Exams:  Cognitive Exam:  Patient is oriented to Person, Place, Time, Situation, and follows simple commands.  Patient anxious with transitions  Fine Motor Coordination:    -       Intact  Left hand, finger to nose, Right hand, finger to nose, Left hand thumb/finger opposition skills, and Right hand thumb/finger opposition skills  Gross Motor Coordination:  WFL  Postural Exam:  Patient presented with the following abnormalities:    -       Rounded shoulders  -       Forward head  -       Kyphosis  Sensation: full sensory eval not completed, but patient states she has no numbness or tingling    Skin Integrity/Edema:      -       Skin integrity: Visible skin intact  -       Edema: None noted B LEs  RLE ROM: WFL  RLE Strength: Deficits: 3+/5 grossly  LLE ROM: WFL  LLE Strength: Deficits: 3+/5 grossly    Functional Mobility:  Bed Mobility:     Rolling Left:  minimum assistance  Rolling Right: minimum assistance  Scooting: minimum assistance  Bridging: minimum assistance  Supine to Sit: moderate assistance  Sit to Supine: maximal assistance  Transfers:     Sit to Stand:  moderate assistance with rolling walker from high stretcher, posterior lean, unable to weight shift anterior to bring center of mass over CLARIBEL.  Patient very anxious, tolerates standing less than 2 minutes.    Balance: Sitting:  posterior lean, Min to Mod Assist, posterior pelvic tilt, Poor ability to maintain midline.                          Standing:  POOR, requires RW, posterior lean, does not weight shift from bed.  Min to Mod assist for midline      AM-PAC 6 CLICK MOBILITY  Total Score:10       Treatment & Education:  Patient and family educated on role of physical therapy and goals for POC.  Patient educated on safety with mobility.  Patient educated on progression of PT goals.      Patient left HOB elevated with all lines intact, call button in reach, bed alarm on, and nurse Rhonda notified.    GOALS:   Multidisciplinary Problems       Physical Therapy Goals          Problem: Physical Therapy    Goal Priority Disciplines Outcome Goal Variances Interventions   Physical Therapy Goal     PT, PT/OT Ongoing, Progressing     Description: Goals to be met by: 2022     Patient will increase functional independence with mobility by performin. Supine to sit with Contact Guard Assistance  2. Sit to supine with Contact Guard Assistance  3. Rolling to Left and Right with Stand-by Assistance.  4. Sit to stand transfer with Stand-by Assistance  5. Bed to chair transfer with Stand-by Assistance using Rolling Walker  6. Gait  x 100 feet  with Contact Guard Assistance using Rolling Walker.   7. Ascend/descend 1 stair withContact Guard Assistance using Rolling Walker.                          History:     Past Medical History:   Diagnosis Date    Anticoagulant long-term use     Anxiety     Arthritis     Atrial fibrillation     Carpal tunnel syndrome on right     Diverticulosis     Encounter for blood transfusion     HTN (hypertension)     Hyperlipidemia     Osteopenia     Overactive bladder     Palpitations     Patellar tendonitis 4/13    saw [place] bone     Psoriasis     S/P partial hysterectomy     Situational depression     Supraventricular tachycardia        Past Surgical History:   Procedure Laterality Date    ABDOMINAL SURGERY      BREAST BIOPSY Left     excisional    CARPAL TUNNEL RELEASE      HYSTERECTOMY      @27yrs of age    KNEE ARTHROSCOPY W/ DEBRIDEMENT  '05    Right    OOPHORECTOMY      @27yrs of age    PARTIAL HYSTERECTOMY         Time Tracking:     PT Received On: 11/30/22  PT Start Time: 0930     PT Stop Time: 1012  PT Total Time (min): 42 min     Billable Minutes: Evaluation 15, Gait Training 15, and Therapeutic Activity 12      11/30/2022

## 2022-11-30 NOTE — NURSING
Arrived on the unit AAO to place, accompanied by daughter. Plan of care explained safety measures in place call bell in reach. Fluids infusing, pure wick set,vital signs per flow sheet. Instructed to call for assistance, verbalized understanding. Will continue to monitor.

## 2022-11-30 NOTE — ASSESSMENT & PLAN NOTE
Patient with Paroxysmal (<7 days) atrial fibrillation which is uncontrolled currently with diltiazem. Patient is currently in atrial fibrillation.[id number] Score: 3. Anticoagulation indicated. Anticoagulation done with xarelto.  HR improved after fluids and tylenol, pt received morning dose of diltiazem.

## 2022-11-30 NOTE — ED NOTES
Repositioned in bed for comfort. Pt. Is pleasantly confused/disoriented. Visitor at bedside assisting pt. With breakfast tray. Admit team at bedside for rounding.

## 2022-11-30 NOTE — ASSESSMENT & PLAN NOTE
Patient with Paroxysmal (<7 days) atrial fibrillation which is uncontrolled currently with diltiazem. Patient is currently in atrial fibrillation.[id number] Score: 3. Anticoagulation indicated. Anticoagulation done with xarelto.  HR improved after fluids and tylenol, pt received morning dose of diltiazem.

## 2022-11-30 NOTE — ASSESSMENT & PLAN NOTE
Controlled upon arrival then borderline low  Pt did take diltiazem morning of admission  Monitor closely

## 2022-11-30 NOTE — PHARMACY MED REC
"    [facility]           Pharmacy  Admission Medication History     The home medication history was taken by Oneyda York.      Medication history obtained from Medications listed below were obtained from: Medical records    Based on information gathered for medication list, you may go to "Admission" then "Reconcile Home Medications" tabs to review and/or act upon those items.     The home medication list has been updated by the Pharmacy department.   Please read ALL comments highlighted in yellow.   Please address this information as you see fit.    Feel free to contact us if you have any questions or require assistance.    The medications listed below were removed from the home medication list.  Please reorder if appropriate:    Patient reports NOT TAKING the following medication(s):  Bactrim ds  Tiazac 240mg      No current facility-administered medications on file prior to encounter.     Current Outpatient Medications on File Prior to Encounter   Medication Sig Dispense Refill    EScitalopram oxalate (LEXAPRO) 5 MG Tab TAKE 1 TABLET (5 MG TOTAL) BY MOUTH ONCE DAILY. 90 tablet 2    melatonin (MELATIN) 3 mg tablet Take 1 tablet (3 mg total) by mouth nightly. 90 tablet 3    multivitamin with folic acid 400 mcg Tab Take 1 tablet by mouth once daily.      MYRBETRIQ 50 mg Tb24 Take 50 mg by mouth once daily.      rivaroxaban (XARELTO) 20 mg Tab Take 1 tablet (20 mg total) by mouth before evening meal. 90 tablet 3    TIADYLT  mg Cs24 TAKE 1 CAPSULE EVERY DAY (Patient taking differently: Take 240 mg by mouth once daily.) 90 capsule 3    chlorthalidone (HYGROTEN) 25 MG Tab       cyanocobalamin, vitamin B-12, 5,000 mcg Subl Place one under tongue once a day for 1 month, then continue to take under tongue per week (Patient not taking: No sig reported) 30 tablet 3    donepeziL (ARICEPT) 5 MG tablet Take 5 mg by mouth once daily.      ELIQUIS 2.5 mg Tab Take 2.5 mg by mouth 2 (two) times daily.  "     simvastatin (ZOCOR) 10 MG tablet TAKE 1 TABLET EVERY EVENING (Patient not taking: No sig reported) 90 tablet 3    suvorexant (BELSOMRA) 5 mg Tab Take 1 tablet by mouth every evening. (Patient not taking: No sig reported) 30 tablet 2       Please address this information as you see fit.  Feel free to contact us if you have any questions or require assistance.    Oneyda York  [phone]              .

## 2022-11-30 NOTE — SUBJECTIVE & OBJECTIVE
Interval hx: She has ongoing SOB and a dry cough, however improved. She denies dysuria, but presented with confusion.   She denies cp, n/v/d.    Review of Systems   Unable to perform ROS: Dementia   Respiratory:  Positive for cough.    Neurological:  Positive for weakness.   Psychiatric/Behavioral:  Positive for confusion.    Objective:     Vital Signs (Most Recent):  Temp: 98.5 °F (36.9 °C) (11/30/22 0329)  Pulse: 97 (11/30/22 0955)  Resp: (!) 23 (11/30/22 0800)  BP: (!) 148/95 (11/30/22 0955)  SpO2: 99 % (11/30/22 0955)   Vital Signs (24h Range):  Temp:  [98.5 °F (36.9 °C)-100.7 °F (38.2 °C)] 98.5 °F (36.9 °C)  Pulse:  [] 97  Resp:  [14-38] 23  SpO2:  [87 %-99 %] 99 %  BP: ()/(57-95) 148/95     Weight: 48.1 kg (106 lb 0.7 oz)  Body mass index is 18.78 kg/m².    Physical Exam  Vitals and nursing note reviewed.   Constitutional:       Appearance: She is well-developed. She is ill-appearing.   HENT:      Head: Normocephalic and atraumatic.   Eyes:      Extraocular Movements: EOM normal.      Pupils: Pupils are equal, round, and reactive to light.   Neck:      Thyroid: No thyromegaly.      Trachea: No tracheal deviation.   Cardiovascular:      Rate and Rhythm: Normal rate and regular rhythm.      Heart sounds: No murmur heard.  Pulmonary:      Effort: Pulmonary effort is normal.      Breath sounds: Normal breath sounds. No wheezing.   Abdominal:      General: Bowel sounds are normal.      Palpations: Abdomen is soft.      Tenderness: There is no abdominal tenderness.   Musculoskeletal:         General: No tenderness. Normal range of motion.      Cervical back: Normal range of motion and neck supple.   Lymphadenopathy:      Cervical: No cervical adenopathy.   Skin:     General: Skin is warm and dry.   Neurological:      Mental Status: She is alert. She is disoriented.      Cranial Nerves: No cranial nerve deficit.      Deep Tendon Reflexes: Reflexes are normal and symmetric.      Comments: Oriented to self,  close to baseline   Psychiatric:         Behavior: Behavior normal.         CRANIAL NERVES     CN III, IV, VI   Pupils are equal, round, and reactive to light.  Extraocular motions are normal.      Significant Labs: All pertinent labs within the past 24 hours have been reviewed.    Significant Imaging: I have reviewed all pertinent imaging results/findings within the past 24 hours.

## 2022-11-30 NOTE — ED NOTES
82 y.o. female to ED via EMS with daughter for evaluation of increased weakness, worsening cough, and fever that started today. Patient has a history of dementia and is a poor historian. Daughter at bedside providing most of patient's history. Patient oriented to self, disoriented to place, time, and situation. Patient has frequent non productive cough. Patient tachycardiac, worse with coughing. Patient currently on 2L nasal cannula. No apparent distress noted. Patient placed on cardiac monitor, continuous pulse oximetry and automatic blood pressure cuff. Bed placed in low locked position, side rails up x 2, call light is within reach of patient orientation to room and explanation of wait provided to patient, alarms set and turned on for monitor and pulse ox, will continue to monitor.

## 2022-11-30 NOTE — PLAN OF CARE
2310 Pt arrived to ER holding bay 18 appeared to be in no distress. Reported no pain.     No accu ck.     Tele: Afib,  HR  low 100,  No alarms.     Bed in lowest position, wheels locked, non skid socks, ID band worn, personal items and call bell with in reach, bed alarm set.

## 2022-11-30 NOTE — PLAN OF CARE
As a preliminary measure, GAYE faxed PASRR to [place] dept of Aging. GAYE called in cue for LOCET, did not receive call back at this time.        11/30/22 3339   Post-Acute Status   Post-Acute Authorization Placement   Post-Acute Placement Status Pending state direction/certification     Annika James, [facility] [place]  ED Social Work  [phone]

## 2022-11-30 NOTE — H&P
"[facility] Medicine  History & Physical    Patient Name: Tigist Murry  MRN: [id number]  Patient Class: OP- Observation  Admission Date: 11/29/2022  Attending Physician: Diandra Garland*   Primary Care Provider: Gwen Porter MD         Patient information was obtained from patient, relative(s), past medical records and ER records.     Subjective:     Principal Problem:Acute hypoxemic respiratory failure    Chief Complaint:   Chief Complaint   Patient presents with    Fever    Weakness    Cough     Fever with weakness and cough since Saturday.        HPI: This is a 82 y.o. year old female with a PMH of paroxysmal A fib (on Xarelto), SVT, HTN, anxiety, dementia, and AVNRT s/p ablation (2015) who presents to the ED with a chief complaint of weakness and cough x 3 days. Pt's daughter providing majority of history.  Pt is close to baseline, "slightly more confused" per daughter.  Pt oriented to person.  Patient denies known exposure to a COVID-19 patient, she is vaccinated. Pt denies fever, chills, nasal congestion, chest pain, shortness of breath, nausea, vomiting, diarrhea, abdominal pain, dizziness, syncope.    In the ED, T max 100.7 F, HR , 93% on 2L.  Influenza A positive.  CBC with normal WBC and Hgb.  CMP without electrolyte abnormalities and normal cr at 0.9 baseline.  Anion gap elevated at 17 with bicarb 18.  .  LA 1.8 WNL.  CXR unremarkable.      Past Medical History:   Diagnosis Date    Anticoagulant long-term use     Anxiety     Arthritis     Atrial fibrillation     Carpal tunnel syndrome on right     Diverticulosis     Encounter for blood transfusion     HTN (hypertension)     Hyperlipidemia     Osteopenia     Overactive bladder     Palpitations     Patellar tendonitis 4/13    saw [place]     Psoriasis     S/P partial hysterectomy     Situational depression     Supraventricular tachycardia        Past Surgical History: "   Procedure Laterality Date    ABDOMINAL SURGERY      BREAST BIOPSY Left     excisional    CARPAL TUNNEL RELEASE      HYSTERECTOMY      @27yrs of age    KNEE ARTHROSCOPY W/ DEBRIDEMENT  '05    Right    OOPHORECTOMY      @27yrs of age    PARTIAL HYSTERECTOMY         Review of patient's allergies indicates:   Allergen Reactions    Ciprofloxacin      Other reaction(s): Nausea       No current facility-administered medications on file prior to encounter.     Current Outpatient Medications on File Prior to Encounter   Medication Sig    cyanocobalamin, vitamin B-12, 5,000 mcg Subl Place one under tongue once a day for 1 month, then continue to take under tongue per week (Patient not taking: No sig reported)    donepeziL (ARICEPT) 10 MG tablet Take 1 tablet (10 mg total) by mouth every evening. (Patient not taking: No sig reported)    EScitalopram oxalate (LEXAPRO) 5 MG Tab TAKE 1 TABLET (5 MG TOTAL) BY MOUTH ONCE DAILY.    melatonin (MELATIN) 3 mg tablet Take 1 tablet (3 mg total) by mouth nightly.    mirabegron (MYRBETRIQ) 25 mg Tb24 ER tablet Take 2 tablets (50 mg total) by mouth once daily.    rivaroxaban (XARELTO) 20 mg Tab Take 1 tablet (20 mg total) by mouth before evening meal.    simvastatin (ZOCOR) 10 MG tablet TAKE 1 TABLET EVERY EVENING (Patient not taking: No sig reported)    suvorexant (BELSOMRA) 5 mg Tab Take 1 tablet by mouth every evening. (Patient not taking: No sig reported)    TIADYLT  mg Cs24 TAKE 1 CAPSULE EVERY DAY     Family History       Problem Relation (Age of Onset)    Heart failure Mother (80)    No Known Problems Sister, Brother, Daughter, Daughter, Daughter          Tobacco Use    Smoking status: Never    Smokeless tobacco: Never   Substance and Sexual Activity    Alcohol use: No    Drug use: No    Sexual activity: Not Currently     Partners: Male     Review of Systems   Unable to perform ROS: Dementia   Respiratory:  Positive for cough.    Neurological:  Positive  for weakness.   Psychiatric/Behavioral:  Positive for confusion.    Objective:     Vital Signs (Most Recent):  Temp: 99.6 °F (37.6 °C) (11/29/22 2003)  Pulse: 110 (11/29/22 2003)  Resp: (!) 24 (11/29/22 2003)  BP: (!) 98/57 (11/29/22 2003)  SpO2: (!) 93 % (11/29/22 2003)   Vital Signs (24h Range):  Temp:  [99.6 °F (37.6 °C)-100.7 °F (38.2 °C)] 99.6 °F (37.6 °C)  Pulse:  [] 110  Resp:  [18-24] 24  SpO2:  [93 %-96 %] 93 %  BP: ()/(57-78) 98/57     Weight: 48.1 kg (106 lb 0.7 oz)  Body mass index is 18.78 kg/m².    Physical Exam  Vitals and nursing note reviewed.   Constitutional:       Appearance: She is well-developed. She is ill-appearing.   HENT:      Head: Normocephalic and atraumatic.   Eyes:      Extraocular Movements: EOM normal.      Pupils: Pupils are equal, round, and reactive to light.   Neck:      Thyroid: No thyromegaly.      Trachea: No tracheal deviation.   Cardiovascular:      Rate and Rhythm: Normal rate and regular rhythm.      Heart sounds: No murmur heard.  Pulmonary:      Effort: Pulmonary effort is normal.      Breath sounds: Normal breath sounds. No wheezing.   Abdominal:      General: Bowel sounds are normal.      Palpations: Abdomen is soft.      Tenderness: There is no abdominal tenderness.   Musculoskeletal:         General: No tenderness. Normal range of motion.      Cervical back: Normal range of motion and neck supple.   Lymphadenopathy:      Cervical: No cervical adenopathy.   Skin:     General: Skin is warm and dry.   Neurological:      Mental Status: She is alert. She is disoriented.      Cranial Nerves: No cranial nerve deficit.      Deep Tendon Reflexes: Reflexes are normal and symmetric.      Comments: Oriented to self, close to baseline   Psychiatric:         Behavior: Behavior normal.         CRANIAL NERVES     CN III, IV, VI   Pupils are equal, round, and reactive to light.  Extraocular motions are normal.      Significant Labs: All pertinent labs within the past 24  hours have been reviewed.    Significant Imaging: I have reviewed all pertinent imaging results/findings within the past 24 hours.    Assessment/Plan:     * Acute hypoxemic respiratory failure  Patient with Hypoxic Respiratory failure which is Acute.  she is not on home oxygen. Supplemental oxygen was provided and noted-  .   Signs/symptoms of respiratory failure include- lethargy. Contributing diagnoses includes - none Labs and images were reviewed. Patient Has not had a recent ABG. Will treat underlying causes and adjust management of respiratory failure as follows-tamiflu    Influenza A  Tamiflu initiated  Antipyretics for fever and continuous IVF      Atrial fibrillation and flutter  Patient with Paroxysmal (<7 days) atrial fibrillation which is uncontrolled currently with diltiazem. Patient is currently in atrial fibrillation.[id number] Score: 3. Anticoagulation indicated. Anticoagulation done with xarelto.  HR improved after fluids and tylenol, pt received morning dose of diltiazem.        Long term current use of anticoagulant  See above      Essential hypertension  Controlled upon arrival then borderline low  Pt did take diltiazem morning of admission  Monitor closely      Non-traumatic rhabdomyolysis  , normal kidney function, trend and treat with continuous IVF      Memory impairment  Close to baseline  Continue home meds and delirium precautions      Situational depression  Patient has persistent depression which is mild and is currently controlled. Will Continue anti-depressant medications. We will not consult psychiatry at this time. Patient does not display psychosis at this time. Continue to monitor closely and adjust plan of care as needed.          VTE Risk Mitigation (From admission, onward)         Ordered     rivaroxaban tablet 20 mg  Daily before evening meal         11/29/22 2036     Reason for No Pharmacological VTE Prophylaxis  Once        Question:  Reasons:  Answer:  Already  adequately anticoagulated on oral Anticoagulants    11/29/22 2036     IP VTE HIGH RISK PATIENT  Once         11/29/22 2036                   Melodie Rincon PA-C  Department of Hospital Medicine   [facility] - Emergency Dept

## 2022-11-30 NOTE — ASSESSMENT & PLAN NOTE
Weakness  -->945, normal kidney function.  Pt's daughter reports hospitalization with rhabdo (pt down for 10 hrs) a few months ago.  Will trend and treat with continuous IVF.  Will order PT/OT per daughter's request.

## 2022-11-30 NOTE — SUBJECTIVE & OBJECTIVE
Past Medical History:   Diagnosis Date    Anticoagulant long-term use     Anxiety     Arthritis     Atrial fibrillation     Carpal tunnel syndrome on right     Diverticulosis     Encounter for blood transfusion     HTN (hypertension)     Hyperlipidemia     Osteopenia     Overactive bladder     Palpitations     Patellar tendonitis 4/13    saw [place] bone     Psoriasis     S/P partial hysterectomy     Situational depression     Supraventricular tachycardia        Past Surgical History:   Procedure Laterality Date    ABDOMINAL SURGERY      BREAST BIOPSY Left     excisional    CARPAL TUNNEL RELEASE      HYSTERECTOMY      @27yrs of age    KNEE ARTHROSCOPY W/ DEBRIDEMENT  '05    Right    OOPHORECTOMY      @27yrs of age    PARTIAL HYSTERECTOMY         Review of patient's allergies indicates:   Allergen Reactions    Ciprofloxacin      Other reaction(s): Nausea       No current facility-administered medications on file prior to encounter.     Current Outpatient Medications on File Prior to Encounter   Medication Sig    cyanocobalamin, vitamin B-12, 5,000 mcg Subl Place one under tongue once a day for 1 month, then continue to take under tongue per week (Patient not taking: No sig reported)    donepeziL (ARICEPT) 10 MG tablet Take 1 tablet (10 mg total) by mouth every evening. (Patient not taking: No sig reported)    EScitalopram oxalate (LEXAPRO) 5 MG Tab TAKE 1 TABLET (5 MG TOTAL) BY MOUTH ONCE DAILY.    melatonin (MELATIN) 3 mg tablet Take 1 tablet (3 mg total) by mouth nightly.    mirabegron (MYRBETRIQ) 25 mg Tb24 ER tablet Take 2 tablets (50 mg total) by mouth once daily.    rivaroxaban (XARELTO) 20 mg Tab Take 1 tablet (20 mg total) by mouth before evening meal.    simvastatin (ZOCOR) 10 MG tablet TAKE 1 TABLET EVERY EVENING (Patient not taking: No sig reported)    suvorexant (BELSOMRA) 5 mg Tab Take 1 tablet by mouth every evening. (Patient not taking: No sig reported)    TIADYLT  mg Cs24 TAKE 1 CAPSULE  EVERY DAY     Family History       Problem Relation (Age of Onset)    Heart failure Mother (80)    No Known Problems Sister, Brother, Daughter, Daughter, Daughter          Tobacco Use    Smoking status: Never    Smokeless tobacco: Never   Substance and Sexual Activity    Alcohol use: No    Drug use: No    Sexual activity: Not Currently     Partners: Male     Review of Systems   Unable to perform ROS: Dementia   Respiratory:  Positive for cough.    Neurological:  Positive for weakness.   Psychiatric/Behavioral:  Positive for confusion.    Objective:     Vital Signs (Most Recent):  Temp: 99.6 °F (37.6 °C) (11/29/22 2003)  Pulse: 110 (11/29/22 2003)  Resp: (!) 24 (11/29/22 2003)  BP: (!) 98/57 (11/29/22 2003)  SpO2: (!) 93 % (11/29/22 2003)   Vital Signs (24h Range):  Temp:  [99.6 °F (37.6 °C)-100.7 °F (38.2 °C)] 99.6 °F (37.6 °C)  Pulse:  [] 110  Resp:  [18-24] 24  SpO2:  [93 %-96 %] 93 %  BP: ()/(57-78) 98/57     Weight: 48.1 kg (106 lb 0.7 oz)  Body mass index is 18.78 kg/m².    Physical Exam  Vitals and nursing note reviewed.   Constitutional:       Appearance: She is well-developed. She is ill-appearing.   HENT:      Head: Normocephalic and atraumatic.   Eyes:      Extraocular Movements: EOM normal.      Pupils: Pupils are equal, round, and reactive to light.   Neck:      Thyroid: No thyromegaly.      Trachea: No tracheal deviation.   Cardiovascular:      Rate and Rhythm: Normal rate and regular rhythm.      Heart sounds: No murmur heard.  Pulmonary:      Effort: Pulmonary effort is normal.      Breath sounds: Normal breath sounds. No wheezing.   Abdominal:      General: Bowel sounds are normal.      Palpations: Abdomen is soft.      Tenderness: There is no abdominal tenderness.   Musculoskeletal:         General: No tenderness. Normal range of motion.      Cervical back: Normal range of motion and neck supple.   Lymphadenopathy:      Cervical: No cervical adenopathy.   Skin:     General: Skin is warm  and dry.   Neurological:      Mental Status: She is alert. She is disoriented.      Cranial Nerves: No cranial nerve deficit.      Deep Tendon Reflexes: Reflexes are normal and symmetric.      Comments: Oriented to self, close to baseline   Psychiatric:         Behavior: Behavior normal.         CRANIAL NERVES     CN III, IV, VI   Pupils are equal, round, and reactive to light.  Extraocular motions are normal.      Significant Labs: All pertinent labs within the past 24 hours have been reviewed.    Significant Imaging: I have reviewed all pertinent imaging results/findings within the past 24 hours.

## 2022-11-30 NOTE — H&P
"[facility]  History & Physical    Patient Name: Tigist Murry  MRN: [id number]  Patient Class: OP- Observation  Admission Date: 11/29/2022  Attending Physician: Diandra Garland*   Primary Care Provider: Gwen Porter MD         Patient information was obtained from patient, relative(s), past medical records and ER records.     Subjective:     Principal Problem:Acute hypoxemic respiratory failure    Chief Complaint:   Chief Complaint   Patient presents with    Fever    Weakness    Cough     Fever with weakness and cough since Saturday.        HPI: This is a 82 y.o. year old female with a PMH of paroxysmal A fib (on Xarelto), SVT, HTN, anxiety, dementia, and AVNRT s/p ablation (2015) who presents to the ED with a chief complaint of weakness and cough x 3 days. Pt's daughter providing majority of history.  Pt is close to baseline, "slightly more confused" per daughter.  Pt oriented to person.  Patient denies known exposure to a COVID-19 patient, she is vaccinated. Pt denies fever, chills, nasal congestion, chest pain, shortness of breath, nausea, vomiting, diarrhea, abdominal pain, dizziness, syncope.    In the ED, T max 100.7 F, HR , 93% on 2L.  Influenza A positive.  CBC with normal WBC and Hgb.  CMP without electrolyte abnormalities and normal cr at 0.9 baseline.  Anion gap elevated at 17 with bicarb 18.  .  LA 1.8 WNL.  CXR unremarkable.      Past Medical History:   Diagnosis Date    Anticoagulant long-term use     Anxiety     Arthritis     Atrial fibrillation     Carpal tunnel syndrome on right     Diverticulosis     Encounter for blood transfusion     HTN (hypertension)     Hyperlipidemia     Osteopenia     Overactive bladder     Palpitations     Patellar tendonitis 4/13    saw [place] bone     Psoriasis     S/P partial hysterectomy     Situational depression     Supraventricular tachycardia        Past Surgical History:   Procedure Laterality Date    " ABDOMINAL SURGERY      BREAST BIOPSY Left     excisional    CARPAL TUNNEL RELEASE      HYSTERECTOMY      @27yrs of age    KNEE ARTHROSCOPY W/ DEBRIDEMENT  '05    Right    OOPHORECTOMY      @27yrs of age    PARTIAL HYSTERECTOMY         Review of patient's allergies indicates:   Allergen Reactions    Ciprofloxacin      Other reaction(s): Nausea       No current facility-administered medications on file prior to encounter.     Current Outpatient Medications on File Prior to Encounter   Medication Sig    cyanocobalamin, vitamin B-12, 5,000 mcg Subl Place one under tongue once a day for 1 month, then continue to take under tongue per week (Patient not taking: No sig reported)    donepeziL (ARICEPT) 10 MG tablet Take 1 tablet (10 mg total) by mouth every evening. (Patient not taking: No sig reported)    EScitalopram oxalate (LEXAPRO) 5 MG Tab TAKE 1 TABLET (5 MG TOTAL) BY MOUTH ONCE DAILY.    melatonin (MELATIN) 3 mg tablet Take 1 tablet (3 mg total) by mouth nightly.    mirabegron (MYRBETRIQ) 25 mg Tb24 ER tablet Take 2 tablets (50 mg total) by mouth once daily.    rivaroxaban (XARELTO) 20 mg Tab Take 1 tablet (20 mg total) by mouth before evening meal.    simvastatin (ZOCOR) 10 MG tablet TAKE 1 TABLET EVERY EVENING (Patient not taking: No sig reported)    suvorexant (BELSOMRA) 5 mg Tab Take 1 tablet by mouth every evening. (Patient not taking: No sig reported)    TIADYLT  mg Cs24 TAKE 1 CAPSULE EVERY DAY     Family History       Problem Relation (Age of Onset)    Heart failure Mother (80)    No Known Problems Sister, Brother, Daughter, Daughter, Daughter          Tobacco Use    Smoking status: Never    Smokeless tobacco: Never   Substance and Sexual Activity    Alcohol use: No    Drug use: No    Sexual activity: Not Currently     Partners: Male     Review of Systems   Unable to perform ROS: Dementia   Respiratory:  Positive for cough.    Neurological:  Positive for weakness.   Psychiatric/Behavioral:  Positive for  confusion.    Objective:     Vital Signs (Most Recent):  Temp: 99.6 °F (37.6 °C) (11/29/22 2003)  Pulse: 110 (11/29/22 2003)  Resp: (!) 24 (11/29/22 2003)  BP: (!) 98/57 (11/29/22 2003)  SpO2: (!) 93 % (11/29/22 2003)   Vital Signs (24h Range):  Temp:  [99.6 °F (37.6 °C)-100.7 °F (38.2 °C)] 99.6 °F (37.6 °C)  Pulse:  [] 110  Resp:  [18-24] 24  SpO2:  [93 %-96 %] 93 %  BP: ()/(57-78) 98/57     Weight: 48.1 kg (106 lb 0.7 oz)  Body mass index is 18.78 kg/m².    Physical Exam  Vitals and nursing note reviewed.   Constitutional:       Appearance: She is well-developed. She is ill-appearing.   HENT:      Head: Normocephalic and atraumatic.   Eyes:      Extraocular Movements: EOM normal.      Pupils: Pupils are equal, round, and reactive to light.   Neck:      Thyroid: No thyromegaly.      Trachea: No tracheal deviation.   Cardiovascular:      Rate and Rhythm: Normal rate and regular rhythm.      Heart sounds: No murmur heard.  Pulmonary:      Effort: Pulmonary effort is normal.      Breath sounds: Normal breath sounds. No wheezing.   Abdominal:      General: Bowel sounds are normal.      Palpations: Abdomen is soft.      Tenderness: There is no abdominal tenderness.   Musculoskeletal:         General: No tenderness. Normal range of motion.      Cervical back: Normal range of motion and neck supple.   Lymphadenopathy:      Cervical: No cervical adenopathy.   Skin:     General: Skin is warm and dry.   Neurological:      Mental Status: She is alert. She is disoriented.      Cranial Nerves: No cranial nerve deficit.      Deep Tendon Reflexes: Reflexes are normal and symmetric.      Comments: Oriented to self, close to baseline   Psychiatric:         Behavior: Behavior normal.         CRANIAL NERVES     CN III, IV, VI   Pupils are equal, round, and reactive to light.  Extraocular motions are normal.      Significant Labs: All pertinent labs within the past 24 hours have been reviewed.    Significant Imaging: I  have reviewed all pertinent imaging results/findings within the past 24 hours.    Assessment/Plan:     * Acute hypoxemic respiratory failure  Patient with Hypoxic Respiratory failure which is Acute.  she is not on home oxygen. Supplemental oxygen was provided and noted-  .   Signs/symptoms of respiratory failure include- lethargy. Contributing diagnoses includes - none Labs and images were reviewed. Patient Has not had a recent ABG. Will treat underlying causes and adjust management of respiratory failure as follows-tamiflu    Influenza A  Tamiflu initiated  Antipyretics for fever and continuous IVF      Atrial fibrillation and flutter  Patient with Paroxysmal (<7 days) atrial fibrillation which is uncontrolled currently with diltiazem. Patient is currently in atrial fibrillation.[id number] Score: 3. Anticoagulation indicated. Anticoagulation done with xarelto.  HR improved after fluids and tylenol, pt received morning dose of diltiazem.        Long term current use of anticoagulant  See above      Essential hypertension  Controlled upon arrival then borderline low  Pt did take diltiazem morning of admission  Monitor closely      Non-traumatic rhabdomyolysis  Weakness  , normal kidney function.  Pt's daughter reports hospitalization with rhabdo (pt down for 10 hrs) a few months ago.  Will trend and treat with continuous IVF.  Will order PT/OT per daughter's request.      Memory impairment  Close to baseline  Continue home meds and delirium precautions      Situational depression  Patient has persistent depression which is mild and is currently controlled. Will Continue anti-depressant medications. We will not consult psychiatry at this time. Patient does not display psychosis at this time. Continue to monitor closely and adjust plan of care as needed.          VTE Risk Mitigation (From admission, onward)           Ordered     rivaroxaban tablet 20 mg  Daily before evening meal         11/29/22 2036     Reason  for No Pharmacological VTE Prophylaxis  Once        Question:  Reasons:  Answer:  Already adequately anticoagulated on oral Anticoagulants    11/29/22 2036     IP VTE HIGH RISK PATIENT  Once         11/29/22 2036                    As clarification on 11/29/22 patient should be admitted for hospital observation services under my care in collaberation with Dr. Escobar.  MOUNA Haines PA-C  Department of Hospital Medicine   [facility] - Emergency Dept

## 2022-11-30 NOTE — ED PROVIDER NOTES
Encounter Date: 11/29/2022       History     Chief Complaint   Patient presents with    Fever    Weakness    Cough     Fever with weakness and cough since Saturday.     82-year-old with dementia but functions well at home that presents for 2 days of worsening fatigue. She has had multiple sick contacts over the thanksgiving holiday 4 days prior. She has had some fatigue and body aches as well. Rest of history limited given mental status.     Review of patient's allergies indicates:   Allergen Reactions    Ciprofloxacin      Other reaction(s): Nausea     Past Medical History:   Diagnosis Date    Anticoagulant long-term use     Anxiety     Arthritis     Atrial fibrillation     Carpal tunnel syndrome on right     Diverticulosis     Encounter for blood transfusion     HTN (hypertension)     Hyperlipidemia     Osteopenia     Overactive bladder     Palpitations     Patellar tendonitis 4/13    saw [place] bone     Psoriasis     S/P partial hysterectomy     Situational depression     Supraventricular tachycardia      Past Surgical History:   Procedure Laterality Date    ABDOMINAL SURGERY      BREAST BIOPSY Left     excisional    CARPAL TUNNEL RELEASE      HYSTERECTOMY      @27yrs of age    KNEE ARTHROSCOPY W/ DEBRIDEMENT  '05    Right    OOPHORECTOMY      @27yrs of age    PARTIAL HYSTERECTOMY       Family History   Problem Relation Age of Onset    Heart failure Mother 80    No Known Problems Sister     No Known Problems Brother     No Known Problems Daughter     No Known Problems Daughter     No Known Problems Daughter      Social History     Tobacco Use    Smoking status: Never    Smokeless tobacco: Never   Substance Use Topics    Alcohol use: No    Drug use: No     Review of Systems   Unable to perform ROS: Dementia     Physical Exam     Initial Vitals [11/29/22 1625]   BP Pulse Resp Temp SpO2   122/78 90 18 (!) 100.7 °F (38.2 °C) 96 %      MAP       --         Physical Exam  Constitutional: ill appearing 83 yo woman  in modeate distress  Eyes: Conjunctivae normal.  ENT       Head: Normocephalic, atraumatic.       Nose: Normal external appearance        Mouth/Throat: no strigulous respirations   Hematological/Lymphatic/Immunilogical: no visible lymphadenopathy   Cardiovascular: rapid rate irregular rhythm  Respiratory: icreased respiratory effort.   Gastrointestinal: non distended   Musculoskeletal: Normal range of motion in all extremities. No obvious deformities or swelling.  Neurologic: Alert, oriented. Normal speech and language. No gross focal neurologic deficits are appreciated.  Skin: Skin is warm, dry. No rash noted.  Psychiatric: Mood and affect are normal.    ED Course   Procedures  Labs Reviewed   INFLUENZA A & B BY MOLECULAR - Abnormal; Notable for the following components:       Result Value    Influenza A, Molecular Positive (*)     All other components within normal limits   CBC W/ AUTO DIFFERENTIAL - Abnormal; Notable for the following components:    Lymph # 0.5 (*)     Gran % 84.3 (*)     Lymph % 6.0 (*)     All other components within normal limits   COMPREHENSIVE METABOLIC PANEL - Abnormal; Notable for the following components:    CO2 18 (*)     Glucose 115 (*)     Anion Gap 17 (*)     All other components within normal limits   URINALYSIS, REFLEX TO URINE CULTURE - Abnormal; Notable for the following components:    Protein, UA 1+ (*)     Ketones, UA 2+ (*)     Occult Blood UA Trace (*)     Nitrite, UA Positive (*)     Leukocytes, UA Trace (*)     All other components within normal limits    Narrative:     Specimen Source->Urine   CK - Abnormal; Notable for the following components:     (*)     All other components within normal limits   URINALYSIS MICROSCOPIC - Abnormal; Notable for the following components:    WBC, UA 9 (*)     All other components within normal limits    Narrative:     Specimen Source->Urine   BASIC METABOLIC PANEL - Abnormal; Notable for the following components:    CO2 22 (*)      All other components within normal limits   CK - Abnormal; Notable for the following components:     (*)     All other components within normal limits   CBC W/ AUTO DIFFERENTIAL - Abnormal; Notable for the following components:    Lymph # 0.6 (*)     Gran % 75.7 (*)     Lymph % 11.1 (*)     All other components within normal limits   CULTURE, BLOOD    Narrative:     Aerobic and anaerobic   CULTURE, BLOOD    Narrative:     Aerobic and anaerobic   CULTURE, URINE   SARS-COV-2 RNA AMPLIFICATION, QUAL   LACTIC ACID, PLASMA   MAGNESIUM   PHOSPHORUS   PROCALCITONIN   CK        ECG Results              EKG 12-lead (In process)  Result time 11/30/22 08:34:48      In process by Interface, Lab In [facility] (11/30/22 08:34:48)                   Narrative:    Test Reason : A41.9,    Vent. Rate : 111 BPM     Atrial Rate : 366 BPM     P-R Int : 000 ms          QRS Dur : 068 ms      QT Int : 292 ms       P-R-T Axes : 000 080 190 degrees     QTc Int : 397 ms    Atrial flutter with variable A-V block  ST and T wave abnormality, consider inferior ischemia  Abnormal ECG  When compared with ECG of 10-FEB-2022 10:42,  Atrial flutter has replaced Sinus rhythm  Vent. rate has increased BY  43 BPM  Criteria for Septal infarct are no longer Present    Referred By: [email or web address]   SELF           Confirmed By:                                   Imaging Results              X-Ray Chest AP Portable (Final result)  Result time 11/29/22 17:40:28      Final result by Cayetano Evangelista DO (11/29/22 17:40:28)                   Impression:      No acute abnormality.      Electronically signed by: Cayetano Evangelista  Date:    11/29/2022  Time:    17:40               Narrative:    EXAMINATION:  XR CHEST AP PORTABLE    CLINICAL HISTORY:  Sepsis;    TECHNIQUE:  Single frontal view of the chest was performed.    COMPARISON:  01/18/2017.    FINDINGS:  The lungs are well expanded and clear. No focal opacities are seen. The pleural spaces are clear.    The cardiac  silhouette is unremarkable.  There are calcifications of the aortic arch.    The visualized osseous structures are unremarkable.  There are right upper quadrant surgical clips.                                       Medications   lactated ringers infusion ( Intravenous Rate/Dose Change 11/30/22 1329)   EScitalopram oxalate tablet 5 mg (5 mg Oral Given 11/30/22 1048)   melatonin tablet 3 mg (3 mg Oral Given 11/29/22 2342)   oxybutynin 24 hr tablet 10 mg (10 mg Oral Given 11/30/22 1048)   diltiaZEM 24 hr capsule 240 mg (240 mg Oral Given 11/30/22 0913)   albuterol-ipratropium 2.5 mg-0.5 mg/3 mL nebulizer solution 3 mL (3 mLs Nebulization Given 11/29/22 2302)   polyethylene glycol packet 17 g (has no administration in time range)   bisacodyL suppository 10 mg (has no administration in time range)   acetaminophen tablet 650 mg (650 mg Oral Given 11/29/22 2341)   naloxone 0.4 mg/mL injection 0.02 mg (has no administration in time range)   glucose chewable tablet 16 g (has no administration in time range)   glucose chewable tablet 24 g (has no administration in time range)   glucagon (human recombinant) injection 1 mg (has no administration in time range)   dextrose 10% bolus 125 mL (has no administration in time range)   dextrose 10% bolus 250 mL (has no administration in time range)   ondansetron injection 4 mg (has no administration in time range)   acetaminophen tablet 650 mg ( Oral Canceled Entry 11/29/22 2341)   oseltamivir capsule 30 mg (30 mg Oral Given 11/30/22 1048)   cefTRIAXone (ROCEPHIN) 1 g/50 mL D5W IVPB (0 g Intravenous Stopped 11/30/22 1259)   rivaroxaban tablet 15 mg (15 mg Oral Given 11/30/22 1724)   melatonin tablet 6 mg (has no administration in time range)   lactated ringers bolus 1,443 mL (0 mLs Intravenous Stopped 11/29/22 2127)   cefTRIAXone (ROCEPHIN) 2 g/50 mL D5W IVPB (0 g Intravenous Stopped 11/29/22 1833)   acetaminophen tablet 650 mg (650 mg Oral Given 11/29/22 1854)   oseltamivir capsule 75  mg (75 mg Oral Given 11/29/22 2037)     Medical Decision Making:   History:   Old Medical Records: I decided to obtain old medical records.  Old Records Summarized: records from clinic visits and records from previous admission(s).  Differential Diagnosis:   SIRS, Sepsis, pneumonia, influenza, covid  Clinical Tests:   Lab Tests: Ordered and Reviewed  Radiological Study: Ordered and Reviewed  Medical Tests: Ordered and Reviewed  ED Management:  Fevers, tachycardia, Swabs obtained, Sepsis protocol initiated.  FLU +, elevated cpk.  Mild tachypnea, concern given early course, plan for observation, have administered IVF, abx, tamiflu and antipyretics.   Discussed with [facility] team, plan for admission and ongoing monitoring.                         Clinical Impression:   Final diagnoses:  [A41.9] Sepsis  [R65.10] SIRS (systemic inflammatory response syndrome)  [J10.1] Influenza A (Primary)  [I48.91] Atrial fibrillation with RVR        ED Disposition Condition    Admit                 Daljit Flood MD  11/30/22 2055

## 2022-11-30 NOTE — PLAN OF CARE
Patient seen for physical therapy evaluation on this date.  Patient's family member present during evaluation as well as daughter on speaker phone.  Patient was A & O x 4, followed all commands.  Patient participated well with therapy.  Patient will benefit from inpatient physical therapy to address decreased balance, decreased endurance, decreased strength, and decreased overall independence with functional mobility.  Anticipate patient will benefit from skilled nursing facility  at discharge.  Full report to follow.    Problem: Physical Therapy  Goal: Physical Therapy Goal  Description: Goals to be met by: 2022     Patient will increase functional independence with mobility by performin. Supine to sit with Contact Guard Assistance  2. Sit to supine with Contact Guard Assistance  3. Rolling to Left and Right with Stand-by Assistance.  4. Sit to stand transfer with Stand-by Assistance  5. Bed to chair transfer with Stand-by Assistance using Rolling Walker  6. Gait  x 100 feet with Contact Guard Assistance using Rolling Walker.   7. Ascend/descend 1 stair withContact Guard Assistance using Rolling Walker.     Outcome: Ongoing, Progressing

## 2022-11-30 NOTE — ED NOTES
Report given to ROBINSON Gleason using SBAR. All questions answered. Patient in no apparent distress at this time.

## 2022-11-30 NOTE — ASSESSMENT & PLAN NOTE
Controlled upon arrival then borderline low  Pt did take diltiazem morning of admission  Monitor closely      Vitals:    11/30/22 0600 11/30/22 0730 11/30/22 0800 11/30/22 0955   BP: (!) 144/81  (!) 148/95 (!) 148/95   Pulse: 98 (!) 112 109 97   Resp: (!) 22  (!) 23    Temp:       TempSrc:       SpO2: (!) 91% (!) 90% 97% 99%   Weight:       Height:

## 2022-11-30 NOTE — PROGRESS NOTES
"[facility]  [facility] Medicine  Progress Note    Patient Name: Tigist Murry  MRN: [id number]  Patient Class: IP- Inpatient   Admission Date: 11/29/2022  Length of Stay: 0 days  Attending Physician: Diandra Garland*  Primary Care Provider: Gwen Porter MD        Subjective:     Principal Problem:Acute hypoxemic respiratory failure        HPI:  This is a 82 y.o. year old female with a PMH of paroxysmal A fib (on Xarelto), SVT, HTN, anxiety, dementia, and AVNRT s/p ablation (2015) who presents to the ED with a chief complaint of weakness and cough x 3 days. Pt's daughter providing majority of history.  Pt is close to baseline, "slightly more confused" per daughter.  Pt oriented to person.  Patient denies known exposure to a COVID-19 patient, she is vaccinated. Pt denies fever, chills, nasal congestion, chest pain, shortness of breath, nausea, vomiting, diarrhea, abdominal pain, dizziness, syncope.    In the ED, T max 100.7 F, HR , 93% on 2L.  Influenza A positive.  CBC with normal WBC and Hgb.  CMP without electrolyte abnormalities and normal cr at 0.9 baseline.  Anion gap elevated at 17 with bicarb 18.  .  LA 1.8 WNL.  CXR unremarkable.      Overview/Hospital Course:  No notes on file    Interval hx: She has ongoing SOB and a dry cough, however improved. She denies dysuria, but presented with confusion.   She denies cp, n/v/d.    Review of Systems   Unable to perform ROS: Dementia   Respiratory:  Positive for cough.    Neurological:  Positive for weakness.   Psychiatric/Behavioral:  Positive for confusion.    Objective:     Vital Signs (Most Recent):  Temp: 98.5 °F (36.9 °C) (11/30/22 0329)  Pulse: 97 (11/30/22 0955)  Resp: (!) 23 (11/30/22 0800)  BP: (!) 148/95 (11/30/22 0955)  SpO2: 99 % (11/30/22 0955)   Vital Signs (24h Range):  Temp:  [98.5 °F (36.9 °C)-100.7 °F (38.2 °C)] 98.5 °F (36.9 °C)  Pulse:  [] 97  Resp:  [14-38] 23  SpO2:  [87 %-99 %] 99 %  BP: " ()/(57-95) 148/95     Weight: 48.1 kg (106 lb 0.7 oz)  Body mass index is 18.78 kg/m².    Physical Exam  Vitals and nursing note reviewed.   Constitutional:       Appearance: She is well-developed. She is ill-appearing.   HENT:      Head: Normocephalic and atraumatic.   Eyes:      Extraocular Movements: EOM normal.      Pupils: Pupils are equal, round, and reactive to light.   Neck:      Thyroid: No thyromegaly.      Trachea: No tracheal deviation.   Cardiovascular:      Rate and Rhythm: Normal rate and regular rhythm.      Heart sounds: No murmur heard.  Pulmonary:      Effort: Pulmonary effort is normal.      Breath sounds: Normal breath sounds. No wheezing.   Abdominal:      General: Bowel sounds are normal.      Palpations: Abdomen is soft.      Tenderness: There is no abdominal tenderness.   Musculoskeletal:         General: No tenderness. Normal range of motion.      Cervical back: Normal range of motion and neck supple.   Lymphadenopathy:      Cervical: No cervical adenopathy.   Skin:     General: Skin is warm and dry.   Neurological:      Mental Status: She is alert. She is disoriented.      Cranial Nerves: No cranial nerve deficit.      Deep Tendon Reflexes: Reflexes are normal and symmetric.      Comments: Oriented to self, close to baseline   Psychiatric:         Behavior: Behavior normal.         CRANIAL NERVES     CN III, IV, VI   Pupils are equal, round, and reactive to light.  Extraocular motions are normal.      Significant Labs: All pertinent labs within the past 24 hours have been reviewed.    Significant Imaging: I have reviewed all pertinent imaging results/findings within the past 24 hours.      Assessment/Plan:      * Acute hypoxemic respiratory failure  Patient with Hypoxic Respiratory failure which is Acute.  she is not on home oxygen. Supplemental oxygen was provided and noted-  .    Signs/symptoms of respiratory failure include- lethargy. Contributing diagnoses includes - none Labs  and images were reviewed. Patient Has not had a recent ABG. Will treat underlying causes and adjust management of respiratory failure as follows-tamiflu    On 3.5 L NC 99% Sao2  Ok to wean O2       UTI (urinary tract infection)    Patient with mild UTI, nitrite +  Confusion, weakness   Continue IV rocephin for now   Urine cx added     Influenza A  Tamiflu initiated  Antipyretics for fever and continuous IVF     Increase IVF       Memory impairment  Close to baseline  Continue home meds and delirium precautions      Weakness        Atrial fibrillation and flutter  Patient with Paroxysmal (<7 days) atrial fibrillation which is uncontrolled currently with diltiazem. Patient is currently in atrial fibrillation.[id number] Score: 3. Anticoagulation indicated. Anticoagulation done with xarelto.  HR improved after fluids and tylenol, pt received morning dose of diltiazem.        Non-traumatic rhabdomyolysis  Weakness  -->945, normal kidney function.  Pt's daughter reports hospitalization with rhabdo (pt down for 10 hrs) a few months ago.  Will trend and treat with continuous IVF.  Will order PT/OT per daughter's request.      Long term current use of anticoagulant  See above      Situational depression  Patient has persistent depression which is mild and is currently controlled. Will Continue anti-depressant medications. We will not consult psychiatry at this time. Patient does not display psychosis at this time. Continue to monitor closely and adjust plan of care as needed.        Essential hypertension  Controlled upon arrival then borderline low  Pt did take diltiazem morning of admission  Monitor closely      Vitals:    11/30/22 0600 11/30/22 0730 11/30/22 0800 11/30/22 0955   BP: (!) 144/81  (!) 148/95 (!) 148/95   Pulse: 98 (!) 112 109 97   Resp: (!) 22  (!) 23    Temp:       TempSrc:       SpO2: (!) 91% (!) 90% 97% 99%   Weight:       Height:               VTE Risk Mitigation (From admission, onward)          Ordered     rivaroxaban tablet 20 mg  Daily before evening meal         11/29/22 2036     Reason for No Pharmacological VTE Prophylaxis  Once        Question:  Reasons:  Answer:  Already adequately anticoagulated on oral Anticoagulants    11/29/22 2036     IP VTE HIGH RISK PATIENT  Once         11/29/22 2036                Discharge Planning   CLARKE:      Code Status: Full Code   Is the patient medically ready for discharge?:     Reason for patient still in hospital (select all that apply): Patient trending condition                     Annie Reyes NP  Department of Hospital Medicine   [place] - Emergency Dept

## 2022-11-30 NOTE — HPI
"This is a 82 y.o. year old female with a PMH of paroxysmal A fib (on Xarelto), SVT, HTN, anxiety, dementia, and AVNRT s/p ablation (2015) who presents to the ED with a chief complaint of weakness and cough x 3 days. Pt's daughter providing majority of history.  Pt is close to baseline, "slightly more confused" per daughter.  Pt oriented to person.  Patient denies known exposure to a COVID-19 patient, she is vaccinated. Pt denies fever, chills, nasal congestion, chest pain, shortness of breath, nausea, vomiting, diarrhea, abdominal pain, dizziness, syncope.    In the ED, T max 100.7 F, HR , 93% on 2L.  Influenza A positive.  CBC with normal WBC and Hgb.  CMP without electrolyte abnormalities and normal cr at 0.9 baseline.  Anion gap elevated at 17 with bicarb 18.  .  LA 1.8 WNL.  CXR unremarkable.  "

## 2022-11-30 NOTE — ASSESSMENT & PLAN NOTE
Weakness  , normal kidney function.  Pt's daughter reports hospitalization with rhabdo (pt down for 10 hrs) a few months ago.  Will trend and treat with continuous IVF.  Will order PT/OT per daughter's request.

## 2022-11-30 NOTE — ASSESSMENT & PLAN NOTE
Patient with Hypoxic Respiratory failure which is Acute.  she is not on home oxygen. Supplemental oxygen was provided and noted-  .   Signs/symptoms of respiratory failure include- lethargy. Contributing diagnoses includes - none Labs and images were reviewed. Patient Has not had a recent ABG. Will treat underlying causes and adjust management of respiratory failure as follows-tamiflu

## 2022-11-30 NOTE — PROGRESS NOTES
Pharmacist Renal Dose Adjustment Note    Tigist Murry is a 82 y.o. female being treated with the medication oseltamivir    Patient Data:    Vital Signs (Most Recent):  Temp: 99.6 °F (37.6 °C) (11/29/22 2003)  Pulse: 110 (11/29/22 2003)  Resp: (!) 24 (11/29/22 2003)  BP: (!) 98/57 (11/29/22 2003)  SpO2: (!) 93 % (11/29/22 2003)   Vital Signs (72h Range):  Temp:  [99.6 °F (37.6 °C)-100.7 °F (38.2 °C)]   Pulse:  []   Resp:  [18-24]   BP: ()/(57-78)   SpO2:  [93 %-96 %]      Recent Labs   Lab 11/29/22  1732   CREATININE 0.9     Serum creatinine: 0.9 mg/dL 11/29/22 1732  Estimated creatinine clearance: 36 mL/min    Medication:oseltamivir dose: 75mg frequency bid will be changed to medication:oseltamivir dose:30mg frequency:bid    Pharmacist's Name: Rigoberto Marks  Pharmacist's Extension: [phone]

## 2022-11-30 NOTE — ASSESSMENT & PLAN NOTE
Patient with Hypoxic Respiratory failure which is Acute.  she is not on home oxygen. Supplemental oxygen was provided and noted-  .    Signs/symptoms of respiratory failure include- lethargy. Contributing diagnoses includes - none Labs and images were reviewed. Patient Has not had a recent ABG. Will treat underlying causes and adjust management of respiratory failure as follows-tamiflu    On 3.5 L NC 99% Sao2  Ok to wean O2

## 2022-11-30 NOTE — RESPIRATORY THERAPY
PT breath sounds were diminished with little air movement.  PRN tx given.  Increased aeration post treatment with improved SpO2.

## 2022-12-01 LAB
ANION GAP SERPL CALC-SCNC: 13 MMOL/L (ref 8–16)
BASOPHILS # BLD AUTO: 0.02 K/UL (ref 0–0.2)
BASOPHILS NFR BLD: 0.4 % (ref 0–1.9)
BUN SERPL-MCNC: 10 MG/DL (ref 8–23)
CALCIUM SERPL-MCNC: 9.6 MG/DL (ref 8.7–10.5)
CHLORIDE SERPL-SCNC: 103 MMOL/L (ref 95–110)
CO2 SERPL-SCNC: 29 MMOL/L (ref 23–29)
CREAT SERPL-MCNC: 0.7 MG/DL (ref 0.5–1.4)
DIFFERENTIAL METHOD: ABNORMAL
EOSINOPHIL # BLD AUTO: 0 K/UL (ref 0–0.5)
EOSINOPHIL NFR BLD: 0.2 % (ref 0–8)
ERYTHROCYTE [DISTWIDTH] IN BLOOD BY AUTOMATED COUNT: 13.5 % (ref 11.5–14.5)
EST. GFR  (NO RACE VARIABLE): >60 ML/MIN/1.73 M^2
GLUCOSE SERPL-MCNC: 74 MG/DL (ref 70–110)
HCT VFR BLD AUTO: 46.6 % (ref 37–48.5)
HGB BLD-MCNC: 15.3 G/DL (ref 12–16)
IMM GRANULOCYTES # BLD AUTO: 0.01 K/UL (ref 0–0.04)
IMM GRANULOCYTES NFR BLD AUTO: 0.2 % (ref 0–0.5)
LYMPHOCYTES # BLD AUTO: 0.9 K/UL (ref 1–4.8)
LYMPHOCYTES NFR BLD: 19.6 % (ref 18–48)
MCH RBC QN AUTO: 30.2 PG (ref 27–31)
MCHC RBC AUTO-ENTMCNC: 32.8 G/DL (ref 32–36)
MCV RBC AUTO: 92 FL (ref 82–98)
MONOCYTES # BLD AUTO: 0.7 K/UL (ref 0.3–1)
MONOCYTES NFR BLD: 15.7 % (ref 4–15)
NEUTROPHILS # BLD AUTO: 3 K/UL (ref 1.8–7.7)
NEUTROPHILS NFR BLD: 63.9 % (ref 38–73)
NRBC BLD-RTO: 0 /100 WBC
PLATELET # BLD AUTO: 179 K/UL (ref 150–450)
PMV BLD AUTO: 10.5 FL (ref 9.2–12.9)
POTASSIUM SERPL-SCNC: 3.6 MMOL/L (ref 3.5–5.1)
RBC # BLD AUTO: 5.07 M/UL (ref 4–5.4)
SODIUM SERPL-SCNC: 145 MMOL/L (ref 136–145)
WBC # BLD AUTO: 4.65 K/UL (ref 3.9–12.7)

## 2022-12-01 PROCEDURE — 63600175 PHARM REV CODE 636 W HCPCS: Performed by: NURSE PRACTITIONER

## 2022-12-01 PROCEDURE — 27000221 HC OXYGEN, UP TO 24 HOURS

## 2022-12-01 PROCEDURE — 99900035 HC TECH TIME PER 15 MIN (STAT)

## 2022-12-01 PROCEDURE — 25000003 PHARM REV CODE 250: Performed by: PHYSICIAN ASSISTANT

## 2022-12-01 PROCEDURE — 97530 THERAPEUTIC ACTIVITIES: CPT

## 2022-12-01 PROCEDURE — 36415 COLL VENOUS BLD VENIPUNCTURE: CPT | Performed by: PHYSICIAN ASSISTANT

## 2022-12-01 PROCEDURE — 97116 GAIT TRAINING THERAPY: CPT

## 2022-12-01 PROCEDURE — 80048 BASIC METABOLIC PNL TOTAL CA: CPT | Performed by: PHYSICIAN ASSISTANT

## 2022-12-01 PROCEDURE — 94761 N-INVAS EAR/PLS OXIMETRY MLT: CPT

## 2022-12-01 PROCEDURE — 25000003 PHARM REV CODE 250: Performed by: NURSE PRACTITIONER

## 2022-12-01 PROCEDURE — 97535 SELF CARE MNGMENT TRAINING: CPT | Mod: CO

## 2022-12-01 PROCEDURE — 85025 COMPLETE CBC W/AUTO DIFF WBC: CPT | Performed by: PHYSICIAN ASSISTANT

## 2022-12-01 PROCEDURE — 25000003 PHARM REV CODE 250: Performed by: FAMILY MEDICINE

## 2022-12-01 PROCEDURE — 11000001 HC ACUTE MED/SURG PRIVATE ROOM

## 2022-12-01 RX ADMIN — Medication 3 MG: at 08:12

## 2022-12-01 RX ADMIN — ACETAMINOPHEN 650 MG: 325 TABLET ORAL at 01:12

## 2022-12-01 RX ADMIN — DILTIAZEM HYDROCHLORIDE 240 MG: 120 CAPSULE, COATED, EXTENDED RELEASE ORAL at 08:12

## 2022-12-01 RX ADMIN — SODIUM CHLORIDE, SODIUM LACTATE, POTASSIUM CHLORIDE, AND CALCIUM CHLORIDE: .6; .31; .03; .02 INJECTION, SOLUTION INTRAVENOUS at 08:12

## 2022-12-01 RX ADMIN — OSELTAMIVIR PHOSPHATE 30 MG: 30 CAPSULE ORAL at 08:12

## 2022-12-01 RX ADMIN — RIVAROXABAN 15 MG: 15 TABLET, FILM COATED ORAL at 06:12

## 2022-12-01 RX ADMIN — CEFTRIAXONE 1 G: 1 INJECTION, SOLUTION INTRAVENOUS at 11:12

## 2022-12-01 RX ADMIN — ESCITALOPRAM OXALATE 5 MG: 5 TABLET, FILM COATED ORAL at 08:12

## 2022-12-01 RX ADMIN — OXYBUTYNIN CHLORIDE 10 MG: 5 TABLET, EXTENDED RELEASE ORAL at 08:12

## 2022-12-01 RX ADMIN — SODIUM CHLORIDE, SODIUM LACTATE, POTASSIUM CHLORIDE, AND CALCIUM CHLORIDE: .6; .31; .03; .02 INJECTION, SOLUTION INTRAVENOUS at 01:12

## 2022-12-01 NOTE — PROGRESS NOTES
"[facility] Medicine  Progress Note    Patient Name: Tigist Murry  MRN: [id number]  Patient Class: IP- Inpatient   Admission Date: 11/29/2022  Length of Stay: 1 days  Attending Physician: Diandra Garland*  Primary Care Provider: Gwen Porter MD        Subjective:     Principal Problem:Acute hypoxemic respiratory failure        HPI:  This is a 82 y.o. year old female with a PMH of paroxysmal A fib (on Xarelto), SVT, HTN, anxiety, dementia, and AVNRT s/p ablation (2015) who presents to the ED with a chief complaint of weakness and cough x 3 days. Pt's daughter providing majority of history.  Pt is close to baseline, "slightly more confused" per daughter.  Pt oriented to person.  Patient denies known exposure to a COVID-19 patient, she is vaccinated. Pt denies fever, chills, nasal congestion, chest pain, shortness of breath, nausea, vomiting, diarrhea, abdominal pain, dizziness, syncope.    In the ED, T max 100.7 F, HR , 93% on 2L.  Influenza A positive.  CBC with normal WBC and Hgb.  CMP without electrolyte abnormalities and normal cr at 0.9 baseline.  Anion gap elevated at 17 with bicarb 18.  .  LA 1.8 WNL.  CXR unremarkable.      Overview/Hospital Course:  No notes on file    Interval hx: breathing and cough is improving, however still on 4L NC. She needs to have a BM this am.   Blood cx NGTD,   Urine cx pending   Continue influenza tx   Continue rocephin until urine cx returns.   PT to eval and treat.   Add IS   Patient status, POC has been discussed daily with daughters Iliana Alvarenga, and caregivers at the bedside.     Review of Systems   Unable to perform ROS: Dementia   Respiratory:  Positive for cough.    Neurological:  Positive for weakness.   Psychiatric/Behavioral:  Positive for confusion.    Objective:     Vital Signs (Most Recent):  Temp: 96 °F (35.6 °C) (12/01/22 1209)  Pulse: 85 (12/01/22 1209)  Resp: 18 (12/01/22 1209)  BP: 138/89 (12/01/22 1209)  SpO2: (!) " 94 % (12/01/22 1209)   Vital Signs (24h Range):  Temp:  [95.6 °F (35.3 °C)-99 °F (37.2 °C)] 96 °F (35.6 °C)  Pulse:  [] 85  Resp:  [18-22] 18  SpO2:  [89 %-98 %] 94 %  BP: (138-172)/(77-95) 138/89     Weight: 53 kg (116 lb 12.8 oz)  Body mass index is 20.96 kg/m².    Physical Exam  Vitals and nursing note reviewed.   Constitutional:       Appearance: She is well-developed. She is ill-appearing.   HENT:      Head: Normocephalic and atraumatic.   Eyes:      Extraocular Movements: EOM normal.      Pupils: Pupils are equal, round, and reactive to light.   Neck:      Thyroid: No thyromegaly.      Trachea: No tracheal deviation.   Cardiovascular:      Rate and Rhythm: Normal rate and regular rhythm.      Heart sounds: No murmur heard.  Pulmonary:      Effort: Pulmonary effort is normal.      Breath sounds: Normal breath sounds. No wheezing.   Abdominal:      General: Bowel sounds are normal.      Palpations: Abdomen is soft.      Tenderness: There is no abdominal tenderness.   Musculoskeletal:         General: No tenderness. Normal range of motion.      Cervical back: Normal range of motion and neck supple.   Lymphadenopathy:      Cervical: No cervical adenopathy.   Skin:     General: Skin is warm and dry.   Neurological:      Mental Status: She is alert. She is disoriented.      Cranial Nerves: No cranial nerve deficit.      Deep Tendon Reflexes: Reflexes are normal and symmetric.      Comments: Oriented to self, close to baseline   Psychiatric:         Behavior: Behavior normal.         CRANIAL NERVES     CN III, IV, VI   Pupils are equal, round, and reactive to light.  Extraocular motions are normal.      Significant Labs: All pertinent labs within the past 24 hours have been reviewed.    Significant Imaging: I have reviewed all pertinent imaging results/findings within the past 24 hours.      Assessment/Plan:      * Acute hypoxemic respiratory failure  Patient with Hypoxic Respiratory failure which is Acute.   she is not on home oxygen. Supplemental oxygen was provided and noted- Oxygen Concentration (%):  [36] 36.    Signs/symptoms of respiratory failure include- lethargy. Contributing diagnoses includes - none Labs and images were reviewed. Patient Has not had a recent ABG. Will treat underlying causes and adjust management of respiratory failure as follows-tamiflu    On 4 L NC 94% Sao2  Wean O2 as tolerated   Would be appropriate for SNF at discharge       UTI (urinary tract infection)    Patient with mild UTI, nitrite +  Confusion, weakness   Continue IV rocephin for now   Urine cx added     Influenza A  Tamiflu initiated  Antipyretics for fever and continuous IVF     Increased IVF - continue until she increases PO intake. Appears dry per physical exam   -PRN duoneb   -IS   - supplemental O2   -OOB with PT as tolerated       Memory impairment  Close to baseline  Continue home meds and delirium precautions      Weakness        Atrial fibrillation and flutter  Patient with Paroxysmal (<7 days) atrial fibrillation which is uncontrolled currently with diltiazem. Patient is currently in atrial fibrillation.[id number] Score: 3. Anticoagulation indicated. Anticoagulation done with xarelto.  HR improved after fluids and tylenol, pt received morning dose of diltiazem.        Non-traumatic rhabdomyolysis  Weakness  -->945, normal kidney function.  Pt's daughter reports hospitalization with rhabdo (pt down for 10 hrs) a few months ago.  Will trend and treat with continuous IVF.  Will order PT/OT per daughter's request.      Long term current use of anticoagulant  See above      Situational depression  Patient has persistent depression which is mild and is currently controlled. Will Continue anti-depressant medications. We will not consult psychiatry at this time. Patient does not display psychosis at this time. Continue to monitor closely and adjust plan of care as needed.        Essential hypertension  Controlled  upon arrival then borderline low  Pt did take diltiazem morning of admission  Monitor closely      Vitals:    12/01/22 0820 12/01/22 1148 12/01/22 1155 12/01/22 1209   BP:  (!) 168/95  138/89   BP Location:  Left arm  Left arm   Patient Position:  Lying  Lying   Pulse:  83 73 85   Resp:  18  18   Temp:  98.2 °F (36.8 °C)  96 °F (35.6 °C)   TempSrc:  Oral  Oral   SpO2: 96% (!) 93%  (!) 94%   Weight:       Height:               VTE Risk Mitigation (From admission, onward)         Ordered     rivaroxaban tablet 15 mg  Daily before evening meal         11/30/22 1458     Reason for No Pharmacological VTE Prophylaxis  Once        Question:  Reasons:  Answer:  Already adequately anticoagulated on oral Anticoagulants    11/29/22 2036     IP VTE HIGH RISK PATIENT  Once         11/29/22 2036                Discharge Planning   CLARKE:      Code Status: Full Code   Is the patient medically ready for discharge?:     Reason for patient still in hospital (select all that apply): Patient trending condition                     Annie Reyes NP  Department of [facility] Medicine   [facility]

## 2022-12-01 NOTE — PLAN OF CARE
Problem: Occupational Therapy  Goal: Occupational Therapy Goal  Description: Goals to be met by: 12/30/2022     Patient will increase functional independence with ADLs by performing:    UE Dressing with Supervision.  LE Dressing with Minimal Assistance.  Grooming while seated at sink with Supervision.  Toileting from bedside commode with Minimal Assistance for hygiene and clothing management.   Toilet transfer to bedside commode with Contact Guard Assistance.  Increased functional strength to WFL for ADLS.    Outcome: Ongoing, Progressing   Cont with OT POC

## 2022-12-01 NOTE — ASSESSMENT & PLAN NOTE
Weakness  -->945, normal kidney function.  Pt's daughter reports hospitalization with rhabdo (pt down for 10 hrs) a few months ago.  Will trend and treat with continuous IVF.  Will order PT/OT per daughter's request.  After discharge will need increased PT/OT at least an hour a day three times weekly and ongoing nursing care due to hx of falls and debility

## 2022-12-01 NOTE — PLAN OF CARE
[facility] - Telemetry  Initial Discharge Assessment       Primary Care Provider: Gwen Porter MD    Admission Diagnosis: Influenza A [J10.1]  SIRS (systemic inflammatory response syndrome) [R65.10]  Atrial fibrillation with RVR [I48.91]  Sepsis [A41.9]    Admission Date: 11/29/2022  Expected Discharge Date:     Discharge Barriers Identified: (P) None    Payor: [employer] MANAGED MEDICARE / Plan: [employer] MEDICARE HMO / Product Type: Capitation /     Extended Emergency Contact Information  Primary Emergency Contact: Sarah Murry  Mobile Phone: [phone]  Relation: Daughter  Secondary Emergency Contact: Uzma Reyes   [facility]  Home Phone: [phone]  Relation: Neighbor    Discharge Plan A: (P) Skilled Nursing Facility  Discharge Plan B: (P) Home, Home with family, Home Health      [facility] - [place] - [address]  [address]  [place] [address]  Phone: [phone] Fax: [phone]    [facility] - [facility] [address]  [address] [address] [address]  Phone: [phone] Fax: [phone]    [id number] - [place] - [address] [facility] [address]  [address] [place] [address]  Phone: [phone] Fax: [phone]    [address] [place] - [address]  [address] [place] [address]  Phone: [phone] Fax: [phone]      Initial Assessment (most recent)       Adult Discharge Assessment - 12/01/22 1600          Discharge Assessment    Assessment Type Discharge Planning Assessment     Confirmed/corrected address, phone number and insurance Yes     Confirmed Demographics Correct on Facesheet     Source of Information family     Communicated CLARKE with patient/caregiver Yes     Lives With alone     Facility Arrived From: Home     Do you expect to return to your current living situation? Other (see comments)      Do you have help at home or someone to help you manage your care at home? Yes     Who are your caregiver(s) and their phone number(s)? [facility] [place] for PCA sitter at home currently from 7a - 7p     Prior to hospitilization cognitive status: Not Oriented to Time;Alert/Oriented     Current cognitive status: Not Oriented to Time;Alert/Oriented     Walking or Climbing Stairs Difficulty ambulation difficulty, assistance 1 person;ambulation difficulty, requires equipment     Dressing/Bathing Difficulty bathing difficulty, requires equipment     Home Accessibility wheelchair accessible     Home Layout Able to live on 1st floor     Equipment Currently Used at Home hospital bed;walker, rolling;bedside commode;bath bench     Readmission within 30 days? No     Patient currently being followed by outpatient case management? No     Do you currently have service(s) that help you manage your care at home? No     Do you take prescription medications? Yes (P)      Do you have any problems affording any of your prescribed medications? No (P)      Is the patient taking medications as prescribed? yes (P)      How do you get to doctors appointments? family or friend will provide (P)      Discharge Plan A Skilled Nursing Facility (P)      Discharge Plan B Home;Home with family;Home Health (P)      DME Needed Upon Discharge  none (P)      Discharge Plan discussed with: Adult children (P)      Discharge Barriers Identified None (P)

## 2022-12-01 NOTE — NURSING
Daughter rip mo and friend at bedside . Family instructed on isolation precautions and instructed to please follow.  Charge nurse at bedside and address family concerns. No pt complaints. Vss, will continue to monitor

## 2022-12-01 NOTE — PT/OT/SLP PROGRESS
Occupational Therapy   Treatment    Name: Tigist Murry  MRN: [id number]  Admitting Diagnosis:  Acute hypoxemic respiratory failure       Recommendations:     Discharge Recommendations: home with home health, home health PT, home health OT  Discharge Equipment Recommendations:  none  Barriers to discharge:  None    Assessment:     Tigist Murry is a 82 y.o. female with a medical diagnosis of Acute hypoxemic respiratory failure. Performance deficits affecting function are weakness, impaired endurance, impaired self care skills, impaired balance, gait instability, impaired functional mobility, impaired coordination, decreased lower extremity function, decreased upper extremity function.     Rehab Prognosis:  Good; patient would benefit from acute skilled OT services to address these deficits and reach maximum level of function.       Plan:     Patient to be seen 3 x/week to address the above listed problems via self-care/home management, therapeutic activities, therapeutic exercises  Plan of Care Expires: 12/30/22  Plan of Care Reviewed with: patient, caregiver, daughter    Subjective     Pain/Comfort:  Pain Rating 1: 0/10    Objective:     Communicated with: RN prior to session.  Patient found supine with peripheral IV, PureWick, telemetry upon OT entry to room.    General Precautions: Standard, fall, contact, droplet   Orthopedic Precautions:N/A   Braces: N/A  Respiratory Status: Nasal cannula, flow 2 L/min     Occupational Performance:     Bed Mobility:    Patient completed Rolling/Turning to Right with independence  Patient completed Scooting/Bridging with independence  Patient completed Supine to Sit with independence  Patient completed Sit to Supine with independence     Functional Mobility/Transfers:  Patient completed Sit <> Stand Transfer with contact guard assistance  with  rolling walker   Patient completed Toilet Transfer Step Transfer technique with contact guard assistance with  rolling  walker  Functional Mobility: Pt was able to perform sit to stand transfer with CGA and Pt was able to ambulate with RW with CGA to the bathroom and back to bed as well as toilet transfers with CGA.     Activities of Daily Living:  Grooming: stand by assistance for squeezing toothpaste  Lower Body Dressing: minimum assistance donning and doffing shoes due to R hand IV  Toileting: contact guard assistance for clothing management, hygiene      AMPA 6 Click ADL: 18    Treatment & Education:  Pt rolled from supine to R side and up to sitting position on edge of bed Independently. Pt required Max A to yanique tennis shoes due to IV in R hand making it difficult to pull on shoes. Pt was able to stand onto RW and ambulate to bathroom with CGA. Pt performed toilet transfers with CGA and was able to clean self and manage clothing with CGA as well. Pt stood from toilet with CGA and ambulated back to bed using RW and CGA. Pt doffed shoes with minimal assistance and was able to return to bed into supine position with Mod I. Pt was able to reposition self with Mod I. Pt performed oral hygiene with minimal assistance due to needing assistance to squeeze toothpaste onto tootbrush.     Patient left HOB elevated with all lines intact, call button in reach, bed alarm on, RN notified, and sitter present    GOALS:   Multidisciplinary Problems       Occupational Therapy Goals          Problem: Occupational Therapy    Goal Priority Disciplines Outcome Interventions   Occupational Therapy Goal     OT, PT/OT Ongoing, Progressing    Description: Goals to be met by: 12/30/2022     Patient will increase functional independence with ADLs by performing:    UE Dressing with Supervision.  LE Dressing with Minimal Assistance.  Grooming while seated at sink with Supervision.  Toileting from bedside commode with Minimal Assistance for hygiene and clothing management.   Toilet transfer to bedside commode with Contact Guard Assistance.  Increased  functional strength to WFL for ADLS.                         Time Tracking:     OT Date of Treatment: 12/01/22  OT Start Time: 0224  OT Stop Time: 0300  OT Total Time (min): 36 min    Billable Minutes:Self Care/Home Management 36               12/1/2022

## 2022-12-01 NOTE — PLAN OF CARE
Problem: Occupational Therapy  Goal: Occupational Therapy Goal  Description: Goals to be met by: 12/30/2022     Patient will increase functional independence with ADLs by performing:    UE Dressing with Supervision.  LE Dressing with Minimal Assistance.  Grooming while seated at sink with Supervision.  Toileting from bedside commode with Minimal Assistance for hygiene and clothing management.   Toilet transfer to bedside commode with Contact Guard Assistance.  Increased functional strength to WFL for ADLS.    Outcome: Ongoing, Progressing

## 2022-12-01 NOTE — ASSESSMENT & PLAN NOTE
Controlled upon arrival then borderline low  Pt did take diltiazem morning of admission  Monitor closely      Vitals:    12/01/22 0820 12/01/22 1148 12/01/22 1155 12/01/22 1209   BP:  (!) 168/95  138/89   BP Location:  Left arm  Left arm   Patient Position:  Lying  Lying   Pulse:  83 73 85   Resp:  18  18   Temp:  98.2 °F (36.8 °C)  96 °F (35.6 °C)   TempSrc:  Oral  Oral   SpO2: 96% (!) 93%  (!) 94%   Weight:       Height:

## 2022-12-01 NOTE — ASSESSMENT & PLAN NOTE
Patient with Hypoxic Respiratory failure which is Acute.  she is not on home oxygen. Supplemental oxygen was provided and noted- Oxygen Concentration (%):  [36] 36.    Signs/symptoms of respiratory failure include- lethargy. Contributing diagnoses includes - none Labs and images were reviewed. Patient Has not had a recent ABG. Will treat underlying causes and adjust management of respiratory failure as follows-tamiflu    On 4 L NC 94% Sao2  Wean O2 as tolerated   Would be appropriate for SNF at discharge

## 2022-12-01 NOTE — PLAN OF CARE
"   12/01/22 1146   Post-Acute Status   Post-Acute Authorization Placement   Post-Acute Placement Status Pending post-acute provider review/more information requested  (Pt has accepting facilities. Discussed pt and agreeable to SNF. Discussed with daughter Sarah Moreno [phone]. Pt has [facility] [place] [facility] sitters at home for 7 am - 7 pm. Would like to discuss with sisters on choices. Has HB at home.)     Referral info emailed to [email or web address]. Also discussed alternative options if home therapy is decided. Daughter is amendable to resuming the PM sitters if needed for 7 pm - 7 am. Will have family discussion about it. Barriers discussed.    Future Appointments   Date Time Provider Department Center   3/2/2023  2:00 PM Manoj Dowling MD [facility]     BP (!) 168/95 (BP Location: Left arm, Patient Position: Lying)   Pulse 83   Temp 98.2 °F (36.8 °C) (Oral)   Resp 18   Ht 5' 2.6" (1.59 m)   Wt 53 kg (116 lb 12.8 oz)   LMP  (LMP Unknown)   SpO2 (!) 93%   Breastfeeding No   BMI 20.96 kg/m²      cefTRIAXone (ROCEPHIN) IVPB  1 g Intravenous Q24H    diltiaZEM  240 mg Oral Daily    EScitalopram oxalate  5 mg Oral Daily    melatonin  3 mg Oral Nightly    oseltamivir  30 mg Oral BID    oxybutynin  10 mg Oral Daily    rivaroxaban  15 mg Oral Daily before evening meal         "

## 2022-12-01 NOTE — PLAN OF CARE
12/01/22 1501   Post-Acute Status   Post-Acute Authorization Placement;Home Health   Post-Acute Placement Status Pending post-acute provider review/more information requested  (Family has preference for hospital based facility only. Still weaning O2 from 3-4 L currently. PENDING ACCEPTING HOSPITAL BASED FACILITY FOR SNF.)   Home Health Status   (List provided if HH is needed)   Discharge Plan   Discharge Plan A Skilled Nursing Facility   Discharge Plan B Home;Home with family;Home Health     Met with daughter Sarah Moreno.  Questions answered. Attending NP updated.

## 2022-12-01 NOTE — PT/OT/SLP EVAL
Occupational Therapy   Evaluation, tx    Name: Tigist Murry  MRN: [id number]  Admitting Diagnosis:  Acute hypoxemic respiratory failure  Recent Surgery: * No surgery found *    The primary encounter diagnosis was Influenza A. Diagnoses of Sepsis, SIRS (systemic inflammatory response syndrome), and Atrial fibrillation with RVR were also pertinent to this visit.    Recommendations:     Discharge Recommendations: nursing facility, skilled  Discharge Equipment Recommendations:   (defer to next level of care)  Barriers to discharge:   (increased evel of assist, high risk for fall and readmission)    Assessment:     Tigist Murry is a 82 y.o. female with a medical diagnosis of Acute hypoxemic respiratory failure.  She presents with Performance deficits affecting function: weakness, impaired endurance, impaired self care skills, impaired functional mobility, gait instability, impaired balance, impaired cognition, decreased upper extremity function, decreased lower extremity function, decreased coordination, decreased safety awareness, decreased ROM, impaired coordination, impaired fine motor, impaired cardiopulmonary response to activity.      Pt. O x 3, not fully oriented to situation, increased confusion and inattention requiring max redirection to task and repeated vc to follow through with instructions for mobility. Pt. Performed feeding SBA to sip drink, pt ate very little of her lunch meal, g/hygiene SBA with HOB elevated, LB dressing Max A to don/doff socks in bed, toileting Total with PW, bed mobility Max-mod A supine<>sit, st<>stand Min A to RW and Max A to side step with RW taking short shuffling steps and leaning moderately to the left, pt was unable to follow cues given for correction of trunk back to midline.  Pt. Is a high fall risk potential. She would benefit from SNF at discharge. Continue with OT POC.    Rehab Prognosis: Fair; patient would benefit from acute skilled OT services to address these  deficits and reach maximum level of function.       Plan:     Patient to be seen 3 x/week to address the above listed problems via self-care/home management, therapeutic activities, therapeutic exercises  Plan of Care Expires: 12/30/22  Plan of Care Reviewed with: patient    Subjective     Chief Complaint: none  Patient/Family Comments/goals: none. .    Occupational Profile:Pt. has dementia and unreliable historian.  Living Environment: per PT,  Patient lives alone in a 1 SH, 1 FER.  Bathroom has a T/S and WIS with built in bench and GB.    PPrevious level of function:Pt was recently discharged from  SNF unit x 6 weeks ago due to fall in September.  She has daily caregivers (12 hours/day) to assist with ADLs and IADLs.  Patient was walking with a RW.  Does not drive.  Patient was living alone independently prior to fall in September lives alone with 12hrs  a day caregiver  Roles and Routines: active in above  Equipment Used at Home:  walker, rolling, bedside commode, bath bench  Assistance upon Discharge: caregivers    Pain/Comfort:  Pain Rating 1: 8/10  Location - Side 1: Right  Location - Orientation 1: generalized  Location 1: shoulder  Pain Addressed 1: Reposition, Distraction, Cessation of Activity  Pain Rating Post-Intervention 1:  (not rated)    Patients cultural, spiritual, Yarsani conflicts given the current situation: no    Objective:     Communicated with: PT and nurse prior to session.  Patient found HOB elevated with telemetry, oxygen, blood pressure cuff, PureWick, pulse ox (continuous), peripheral IV upon OT entry to room.    General Precautions: Standard, fall, contact, droplet, airborne   Orthopedic Precautions:N/A   Braces:    Respiratory Status: Nasal cannula, flow 4 L/min SpO2 94-95%    Occupational Performance:    Bed Mobility:    Patient completed Rolling/Turning to Right with moderate assistance  Patient completed Scooting/Bridging with moderate assistance  Patient completed Supine to Sit  with moderate assistance and maximal assistance  Patient completed Sit to Supine with moderate assistance and maximal assistance    Functional Mobility/Transfers:  Patient completed Sit <> Stand Transfer with minimum assistance and from elevated bed height  with  rolling walker   Functional Mobility: side stepped max-mod A , max vc, tc and walker management, poor following of instruction for sequencing, pt leaning moderately to left side, shuffled steps, poor balance, no self correction despite max vc and tc.     Activities of Daily Living:  Feeding:  stand by assistance 2/2 not eating much, drank beverage from cup with prompts  Grooming: stand by assistance wiped face and hands with HOB elevated with vc   Lower Body Dressing: maximal assistance donned socks long sit in bed , crossed ankle over opposite knee, limited reach  Toileting: total assistance pt has PW device    Cognitive/Visual Perceptual:  Cognitive/Psychosocial Skills:     -       Oriented to: Person, Place, and Time   -       Follows Commands/attention:Inattentive, Easily distracted, and Follows one-step commands with prompts, repeated cues  -       Communication: clear/fluent and confused speech intermittently  -       Memory: Impaired STM and Poor immediate recall  -       Safety awareness/insight to disability: impaired   -       Mood/Affect/Coping skills/emotional control: Anxious,restless, cooperative  Visual/Perceptual:      -Intact .    Physical Exam:  Balance:    -       sitting: fair plus  dynamic: fair  standing: poor   Postural examination/scapula alignment:    -       Rounded shoulders  -       Forward head  Dominant hand:    -       right  Upper Extremity Range of Motion:     -       Right Upper Extremity: WFL except shld AROM ~45 flexion and abd due to pain reportedly after her fall at home  -       Left Upper Extremity: WFL  Upper Extremity Strength:    -       Right Upper Extremity: Deficits: shld 2+/5, distally 4-/5  -       Left Upper  Extremity: Deficits: 4-/5   Strength:    -       Right Upper Extremity: Deficits: fair plus  -       Left Upper Extremity: Deficits: fair plus    AMPAC 6 Click ADL:  AMPAC Total Score: 14    Treatment & Education:  Purpose of OT and POC  Cognitive remediation:  max vc, tc to redirect and increase follow through to instruction for mobility  Pt performed BUE AAROM 10 x 1 all major joints and planes while lying in bed with HIB elevated. Tolerated ex well, no distress , SpO2 94% on 4L/min O2.   Pt was educated in fall prevention, no OOB activity without staff assist.  All questions/ concerns addressed within scope.    Patient left HOB elevated with all lines intact and call button in reach    GOALS:   Multidisciplinary Problems       Occupational Therapy Goals          Problem: Occupational Therapy    Goal Priority Disciplines Outcome Interventions   Occupational Therapy Goal     OT, PT/OT Ongoing, Progressing    Description: Goals to be met by: 12/30/2022     Patient will increase functional independence with ADLs by performing:    UE Dressing with Supervision.  LE Dressing with Minimal Assistance.  Grooming while seated at sink with Supervision.  Toileting from bedside commode with Minimal Assistance for hygiene and clothing management.   Toilet transfer to bedside commode with Contact Guard Assistance.  Increased functional strength to WFL for ADLS.                         History:     Past Medical History:   Diagnosis Date    Anticoagulant long-term use     Anxiety     Arthritis     Atrial fibrillation     Carpal tunnel syndrome on right     Diverticulosis     Encounter for blood transfusion     HTN (hypertension)     Hyperlipidemia     Osteopenia     Overactive bladder     Palpitations     Patellar tendonitis 4/13    saw [facility] [facility]     Psoriasis     S/P partial hysterectomy     Situational depression     Supraventricular tachycardia          Past Surgical History:   Procedure Laterality Date    ABDOMINAL  SURGERY      BREAST BIOPSY Left     excisional    CARPAL TUNNEL RELEASE      HYSTERECTOMY      @27yrs of age    KNEE ARTHROSCOPY W/ DEBRIDEMENT  '05    Right    OOPHORECTOMY      @27yrs of age    PARTIAL HYSTERECTOMY         Time Tracking:     OT Date of Treatment: 11/30/22  OT Start Time: 1412  OT Stop Time: 1425  OT Total Time (min): 13 min    Billable Minutes:Evaluation 5  Therapeutic Exercise 8  Total Time 13    11/30/2022

## 2022-12-01 NOTE — ASSESSMENT & PLAN NOTE
Patient with Paroxysmal (<7 days) atrial fibrillation which is uncontrolled currently with diltiazem. Patient is currently in atrial fibrillation.[id number] Score: 3. Anticoagulation indicated. Anticoagulation done with xarelto.  HR improved after fluids and tylenol, pt received morning dose of diltiazem.

## 2022-12-01 NOTE — PT/OT/SLP PROGRESS
"Physical Therapy Treatment    Patient Name:  Tigist Murry   MRN:  [id number]    Recommendations:     Discharge Recommendations:  other (see comments) (HH PT/OT w/ 24/7 care/assistance; SNF if family cannot provide)   Discharge Equipment Recommendations: other (see comments) (issued gait belt)   Barriers to discharge: Decreased caregiver support and requires increased level of assistance for functional mobility.    Assessment:     Tigist Murry is a 82 y.o. female admitted with a medical diagnosis of Acute hypoxemic respiratory failure.  She presents with the following impairments/functional limitations:  weakness, impaired endurance, impaired self care skills, impaired functional mobility, gait instability, impaired balance, decreased upper extremity function, decreased lower extremity function, decreased safety awareness, impaired coordination, decreased ROM, decreased coordination, impaired fine motor, impaired cardiopulmonary response to activity    Pt tolerated treatment well today. Pt required mod A transferring sup<>sit, but progressed to only needing CGA for sit-stand and ambulation. Pt ambulated 20 ft x1 and 50 ft x1 w/ RW. Recommending HH PT/OT w/ 24/7 care/assistance upon d/c. If family is unable to provide, recommending SNF.    Rehab Prognosis: Good; patient would benefit from acute skilled PT services to address these deficits and reach maximum level of function.    Recent Surgery: * No surgery found *      Plan:     During this hospitalization, patient to be seen 5 x/week to address the identified rehab impairments via gait training, therapeutic activities, therapeutic exercises, neuromuscular re-education and progress toward the following goals:    Plan of Care Expires:  12/30/22    Subjective     Chief Complaint: None reported; "I'm feeling pretty good today."  Patient/Family Comments/goals: Pt pleasant and agreeable to PT session. Pt and pt's daughter state preference to d/c w/ HH PT/OT if deemed " appropriate.  Pain/Comfort:  Pain Rating 1: 0/10      Objective:     Communicated with nurse Franco prior to session.  Patient found HOB elevated with peripheral IV, PureWick, telemetry upon PT entry to room.     General Precautions: Standard, fall, contact, airborne, droplet   Orthopedic Precautions:N/A   Braces: N/A  Respiratory Status: Nasal cannula, flow 2 L/min     Functional Mobility:  Bed Mobility:     Scooting: moderate assistance  Supine to Sit: moderate assistance  Sit to Supine: moderate assistance  Transfers:     Sit to Stand:  contact guard assistance with rolling walker. Verbal cues for hand placement before standing.  Gait: Amb 20 ft x1 and 50 ft x1 w/ RW and CGA. Noted anterior trunk lean and short B step length. Noted good step shereen. No verbal cues required for RW management.   Balance: Noted good sitting balance and fair-good static and dynamic standing balance. Required initial min A when standing w/ RW but progressed to CGA during ambulation.      AM-PAC 6 CLICK MOBILITY  Turning over in bed (including adjusting bedclothes, sheets and blankets)?: 2  Sitting down on and standing up from a chair with arms (e.g., wheelchair, bedside commode, etc.): 3  Moving from lying on back to sitting on the side of the bed?: 2  Moving to and from a bed to a chair (including a wheelchair)?: 3  Need to walk in hospital room?: 3  Climbing 3-5 steps with a railing?: 2  Basic Mobility Total Score: 15       Treatment & Education:  Pt transferred sup-sit.   Shoes and socked donned to pt.  Transferred sit-stand and performed standing marches.  Ambulated 20 ft and rested on EOB for 2 min.  Transferred sit-stand and ambulated 50 ft.  Returned to sitting EOB.  Shoes and socks doffed.  Transferred sit-sup.  Pt/pt's daughter educated on recommendation for  PT/OT if pt has 24/7 care.  Pt's daughter issued gait belt and educated on its use.    Patient left HOB elevated with all lines intact, call button in reach, bed  alarm on, nurse Joan notified, and daughter and friend present..    GOALS:   Multidisciplinary Problems       Physical Therapy Goals          Problem: Physical Therapy    Goal Priority Disciplines Outcome Goal Variances Interventions   Physical Therapy Goal     PT, PT/OT Ongoing, Progressing     Description: Goals to be met by: 2022     Patient will increase functional independence with mobility by performin. Supine to sit with Contact Guard Assistance  2. Sit to supine with Contact Guard Assistance  3. Rolling to Left and Right with Stand-by Assistance.  4. Sit to stand transfer with Stand-by Assistance  5. Bed to chair transfer with Stand-by Assistance using Rolling Walker  6. Gait  x 100 feet with Contact Guard Assistance using Rolling Walker.   7. Ascend/descend 1 stair withContact Guard Assistance using Rolling Walker.                          Time Tracking:     PT Received On: 22  PT Start Time: 1302     PT Stop Time: 1335  PT Total Time (min): 33 min     Billable Minutes: Gait Training 20 and Therapeutic Activity 13    Treatment Type: Treatment  PT/PTA: PT     PTA Visit Number: 0     2022

## 2022-12-01 NOTE — ASSESSMENT & PLAN NOTE
Tamiflu initiated  Antipyretics for fever and continuous IVF     Increased IVF - continue until she increases PO intake. Appears dry per physical exam   -PRN duoneb   -IS   - supplemental O2   -OOB with PT as tolerated

## 2022-12-01 NOTE — PLAN OF CARE
Problem: Adult Inpatient Plan of Care  Goal: Plan of Care Review  Outcome: Ongoing, Progressing  Goal: Patient-Specific Goal (Individualized)  Outcome: Ongoing, Progressing  Goal: Absence of Hospital-Acquired Illness or Injury  Outcome: Ongoing, Progressing  Goal: Optimal Comfort and Wellbeing  Outcome: Ongoing, Progressing  Goal: Readiness for Transition of Care  Outcome: Ongoing, Progressing     Problem: Fall Injury Risk  Goal: Absence of Fall and Fall-Related Injury  Outcome: Ongoing, Progressing     Problem: Gas Exchange Impaired  Goal: Optimal Gas Exchange  Outcome: Ongoing, Progressing     Problem: Skin Injury Risk Increased  Goal: Skin Health and Integrity  Outcome: Ongoing, Progressing     Problem: Infection  Goal: Absence of Infection Signs and Symptoms  Outcome: Ongoing, Progressing     Problem: UTI (Urinary Tract Infection)  Goal: Improved Infection Symptoms  Outcome: Ongoing, Progressing

## 2022-12-01 NOTE — SUBJECTIVE & OBJECTIVE
Interval hx: breathing and cough is improving, however still on 4L NC. She needs to have a BM this am.   Blood cx NGTD,   Urine cx pending   Continue influenza tx   Continue rocephin until urine cx returns.   PT to eval and treat.   Add IS   Patient status, POC has been discussed daily with daughters Iliana Alvarenga, and caregivers at the bedside.     Review of Systems   Unable to perform ROS: Dementia   Respiratory:  Positive for cough.    Neurological:  Positive for weakness.   Psychiatric/Behavioral:  Positive for confusion.    Objective:     Vital Signs (Most Recent):  Temp: 96 °F (35.6 °C) (12/01/22 1209)  Pulse: 85 (12/01/22 1209)  Resp: 18 (12/01/22 1209)  BP: 138/89 (12/01/22 1209)  SpO2: (!) 94 % (12/01/22 1209)   Vital Signs (24h Range):  Temp:  [95.6 °F (35.3 °C)-99 °F (37.2 °C)] 96 °F (35.6 °C)  Pulse:  [] 85  Resp:  [18-22] 18  SpO2:  [89 %-98 %] 94 %  BP: (138-172)/(77-95) 138/89     Weight: 53 kg (116 lb 12.8 oz)  Body mass index is 20.96 kg/m².    Physical Exam  Vitals and nursing note reviewed.   Constitutional:       Appearance: She is well-developed. She is ill-appearing.   HENT:      Head: Normocephalic and atraumatic.   Eyes:      Extraocular Movements: EOM normal.      Pupils: Pupils are equal, round, and reactive to light.   Neck:      Thyroid: No thyromegaly.      Trachea: No tracheal deviation.   Cardiovascular:      Rate and Rhythm: Normal rate and regular rhythm.      Heart sounds: No murmur heard.  Pulmonary:      Effort: Pulmonary effort is normal.      Breath sounds: Normal breath sounds. No wheezing.   Abdominal:      General: Bowel sounds are normal.      Palpations: Abdomen is soft.      Tenderness: There is no abdominal tenderness.   Musculoskeletal:         General: No tenderness. Normal range of motion.      Cervical back: Normal range of motion and neck supple.   Lymphadenopathy:      Cervical: No cervical adenopathy.   Skin:     General: Skin is warm and dry.    Neurological:      Mental Status: She is alert. She is disoriented.      Cranial Nerves: No cranial nerve deficit.      Deep Tendon Reflexes: Reflexes are normal and symmetric.      Comments: Oriented to self, close to baseline   Psychiatric:         Behavior: Behavior normal.         CRANIAL NERVES     CN III, IV, VI   Pupils are equal, round, and reactive to light.  Extraocular motions are normal.      Significant Labs: All pertinent labs within the past 24 hours have been reviewed.    Significant Imaging: I have reviewed all pertinent imaging results/findings within the past 24 hours.

## 2022-12-02 LAB
ANION GAP SERPL CALC-SCNC: 13 MMOL/L (ref 8–16)
BACTERIA UR CULT: NORMAL
BASOPHILS # BLD AUTO: 0.02 K/UL (ref 0–0.2)
BASOPHILS NFR BLD: 0.4 % (ref 0–1.9)
BUN SERPL-MCNC: 13 MG/DL (ref 8–23)
CALCIUM SERPL-MCNC: 9.5 MG/DL (ref 8.7–10.5)
CHLORIDE SERPL-SCNC: 102 MMOL/L (ref 95–110)
CO2 SERPL-SCNC: 29 MMOL/L (ref 23–29)
CREAT SERPL-MCNC: 0.7 MG/DL (ref 0.5–1.4)
DIFFERENTIAL METHOD: ABNORMAL
EOSINOPHIL # BLD AUTO: 0 K/UL (ref 0–0.5)
EOSINOPHIL NFR BLD: 0.6 % (ref 0–8)
ERYTHROCYTE [DISTWIDTH] IN BLOOD BY AUTOMATED COUNT: 13.3 % (ref 11.5–14.5)
EST. GFR  (NO RACE VARIABLE): >60 ML/MIN/1.73 M^2
GLUCOSE SERPL-MCNC: 105 MG/DL (ref 70–110)
HCT VFR BLD AUTO: 45.9 % (ref 37–48.5)
HGB BLD-MCNC: 15.1 G/DL (ref 12–16)
IMM GRANULOCYTES # BLD AUTO: 0.01 K/UL (ref 0–0.04)
IMM GRANULOCYTES NFR BLD AUTO: 0.2 % (ref 0–0.5)
LYMPHOCYTES # BLD AUTO: 0.8 K/UL (ref 1–4.8)
LYMPHOCYTES NFR BLD: 14.7 % (ref 18–48)
MCH RBC QN AUTO: 30.1 PG (ref 27–31)
MCHC RBC AUTO-ENTMCNC: 32.9 G/DL (ref 32–36)
MCV RBC AUTO: 92 FL (ref 82–98)
MONOCYTES # BLD AUTO: 0.7 K/UL (ref 0.3–1)
MONOCYTES NFR BLD: 12.3 % (ref 4–15)
NEUTROPHILS # BLD AUTO: 3.8 K/UL (ref 1.8–7.7)
NEUTROPHILS NFR BLD: 71.8 % (ref 38–73)
NRBC BLD-RTO: 0 /100 WBC
PLATELET # BLD AUTO: 185 K/UL (ref 150–450)
PMV BLD AUTO: 10.2 FL (ref 9.2–12.9)
POTASSIUM SERPL-SCNC: 3.5 MMOL/L (ref 3.5–5.1)
RBC # BLD AUTO: 5.01 M/UL (ref 4–5.4)
SODIUM SERPL-SCNC: 144 MMOL/L (ref 136–145)
WBC # BLD AUTO: 5.3 K/UL (ref 3.9–12.7)

## 2022-12-02 PROCEDURE — 36415 COLL VENOUS BLD VENIPUNCTURE: CPT | Performed by: PHYSICIAN ASSISTANT

## 2022-12-02 PROCEDURE — 94761 N-INVAS EAR/PLS OXIMETRY MLT: CPT

## 2022-12-02 PROCEDURE — 80048 BASIC METABOLIC PNL TOTAL CA: CPT | Performed by: PHYSICIAN ASSISTANT

## 2022-12-02 PROCEDURE — 63600175 PHARM REV CODE 636 W HCPCS: Performed by: NURSE PRACTITIONER

## 2022-12-02 PROCEDURE — 36415 COLL VENOUS BLD VENIPUNCTURE: CPT | Performed by: NURSE PRACTITIONER

## 2022-12-02 PROCEDURE — 25000003 PHARM REV CODE 250: Performed by: NURSE PRACTITIONER

## 2022-12-02 PROCEDURE — 97530 THERAPEUTIC ACTIVITIES: CPT | Mod: CO

## 2022-12-02 PROCEDURE — 97116 GAIT TRAINING THERAPY: CPT

## 2022-12-02 PROCEDURE — 27000221 HC OXYGEN, UP TO 24 HOURS

## 2022-12-02 PROCEDURE — 82746 ASSAY OF FOLIC ACID SERUM: CPT | Performed by: NURSE PRACTITIONER

## 2022-12-02 PROCEDURE — 25000003 PHARM REV CODE 250: Performed by: FAMILY MEDICINE

## 2022-12-02 PROCEDURE — 85025 COMPLETE CBC W/AUTO DIFF WBC: CPT | Performed by: PHYSICIAN ASSISTANT

## 2022-12-02 PROCEDURE — 82607 VITAMIN B-12: CPT | Performed by: NURSE PRACTITIONER

## 2022-12-02 PROCEDURE — 25000003 PHARM REV CODE 250: Performed by: PHYSICIAN ASSISTANT

## 2022-12-02 PROCEDURE — 27000207 HC ISOLATION

## 2022-12-02 PROCEDURE — 99900035 HC TECH TIME PER 15 MIN (STAT)

## 2022-12-02 PROCEDURE — 97530 THERAPEUTIC ACTIVITIES: CPT

## 2022-12-02 PROCEDURE — 11000001 HC ACUTE MED/SURG PRIVATE ROOM

## 2022-12-02 RX ORDER — IPRATROPIUM BROMIDE AND ALBUTEROL SULFATE 2.5; .5 MG/3ML; MG/3ML
3 SOLUTION RESPIRATORY (INHALATION) EVERY 4 HOURS PRN
Qty: 75 ML | Refills: 0 | Status: SHIPPED | OUTPATIENT
Start: 2022-12-02 | End: 2023-01-30

## 2022-12-02 RX ORDER — OSELTAMIVIR PHOSPHATE 30 MG/1
30 CAPSULE ORAL 2 TIMES DAILY
Qty: 2 CAPSULE | Refills: 0 | Status: SHIPPED | OUTPATIENT
Start: 2022-12-02 | End: 2022-12-03

## 2022-12-02 RX ORDER — DILTIAZEM HYDROCHLORIDE 300 MG/1
300 CAPSULE, COATED, EXTENDED RELEASE ORAL DAILY
Qty: 30 CAPSULE | Refills: 11 | Status: SHIPPED | OUTPATIENT
Start: 2022-12-03 | End: 2023-01-30

## 2022-12-02 RX ADMIN — ESCITALOPRAM OXALATE 5 MG: 5 TABLET, FILM COATED ORAL at 09:12

## 2022-12-02 RX ADMIN — OSELTAMIVIR PHOSPHATE 30 MG: 30 CAPSULE ORAL at 08:12

## 2022-12-02 RX ADMIN — DILTIAZEM HYDROCHLORIDE 300 MG: 180 CAPSULE, COATED, EXTENDED RELEASE ORAL at 09:12

## 2022-12-02 RX ADMIN — ACETAMINOPHEN 650 MG: 325 TABLET ORAL at 08:12

## 2022-12-02 RX ADMIN — SODIUM CHLORIDE, SODIUM LACTATE, POTASSIUM CHLORIDE, AND CALCIUM CHLORIDE: .6; .31; .03; .02 INJECTION, SOLUTION INTRAVENOUS at 06:12

## 2022-12-02 RX ADMIN — RIVAROXABAN 15 MG: 15 TABLET, FILM COATED ORAL at 06:12

## 2022-12-02 RX ADMIN — OXYBUTYNIN CHLORIDE 10 MG: 5 TABLET, EXTENDED RELEASE ORAL at 09:12

## 2022-12-02 RX ADMIN — Medication 6 MG: at 08:12

## 2022-12-02 RX ADMIN — OSELTAMIVIR PHOSPHATE 30 MG: 30 CAPSULE ORAL at 09:12

## 2022-12-02 NOTE — ASSESSMENT & PLAN NOTE
Tamiflu initiated  Antipyretics for fever and continuous IVF     Increased IVF - continue until she increases PO intake. Appears dry per physical exam - improved; ok to stop IVF   -PRN duoneb   -IS   - supplemental O2   -OOB with PT as tolerated

## 2022-12-02 NOTE — PT/OT/SLP PROGRESS
Occupational Therapy   Treatment    Name: Tigist Murry  MRN: [id number]  Admitting Diagnosis:  Acute hypoxemic respiratory failure       Recommendations:     Discharge Recommendations: home health OT, home health PT  Discharge Equipment Recommendations:  none  Barriers to discharge:  None    Assessment:   Patient very confused this session; difficult to redirect. Patient required MaxA to maintain stance 2/2 significant posterior and R lateral lean. Patient will benefit from continued skilled OT to address deficits and improve performance in functional ADL tasks. Cont OT.    Tigist Murry is a 82 y.o. female with a medical diagnosis of Acute hypoxemic respiratory failure.  Performance deficits affecting function are weakness, impaired endurance, impaired self care skills, impaired functional mobility, gait instability, impaired balance, impaired cognition, decreased coordination, decreased upper extremity function, decreased lower extremity function, decreased safety awareness, impaired coordination, decreased ROM.     Rehab Prognosis:  Fair; patient would benefit from acute skilled OT services to address these deficits and reach maximum level of function.       Plan:     Patient to be seen 3 x/week to address the above listed problems via self-care/home management, therapeutic activities, therapeutic exercises  Plan of Care Expires: 12/30/22  Plan of Care Reviewed with: patient, caregiver    Subjective     Pain/Comfort:  Pain Rating 1: 0/10  Pain Rating Post-Intervention 1: 0/10    Objective:     Communicated with: Joan figueroa prior to session.  Patient found HOB elevated with telemetry, oxygen, peripheral IV, PureWick upon OT entry to room.    General Precautions: Standard, contact, droplet, fall     Bed Mobility:    Patient completed Scooting/Bridging with maximal assistance  Patient completed Supine to Sit with maximal assistance  Patient completed Sit to Supine with moderate assistance     Functional  "Mobility/Transfers:  Patient completed Sit <> Stand Transfer with minimum assistance  with  rolling walker     Activities of Daily Living:  Lower Body Dressing: maximal assistance yanique B tennis shoes EOB    [facility] 6 Click ADL: 15    Treatment & Education:  Patient very confused entire session, kept stating "This is my house" and talking about people not present. Required significantly increased time to follow cues to transition to EOB sitting. Shoes donned EOB. Patient instructed in sit > stand using RW as above - demo'd significant posterior and R lateral lean. Patient attempted steps fwd and had difficulty picking up and advancing LLE. Able to step laterally to HOB (towards L), but still /c difficulty. Patient returned to EOB > supine. Nsg notified of status.     Patient left HOB elevated with all lines intact, call button in reach, bed alarm on, nsg notified, and CG present    GOALS:   Multidisciplinary Problems       Occupational Therapy Goals          Problem: Occupational Therapy    Goal Priority Disciplines Outcome Interventions   Occupational Therapy Goal     OT, PT/OT Ongoing, Progressing    Description: Goals to be met by: 12/30/2022     Patient will increase functional independence with ADLs by performing:    UE Dressing with Supervision.  LE Dressing with Minimal Assistance.  Grooming while seated at sink with Supervision.  Toileting from bedside commode with Minimal Assistance for hygiene and clothing management.   Toilet transfer to bedside commode with Contact Guard Assistance.  Increased functional strength to WFL for ADLS.                         Time Tracking:     OT Date of Treatment: 12/02/22  OT Start Time: 1133  OT Stop Time: 1156  OT Total Time (min): 23 min    Billable Minutes:Therapeutic Activity 23    OT/CHELSY: CHELSY VALENTINO Visit Number: 1    12/2/2022  "

## 2022-12-02 NOTE — PLAN OF CARE
Angelique Moreno notified that pt will need one more night for oxygen weaning.    Sal Marquez, RN   Supervisor Case Management-[place]  [phone]

## 2022-12-02 NOTE — PLAN OF CARE
[facility] - Telemetry      HOME HEALTH ORDERS  FACE TO FACE ENCOUNTER    Patient Name: Tigist Murry  YOB: 1940    PCP: Gwen Porter MD   PCP Address: [address] / [place] [address]  PCP Phone Number: [phone]  PCP Fax: [phone]    Encounter Date: 11/29/22    Admit to Home Health    Diagnoses:  Active Hospital Problems    Diagnosis  POA    *Acute hypoxemic respiratory failure [J96.01]  Unknown    UTI (urinary tract infection) [N39.0]  Unknown    Influenza A [J10.1]  Unknown    Atrial fibrillation and flutter [I48.91, I48.92]  Yes    Memory impairment [R41.3]  Yes    Non-traumatic rhabdomyolysis [M62.82]  Yes    Weakness [R53.1]  Yes    Long term current use of anticoagulant [Z79.01]  Not Applicable    Situational depression [F43.21]  Yes     Chronic    Essential hypertension [I10]  Yes     Chronic      Resolved Hospital Problems   No resolved problems to display.       Follow Up Appointments:  Future Appointments   Date Time Provider Department Center   3/2/2023  2:00 PM Manoj Dowling MD [facility]       Allergies:  Review of patient's allergies indicates:   Allergen Reactions    Ciprofloxacin      Other reaction(s): Nausea       Medications: Review discharge medications with patient and family and provide education.    Current Facility-Administered Medications   Medication Dose Route Frequency Provider Last Rate Last Admin    acetaminophen tablet 650 mg  650 mg Oral Q4H PRN Melodie Rincon PA-C   650 mg at 12/01/22 0115    acetaminophen tablet 650 mg  650 mg Oral Q8H PRN Melodie Rincon PA-C        albuterol-ipratropium 2.5 mg-0.5 mg/3 mL nebulizer solution 3 mL  3 mL Nebulization Q4H PRN Melodie Rincon PA-C   3 mL at 11/29/22 2302    bisacodyL suppository 10 mg  10 mg Rectal Daily PRN Melodie Rincon PA-C        dextrose 10% bolus 125 mL  12.5 g Intravenous PRN Melodie Rincon PA-C        dextrose 10% bolus 250 mL  25 g Intravenous PRN Melodie Rincon PA-C         diltiaZEM 24 hr capsule 300 mg  300 mg Oral Daily Annie Reyes NP   300 mg at 12/02/22 0932    EScitalopram oxalate tablet 5 mg  5 mg Oral Daily Melodie Rincon PA-C   5 mg at 12/02/22 0932    glucagon (human recombinant) injection 1 mg  1 mg Intramuscular PRN Melodie Rincon PA-C        glucose chewable tablet 16 g  16 g Oral PRN Melodie Rincon PA-C        glucose chewable tablet 24 g  24 g Oral PRN Melodie Rincon PA-C        melatonin tablet 3 mg  3 mg Oral Nightly Melodie Rincon PA-C   3 mg at 12/01/22 2042    melatonin tablet 6 mg  6 mg Oral Nightly PRN Diandra Garland MD        naloxone 0.4 mg/mL injection 0.02 mg  0.02 mg Intravenous PRN Melodie Rincon PA-C        ondansetron injection 4 mg  4 mg Intravenous Q8H PRN Melodie Rincon PA-C        oseltamivir capsule 30 mg  30 mg Oral BID Diandra Garland MD   30 mg at 12/02/22 0932    oxybutynin 24 hr tablet 10 mg  10 mg Oral Daily Melodie Rincon PA-C   10 mg at 12/02/22 0932    polyethylene glycol packet 17 g  17 g Oral BID PRN Melodie Rincon PA-C        rivaroxaban tablet 15 mg  15 mg Oral Daily before evening meal Annie Reyes NP   15 mg at 12/01/22 1809     Current Discharge Medication List        START taking these medications    Details   albuterol-ipratropium (DUO-NEB) 2.5 mg-0.5 mg/3 mL nebulizer solution Take 3 mLs by nebulization every 4 (four) hours as needed for Wheezing or Shortness of Breath. Rescue  Qty: 75 mL, Refills: 0      benzonatate (TESSALON) 100 MG capsule Take 1 capsule (100 mg total) by mouth 3 (three) times daily as needed for Cough.  Qty: 15 capsule, Refills: 0      diltiaZEM (CARDIZEM CD) 300 MG 24 hr capsule Take 1 capsule (300 mg total) by mouth once daily.  Qty: 30 capsule, Refills: 11      oseltamivir (TAMIFLU) 30 MG capsule Take 1 capsule (30 mg total) by mouth 2 (two) times daily. for 1 day  Qty: 2 capsule, Refills: 0           CONTINUE these medications which have CHANGED    Details    rivaroxaban (XARELTO) 15 mg Tab Take 1 tablet (15 mg total) by mouth before evening meal.  Qty: 30 tablet, Refills: 2           CONTINUE these medications which have NOT CHANGED    Details   EScitalopram oxalate (LEXAPRO) 5 MG Tab TAKE 1 TABLET (5 MG TOTAL) BY MOUTH ONCE DAILY.  Qty: 90 tablet, Refills: 2    Associated Diagnoses: Memory loss      melatonin (MELATIN) 3 mg tablet Take 1 tablet (3 mg total) by mouth nightly.  Qty: 90 tablet, Refills: 3      MYRBETRIQ 50 mg Tb24 Take 50 mg by mouth once daily.      multivitamin with folic acid 400 mcg Tab Take 1 tablet by mouth once daily.           STOP taking these medications       cyanocobalamin, vitamin B-12, 5,000 mcg Subl Comments:   Reason for Stopping:         donepeziL (ARICEPT) 5 MG tablet Comments:   Reason for Stopping:         simvastatin (ZOCOR) 10 MG tablet Comments:   Reason for Stopping:         suvorexant (BELSOMRA) 5 mg Tab Comments:   Reason for Stopping:         TIADYLT  mg Cs24 Comments:   Reason for Stopping:                 I have seen and examined this patient within the last 30 days. My clinical findings that support the need for the home health skilled services and home bound status are the following:no   Weakness/numbness causing balance and gait disturbance due to Weakness/Debility making it taxing to leave home.  Mental confusion making it unsafe for patient to leave home alone due to  Dementia.     Diet:   cardiac diet    Labs:      Referrals/ Consults  Physical Therapy to evaluate and treat. Evaluate for home safety and equipment needs; Establish/upgrade home exercise program. Perform / instruct on therapeutic exercises, gait training, transfer training, and Range of Motion.  Occupational Therapy to evaluate and treat. Evaluate home environment for safety and equipment needs. Perform/Instruct on transfers, ADL training, ROM, and therapeutic exercises.   to evaluate for community resources/long-range planning.  Aide  to provide assistance with personal care, ADLs, and vital signs.    Activities:   activity as tolerated    Nursing:   Agency to admit patient within 24 hours of hospital discharge unless specified on physician order or at patient request    SN to complete comprehensive assessment including routine vital signs. Instruct on disease process and s/s of complications to report to MD. Review/verify medication list sent home with the patient at time of discharge  and instruct patient/caregiver as needed. Frequency may be adjusted depending on start of care date.     Skilled nurse to perform up to 3 visits PRN for symptoms related to diagnosis    Notify MD if SBP > 160 or < 90; DBP > 90 or < 50; HR > 120 or < 50; Temp > 101; O2 < 88%; Other:       Ok to schedule additional visits based on staff availability and patient request on consecutive days within the home health episode.    When multiple disciplines ordered:    Start of Care occurs on Sunday - Wednesday schedule remaining discipline evaluations as ordered on separate consecutive days following the start of care.    Thursday SOC -schedule subsequent evaluations Friday and Monday the following week.     Friday - Saturday SOC - schedule subsequent discipline evaluations on consecutive days starting Monday of the following week.    For all post-discharge communication and subsequent orders please contact patient's primary care physician. If unable to reach primary care physician or do not receive response within 30 minutes, please contact [facility] on call for clinical staff order clarification    Miscellaneous   Routine Skin for Bedridden Patients: Instruct patient/caregiver to apply moisture barrier cream to all skin folds and wet areas in perineal area daily and after baths and all bowel movements.    Home Health Aide:  Nursing Three times weekly, Physical Therapy Three times weekly, Occupational Therapy Three times weekly, Medical Social Work Three times weekly, and  Home Health Aide Three times weekly  Patient will need PT/OT for at least 1 hour three times weekly for debility and hx of falls.         Wound Care Orders  no    I certify that this patient is confined to her home and needs intermittent skilled nursing care, physical therapy, and occupational therapy.

## 2022-12-02 NOTE — PLAN OF CARE
[facility] accepted patient. SW requested HH orders from NP. Per NP possible dc tomorrow. SW will continue to follow pt throughout her transitions of care and assist with any dc needs.     Future Appointments   Date Time Provider Department Center   3/2/2023  2:00 PM Manoj Dowling MD [facility]        12/02/22 6438   Post-Acute Status   Post-Acute Authorization Home Health

## 2022-12-02 NOTE — ASSESSMENT & PLAN NOTE
Patient with Hypoxic Respiratory failure which is Acute.  she is not on home oxygen. Supplemental oxygen was provided and noted- Oxygen Concentration (%):  [28] 28.    Signs/symptoms of respiratory failure include- lethargy. Contributing diagnoses includes -influenza Labs and images were reviewed. Patient Has not had a recent ABG. Will treat underlying causes and adjust management of respiratory failure as follows-tamiflu    On 1-2L NC  Wean O2 as tolerated   desat study ordered   Would be appropriate for SNF at discharge, however family wants to take her home with 24 hour care, home health PT/OT/nursing/AIDE     Pt is due for a fu

## 2022-12-02 NOTE — SUBJECTIVE & OBJECTIVE
Interval hx: breathing and cough is improving, however still on 1-2L NC. She is confused this am; suspect hospital related delirium.   Blood cx NGTD,   Urine cx NG- stop rocephin  Ok to stop IVF    Continue influenza tx   PT to eval and treat.   Add IS   Patient status, POC has been discussed daily with daughters Sarah Moreno and caregivers at the bedside.   Increase cardizem for better BP control   Desat study     Review of Systems   Unable to perform ROS: Dementia   Respiratory:  Positive for cough.    Neurological:  Positive for weakness.   Psychiatric/Behavioral:  Positive for confusion.    Objective:     Vital Signs (Most Recent):  Temp: 97.9 °F (36.6 °C) (12/02/22 0755)  Pulse: 91 (12/02/22 0755)  Resp: 19 (12/02/22 0755)  BP: (!) 170/97 (12/02/22 0755)  SpO2: 97 % (12/02/22 0900)   Vital Signs (24h Range):  Temp:  [96 °F (35.6 °C)-98.2 °F (36.8 °C)] 97.9 °F (36.6 °C)  Pulse:  [] 91  Resp:  [16-20] 19  SpO2:  [91 %-97 %] 97 %  BP: (138-184)/(76-97) 170/97     Weight: 53 kg (116 lb 12.8 oz)  Body mass index is 20.96 kg/m².    Physical Exam  Vitals and nursing note reviewed.   Constitutional:       Appearance: She is well-developed. She is ill-appearing.   HENT:      Head: Normocephalic and atraumatic.   Eyes:      Extraocular Movements: EOM normal.      Pupils: Pupils are equal, round, and reactive to light.   Neck:      Thyroid: No thyromegaly.      Trachea: No tracheal deviation.   Cardiovascular:      Rate and Rhythm: Normal rate and regular rhythm.      Heart sounds: No murmur heard.  Pulmonary:      Effort: Pulmonary effort is normal.      Breath sounds: Normal breath sounds. No wheezing.   Abdominal:      General: Bowel sounds are normal.      Palpations: Abdomen is soft.      Tenderness: There is no abdominal tenderness.   Musculoskeletal:         General: No tenderness. Normal range of motion.      Cervical back: Normal range of motion and neck supple.   Lymphadenopathy:      Cervical: No cervical  adenopathy.   Skin:     General: Skin is warm and dry.   Neurological:      Mental Status: She is alert. She is disoriented.      Cranial Nerves: No cranial nerve deficit.      Deep Tendon Reflexes: Reflexes are normal and symmetric.      Comments: Oriented to self, close to baseline   Psychiatric:         Behavior: Behavior is agitated.         CRANIAL NERVES     CN III, IV, VI   Pupils are equal, round, and reactive to light.  Extraocular motions are normal.      Significant Labs: All pertinent labs within the past 24 hours have been reviewed.    Significant Imaging: I have reviewed all pertinent imaging results/findings within the past 24 hours.

## 2022-12-02 NOTE — PT/OT/SLP PROGRESS
"Physical Therapy Treatment    Patient Name:  Tigist Murry   MRN:  [id number]    Recommendations:     Discharge Recommendations:  nursing facility, skilled (if going home, patient will benefit from Home Health PT/OT and resumption of 24/7 caregiver assist)   Discharge Equipment Recommendations: none   Barriers to discharge:  current cognitive status, functional mobility status    Assessment:     Tigist Murry is a 82 y.o. female admitted with a medical diagnosis of Acute hypoxemic respiratory failure.  She presents with the following impairments/functional limitations:  weakness, impaired functional mobility, impaired cognition, decreased safety awareness, impaired endurance, gait instability, impaired balance, impaired self care skills, decreased lower extremity function Patient seen for physical therapy treatment on this date.  Patient with increase in confusion during session, but oriented.  Sitter present during session.  Full report to follow.  Continue to recommend patient to discharge to skilled nursing facility for continued rehabilitation prior to returning home.  Should patient return home, patient will benefit from 24/7 caregiver assist/supervision.    Rehab Prognosis: Fair; patient would benefit from acute skilled PT services to address these deficits and reach maximum level of function.    Recent Surgery: * No surgery found *      Plan:     During this hospitalization, patient to be seen 5 x/week to address the identified rehab impairments via gait training, therapeutic activities, therapeutic exercises, neuromuscular re-education and progress toward the following goals:    Plan of Care Expires:  12/30/22    Subjective     Chief Complaint: "I feel alright"  Patient/Family Comments/goals: To return home [facility]  Pain/Comfort:  Pain Rating 1: 0/10  Pain Rating Post-Intervention 1: 0/10      Objective:     Communicated with nurse Franco prior to session.  Patient found supine with telemetry, oxygen, " "peripheral IV, PureWick upon PT entry to room.     General Precautions: Standard, contact, droplet, fall   Orthopedic Precautions:N/A   Braces: N/A  Respiratory Status: Nasal cannula, flow 2 L/min     Functional Mobility:  Bed Mobility:     Rolling Right: minimum assistance  Scooting: minimum assistance  Supine to Sit: minimum assistance  Sit to Supine: moderate assistance  Transfers:     Sit to Stand:  moderate assistance with rolling walker  Gait: with RW x 75' with Min Assist for managing RW and safety, flexed posture, shuffling steps, slow pace  Balance: Seated:  EOB, varies with Min assist to SBA, flexed posture     Standing:  requires RW, Min assist to maintain COM over CLARIBEL      AM-PAC 6 CLICK MOBILITY  Turning over in bed (including adjusting bedclothes, sheets and blankets)?: 2  Sitting down on and standing up from a chair with arms (e.g., wheelchair, bedside commode, etc.): 3  Moving from lying on back to sitting on the side of the bed?: 3  Moving to and from a bed to a chair (including a wheelchair)?: 3  Need to walk in hospital room?: 3  Climbing 3-5 steps with a railing?: 1  Basic Mobility Total Score: 15       Treatment & Education:  Patient performs functional mobility as above.  Patient oriented to name and location "hospital" - unable to state correct date/situation.  Patient requires min VC's for redirection to task, follows simple commands.  Unable to follow multi-step commands.  Patient taken off oxygen with Nursing present for O2 evaluation.  Patient maintains O2 sats above 91% during transfers and gait with no supplemental oxygen.  Patient wishes to return back to supine due to fatigue.  Patient with coughing at end of session, nursing aware.      Patient left supine with all lines intact, call button in reach, bed alarm on, and nurse present..    GOALS:   Multidisciplinary Problems       Physical Therapy Goals          Problem: Physical Therapy    Goal Priority Disciplines Outcome Goal " Variances Interventions   Physical Therapy Goal     PT, PT/OT Ongoing, Progressing     Description: Goals to be met by: 2022     Patient will increase functional independence with mobility by performin. Supine to sit with Contact Guard Assistance  2. Sit to supine with Contact Guard Assistance  3. Rolling to Left and Right with Stand-by Assistance.  4. Sit to stand transfer with Stand-by Assistance  5. Bed to chair transfer with Stand-by Assistance using Rolling Walker  6. Gait  x 100 feet with Contact Guard Assistance using Rolling Walker.   7. Ascend/descend 1 stair withContact Guard Assistance using Rolling Walker.                          Time Tracking:     PT Received On: 22  PT Start Time: 1138     PT Stop Time: 1209  PT Total Time (min): 31 min     Billable Minutes: Gait Training 15 and Therapeutic Activity 16    Treatment Type: Treatment  PT/PTA: PT     PTA Visit Number: 0     2022

## 2022-12-02 NOTE — PLAN OF CARE
Problem: Adult Inpatient Plan of Care  Goal: Plan of Care Review  Outcome: Ongoing, Progressing  Goal: Absence of Hospital-Acquired Illness or Injury  Outcome: Ongoing, Progressing  Goal: Optimal Comfort and Wellbeing  Outcome: Ongoing, Progressing  Goal: Readiness for Transition of Care  Outcome: Ongoing, Progressing     Problem: Fall Injury Risk  Goal: Absence of Fall and Fall-Related Injury  Outcome: Ongoing, Progressing     Problem: Gas Exchange Impaired  Goal: Optimal Gas Exchange  Outcome: Ongoing, Progressing     Problem: Skin Injury Risk Increased  Goal: Skin Health and Integrity  Outcome: Ongoing, Progressing     Problem: UTI (Urinary Tract Infection)  Goal: Improved Infection Symptoms  Outcome: Ongoing, Progressing

## 2022-12-02 NOTE — ASSESSMENT & PLAN NOTE
Controlled upon arrival then borderline low  Pt did take diltiazem morning of admission  Monitor closely  -increase cardizem to 300 mg     Vitals:    12/02/22 0046 12/02/22 0444 12/02/22 0755 12/02/22 0900   BP: (!) 156/76 (!) 184/94 (!) 170/97    BP Location: Right arm Left arm Right arm    Patient Position: Lying Lying Sitting    Pulse: 85 103 91    Resp:  18 19    Temp:  97.9 °F (36.6 °C) 97.9 °F (36.6 °C)    TempSrc:  Oral Oral    SpO2:  95% 97% 97%   Weight:       Height:

## 2022-12-02 NOTE — ASSESSMENT & PLAN NOTE
Close to baseline  Continue home meds and delirium precautions  Fall precautions   Aspiration precautions   Turn q 2 hours

## 2022-12-02 NOTE — PROGRESS NOTES
"[facility] Medicine  Progress Note    Patient Name: Tigist Murry  MRN: [id number]  Patient Class: IP- Inpatient   Admission Date: 11/29/2022  Length of Stay: 2 days  Attending Physician: Diandra Garland*  Primary Care Provider: Gwen Porter MD        Subjective:     Principal Problem:Acute hypoxemic respiratory failure        HPI:  This is a 82 y.o. year old female with a PMH of paroxysmal A fib (on Xarelto), SVT, HTN, anxiety, dementia, and AVNRT s/p ablation (2015) who presents to the ED with a chief complaint of weakness and cough x 3 days. Pt's daughter providing majority of history.  Pt is close to baseline, "slightly more confused" per daughter.  Pt oriented to person.  Patient denies known exposure to a COVID-19 patient, she is vaccinated. Pt denies fever, chills, nasal congestion, chest pain, shortness of breath, nausea, vomiting, diarrhea, abdominal pain, dizziness, syncope.    In the ED, T max 100.7 F, HR , 93% on 2L.  Influenza A positive.  CBC with normal WBC and Hgb.  CMP without electrolyte abnormalities and normal cr at 0.9 baseline.  Anion gap elevated at 17 with bicarb 18.  .  LA 1.8 WNL.  CXR unremarkable.      Overview/Hospital Course:  No notes on file    Interval hx: breathing and cough is improving, however still on 1-2L NC. She is confused this am; suspect hospital related delirium.   Blood cx NGTD,   Urine cx NG- stop rocephin  Ok to stop IVF    Continue influenza tx   PT to eval and treat.   Add IS   Patient status, POC has been discussed daily with daughters Sarah Moreno and caregivers at the bedside.   Increase cardizem for better BP control   Desat study     Review of Systems   Unable to perform ROS: Dementia   Respiratory:  Positive for cough.    Neurological:  Positive for weakness.   Psychiatric/Behavioral:  Positive for confusion.    Objective:     Vital Signs (Most Recent):  Temp: 97.9 °F (36.6 °C) (12/02/22 0755)  Pulse: 91 (12/02/22 " 0755)  Resp: 19 (12/02/22 0755)  BP: (!) 170/97 (12/02/22 0755)  SpO2: 97 % (12/02/22 0900)   Vital Signs (24h Range):  Temp:  [96 °F (35.6 °C)-98.2 °F (36.8 °C)] 97.9 °F (36.6 °C)  Pulse:  [] 91  Resp:  [16-20] 19  SpO2:  [91 %-97 %] 97 %  BP: (138-184)/(76-97) 170/97     Weight: 53 kg (116 lb 12.8 oz)  Body mass index is 20.96 kg/m².    Physical Exam  Vitals and nursing note reviewed.   Constitutional:       Appearance: She is well-developed. She is ill-appearing.   HENT:      Head: Normocephalic and atraumatic.   Eyes:      Extraocular Movements: EOM normal.      Pupils: Pupils are equal, round, and reactive to light.   Neck:      Thyroid: No thyromegaly.      Trachea: No tracheal deviation.   Cardiovascular:      Rate and Rhythm: Normal rate and regular rhythm.      Heart sounds: No murmur heard.  Pulmonary:      Effort: Pulmonary effort is normal.      Breath sounds: Normal breath sounds. No wheezing.   Abdominal:      General: Bowel sounds are normal.      Palpations: Abdomen is soft.      Tenderness: There is no abdominal tenderness.   Musculoskeletal:         General: No tenderness. Normal range of motion.      Cervical back: Normal range of motion and neck supple.   Lymphadenopathy:      Cervical: No cervical adenopathy.   Skin:     General: Skin is warm and dry.   Neurological:      Mental Status: She is alert. She is disoriented.      Cranial Nerves: No cranial nerve deficit.      Deep Tendon Reflexes: Reflexes are normal and symmetric.      Comments: Oriented to self, close to baseline   Psychiatric:         Behavior: Behavior is agitated.         CRANIAL NERVES     CN III, IV, VI   Pupils are equal, round, and reactive to light.  Extraocular motions are normal.      Significant Labs: All pertinent labs within the past 24 hours have been reviewed.    Significant Imaging: I have reviewed all pertinent imaging results/findings within the past 24 hours.      Assessment/Plan:      * Acute hypoxemic  respiratory failure  Patient with Hypoxic Respiratory failure which is Acute.  she is not on home oxygen. Supplemental oxygen was provided and noted- Oxygen Concentration (%):  [28] 28.    Signs/symptoms of respiratory failure include- lethargy. Contributing diagnoses includes -influenza Labs and images were reviewed. Patient Has not had a recent ABG. Will treat underlying causes and adjust management of respiratory failure as follows-tamiflu    On 1-2L NC  Wean O2 as tolerated   desat study ordered   Would be appropriate for SNF at discharge, however family wants to take her home with 24 hour care, home health PT/OT/nursing/AIDE      UTI (urinary tract infection)    Patient with mild UTI, nitrite +  Confusion, weakness   Continue IV rocephin for now   Urine cx NG final   Treated with 3 days of Rocephin, ok to stop     Influenza A  Tamiflu initiated  Antipyretics for fever and continuous IVF     Increased IVF - continue until she increases PO intake. Appears dry per physical exam - improved; ok to stop IVF   -PRN duoneb   -IS   - supplemental O2   -OOB with PT as tolerated       Memory impairment  Close to baseline  Continue home meds and delirium precautions  Fall precautions   Aspiration precautions   Turn q 2 hours       Weakness        Atrial fibrillation and flutter  Patient with Paroxysmal (<7 days) atrial fibrillation which is uncontrolled currently with diltiazem. Patient is currently in atrial fibrillation.[id number] Score: 3. Anticoagulation indicated. Anticoagulation done with xarelto.  HR improved after fluids and tylenol, pt received morning dose of diltiazem.        Non-traumatic rhabdomyolysis  Weakness  -->945, normal kidney function.  Pt's daughter reports hospitalization with rhabdo (pt down for 10 hrs) a few months ago.  Will trend and treat with continuous IVF.  Will order PT/OT per daughter's request.  After discharge will need increased PT/OT at least an hour a day three times  weekly and ongoing nursing care due to hx of falls and debility       Long term current use of anticoagulant  See above      Situational depression  Patient has persistent depression which is mild and is currently controlled. Will Continue anti-depressant medications. We will not consult psychiatry at this time. Patient does not display psychosis at this time. Continue to monitor closely and adjust plan of care as needed.        Essential hypertension  Controlled upon arrival then borderline low  Pt did take diltiazem morning of admission  Monitor closely  -increase cardizem to 300 mg     Vitals:    12/02/22 0046 12/02/22 0444 12/02/22 0755 12/02/22 0900   BP: (!) 156/76 (!) 184/94 (!) 170/97    BP Location: Right arm Left arm Right arm    Patient Position: Lying Lying Sitting    Pulse: 85 103 91    Resp:  18 19    Temp:  97.9 °F (36.6 °C) 97.9 °F (36.6 °C)    TempSrc:  Oral Oral    SpO2:  95% 97% 97%   Weight:       Height:               VTE Risk Mitigation (From admission, onward)         Ordered     rivaroxaban tablet 15 mg  Daily before evening meal         11/30/22 1458     Reason for No Pharmacological VTE Prophylaxis  Once        Question:  Reasons:  Answer:  Already adequately anticoagulated on oral Anticoagulants    11/29/22 2036     IP VTE HIGH RISK PATIENT  Once         11/29/22 2036                Discharge Planning   CLARKE:      Code Status: Full Code   Is the patient medically ready for discharge?:     Reason for patient still in hospital (select all that apply): Patient trending condition  Discharge Plan A: Skilled Nursing Facility                  Annie Reyes NP  Department of [facility] Medicine   [facility]

## 2022-12-02 NOTE — ASSESSMENT & PLAN NOTE
Patient with Paroxysmal (<7 days) atrial fibrillation which is uncontrolled currently with diltiazem. Patient is currently in atrial fibrillation.[id number] Score: 3. Anticoagulation indicated. Anticoagulation done with xarelto.  HR improved after fluids and tylenol, pt received morning dose of diltiazem.

## 2022-12-02 NOTE — PLAN OF CARE
SW called pts daughter Sarah Moreno [phone] and there was no answer.  GAYE will follow up at a later time.     Future Appointments   Date Time Provider Department Center   3/2/2023  2:00 PM Manoj Dowling MD [facility]        12/02/22 1131   Post-Acute Status   Post-Acute Authorization Other

## 2022-12-02 NOTE — PLAN OF CARE
Problem: Occupational Therapy  Goal: Occupational Therapy Goal  Description: Goals to be met by: 12/30/2022     Patient will increase functional independence with ADLs by performing:    UE Dressing with Supervision.  LE Dressing with Minimal Assistance.  Grooming while seated at sink with Supervision.  Toileting from bedside commode with Minimal Assistance for hygiene and clothing management.   Toilet transfer to bedside commode with Contact Guard Assistance.  Increased functional strength to WFL for ADLS.    Outcome: Ongoing, Progressing     Patient very confused this session; difficult to redirect. Patient required MaxA to maintain stance 2/2 significant posterior and R lateral lean. Patient will benefit from continued skilled OT to address deficits and improve performance in functional ADL tasks. Cont OT.

## 2022-12-02 NOTE — NURSING
Received report. Pt very confused all night and pt remains very confused this am-attempted to get out of bed, need to get dress, to go to classroom...only oriented to person; bed alarm, marlon system in use, pt close to nursing station. Will continue to monitor

## 2022-12-02 NOTE — PLAN OF CARE
Patient seen for physical therapy treatment on this date.  Patient with increase in confusion during session, but oriented.  Sitter present during session.  Full report to follow.  Continue to recommend patient to discharge to skilled nursing facility for continued rehabilitation prior to returning home.  Should patient return home, patient will benefit from  caregiver assist/supervision      Problem: Physical Therapy  Goal: Physical Therapy Goal  Description: Goals to be met by: 2022     Patient will increase functional independence with mobility by performin. Supine to sit with Contact Guard Assistance  2. Sit to supine with Contact Guard Assistance  3. Rolling to Left and Right with Stand-by Assistance.  4. Sit to stand transfer with Stand-by Assistance  5. Bed to chair transfer with Stand-by Assistance using Rolling Walker  6. Gait  x 100 feet with Contact Guard Assistance using Rolling Walker.   7. Ascend/descend 1 stair withContact Guard Assistance using Rolling Walker.     Outcome: Ongoing, Progressing

## 2022-12-02 NOTE — ASSESSMENT & PLAN NOTE
Patient with mild UTI, nitrite +  Confusion, weakness   Continue IV rocephin for now   Urine cx NG final   Treated with 3 days of Rocephin, ok to stop

## 2022-12-02 NOTE — NURSING
Attempted multiple times to obtain 02 sats ambulation . Pt was very confused this morning, than late morning, she was very sleep. Pt awake now, 02 sat sitting at bedside was 92, pt ambulated to nursing station and back, 02 sat 90. NP Annei Reyes informed. Sachi, caretaker at bedside

## 2022-12-03 VITALS
SYSTOLIC BLOOD PRESSURE: 164 MMHG | OXYGEN SATURATION: 92 % | RESPIRATION RATE: 18 BRPM | DIASTOLIC BLOOD PRESSURE: 86 MMHG | HEIGHT: 63 IN | BODY MASS INDEX: 20.7 KG/M2 | HEART RATE: 75 BPM | WEIGHT: 116.81 LBS | TEMPERATURE: 98 F

## 2022-12-03 LAB
ANION GAP SERPL CALC-SCNC: 13 MMOL/L (ref 8–16)
BASOPHILS # BLD AUTO: 0.01 K/UL (ref 0–0.2)
BASOPHILS NFR BLD: 0.3 % (ref 0–1.9)
BUN SERPL-MCNC: 13 MG/DL (ref 8–23)
CALCIUM SERPL-MCNC: 9.5 MG/DL (ref 8.7–10.5)
CHLORIDE SERPL-SCNC: 104 MMOL/L (ref 95–110)
CO2 SERPL-SCNC: 26 MMOL/L (ref 23–29)
CREAT SERPL-MCNC: 0.7 MG/DL (ref 0.5–1.4)
DIFFERENTIAL METHOD: ABNORMAL
EOSINOPHIL # BLD AUTO: 0.1 K/UL (ref 0–0.5)
EOSINOPHIL NFR BLD: 1.3 % (ref 0–8)
ERYTHROCYTE [DISTWIDTH] IN BLOOD BY AUTOMATED COUNT: 13 % (ref 11.5–14.5)
EST. GFR  (NO RACE VARIABLE): >60 ML/MIN/1.73 M^2
FOLATE SERPL-MCNC: 18.5 NG/ML (ref 4–24)
GLUCOSE SERPL-MCNC: 132 MG/DL (ref 70–110)
HCT VFR BLD AUTO: 48 % (ref 37–48.5)
HGB BLD-MCNC: 15.7 G/DL (ref 12–16)
IMM GRANULOCYTES # BLD AUTO: 0.01 K/UL (ref 0–0.04)
IMM GRANULOCYTES NFR BLD AUTO: 0.3 % (ref 0–0.5)
LYMPHOCYTES # BLD AUTO: 0.9 K/UL (ref 1–4.8)
LYMPHOCYTES NFR BLD: 23 % (ref 18–48)
MCH RBC QN AUTO: 30 PG (ref 27–31)
MCHC RBC AUTO-ENTMCNC: 32.7 G/DL (ref 32–36)
MCV RBC AUTO: 92 FL (ref 82–98)
MONOCYTES # BLD AUTO: 0.4 K/UL (ref 0.3–1)
MONOCYTES NFR BLD: 10.5 % (ref 4–15)
NEUTROPHILS # BLD AUTO: 2.5 K/UL (ref 1.8–7.7)
NEUTROPHILS NFR BLD: 64.6 % (ref 38–73)
NRBC BLD-RTO: 0 /100 WBC
PLATELET # BLD AUTO: 200 K/UL (ref 150–450)
PMV BLD AUTO: 10.5 FL (ref 9.2–12.9)
POTASSIUM SERPL-SCNC: 3.6 MMOL/L (ref 3.5–5.1)
RBC # BLD AUTO: 5.23 M/UL (ref 4–5.4)
SODIUM SERPL-SCNC: 143 MMOL/L (ref 136–145)
VIT B12 SERPL-MCNC: 719 PG/ML (ref 210–950)
WBC # BLD AUTO: 3.91 K/UL (ref 3.9–12.7)

## 2022-12-03 PROCEDURE — 99900035 HC TECH TIME PER 15 MIN (STAT)

## 2022-12-03 PROCEDURE — 85025 COMPLETE CBC W/AUTO DIFF WBC: CPT | Performed by: NURSE PRACTITIONER

## 2022-12-03 PROCEDURE — 80048 BASIC METABOLIC PNL TOTAL CA: CPT | Performed by: NURSE PRACTITIONER

## 2022-12-03 PROCEDURE — 25000003 PHARM REV CODE 250: Performed by: PHYSICIAN ASSISTANT

## 2022-12-03 PROCEDURE — 25000003 PHARM REV CODE 250: Performed by: FAMILY MEDICINE

## 2022-12-03 PROCEDURE — 36415 COLL VENOUS BLD VENIPUNCTURE: CPT | Performed by: NURSE PRACTITIONER

## 2022-12-03 PROCEDURE — 94761 N-INVAS EAR/PLS OXIMETRY MLT: CPT

## 2022-12-03 PROCEDURE — 25000003 PHARM REV CODE 250: Performed by: NURSE PRACTITIONER

## 2022-12-03 RX ORDER — BENZONATATE 100 MG/1
100 CAPSULE ORAL 3 TIMES DAILY PRN
Status: DISCONTINUED | OUTPATIENT
Start: 2022-12-03 | End: 2022-12-03 | Stop reason: HOSPADM

## 2022-12-03 RX ORDER — BENZONATATE 100 MG/1
100 CAPSULE ORAL 3 TIMES DAILY PRN
Qty: 15 CAPSULE | Refills: 0 | Status: SHIPPED | OUTPATIENT
Start: 2022-12-03 | End: 2022-12-13

## 2022-12-03 RX ADMIN — DILTIAZEM HYDROCHLORIDE 300 MG: 180 CAPSULE, COATED, EXTENDED RELEASE ORAL at 09:12

## 2022-12-03 RX ADMIN — OXYBUTYNIN CHLORIDE 10 MG: 5 TABLET, EXTENDED RELEASE ORAL at 09:12

## 2022-12-03 RX ADMIN — ESCITALOPRAM OXALATE 5 MG: 5 TABLET, FILM COATED ORAL at 09:12

## 2022-12-03 RX ADMIN — OSELTAMIVIR PHOSPHATE 30 MG: 30 CAPSULE ORAL at 09:12

## 2022-12-03 NOTE — SUBJECTIVE & OBJECTIVE
Interval hx: breathing and cough is improving, on RA, more oriented today.   Blood cx NGTD,   Urine cx NG- stop rocephin  Ok to stop IVF    Continue influenza tx   PT to eval and treat.   Add IS   Patient status, POC has been discussed with daughter at the bedside.   Increased cardizem for better BP control   Desat study - on RA    Review of Systems   Unable to perform ROS: Dementia   Respiratory:  Positive for cough.    Neurological:  Positive for weakness.   Psychiatric/Behavioral:  Positive for confusion.    Objective:     Vital Signs (Most Recent):  Temp: 98.4 °F (36.9 °C) (12/03/22 0809)  Pulse: 75 (12/03/22 0809)  Resp: 18 (12/03/22 0809)  BP: (!) 164/86 (12/03/22 0809)  SpO2: (!) 92 % (12/03/22 0809)   Vital Signs (24h Range):  Temp:  [96.4 °F (35.8 °C)-98.4 °F (36.9 °C)] 98.4 °F (36.9 °C)  Pulse:  [61-75] 75  Resp:  [18-19] 18  SpO2:  [92 %-96 %] 92 %  BP: (133-176)/(69-92) 164/86     Weight: 53 kg (116 lb 12.8 oz)  Body mass index is 20.96 kg/m².    Physical Exam  Vitals and nursing note reviewed.   Constitutional:       Appearance: She is well-developed. She is not ill-appearing.   HENT:      Head: Normocephalic and atraumatic.   Eyes:      Extraocular Movements: EOM normal.      Pupils: Pupils are equal, round, and reactive to light.   Neck:      Thyroid: No thyromegaly.      Trachea: No tracheal deviation.   Cardiovascular:      Rate and Rhythm: Normal rate and regular rhythm.      Heart sounds: No murmur heard.  Pulmonary:      Effort: Pulmonary effort is normal.      Breath sounds: Normal breath sounds. No wheezing.   Abdominal:      General: Bowel sounds are normal.      Palpations: Abdomen is soft.      Tenderness: There is no abdominal tenderness.   Musculoskeletal:         General: No tenderness. Normal range of motion.      Cervical back: Normal range of motion and neck supple.   Lymphadenopathy:      Cervical: No cervical adenopathy.   Skin:     General: Skin is warm and dry.   Neurological:       Mental Status: She is alert. Mental status is at baseline.      Cranial Nerves: No cranial nerve deficit.      Deep Tendon Reflexes: Reflexes are normal and symmetric.      Comments: Oriented to self, close to baseline   Psychiatric:         Behavior: Behavior is not agitated.         CRANIAL NERVES     CN III, IV, VI   Pupils are equal, round, and reactive to light.  Extraocular motions are normal.      Significant Labs: All pertinent labs within the past 24 hours have been reviewed.    Significant Imaging: I have reviewed all pertinent imaging results/findings within the past 24 hours.

## 2022-12-03 NOTE — ASSESSMENT & PLAN NOTE
Patient with Hypoxic Respiratory failure which is Acute.  she is not on home oxygen. Supplemental oxygen was provided and noted-  .     Signs/symptoms of respiratory failure include- lethargy. Contributing diagnoses includes -influenza Labs and images were reviewed. Patient Has not had a recent ABG. Will treat underlying causes and adjust management of respiratory failure as follows-tamiflu    Now on RA per desat study   Would be appropriate for SNF at discharge, however family wants to take her home with 24 hour care, home health PT/OT/nursing/AIDE

## 2022-12-03 NOTE — ASSESSMENT & PLAN NOTE
Controlled upon arrival then borderline low  Pt did take diltiazem morning of admission  Monitor closely  -increased cardizem to 300 mg     Vitals:    12/03/22 0609 12/03/22 0735 12/03/22 0756 12/03/22 0809   BP: (!) 176/92   (!) 164/86   BP Location: Left arm   Right arm   Patient Position: Lying   Lying   Pulse: 73 74 75 75   Resp: 19  18 18   Temp: 96.4 °F (35.8 °C)   98.4 °F (36.9 °C)   TempSrc: Axillary   Oral   SpO2: (!) 92%  (!) 94% (!) 92%   Weight:       Height:         Needs close PCP follow up

## 2022-12-03 NOTE — DISCHARGE SUMMARY
"[facility] Medicine  Discharge Summary      Patient Name: Tigist Murry  MRN: [id number]  DANILO: [id number]  Patient Class: IP- Inpatient  Admission Date: 11/29/2022  Hospital Length of Stay: 3 days  Discharge Date and Time:  12/03/2022 12:18 PM  Attending Physician: No att. providers found   Discharging Provider: Annie Reyes NP  Primary Care Provider: Gwen Porter MD    Primary Care Team: Networked reference to record PCT     HPI:   This is a 82 y.o. year old female with a PMH of paroxysmal A fib (on Xarelto), SVT, HTN, anxiety, dementia, and AVNRT s/p ablation (2015) who presents to the ED with a chief complaint of weakness and cough x 3 days. Pt's daughter providing majority of history.  Pt is close to baseline, "slightly more confused" per daughter.  Pt oriented to person.  Patient denies known exposure to a COVID-19 patient, she is vaccinated. Pt denies fever, chills, nasal congestion, chest pain, shortness of breath, nausea, vomiting, diarrhea, abdominal pain, dizziness, syncope.    In the ED, T max 100.7 F, HR , 93% on 2L.  Influenza A positive.  CBC with normal WBC and Hgb.  CMP without electrolyte abnormalities and normal cr at 0.9 baseline.  Anion gap elevated at 17 with bicarb 18.  .  LA 1.8 WNL.  CXR unremarkable.      * No surgery found *      Hospital Course:   No notes on file     Goals of Care Treatment Preferences:  Code Status: Full Code      Consults:     * Acute hypoxemic respiratory failure  Patient with Hypoxic Respiratory failure which is Acute.  she is not on home oxygen. Supplemental oxygen was provided and noted-  .     Signs/symptoms of respiratory failure include- lethargy. Contributing diagnoses includes -influenza Labs and images were reviewed. Patient Has not had a recent ABG. Will treat underlying causes and adjust management of respiratory failure as follows-tamiflu    Now on RA per desat study   Would be appropriate for SNF at discharge, " however family wants to take her home with 24 hour care, home health PT/OT/nursing/AIDE      UTI (urinary tract infection)    Patient with mild UTI, nitrite +  Confusion, weakness   Continue IV rocephin for now   Urine cx NG final   Treated with 3 days of Rocephin, ok to stop     Influenza A  Tamiflu initiated  Antipyretics for fever and continuous IVF     Increased IVF - continue until she increases PO intake. Appears dry per physical exam - improved; ok to stop IVF   -PRN duoneb   -IS   - supplemental O2   -OOB with PT as tolerated   -PRN tessalon for cough       Memory impairment  Close to baseline  Continue home meds and delirium precautions  Fall precautions   Aspiration precautions   Turn q 2 hours       Weakness        Atrial fibrillation and flutter  Patient with Paroxysmal (<7 days) atrial fibrillation which is controlled currently with diltiazem. Patient is currently in atrial fibrillation.[id number] Score: 3. Anticoagulation indicated. Anticoagulation done with xarelto.  HR improved after fluids and tylenol, pt received morning dose of diltiazem.        Non-traumatic rhabdomyolysis  Weakness  -->945, normal kidney function.  Pt's daughter reports hospitalization with rhabdo (pt down for 10 hrs) a few months ago.  Will trend and treat with continuous IVF.  Will order PT/OT per daughter's request.  After discharge will need increased PT/OT at least an hour a day three times weekly and ongoing nursing care due to hx of falls and debility       Long term current use of anticoagulant  See above      Situational depression  Patient has persistent depression which is mild and is currently controlled. Will Continue anti-depressant medications. We will not consult psychiatry at this time. Patient does not display psychosis at this time. Continue to monitor closely and adjust plan of care as needed.        Essential hypertension  Controlled upon arrival then borderline low  Pt did take diltiazem  morning of admission  Monitor closely  -increased cardizem to 300 mg     Vitals:    12/03/22 0609 12/03/22 0735 12/03/22 0756 12/03/22 0809   BP: (!) 176/92   (!) 164/86   BP Location: Left arm   Right arm   Patient Position: Lying   Lying   Pulse: 73 74 75 75   Resp: 19  18 18   Temp: 96.4 °F (35.8 °C)   98.4 °F (36.9 °C)   TempSrc: Axillary   Oral   SpO2: (!) 92%  (!) 94% (!) 92%   Weight:       Height:         Needs close PCP follow up       Final Active Diagnoses:    Diagnosis Date Noted POA    PRINCIPAL PROBLEM:  Acute hypoxemic respiratory failure [J96.01] 11/29/2022 Unknown    UTI (urinary tract infection) [N39.0] 11/30/2022 Unknown    Influenza A [J10.1] 11/29/2022 Unknown    Atrial fibrillation and flutter [I48.91, I48.92] 11/02/2022 Yes    Memory impairment [R41.3] 11/02/2022 Yes    Non-traumatic rhabdomyolysis [M62.82] 11/02/2022 Yes    Weakness [R53.1] 11/02/2022 Yes    Long term current use of anticoagulant [Z79.01] 12/06/2017 Not Applicable    Situational depression [F43.21]  Yes     Chronic    Essential hypertension [I10]  Yes     Chronic      Problems Resolved During this Admission:       Discharged Condition: stable    Disposition: Home-Health Care [facility]    Follow Up:   Follow-up Information     Gwen Porter MD Follow up in 1 week(s).    Specialty: Internal Medicine  Contact information:  [address]  [place] [address]  [phone]             [facility] Follow up.    Why: [facility].           [facility] Follow up.    Why: [facility] will contact patient of future follow up appointments.           [facility].    Specialty: Home Health Services  Why: Home Health Agency will contact patient to nikunj first appointment time/date.  Contact information:  [address]  [address]  [place] [address]  [phone]                       Patient Instructions:      NEBULIZER FOR HOME USE  "    Order Specific Question Answer Comments   Height: 5' 2.6" (1.59 m)    Weight: 53 kg (116 lb 12.8 oz)    Does patient have medical equipment at home? bedside commode    Does patient have medical equipment at home? bath bench    Does patient have medical equipment at home? walker, rolling    Does patient have medical equipment at home? hospital bed    Length of need (1-99 months): 1      NEBULIZER KIT (SUPPLIES) FOR HOME USE     Order Specific Question Answer Comments   Height: 5' 2.6" (1.59 m)    Weight: 53 kg (116 lb 12.8 oz)    Does patient have medical equipment at home? bedside commode    Does patient have medical equipment at home? bath bench    Does patient have medical equipment at home? walker, rolling    Does patient have medical equipment at home? hospital bed    Length of need (1-99 months): 1    Mask or Mouthpiece? Mask      Diet Cardiac     Notify your health care provider if you experience any of the following:  temperature >100.4     Notify your health care provider if you experience any of the following:  persistent nausea and vomiting or diarrhea     Notify your health care provider if you experience any of the following:  severe uncontrolled pain     Notify your health care provider if you experience any of the following:  redness, tenderness, or signs of infection (pain, swelling, redness, odor or green/yellow discharge around incision site)     Notify your health care provider if you experience any of the following:  difficulty breathing or increased cough     Notify your health care provider if you experience any of the following:  severe persistent headache     Notify your health care provider if you experience any of the following:  worsening rash     Notify your health care provider if you experience any of the following:  persistent dizziness, light-headedness, or visual disturbances     Notify your health care provider if you experience any of the following:  increased confusion or " weakness     Activity as tolerated       Significant Diagnostic Studies: Labs:   CMP   Recent Labs   Lab 12/02/22  0301 12/03/22  0951    143   K 3.5 3.6    104   CO2 29 26    132*   BUN 13 13   CREATININE 0.7 0.7   CALCIUM 9.5 9.5   ANIONGAP 13 13    and CBC   Recent Labs   Lab 12/02/22  0302 12/03/22  0951   WBC 5.30 3.91   HGB 15.1 15.7   HCT 45.9 48.0    200     Microbiology:   Blood Culture   Lab Results   Component Value Date    LABBLOO No Growth to date 11/29/2022    LABBLOO No Growth to date 11/29/2022    LABBLOO No Growth to date 11/29/2022    LABBLOO No Growth to date 11/29/2022    LABBLOO No Growth to date 11/29/2022    LABBLOO No Growth to date 11/29/2022    LABBLOO No Growth to date 11/29/2022    LABBLOO No Growth to date 11/29/2022   , Sputum Culture No results found for: GSRESP, RESPIRATORYC and Urine Culture    Lab Results   Component Value Date    LABURIN No significant growth 11/30/2022       Pending Diagnostic Studies:     None         Medications:  Reconciled Home Medications:      Medication List      START taking these medications    albuterol-ipratropium 2.5 mg-0.5 mg/3 mL nebulizer solution  Commonly known as: DUO-NEB  Take 3 mLs by nebulization every 4 (four) hours as needed for Wheezing or Shortness of Breath. Rescue     benzonatate 100 MG capsule  Commonly known as: TESSALON  Take 1 capsule (100 mg total) by mouth 3 (three) times daily as needed for Cough.     diltiaZEM 300 MG 24 hr capsule  Commonly known as: CARDIZEM CD  Take 1 capsule (300 mg total) by mouth once daily.  Replaces: TIADYLT  MG Cs24     oseltamivir 30 MG capsule  Commonly known as: TAMIFLU  Take 1 capsule (30 mg total) by mouth 2 (two) times daily. for 1 day        CHANGE how you take these medications    rivaroxaban 15 mg Tab  Commonly known as: XARELTO  Take 1 tablet (15 mg total) by mouth before evening meal.  What changed:   · medication strength  · how much to take        CONTINUE  taking these medications    EScitalopram oxalate 5 MG Tab  Commonly known as: LEXAPRO  TAKE 1 TABLET (5 MG TOTAL) BY MOUTH ONCE DAILY.     melatonin 3 mg tablet  Commonly known as: MELATIN  Take 1 tablet (3 mg total) by mouth nightly.     multivitamin with folic acid 400 mcg Tab  Take 1 tablet by mouth once daily.     MYRBETRIQ 50 mg Tb24  Generic drug: mirabegron  Take 50 mg by mouth once daily.        STOP taking these medications    BELSOMRA 5 mg Tab  Generic drug: suvorexant     cyanocobalamin (vitamin B-12) 5,000 mcg Subl     donepeziL 5 MG tablet  Commonly known as: ARICEPT     simvastatin 10 MG tablet  Commonly known as: ZOCOR     TIADYLT  MG Cs24  Generic drug: diltiaZEM  Replaced by: diltiaZEM 300 MG 24 hr capsule            Indwelling Lines/Drains at time of discharge:   Lines/Drains/Airways     None                 Time spent on the discharge of patient: 60 minutes         Annie Reyes NP  Department of [facility] Medicine  [facility] [facility]

## 2022-12-03 NOTE — ASSESSMENT & PLAN NOTE
Patient with Paroxysmal (<7 days) atrial fibrillation which is controlled currently with diltiazem. Patient is currently in atrial fibrillation.[id number] Score: 3. Anticoagulation indicated. Anticoagulation done with xarelto.  HR improved after fluids and tylenol, pt received morning dose of diltiazem.

## 2022-12-03 NOTE — ASSESSMENT & PLAN NOTE
Patient with Paroxysmal (<7 days) atrial fibrillation which is controlled currently with diltiazem. Patient is currently in atrial fibrillation.[id number] Score: 3. Anticoagulation indicated. Anticoagulation done with xarelto.  HR improved after fluids and tylenol, pt received morning dose of diltiazem.

## 2022-12-03 NOTE — PROGRESS NOTES
"[facility] Medicine  Progress Note    Patient Name: Tigist Murry  MRN: [id number]  Patient Class: IP- Inpatient   Admission Date: 11/29/2022  Length of Stay: 3 days  Attending Physician: No att. providers found  Primary Care Provider: Gwen Porter MD        Subjective:     Principal Problem:Acute hypoxemic respiratory failure        HPI:  This is a 82 y.o. year old female with a PMH of paroxysmal A fib (on Xarelto), SVT, HTN, anxiety, dementia, and AVNRT s/p ablation (2015) who presents to the ED with a chief complaint of weakness and cough x 3 days. Pt's daughter providing majority of history.  Pt is close to baseline, "slightly more confused" per daughter.  Pt oriented to person.  Patient denies known exposure to a COVID-19 patient, she is vaccinated. Pt denies fever, chills, nasal congestion, chest pain, shortness of breath, nausea, vomiting, diarrhea, abdominal pain, dizziness, syncope.    In the ED, T max 100.7 F, HR , 93% on 2L.  Influenza A positive.  CBC with normal WBC and Hgb.  CMP without electrolyte abnormalities and normal cr at 0.9 baseline.  Anion gap elevated at 17 with bicarb 18.  .  LA 1.8 WNL.  CXR unremarkable.      Overview/Hospital Course:  No notes on file    Interval hx: breathing and cough is improving, on RA, more oriented today.   Blood cx NGTD,   Urine cx NG- stop rocephin  Ok to stop IVF    Continue influenza tx   PT to eval and treat.   Add IS   Patient status, POC has been discussed with daughter at the bedside.   Increased cardizem for better BP control   Desat study - on RA    Review of Systems   Unable to perform ROS: Dementia   Respiratory:  Positive for cough.    Neurological:  Positive for weakness.   Psychiatric/Behavioral:  Positive for confusion.    Objective:     Vital Signs (Most Recent):  Temp: 98.4 °F (36.9 °C) (12/03/22 0809)  Pulse: 75 (12/03/22 0809)  Resp: 18 (12/03/22 0809)  BP: (!) 164/86 (12/03/22 0809)  SpO2: (!) 92 % (12/03/22 " 0809)   Vital Signs (24h Range):  Temp:  [96.4 °F (35.8 °C)-98.4 °F (36.9 °C)] 98.4 °F (36.9 °C)  Pulse:  [61-75] 75  Resp:  [18-19] 18  SpO2:  [92 %-96 %] 92 %  BP: (133-176)/(69-92) 164/86     Weight: 53 kg (116 lb 12.8 oz)  Body mass index is 20.96 kg/m².    Physical Exam  Vitals and nursing note reviewed.   Constitutional:       Appearance: She is well-developed. She is not ill-appearing.   HENT:      Head: Normocephalic and atraumatic.   Eyes:      Extraocular Movements: EOM normal.      Pupils: Pupils are equal, round, and reactive to light.   Neck:      Thyroid: No thyromegaly.      Trachea: No tracheal deviation.   Cardiovascular:      Rate and Rhythm: Normal rate and regular rhythm.      Heart sounds: No murmur heard.  Pulmonary:      Effort: Pulmonary effort is normal.      Breath sounds: Normal breath sounds. No wheezing.   Abdominal:      General: Bowel sounds are normal.      Palpations: Abdomen is soft.      Tenderness: There is no abdominal tenderness.   Musculoskeletal:         General: No tenderness. Normal range of motion.      Cervical back: Normal range of motion and neck supple.   Lymphadenopathy:      Cervical: No cervical adenopathy.   Skin:     General: Skin is warm and dry.   Neurological:      Mental Status: She is alert. Mental status is at baseline.      Cranial Nerves: No cranial nerve deficit.      Deep Tendon Reflexes: Reflexes are normal and symmetric.      Comments: Oriented to self, close to baseline   Psychiatric:         Behavior: Behavior is not agitated.         CRANIAL NERVES     CN III, IV, VI   Pupils are equal, round, and reactive to light.  Extraocular motions are normal.      Significant Labs: All pertinent labs within the past 24 hours have been reviewed.    Significant Imaging: I have reviewed all pertinent imaging results/findings within the past 24 hours.      Assessment/Plan:      * Acute hypoxemic respiratory failure  Patient with Hypoxic Respiratory failure which is  Acute.  she is not on home oxygen. Supplemental oxygen was provided and noted-  .     Signs/symptoms of respiratory failure include- lethargy. Contributing diagnoses includes -influenza Labs and images were reviewed. Patient Has not had a recent ABG. Will treat underlying causes and adjust management of respiratory failure as follows-tamiflu    Now on RA per desat study   Would be appropriate for SNF at discharge, however family wants to take her home with 24 hour care, home health PT/OT/nursing/AIDE      UTI (urinary tract infection)    Patient with mild UTI, nitrite +  Confusion, weakness   Continue IV rocephin for now   Urine cx NG final   Treated with 3 days of Rocephin, ok to stop     Influenza A  Tamiflu initiated  Antipyretics for fever and continuous IVF     Increased IVF - continue until she increases PO intake. Appears dry per physical exam - improved; ok to stop IVF   -PRN duoneb   -IS   - supplemental O2   -OOB with PT as tolerated   -PRN tessalon for cough       Memory impairment  Close to baseline  Continue home meds and delirium precautions  Fall precautions   Aspiration precautions   Turn q 2 hours       Weakness        Atrial fibrillation and flutter  Patient with Paroxysmal (<7 days) atrial fibrillation which is controlled currently with diltiazem. Patient is currently in atrial fibrillation.[id number] Score: 3. Anticoagulation indicated. Anticoagulation done with xarelto.  HR improved after fluids and tylenol, pt received morning dose of diltiazem.        Non-traumatic rhabdomyolysis  Weakness  -->945, normal kidney function.  Pt's daughter reports hospitalization with rhabdo (pt down for 10 hrs) a few months ago.  Will trend and treat with continuous IVF.  Will order PT/OT per daughter's request.  After discharge will need increased PT/OT at least an hour a day three times weekly and ongoing nursing care due to hx of falls and debility       Long term current use of  anticoagulant  See above      Situational depression  Patient has persistent depression which is mild and is currently controlled. Will Continue anti-depressant medications. We will not consult psychiatry at this time. Patient does not display psychosis at this time. Continue to monitor closely and adjust plan of care as needed.        Essential hypertension  Controlled upon arrival then borderline low  Pt did take diltiazem morning of admission  Monitor closely  -increased cardizem to 300 mg     Vitals:    12/03/22 0609 12/03/22 0735 12/03/22 0756 12/03/22 0809   BP: (!) 176/92   (!) 164/86   BP Location: Left arm   Right arm   Patient Position: Lying   Lying   Pulse: 73 74 75 75   Resp: 19  18 18   Temp: 96.4 °F (35.8 °C)   98.4 °F (36.9 °C)   TempSrc: Axillary   Oral   SpO2: (!) 92%  (!) 94% (!) 92%   Weight:       Height:         Needs close PCP follow up       VTE Risk Mitigation (From admission, onward)         Ordered     rivaroxaban tablet 15 mg  Daily before evening meal         11/30/22 1458     Reason for No Pharmacological VTE Prophylaxis  Once        Question:  Reasons:  Answer:  Already adequately anticoagulated on oral Anticoagulants    11/29/22 2036     IP VTE HIGH RISK PATIENT  Once         11/29/22 2036                Discharge Planning   CLARKE: 12/3/2022     Code Status: Full Code   Is the patient medically ready for discharge?:     Reason for patient still in hospital (select all that apply): Patient trending condition  Discharge Plan A: Skilled Nursing Facility   Discharge Delays: None known at this time              Annie Reyes NP  Department of [facility] Medicine   [facility]

## 2022-12-03 NOTE — ASSESSMENT & PLAN NOTE
Tamiflu initiated  Antipyretics for fever and continuous IVF     Increased IVF - continue until she increases PO intake. Appears dry per physical exam - improved; ok to stop IVF   -PRN duoneb   -IS   - supplemental O2   -OOB with PT as tolerated   -PRN tessalon for cough

## 2022-12-03 NOTE — PLAN OF CARE
Problem: Adult Inpatient Plan of Care  Goal: Plan of Care Review  Outcome: Ongoing, Progressing  Goal: Absence of Hospital-Acquired Illness or Injury  Outcome: Ongoing, Progressing  Goal: Optimal Comfort and Wellbeing  Outcome: Ongoing, Progressing  Goal: Readiness for Transition of Care  Outcome: Ongoing, Progressing     Problem: Fall Injury Risk  Goal: Absence of Fall and Fall-Related Injury  Outcome: Ongoing, Progressing     Problem: Gas Exchange Impaired  Goal: Optimal Gas Exchange  Outcome: Ongoing, Progressing     Problem: UTI (Urinary Tract Infection)  Goal: Improved Infection Symptoms  Outcome: Ongoing, Progressing

## 2022-12-03 NOTE — PLAN OF CARE
Pt will dc with [facility]. SW sent HH orders via careport. Pts daughter is at bedside and will transport pt home upon dc. SW will continue to follow pt throughout her transitions of care and assist with any dc needs. Home Health agency will contact pt to schedule first appoint time/date.     Cleared from CM . Bedside Nurse and VN notified.    PCP requested  Cardi requested.     Future Appointments   Date Time Provider Department Center   3/2/2023  2:00 PM Manoj Dowling MD [facility]        12/03/22 0935   Final Note   Assessment Type Final Discharge Note   Anticipated Discharge Disposition [facility] Resources/Appts/Education Provided Appointments scheduled by Navigator/Coordinator   Post-Acute Status   Discharge Delays None known at this time      Patient has no objection to blood transfusions.

## 2022-12-05 LAB
BACTERIA BLD CULT: NORMAL
BACTERIA BLD CULT: NORMAL

## 2022-12-07 PROCEDURE — G0180 MD CERTIFICATION HHA PATIENT: HCPCS | Mod: ,,, | Performed by: FAMILY MEDICINE

## 2022-12-07 PROCEDURE — G0180 PR HOME HEALTH MD CERTIFICATION: ICD-10-PCS | Mod: ,,, | Performed by: FAMILY MEDICINE

## 2022-12-21 ENCOUNTER — TELEPHONE (OUTPATIENT)
Dept: UROLOGY | Facility: CLINIC | Age: 82
End: 2022-12-21
Payer: MEDICARE

## 2022-12-21 RX ORDER — MIRABEGRON 50 MG/1
50 TABLET, FILM COATED, EXTENDED RELEASE ORAL DAILY
Qty: 90 TABLET | Refills: 3 | Status: ON HOLD | OUTPATIENT
Start: 2022-12-21 | End: 2023-12-08 | Stop reason: HOSPADM

## 2022-12-21 NOTE — TELEPHONE ENCOUNTER
----- Message from Pedro Layton sent at 12/21/2022  1:26 PM CST -----  Type:  RX Refill Request    Who Called: Natalie from [facility] pharmacy   Refill or New Rx:refill  RX Name and Strength:MYRBETRIQ 50 mg Tb24  How is the patient currently taking it? (ex. 1XDay):[facility] TELEMETRY UN[facility]  Is this a 30 day or 90 day RX:  Preferred Pharmacy with phone number:[facility] - [facility] [address]   Phone: [phone]  Fax: [phone]  Local or Mail Order:mail  Ordering Provider:  Would the patient rather a call back or a response via [facility]?   Best Call Back Number:  Additional Information:

## 2023-01-12 ENCOUNTER — EXTERNAL HOME HEALTH (OUTPATIENT)
Dept: HOME HEALTH SERVICES | Facility: HOSPITAL | Age: 83
End: 2023-01-12
Payer: MEDICARE

## 2023-01-27 ENCOUNTER — TELEPHONE (OUTPATIENT)
Dept: ADMINISTRATIVE | Facility: CLINIC | Age: 83
End: 2023-01-27
Payer: MEDICARE

## 2023-01-27 NOTE — TELEPHONE ENCOUNTER
Returned call to Marlin with home health. Reports pt has a superficial wound on left posterior lower leg. Order given to eval and treat.

## 2023-01-30 ENCOUNTER — OFFICE VISIT (OUTPATIENT)
Dept: INTERNAL MEDICINE | Facility: CLINIC | Age: 83
End: 2023-01-30
Payer: MEDICARE

## 2023-01-30 VITALS
DIASTOLIC BLOOD PRESSURE: 54 MMHG | BODY MASS INDEX: 19.8 KG/M2 | WEIGHT: 111.75 LBS | OXYGEN SATURATION: 96 % | SYSTOLIC BLOOD PRESSURE: 108 MMHG | HEIGHT: 63 IN | HEART RATE: 70 BPM

## 2023-01-30 DIAGNOSIS — N18.30 STAGE 3 CHRONIC KIDNEY DISEASE, UNSPECIFIED WHETHER STAGE 3A OR 3B CKD: ICD-10-CM

## 2023-01-30 DIAGNOSIS — E78.00 PURE HYPERCHOLESTEROLEMIA: Chronic | ICD-10-CM

## 2023-01-30 DIAGNOSIS — I70.0 ATHEROSCLEROSIS OF AORTA: ICD-10-CM

## 2023-01-30 DIAGNOSIS — J84.10 CALCIFIED GRANULOMA OF LUNG: ICD-10-CM

## 2023-01-30 DIAGNOSIS — G20.C PARKINSONISM, UNSPECIFIED PARKINSONISM TYPE: ICD-10-CM

## 2023-01-30 DIAGNOSIS — G30.1 ALZHEIMER'S DISEASE WITH LATE ONSET (CODE): ICD-10-CM

## 2023-01-30 DIAGNOSIS — E64.0 SEQUELAE OF PROTEIN-CALORIE MALNUTRITION: ICD-10-CM

## 2023-01-30 DIAGNOSIS — R31.0 GROSS HEMATURIA: ICD-10-CM

## 2023-01-30 DIAGNOSIS — I48.0 PAF (PAROXYSMAL ATRIAL FIBRILLATION): ICD-10-CM

## 2023-01-30 DIAGNOSIS — R73.9 HYPERGLYCEMIA: ICD-10-CM

## 2023-01-30 DIAGNOSIS — I10 ESSENTIAL HYPERTENSION: Primary | ICD-10-CM

## 2023-01-30 DIAGNOSIS — F41.9 ANXIETY: Chronic | ICD-10-CM

## 2023-01-30 PROCEDURE — 1126F PR PAIN SEVERITY QUANTIFIED, NO PAIN PRESENT: ICD-10-PCS | Mod: HCNC,CPTII,S$GLB, | Performed by: INTERNAL MEDICINE

## 2023-01-30 PROCEDURE — 1160F PR REVIEW ALL MEDS BY PRESCRIBER/CLIN PHARMACIST DOCUMENTED: ICD-10-PCS | Mod: HCNC,CPTII,S$GLB, | Performed by: INTERNAL MEDICINE

## 2023-01-30 PROCEDURE — 99214 PR OFFICE/OUTPT VISIT, EST, LEVL IV, 30-39 MIN: ICD-10-PCS | Mod: HCNC,S$GLB,, | Performed by: INTERNAL MEDICINE

## 2023-01-30 PROCEDURE — 3074F PR MOST RECENT SYSTOLIC BLOOD PRESSURE < 130 MM HG: ICD-10-PCS | Mod: HCNC,CPTII,S$GLB, | Performed by: INTERNAL MEDICINE

## 2023-01-30 PROCEDURE — 1159F PR MEDICATION LIST DOCUMENTED IN MEDICAL RECORD: ICD-10-PCS | Mod: HCNC,CPTII,S$GLB, | Performed by: INTERNAL MEDICINE

## 2023-01-30 PROCEDURE — 3288F FALL RISK ASSESSMENT DOCD: CPT | Mod: HCNC,CPTII,S$GLB, | Performed by: INTERNAL MEDICINE

## 2023-01-30 PROCEDURE — 3078F PR MOST RECENT DIASTOLIC BLOOD PRESSURE < 80 MM HG: ICD-10-PCS | Mod: HCNC,CPTII,S$GLB, | Performed by: INTERNAL MEDICINE

## 2023-01-30 PROCEDURE — 3288F PR FALLS RISK ASSESSMENT DOCUMENTED: ICD-10-PCS | Mod: HCNC,CPTII,S$GLB, | Performed by: INTERNAL MEDICINE

## 2023-01-30 PROCEDURE — 1101F PT FALLS ASSESS-DOCD LE1/YR: CPT | Mod: HCNC,CPTII,S$GLB, | Performed by: INTERNAL MEDICINE

## 2023-01-30 PROCEDURE — 1101F PR PT FALLS ASSESS DOC 0-1 FALLS W/OUT INJ PAST YR: ICD-10-PCS | Mod: HCNC,CPTII,S$GLB, | Performed by: INTERNAL MEDICINE

## 2023-01-30 PROCEDURE — 3074F SYST BP LT 130 MM HG: CPT | Mod: HCNC,CPTII,S$GLB, | Performed by: INTERNAL MEDICINE

## 2023-01-30 PROCEDURE — 3078F DIAST BP <80 MM HG: CPT | Mod: HCNC,CPTII,S$GLB, | Performed by: INTERNAL MEDICINE

## 2023-01-30 PROCEDURE — 99214 OFFICE O/P EST MOD 30 MIN: CPT | Mod: HCNC,S$GLB,, | Performed by: INTERNAL MEDICINE

## 2023-01-30 PROCEDURE — 99999 PR PBB SHADOW E&M-EST. PATIENT-LVL III: CPT | Mod: PBBFAC,HCNC,, | Performed by: INTERNAL MEDICINE

## 2023-01-30 PROCEDURE — 1159F MED LIST DOCD IN RCRD: CPT | Mod: HCNC,CPTII,S$GLB, | Performed by: INTERNAL MEDICINE

## 2023-01-30 PROCEDURE — 1157F PR ADVANCE CARE PLAN OR EQUIV PRESENT IN MEDICAL RECORD: ICD-10-PCS | Mod: HCNC,CPTII,S$GLB, | Performed by: INTERNAL MEDICINE

## 2023-01-30 PROCEDURE — 99999 PR PBB SHADOW E&M-EST. PATIENT-LVL III: ICD-10-PCS | Mod: PBBFAC,HCNC,, | Performed by: INTERNAL MEDICINE

## 2023-01-30 PROCEDURE — 1160F RVW MEDS BY RX/DR IN RCRD: CPT | Mod: HCNC,CPTII,S$GLB, | Performed by: INTERNAL MEDICINE

## 2023-01-30 PROCEDURE — 1126F AMNT PAIN NOTED NONE PRSNT: CPT | Mod: HCNC,CPTII,S$GLB, | Performed by: INTERNAL MEDICINE

## 2023-01-30 PROCEDURE — 1157F ADVNC CARE PLAN IN RCRD: CPT | Mod: HCNC,CPTII,S$GLB, | Performed by: INTERNAL MEDICINE

## 2023-01-30 RX ORDER — DILTIAZEM HYDROCHLORIDE 240 MG/1
240 CAPSULE, COATED, EXTENDED RELEASE ORAL DAILY
Qty: 90 CAPSULE | Refills: 3 | Status: ON HOLD | OUTPATIENT
Start: 2023-01-30 | End: 2023-12-27

## 2023-01-30 NOTE — PROGRESS NOTES
Patient ID: Tigist Murry is a 82 y.o. female.    Chief Complaint: Follow-up    HPI Tigist is a 82 y.o. female with anxiety, HTN, HLD, OAB, psoriasis, SVT, atrial fibrillation and memory loss who presents for routine follow-up of medical conditions.  She presents with her daughter and nursing aide Sachi today. Since last visit with me, she has been to hospital for syncope and influenza. Also has been in SNF. But now currently at home with 24/7 care provided by aides and daughters.  In hospital (at [facility]) they increased her diltiazem dose and she has had leg swelling ever since. We reviewed her echo from that [facility] stay. She is also taking xarelto for atrial fibrillation. Xarelto dose was decreased and she has had not any issues with blood in the urine since then. Reviewed CBC and BMP lab results from 12/3/22.    Review of Systems   Cardiovascular:  Positive for leg swelling.   All other systems reviewed and are negative.      Objective:     Vitals:    01/30/23 0937   BP: (!) 108/54   Pulse:         Physical Exam  Vitals reviewed.   Constitutional:       General: She is not in acute distress.     Appearance: Normal appearance. She is well-developed. She is not ill-appearing, toxic-appearing or diaphoretic.   HENT:      Head: Normocephalic and atraumatic.      Right Ear: External ear normal.      Left Ear: External ear normal.      Nose: Nose normal.   Eyes:      General: No scleral icterus.        Right eye: No discharge.         Left eye: No discharge.      Extraocular Movements: Extraocular movements intact.      Conjunctiva/sclera: Conjunctivae normal.   Cardiovascular:      Rate and Rhythm: Normal rate and regular rhythm.      Heart sounds: Normal heart sounds. No murmur heard.    No friction rub. No gallop.   Pulmonary:      Effort: Pulmonary effort is normal. No respiratory distress.      Breath sounds: Normal breath sounds. No stridor. No wheezing, rhonchi or rales.   Musculoskeletal:      Right lower leg:  Edema (2+) present.      Left lower leg: Edema (2+) present.   Skin:     General: Skin is warm and dry.   Neurological:      General: No focal deficit present.      Mental Status: She is alert and oriented to person, place, and time. Mental status is at baseline.   Psychiatric:         Mood and Affect: Mood normal.         Behavior: Behavior normal.         Thought Content: Thought content normal.         Judgment: Judgment normal.       Assessment:       1. Essential hypertension Well controlled   2. Anxiety Well controlled   3. Pure hypercholesterolemia Chronic   4. PAF (paroxysmal atrial fibrillation) Chronic   5. Hyperglycemia Active   6. Sequelae of protein-calorie malnutrition Chronic   7. Parkinsonism, unspecified Parkinsonism type Chronic   8. Alzheimer's disease with late onset (CODE) Chronic   9. Atherosclerosis of aorta Chronic   10. Calcified granuloma of lung Chronic   11. Stage 3 chronic kidney disease, unspecified whether stage 3a or 3b CKD Chronic   12. Gross hematuria Inactive         Plan:         Essential hypertension  Comments:  Reducing diltiazem dose due to swelling. Alert me for palpitations and/or HR >110.   Orders:  -     diltiaZEM (CARDIZEM CD) 240 MG 24 hr capsule; Take 1 capsule (240 mg total) by mouth once daily.  Dispense: 90 capsule; Refill: 3  -     Comprehensive Metabolic Panel; Future; Expected date: 06/01/2023    Anxiety  Comments:  Cont current medication    Pure hypercholesterolemia  Comments:  Pt no longer on statin.   Orders:  -     Lipid Panel; Future; Expected date: 06/01/2023    PAF (paroxysmal atrial fibrillation)  Comments:  cont current medication. Cont to follow with cardiology   Orders:  -     diltiaZEM (CARDIZEM CD) 240 MG 24 hr capsule; Take 1 capsule (240 mg total) by mouth once daily.  Dispense: 90 capsule; Refill: 3  -     CBC Auto Differential; Future; Expected date: 06/01/2023  -     TSH; Future; Expected date: 06/01/2023    Hyperglycemia  -     Hemoglobin A1C;  Future; Expected date: 06/01/2023    Sequelae of protein-calorie malnutrition    Parkinsonism, unspecified Parkinsonism type    Alzheimer's disease with late onset (CODE)    Atherosclerosis of aorta    Calcified granuloma of lung    Stage 3 chronic kidney disease, unspecified whether stage 3a or 3b CKD  Comments:  Cont to monitor     Gross hematuria  Comments:  Resolved. Monitored by urology         RTC 6 months for annual exam     Warning signs discussed, patient to call with any further issues or worsening of symptoms.       Parts of the above note were dictated using a voice dictation software. Please excuse any grammatical or typographical errors.

## 2023-02-05 PROCEDURE — G0179 MD RECERTIFICATION HHA PT: HCPCS | Mod: ,,, | Performed by: INTERNAL MEDICINE

## 2023-02-05 PROCEDURE — G0179 PR HOME HEALTH MD RECERTIFICATION: ICD-10-PCS | Mod: ,,, | Performed by: INTERNAL MEDICINE

## 2023-02-09 DIAGNOSIS — Z00.00 ENCOUNTER FOR MEDICARE ANNUAL WELLNESS EXAM: ICD-10-CM

## 2023-02-16 ENCOUNTER — PES CALL (OUTPATIENT)
Dept: ADMINISTRATIVE | Facility: CLINIC | Age: 83
End: 2023-02-16
Payer: MEDICARE

## 2023-02-24 ENCOUNTER — DOCUMENT SCAN (OUTPATIENT)
Dept: HOME HEALTH SERVICES | Facility: HOSPITAL | Age: 83
End: 2023-02-24
Payer: MEDICARE

## 2023-03-01 ENCOUNTER — DOCUMENT SCAN (OUTPATIENT)
Dept: HOME HEALTH SERVICES | Facility: HOSPITAL | Age: 83
End: 2023-03-01
Payer: MEDICARE

## 2023-03-01 DIAGNOSIS — I49.8 OTHER CARDIAC ARRHYTHMIA: Primary | ICD-10-CM

## 2023-03-02 ENCOUNTER — HOSPITAL ENCOUNTER (OUTPATIENT)
Dept: CARDIOLOGY | Facility: HOSPITAL | Age: 83
Discharge: HOME | End: 2023-03-02
Attending: INTERNAL MEDICINE
Payer: MEDICARE

## 2023-03-02 ENCOUNTER — OFFICE VISIT (OUTPATIENT)
Dept: CARDIOLOGY | Facility: CLINIC | Age: 83
End: 2023-03-02
Payer: MEDICARE

## 2023-03-02 VITALS — WEIGHT: 117 LBS | HEIGHT: 63 IN | BODY MASS INDEX: 20.73 KG/M2

## 2023-03-02 VITALS
BODY MASS INDEX: 20.9 KG/M2 | HEIGHT: 63 IN | WEIGHT: 117.94 LBS | SYSTOLIC BLOOD PRESSURE: 140 MMHG | HEART RATE: 72 BPM | DIASTOLIC BLOOD PRESSURE: 85 MMHG

## 2023-03-02 DIAGNOSIS — I48.92 ATRIAL FIBRILLATION AND FLUTTER: ICD-10-CM

## 2023-03-02 DIAGNOSIS — G30.1 ALZHEIMER'S DISEASE WITH LATE ONSET (CODE): ICD-10-CM

## 2023-03-02 DIAGNOSIS — I48.0 PAF (PAROXYSMAL ATRIAL FIBRILLATION): ICD-10-CM

## 2023-03-02 DIAGNOSIS — N18.30 STAGE 3 CHRONIC KIDNEY DISEASE, UNSPECIFIED WHETHER STAGE 3A OR 3B CKD: Chronic | ICD-10-CM

## 2023-03-02 DIAGNOSIS — I10 ESSENTIAL HYPERTENSION: Chronic | ICD-10-CM

## 2023-03-02 DIAGNOSIS — I10 ESSENTIAL HYPERTENSION: ICD-10-CM

## 2023-03-02 DIAGNOSIS — R41.3 MEMORY IMPAIRMENT: Primary | ICD-10-CM

## 2023-03-02 DIAGNOSIS — E78.00 PURE HYPERCHOLESTEROLEMIA: Chronic | ICD-10-CM

## 2023-03-02 DIAGNOSIS — I49.8 OTHER CARDIAC ARRHYTHMIA: ICD-10-CM

## 2023-03-02 DIAGNOSIS — I35.1 AORTIC VALVE INSUFFICIENCY, ETIOLOGY OF CARDIAC VALVE DISEASE UNSPECIFIED: ICD-10-CM

## 2023-03-02 DIAGNOSIS — I70.0 AORTIC ATHEROSCLEROSIS: Chronic | ICD-10-CM

## 2023-03-02 DIAGNOSIS — I48.91 ATRIAL FIBRILLATION AND FLUTTER: ICD-10-CM

## 2023-03-02 PROCEDURE — 3079F PR MOST RECENT DIASTOLIC BLOOD PRESSURE 80-89 MM HG: ICD-10-PCS | Mod: HCNC,CPTII,S$GLB, | Performed by: INTERNAL MEDICINE

## 2023-03-02 PROCEDURE — 3288F FALL RISK ASSESSMENT DOCD: CPT | Mod: HCNC,CPTII,S$GLB, | Performed by: INTERNAL MEDICINE

## 2023-03-02 PROCEDURE — 99214 OFFICE O/P EST MOD 30 MIN: CPT | Mod: HCNC,S$GLB,, | Performed by: INTERNAL MEDICINE

## 2023-03-02 PROCEDURE — 1157F ADVNC CARE PLAN IN RCRD: CPT | Mod: HCNC,CPTII,S$GLB, | Performed by: INTERNAL MEDICINE

## 2023-03-02 PROCEDURE — 93005 ELECTROCARDIOGRAM TRACING: CPT | Mod: HCNC,S$GLB,, | Performed by: INTERNAL MEDICINE

## 2023-03-02 PROCEDURE — 1160F PR REVIEW ALL MEDS BY PRESCRIBER/CLIN PHARMACIST DOCUMENTED: ICD-10-PCS | Mod: HCNC,CPTII,S$GLB, | Performed by: INTERNAL MEDICINE

## 2023-03-02 PROCEDURE — 1159F PR MEDICATION LIST DOCUMENTED IN MEDICAL RECORD: ICD-10-PCS | Mod: HCNC,CPTII,S$GLB, | Performed by: INTERNAL MEDICINE

## 2023-03-02 PROCEDURE — 1126F PR PAIN SEVERITY QUANTIFIED, NO PAIN PRESENT: ICD-10-PCS | Mod: HCNC,CPTII,S$GLB, | Performed by: INTERNAL MEDICINE

## 2023-03-02 PROCEDURE — 1159F MED LIST DOCD IN RCRD: CPT | Mod: HCNC,CPTII,S$GLB, | Performed by: INTERNAL MEDICINE

## 2023-03-02 PROCEDURE — 93306 TTE W/DOPPLER COMPLETE: CPT | Mod: HCNC

## 2023-03-02 PROCEDURE — 1160F RVW MEDS BY RX/DR IN RCRD: CPT | Mod: HCNC,CPTII,S$GLB, | Performed by: INTERNAL MEDICINE

## 2023-03-02 PROCEDURE — 3079F DIAST BP 80-89 MM HG: CPT | Mod: HCNC,CPTII,S$GLB, | Performed by: INTERNAL MEDICINE

## 2023-03-02 PROCEDURE — 99214 PR OFFICE/OUTPT VISIT, EST, LEVL IV, 30-39 MIN: ICD-10-PCS | Mod: HCNC,S$GLB,, | Performed by: INTERNAL MEDICINE

## 2023-03-02 PROCEDURE — 1101F PR PT FALLS ASSESS DOC 0-1 FALLS W/OUT INJ PAST YR: ICD-10-PCS | Mod: HCNC,CPTII,S$GLB, | Performed by: INTERNAL MEDICINE

## 2023-03-02 PROCEDURE — 99999 PR PBB SHADOW E&M-EST. PATIENT-LVL III: CPT | Mod: PBBFAC,HCNC,, | Performed by: INTERNAL MEDICINE

## 2023-03-02 PROCEDURE — 93306 ECHO (CUPID ONLY): ICD-10-PCS | Mod: 26,HCNC,, | Performed by: INTERNAL MEDICINE

## 2023-03-02 PROCEDURE — 3288F PR FALLS RISK ASSESSMENT DOCUMENTED: ICD-10-PCS | Mod: HCNC,CPTII,S$GLB, | Performed by: INTERNAL MEDICINE

## 2023-03-02 PROCEDURE — 3077F PR MOST RECENT SYSTOLIC BLOOD PRESSURE >= 140 MM HG: ICD-10-PCS | Mod: HCNC,CPTII,S$GLB, | Performed by: INTERNAL MEDICINE

## 2023-03-02 PROCEDURE — 93005 RHYTHM STRIP: ICD-10-PCS | Mod: HCNC,S$GLB,, | Performed by: INTERNAL MEDICINE

## 2023-03-02 PROCEDURE — 93306 TTE W/DOPPLER COMPLETE: CPT | Mod: 26,HCNC,, | Performed by: INTERNAL MEDICINE

## 2023-03-02 PROCEDURE — 1157F PR ADVANCE CARE PLAN OR EQUIV PRESENT IN MEDICAL RECORD: ICD-10-PCS | Mod: HCNC,CPTII,S$GLB, | Performed by: INTERNAL MEDICINE

## 2023-03-02 PROCEDURE — 93010 ELECTROCARDIOGRAM REPORT: CPT | Mod: HCNC,S$GLB,, | Performed by: INTERNAL MEDICINE

## 2023-03-02 PROCEDURE — 99999 PR PBB SHADOW E&M-EST. PATIENT-LVL III: ICD-10-PCS | Mod: PBBFAC,HCNC,, | Performed by: INTERNAL MEDICINE

## 2023-03-02 PROCEDURE — 3077F SYST BP >= 140 MM HG: CPT | Mod: HCNC,CPTII,S$GLB, | Performed by: INTERNAL MEDICINE

## 2023-03-02 PROCEDURE — 1101F PT FALLS ASSESS-DOCD LE1/YR: CPT | Mod: HCNC,CPTII,S$GLB, | Performed by: INTERNAL MEDICINE

## 2023-03-02 PROCEDURE — 93010 RHYTHM STRIP: ICD-10-PCS | Mod: HCNC,S$GLB,, | Performed by: INTERNAL MEDICINE

## 2023-03-02 PROCEDURE — 1126F AMNT PAIN NOTED NONE PRSNT: CPT | Mod: HCNC,CPTII,S$GLB, | Performed by: INTERNAL MEDICINE

## 2023-03-02 NOTE — PROGRESS NOTES
Subjective:    Patient ID:  Tigist Murry is a 82 y.o. female who presents for follow-up of PAF    HPI   Former pt of JOSE Perry; switched to me to be seen in [place]    82 y.o. 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  Still has PAF, as evidenced on MCOT and Holter monitoring, as well as ECGs. Asx.  No CP, no SOB, no LH/presync/sync.  no LH/presync/sync.  Working out (aerobics, yoga) 4x/week! No problems at all.    Update 3/23  We changed eliquis to xarelto to reduce the # of pills she takes.  No bleeding.    Holter: 10/21/19: SR    My interpretation of today's ECG is AF at 72 bpm.     Review of Systems   Constitutional: Negative. Negative for malaise/fatigue.   HENT: Negative.  Negative for ear pain and tinnitus.    Eyes:  Negative for blurred vision.   Cardiovascular: Negative.  Negative for chest pain, dyspnea on exertion, near-syncope, palpitations and syncope.   Respiratory: Negative.  Negative for shortness of breath.    Endocrine: Negative.  Negative for polyuria.   Hematologic/Lymphatic: Does not bruise/bleed easily.   Skin: Negative.  Negative for rash.   Musculoskeletal: Negative.  Negative for joint pain and muscle weakness.   Gastrointestinal: Negative.  Negative for abdominal pain and change in bowel habit.   Genitourinary:  Negative for frequency.   Neurological: Negative.  Negative for dizziness and weakness.   Psychiatric/Behavioral:  Positive for memory loss. Negative for depression. The patient is not nervous/anxious.    Allergic/Immunologic: Negative for environmental allergies.      Objective:    Physical Exam  Vitals and nursing note reviewed.   Constitutional:       Appearance: Normal appearance. She is well-developed.   HENT:      Head: Normocephalic and atraumatic.   Eyes:      Conjunctiva/sclera: Conjunctivae normal.   Neck:      Thyroid: No thyroid mass or thyromegaly.      Trachea: No tracheal deviation.    Cardiovascular:      Rate and Rhythm: Normal rate and regular rhythm.      Pulses: Normal pulses.   Pulmonary:      Effort: Pulmonary effort is normal.      Breath sounds: Normal breath sounds.   Abdominal:      General: There is no distension.   Musculoskeletal:         General: Normal range of motion.      Cervical back: Normal range of motion. No edema.   Lymphadenopathy:      Comments: 1+ edema RLE, trace on LLE   Skin:     General: Skin is warm and dry.      Findings: No rash.   Neurological:      Mental Status: She is alert and oriented to person, place, and time.      Coordination: Coordination normal.      Comments: Speech/conversation pleasant but tangential and repetitive at times.   Psychiatric:         Speech: Speech normal.         Behavior: Behavior normal. Behavior is cooperative.         Thought Content: Thought content normal.         Cognition and Memory: Memory is impaired.         Assessment:       1. Memory impairment    2. Other cardiac arrhythmia    3. Essential hypertension    4. Pure hypercholesterolemia    5. Aortic atherosclerosis    6. PAF (paroxysmal atrial fibrillation)    7. Atrial fibrillation and flutter    8. Stage 3 chronic kidney disease, unspecified whether stage 3a or 3b CKD    9. Alzheimer's disease with late onset (CODE)    10. Aortic valve insufficiency, etiology of cardiac valve disease unspecified         Plan:       Asx AF.  Continue dilt.  Xarelto: increase to 20 qd due to preserved renal function. If hematuria recurs, f/u with urology and/or call. ? Watchman prn.  Update echo (re: LE edema)  Return in 1 year, or earlier prn.

## 2023-03-03 LAB
AORTIC ROOT ANNULUS: 2.7 CM
AV INDEX (PROSTH): 0.97
AV MEAN GRADIENT: 2 MMHG
AV PEAK GRADIENT: 4 MMHG
AV REGURGITATION PRESSURE HALF TIME: 574.24 MS
AV VALVE AREA: 3.25 CM2
AV VELOCITY RATIO: 0.94
BSA FOR ECHO PROCEDURE: 1.54 M2
CV ECHO LV RWT: 0.46 CM
DOP CALC AO PEAK VEL: 1 M/S
DOP CALC AO VTI: 17.8 CM
DOP CALC LVOT AREA: 3.4 CM2
DOP CALC LVOT DIAMETER: 2.07 CM
DOP CALC LVOT PEAK VEL: 0.94 M/S
DOP CALC LVOT STROKE VOLUME: 57.85 CM3
DOP CALCLVOT PEAK VEL VTI: 17.2 CM
ECHO LV POSTERIOR WALL: 1 CM (ref 0.6–1.1)
EJECTION FRACTION: 55 %
FRACTIONAL SHORTENING: 54 % (ref 28–44)
INTERVENTRICULAR SEPTUM: 0.9 CM (ref 0.6–1.1)
IVC DIAMETER: 20 CM
LA MAJOR: 7.15 CM
LA MINOR: 7.59 CM
LA WIDTH: 4.5 CM
LEFT ATRIUM SIZE: 4.13 CM
LEFT ATRIUM VOLUME INDEX MOD: 59.5 ML/M2
LEFT ATRIUM VOLUME INDEX: 75.5 ML/M2
LEFT ATRIUM VOLUME MOD: 91.64 CM3
LEFT ATRIUM VOLUME: 116.32 CM3
LEFT INTERNAL DIMENSION IN SYSTOLE: 2 CM (ref 2.1–4)
LEFT VENTRICLE DIASTOLIC VOLUME INDEX: 55.42 ML/M2
LEFT VENTRICLE DIASTOLIC VOLUME: 85.34 ML
LEFT VENTRICLE MASS INDEX: 88 G/M2
LEFT VENTRICLE SYSTOLIC VOLUME INDEX: 24.5 ML/M2
LEFT VENTRICLE SYSTOLIC VOLUME: 37.79 ML
LEFT VENTRICULAR INTERNAL DIMENSION IN DIASTOLE: 4.35 CM (ref 3.5–6)
LEFT VENTRICULAR MASS: 135.24 G
LVOT MG: 1.92 MMHG
LVOT MV: 0.65 CM/S
PISA AR MAX VEL: 4.18 M/S
PISA MRMAX VEL: 5.14 M/S
PISA TR MAX VEL: 2.56 M/S
PULM VEIN S/D RATIO: 0.52
PV PEAK D VEL: 0.44 M/S
PV PEAK S VEL: 0.23 M/S
PV PEAK VELOCITY: 0.54 CM/S
RA MAJOR: 6.48 CM
RA PRESSURE: 3 MMHG
RA WIDTH: 4.1 CM
RIGHT VENTRICULAR END-DIASTOLIC DIMENSION: 2.25 CM
RIGHT VENTRICULAR LENGTH IN DIASTOLE (APICAL 4-CHAMBER VIEW): 5.5 CM
RV TISSUE DOPPLER FREE WALL SYSTOLIC VELOCITY 1 (APICAL 4 CHAMBER VIEW): 0.01 CM/S
TR MAX PG: 26 MMHG
TRICUSPID ANNULAR PLANE SYSTOLIC EXCURSION: 1.6 CM
TV REST PULMONARY ARTERY PRESSURE: 29 MMHG

## 2023-03-06 PROBLEM — N39.0 UTI (URINARY TRACT INFECTION): Status: RESOLVED | Noted: 2022-11-30 | Resolved: 2023-03-06

## 2023-03-06 PROBLEM — J96.01 ACUTE HYPOXEMIC RESPIRATORY FAILURE: Status: RESOLVED | Noted: 2022-11-29 | Resolved: 2023-03-06

## 2023-03-08 ENCOUNTER — PATIENT MESSAGE (OUTPATIENT)
Dept: INTERNAL MEDICINE | Facility: CLINIC | Age: 83
End: 2023-03-08
Payer: MEDICARE

## 2023-03-09 DIAGNOSIS — F33.1 MODERATE EPISODE OF RECURRENT MAJOR DEPRESSIVE DISORDER: Primary | ICD-10-CM

## 2023-03-09 RX ORDER — ESCITALOPRAM OXALATE 10 MG/1
10 TABLET ORAL DAILY
Qty: 90 TABLET | Refills: 1 | Status: SHIPPED | OUTPATIENT
Start: 2023-03-09 | End: 2023-08-24

## 2023-03-14 ENCOUNTER — EXTERNAL HOME HEALTH (OUTPATIENT)
Dept: HOME HEALTH SERVICES | Facility: HOSPITAL | Age: 83
End: 2023-03-14
Payer: MEDICARE

## 2023-04-13 ENCOUNTER — PES CALL (OUTPATIENT)
Dept: ADMINISTRATIVE | Facility: CLINIC | Age: 83
End: 2023-04-13
Payer: MEDICARE

## 2023-05-09 ENCOUNTER — PATIENT MESSAGE (OUTPATIENT)
Dept: INTERNAL MEDICINE | Facility: CLINIC | Age: 83
End: 2023-05-09
Payer: MEDICARE

## 2023-05-11 DIAGNOSIS — R53.1 WEAKNESS: Primary | ICD-10-CM

## 2023-05-11 DIAGNOSIS — R53.81 PHYSICAL DEBILITY: ICD-10-CM
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
## 2023-05-16 PROCEDURE — G0180 MD CERTIFICATION HHA PATIENT: HCPCS | Mod: ,,, | Performed by: INTERNAL MEDICINE

## 2023-05-16 PROCEDURE — G0180 PR HOME HEALTH MD CERTIFICATION: ICD-10-PCS | Mod: ,,, | Performed by: INTERNAL MEDICINE

## 2023-05-23 DIAGNOSIS — G20.C PARKINSONISM, UNSPECIFIED PARKINSONISM TYPE: Primary | ICD-10-CM

## 2023-05-23 DIAGNOSIS — I48.0 PAF (PAROXYSMAL ATRIAL FIBRILLATION): ICD-10-CM

## 2023-05-23 DIAGNOSIS — N32.81 OVERACTIVE BLADDER: Chronic | ICD-10-CM

## 2023-05-25 ENCOUNTER — PATIENT MESSAGE (OUTPATIENT)
Dept: FAMILY MEDICINE | Facility: CLINIC | Age: 83
End: 2023-05-25
Payer: MEDICARE

## 2023-05-25 ENCOUNTER — TELEPHONE (OUTPATIENT)
Dept: FAMILY MEDICINE | Facility: CLINIC | Age: 83
End: 2023-05-25
Payer: MEDICARE

## 2023-05-25 DIAGNOSIS — Z11.1 SCREENING-PULMONARY TB: Primary | ICD-10-CM

## 2023-05-25 NOTE — TELEPHONE ENCOUNTER
----- Message from Aleyda Cross sent at 5/25/2023 10:29 AM CDT -----  Regarding: pt call back  Name of Who is Calling: Daughter            What is the request in detail: Pt is requesting TB skin  test to be ordered            Can the clinic reply by [facility]:           What Number to Call Back if not in [facility]: [phone]

## 2023-06-05 ENCOUNTER — EXTERNAL HOME HEALTH (OUTPATIENT)
Dept: HOME HEALTH SERVICES | Facility: HOSPITAL | Age: 83
End: 2023-06-05
Payer: MEDICARE

## 2023-06-08 ENCOUNTER — TELEPHONE (OUTPATIENT)
Dept: FAMILY MEDICINE | Facility: CLINIC | Age: 83
End: 2023-06-08
Payer: MEDICARE

## 2023-06-08 NOTE — TELEPHONE ENCOUNTER
----- Message from Tara Pitts sent at 6/8/2023  8:10 AM CDT -----  Contact: [email or web address]  Type:  Needs Medical Advice    Who Called: [facility]  Symptoms (please be specific): they are requesting a verbal order to extend physical therapy for 2 more weeks      Would the patient rather a call back or a response via [facility]? call  Best Call Back Number: [phone]  Additional Information:

## 2023-06-26 ENCOUNTER — DOCUMENT SCAN (OUTPATIENT)
Dept: HOME HEALTH SERVICES | Facility: HOSPITAL | Age: 83
End: 2023-06-26
Payer: MEDICARE

## 2023-08-24 DIAGNOSIS — F33.1 MODERATE EPISODE OF RECURRENT MAJOR DEPRESSIVE DISORDER: ICD-10-CM

## 2023-08-24 RX ORDER — ESCITALOPRAM OXALATE 10 MG/1
TABLET ORAL
Qty: 90 TABLET | Refills: 1 | Status: ON HOLD | OUTPATIENT
Start: 2023-08-24 | End: 2023-12-27

## 2023-08-24 NOTE — TELEPHONE ENCOUNTER
No care due was identified.  [facility] Embedded Care Due Messages. Reference number: [id number].   8/24/2023 2:09:48 PM CDT

## 2023-08-24 NOTE — TELEPHONE ENCOUNTER
Refill Decision Note   Tigist Murry  is requesting a refill authorization.  Brief Assessment and Rationale for Refill:  Approve     Medication Therapy Plan:         Comments:     Note composed:2:15 PM 08/24/2023             Appointments     Last Visit   1/30/2023 Gwen Porter MD   Next Visit   Visit date not found Gwen Porter MD

## 2023-11-29 ENCOUNTER — DOCUMENT SCAN (OUTPATIENT)
Dept: HOME HEALTH SERVICES | Facility: HOSPITAL | Age: 83
End: 2023-11-29
Payer: MEDICARE

## 2023-12-04 ENCOUNTER — HOSPITAL ENCOUNTER (INPATIENT)
Facility: HOSPITAL | Age: 83
LOS: 3 days | Discharge: NURSING, REHAB OR LONG-TERM CARE | DRG: 522 | End: 2023-12-08
Attending: EMERGENCY MEDICINE | Admitting: INTERNAL MEDICINE
Payer: MEDICARE

## 2023-12-04 DIAGNOSIS — N30.00 ACUTE CYSTITIS WITHOUT HEMATURIA: ICD-10-CM

## 2023-12-04 DIAGNOSIS — S32.010A CLOSED COMPRESSION FRACTURE OF BODY OF L1 VERTEBRA: ICD-10-CM

## 2023-12-04 DIAGNOSIS — S72.002A CLOSED FRACTURE OF NECK OF LEFT FEMUR, INITIAL ENCOUNTER: ICD-10-CM

## 2023-12-04 DIAGNOSIS — R00.0 TACHYCARDIA: ICD-10-CM

## 2023-12-04 DIAGNOSIS — S72.002A CLOSED DISPLACED FRACTURE OF LEFT FEMORAL NECK: ICD-10-CM
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
## 2023-12-04 DIAGNOSIS — S43.005A DISLOCATED SHOULDER, LEFT, INITIAL ENCOUNTER: Primary | ICD-10-CM

## 2023-12-04 PROCEDURE — 25000003 PHARM REV CODE 250: Mod: HCNC | Performed by: NURSE PRACTITIONER

## 2023-12-04 PROCEDURE — 94761 N-INVAS EAR/PLS OXIMETRY MLT: CPT | Mod: HCNC

## 2023-12-04 PROCEDURE — 96374 THER/PROPH/DIAG INJ IV PUSH: CPT | Mod: HCNC

## 2023-12-04 PROCEDURE — 96376 TX/PRO/DX INJ SAME DRUG ADON: CPT | Mod: HCNC

## 2023-12-04 PROCEDURE — 99285 EMERGENCY DEPT VISIT HI MDM: CPT | Mod: HCNC

## 2023-12-04 PROCEDURE — 51702 INSERT TEMP BLADDER CATH: CPT | Mod: HCNC

## 2023-12-04 PROCEDURE — 83036 HEMOGLOBIN GLYCOSYLATED A1C: CPT | Mod: HCNC | Performed by: EMERGENCY MEDICINE

## 2023-12-04 PROCEDURE — 23650 CLTX SHO DSLC W/MNPJ WO ANES: CPT | Mod: HCNC,LT

## 2023-12-04 RX ORDER — ACETAMINOPHEN 500 MG
1000 TABLET ORAL
Status: COMPLETED | OUTPATIENT
Start: 2023-12-04 | End: 2023-12-04

## 2023-12-04 RX ADMIN — ACETAMINOPHEN 1000 MG: 500 TABLET ORAL at 11:12

## 2023-12-05 ENCOUNTER — ANESTHESIA (OUTPATIENT)
Dept: SURGERY | Facility: HOSPITAL | Age: 83
DRG: 522 | End: 2023-12-05
Payer: MEDICARE

## 2023-12-05 ENCOUNTER — ANESTHESIA EVENT (OUTPATIENT)
Dept: SURGERY | Facility: HOSPITAL | Age: 83
DRG: 522 | End: 2023-12-05
Payer: MEDICARE

## 2023-12-05 PROBLEM — S43.005A CLOSED DISLOCATION OF LEFT SHOULDER: Status: ACTIVE | Noted: 2023-12-05

## 2023-12-05 PROBLEM — R79.1 ELEVATED INR: Status: RESOLVED | Noted: 2023-12-05 | Resolved: 2023-12-05

## 2023-12-05 PROBLEM — R79.89 ELEVATED LFTS: Status: ACTIVE | Noted: 2023-12-05

## 2023-12-05 PROBLEM — S32.010A COMPRESSION FRACTURE OF L1 LUMBAR VERTEBRA: Status: ACTIVE | Noted: 2023-12-05

## 2023-12-05 PROBLEM — N30.00 ACUTE CYSTITIS WITHOUT HEMATURIA: Status: ACTIVE | Noted: 2023-12-05

## 2023-12-05 PROBLEM — S72.002A CLOSED DISPLACED FRACTURE OF LEFT FEMORAL NECK: Status: ACTIVE | Noted: 2023-12-05

## 2023-12-05 PROBLEM — J98.11 ATELECTASIS: Status: ACTIVE | Noted: 2023-12-05

## 2023-12-05 PROBLEM — R79.1 ELEVATED INR: Status: ACTIVE | Noted: 2023-12-05

## 2023-12-05 LAB
25(OH)D3+25(OH)D2 SERPL-MCNC: 31 NG/ML (ref 30–96)
ABO + RH BLD: NORMAL
ALBUMIN SERPL BCP-MCNC: 3.7 G/DL (ref 3.5–5.2)
ALP SERPL-CCNC: 85 U/L (ref 55–135)
ALT SERPL W/O P-5'-P-CCNC: 66 U/L (ref 10–44)
ANION GAP SERPL CALC-SCNC: 11 MMOL/L (ref 8–16)
APTT PPP: 40.7 SEC (ref 21–32)
AST SERPL-CCNC: 54 U/L (ref 10–40)
BACTERIA #/AREA URNS AUTO: ABNORMAL /HPF
BASOPHILS # BLD AUTO: 0.01 K/UL (ref 0–0.2)
BASOPHILS # BLD AUTO: 0.03 K/UL (ref 0–0.2)
BASOPHILS NFR BLD: 0.1 % (ref 0–1.9)
BASOPHILS NFR BLD: 0.2 % (ref 0–1.9)
BILIRUB SERPL-MCNC: 0.8 MG/DL (ref 0.1–1)
BILIRUB UR QL STRIP: NEGATIVE
BLD GP AB SCN CELLS X3 SERPL QL: NORMAL
BNP SERPL-MCNC: 259 PG/ML (ref 0–99)
BUN SERPL-MCNC: 17 MG/DL (ref 8–23)
CALCIUM SERPL-MCNC: 9.7 MG/DL (ref 8.7–10.5)
CHLORIDE SERPL-SCNC: 107 MMOL/L (ref 95–110)
CLARITY UR REFRACT.AUTO: ABNORMAL
CO2 SERPL-SCNC: 22 MMOL/L (ref 23–29)
COLOR UR AUTO: YELLOW
CREAT SERPL-MCNC: 0.8 MG/DL (ref 0.5–1.4)
DIFFERENTIAL METHOD: ABNORMAL
DIFFERENTIAL METHOD: ABNORMAL
EOSINOPHIL # BLD AUTO: 0 K/UL (ref 0–0.5)
EOSINOPHIL # BLD AUTO: 0 K/UL (ref 0–0.5)
EOSINOPHIL NFR BLD: 0 % (ref 0–8)
EOSINOPHIL NFR BLD: 0 % (ref 0–8)
ERYTHROCYTE [DISTWIDTH] IN BLOOD BY AUTOMATED COUNT: 12.8 % (ref 11.5–14.5)
ERYTHROCYTE [DISTWIDTH] IN BLOOD BY AUTOMATED COUNT: 13 % (ref 11.5–14.5)
EST. GFR  (NO RACE VARIABLE): >60 ML/MIN/1.73 M^2
ESTIMATED AVG GLUCOSE: 105 MG/DL (ref 68–131)
GLUCOSE SERPL-MCNC: 156 MG/DL (ref 70–110)
GLUCOSE UR QL STRIP: ABNORMAL
HBA1C MFR BLD: 5.3 % (ref 4–5.6)
HCT VFR BLD AUTO: 40.3 % (ref 37–48.5)
HCT VFR BLD AUTO: 43.9 % (ref 37–48.5)
HGB BLD-MCNC: 13.1 G/DL (ref 12–16)
HGB BLD-MCNC: 15.2 G/DL (ref 12–16)
HGB UR QL STRIP: NEGATIVE
HYALINE CASTS UR QL AUTO: 0 /LPF
IMM GRANULOCYTES # BLD AUTO: 0.04 K/UL (ref 0–0.04)
IMM GRANULOCYTES # BLD AUTO: 0.07 K/UL (ref 0–0.04)
IMM GRANULOCYTES NFR BLD AUTO: 0.3 % (ref 0–0.5)
IMM GRANULOCYTES NFR BLD AUTO: 0.6 % (ref 0–0.5)
INR PPP: 1.8 (ref 0.8–1.2)
KETONES UR QL STRIP: NEGATIVE
LEUKOCYTE ESTERASE UR QL STRIP: ABNORMAL
LYMPHOCYTES # BLD AUTO: 0.5 K/UL (ref 1–4.8)
LYMPHOCYTES # BLD AUTO: 0.7 K/UL (ref 1–4.8)
LYMPHOCYTES NFR BLD: 3.9 % (ref 18–48)
LYMPHOCYTES NFR BLD: 5.6 % (ref 18–48)
MAGNESIUM SERPL-MCNC: 2.1 MG/DL (ref 1.6–2.6)
MCH RBC QN AUTO: 30.3 PG (ref 27–31)
MCH RBC QN AUTO: 31.1 PG (ref 27–31)
MCHC RBC AUTO-ENTMCNC: 32.5 G/DL (ref 32–36)
MCHC RBC AUTO-ENTMCNC: 34.6 G/DL (ref 32–36)
MCV RBC AUTO: 90 FL (ref 82–98)
MCV RBC AUTO: 93 FL (ref 82–98)
MICROSCOPIC COMMENT: ABNORMAL
MONOCYTES # BLD AUTO: 0.7 K/UL (ref 0.3–1)
MONOCYTES # BLD AUTO: 0.9 K/UL (ref 0.3–1)
MONOCYTES NFR BLD: 5.6 % (ref 4–15)
MONOCYTES NFR BLD: 7.4 % (ref 4–15)
NEUTROPHILS # BLD AUTO: 10.1 K/UL (ref 1.8–7.7)
NEUTROPHILS # BLD AUTO: 11.3 K/UL (ref 1.8–7.7)
NEUTROPHILS NFR BLD: 86.6 % (ref 38–73)
NEUTROPHILS NFR BLD: 89.7 % (ref 38–73)
NITRITE UR QL STRIP: POSITIVE
NRBC BLD-RTO: 0 /100 WBC
NRBC BLD-RTO: 0 /100 WBC
PH UR STRIP: 5 [PH] (ref 5–8)
PHOSPHATE SERPL-MCNC: 3 MG/DL (ref 2.7–4.5)
PLATELET # BLD AUTO: 218 K/UL (ref 150–450)
PLATELET # BLD AUTO: 233 K/UL (ref 150–450)
PMV BLD AUTO: 10.8 FL (ref 9.2–12.9)
PMV BLD AUTO: 10.9 FL (ref 9.2–12.9)
POTASSIUM SERPL-SCNC: 4.1 MMOL/L (ref 3.5–5.1)
PREALB SERPL-MCNC: 24 MG/DL (ref 20–43)
PROT SERPL-MCNC: 6.8 G/DL (ref 6–8.4)
PROT UR QL STRIP: ABNORMAL
PROTHROMBIN TIME: 18.2 SEC (ref 9–12.5)
RBC # BLD AUTO: 4.33 M/UL (ref 4–5.4)
RBC # BLD AUTO: 4.88 M/UL (ref 4–5.4)
RBC #/AREA URNS AUTO: 6 /HPF (ref 0–4)
SODIUM SERPL-SCNC: 140 MMOL/L (ref 136–145)
SP GR UR STRIP: >=1.03 (ref 1–1.03)
SPECIMEN OUTDATE: NORMAL
SQUAMOUS #/AREA URNS AUTO: 1 /HPF
TRANSFERRIN SERPL-MCNC: 242 MG/DL (ref 200–375)
TROPONIN I SERPL DL<=0.01 NG/ML-MCNC: <0.006 NG/ML (ref 0–0.03)
TSH SERPL DL<=0.005 MIU/L-ACNC: 0.49 UIU/ML (ref 0.4–4)
URN SPEC COLLECT METH UR: ABNORMAL
WBC # BLD AUTO: 11.68 K/UL (ref 3.9–12.7)
WBC # BLD AUTO: 12.57 K/UL (ref 3.9–12.7)
WBC #/AREA URNS AUTO: >100 /HPF (ref 0–5)

## 2023-12-05 PROCEDURE — 80053 COMPREHEN METABOLIC PANEL: CPT | Mod: HCNC | Performed by: EMERGENCY MEDICINE

## 2023-12-05 PROCEDURE — 85025 COMPLETE CBC W/AUTO DIFF WBC: CPT | Mod: HCNC | Performed by: EMERGENCY MEDICINE

## 2023-12-05 PROCEDURE — 37000009 HC ANESTHESIA EA ADD 15 MINS: Mod: HCNC | Performed by: ORTHOPAEDIC SURGERY

## 2023-12-05 PROCEDURE — 93005 ELECTROCARDIOGRAM TRACING: CPT | Mod: HCNC

## 2023-12-05 PROCEDURE — 83880 ASSAY OF NATRIURETIC PEPTIDE: CPT | Mod: HCNC | Performed by: EMERGENCY MEDICINE

## 2023-12-05 PROCEDURE — 25000003 PHARM REV CODE 250: Mod: HCNC | Performed by: ANESTHESIOLOGY

## 2023-12-05 PROCEDURE — 71000015 HC POSTOP RECOV 1ST HR: Mod: HCNC | Performed by: ORTHOPAEDIC SURGERY

## 2023-12-05 PROCEDURE — 93010 ELECTROCARDIOGRAM REPORT: CPT | Mod: HCNC,,, | Performed by: INTERNAL MEDICINE

## 2023-12-05 PROCEDURE — 71000039 HC RECOVERY, EACH ADD'L HOUR: Mod: HCNC | Performed by: ORTHOPAEDIC SURGERY

## 2023-12-05 PROCEDURE — 37000008 HC ANESTHESIA 1ST 15 MINUTES: Mod: HCNC | Performed by: ORTHOPAEDIC SURGERY

## 2023-12-05 PROCEDURE — 63600175 PHARM REV CODE 636 W HCPCS: Mod: HCNC | Performed by: EMERGENCY MEDICINE

## 2023-12-05 PROCEDURE — 84100 ASSAY OF PHOSPHORUS: CPT | Mod: HCNC | Performed by: EMERGENCY MEDICINE

## 2023-12-05 PROCEDURE — 94761 N-INVAS EAR/PLS OXIMETRY MLT: CPT | Mod: HCNC

## 2023-12-05 PROCEDURE — 25000003 PHARM REV CODE 250: Mod: HCNC

## 2023-12-05 PROCEDURE — 27201423 OPTIME MED/SURG SUP & DEVICES STERILE SUPPLY: Mod: HCNC | Performed by: ORTHOPAEDIC SURGERY

## 2023-12-05 PROCEDURE — 64999 PR INJ, ANES AGENT, FASCIA ILICA, CONT: ICD-10-PCS | Mod: ,,, | Performed by: ANESTHESIOLOGY

## 2023-12-05 PROCEDURE — 85610 PROTHROMBIN TIME: CPT | Mod: HCNC

## 2023-12-05 PROCEDURE — 63600175 PHARM REV CODE 636 W HCPCS: Mod: HCNC | Performed by: NURSE ANESTHETIST, CERTIFIED REGISTERED

## 2023-12-05 PROCEDURE — 99223 PR INITIAL HOSPITAL CARE,LEVL III: ICD-10-PCS | Mod: 57,HCNC,GC, | Performed by: ORTHOPAEDIC SURGERY

## 2023-12-05 PROCEDURE — 86901 BLOOD TYPING SEROLOGIC RH(D): CPT | Mod: HCNC

## 2023-12-05 PROCEDURE — 84134 ASSAY OF PREALBUMIN: CPT | Mod: HCNC

## 2023-12-05 PROCEDURE — 25000003 PHARM REV CODE 250: Mod: HCNC | Performed by: INTERNAL MEDICINE

## 2023-12-05 PROCEDURE — 85025 COMPLETE CBC W/AUTO DIFF WBC: CPT | Mod: 91,HCNC | Performed by: INTERNAL MEDICINE

## 2023-12-05 PROCEDURE — 86920 COMPATIBILITY TEST SPIN: CPT | Mod: HCNC | Performed by: ORTHOPAEDIC SURGERY

## 2023-12-05 PROCEDURE — D9220A PRA ANESTHESIA: ICD-10-PCS | Mod: HCNC,CRNA,, | Performed by: NURSE ANESTHETIST, CERTIFIED REGISTERED

## 2023-12-05 PROCEDURE — 82306 VITAMIN D 25 HYDROXY: CPT | Mod: HCNC

## 2023-12-05 PROCEDURE — D9220A PRA ANESTHESIA: Mod: HCNC,ANES,, | Performed by: ANESTHESIOLOGY

## 2023-12-05 PROCEDURE — 87040 BLOOD CULTURE FOR BACTERIA: CPT | Mod: HCNC | Performed by: EMERGENCY MEDICINE

## 2023-12-05 PROCEDURE — 84466 ASSAY OF TRANSFERRIN: CPT | Mod: HCNC | Performed by: EMERGENCY MEDICINE

## 2023-12-05 PROCEDURE — 83735 ASSAY OF MAGNESIUM: CPT | Mod: HCNC | Performed by: EMERGENCY MEDICINE

## 2023-12-05 PROCEDURE — 25000003 PHARM REV CODE 250: Mod: HCNC | Performed by: NURSE ANESTHETIST, CERTIFIED REGISTERED

## 2023-12-05 PROCEDURE — 71000033 HC RECOVERY, INTIAL HOUR: Mod: HCNC | Performed by: ORTHOPAEDIC SURGERY

## 2023-12-05 PROCEDURE — 64999 UNLISTED PX NERVOUS SYSTEM: CPT | Mod: ,,, | Performed by: ANESTHESIOLOGY

## 2023-12-05 PROCEDURE — 93010 ELECTROCARDIOGRAM REPORT: CPT | Mod: 76,HCNC,, | Performed by: INTERNAL MEDICINE

## 2023-12-05 PROCEDURE — 63600175 PHARM REV CODE 636 W HCPCS: Mod: HCNC | Performed by: INTERNAL MEDICINE

## 2023-12-05 PROCEDURE — 76942 SIFI CATHETER: ICD-10-PCS | Mod: 26,HCNC,, | Performed by: ANESTHESIOLOGY

## 2023-12-05 PROCEDURE — 36000711: Mod: HCNC | Performed by: ORTHOPAEDIC SURGERY

## 2023-12-05 PROCEDURE — 63600175 PHARM REV CODE 636 W HCPCS: Mod: HCNC | Performed by: ORTHOPAEDIC SURGERY

## 2023-12-05 PROCEDURE — 84443 ASSAY THYROID STIM HORMONE: CPT | Mod: HCNC | Performed by: EMERGENCY MEDICINE

## 2023-12-05 PROCEDURE — 25500020 PHARM REV CODE 255: Mod: HCNC | Performed by: EMERGENCY MEDICINE

## 2023-12-05 PROCEDURE — 11000001 HC ACUTE MED/SURG PRIVATE ROOM: Mod: HCNC

## 2023-12-05 PROCEDURE — 76942 ECHO GUIDE FOR BIOPSY: CPT | Mod: 26,HCNC,, | Performed by: ANESTHESIOLOGY

## 2023-12-05 PROCEDURE — 63600175 PHARM REV CODE 636 W HCPCS: Mod: HCNC

## 2023-12-05 PROCEDURE — 81001 URINALYSIS AUTO W/SCOPE: CPT | Mod: HCNC | Performed by: EMERGENCY MEDICINE

## 2023-12-05 PROCEDURE — 27236 TREAT THIGH FRACTURE: CPT | Mod: HCNC,LT,, | Performed by: ORTHOPAEDIC SURGERY

## 2023-12-05 PROCEDURE — 64448 NJX AA&/STRD FEM NRV NFS IMG: CPT | Mod: HCNC | Performed by: ANESTHESIOLOGY

## 2023-12-05 PROCEDURE — 99900035 HC TECH TIME PER 15 MIN (STAT): Mod: HCNC

## 2023-12-05 PROCEDURE — C1776 JOINT DEVICE (IMPLANTABLE): HCPCS | Mod: HCNC | Performed by: ORTHOPAEDIC SURGERY

## 2023-12-05 PROCEDURE — 27000221 HC OXYGEN, UP TO 24 HOURS: Mod: HCNC

## 2023-12-05 PROCEDURE — 87086 URINE CULTURE/COLONY COUNT: CPT | Mod: HCNC | Performed by: EMERGENCY MEDICINE

## 2023-12-05 PROCEDURE — 27236 PR FEMORAL FX, OPEN TX: ICD-10-PCS | Mod: HCNC,LT,, | Performed by: ORTHOPAEDIC SURGERY

## 2023-12-05 PROCEDURE — 36415 COLL VENOUS BLD VENIPUNCTURE: CPT | Mod: HCNC | Performed by: INTERNAL MEDICINE

## 2023-12-05 PROCEDURE — 99223 1ST HOSP IP/OBS HIGH 75: CPT | Mod: 57,HCNC,GC, | Performed by: ORTHOPAEDIC SURGERY

## 2023-12-05 PROCEDURE — 25000003 PHARM REV CODE 250: Mod: HCNC | Performed by: EMERGENCY MEDICINE

## 2023-12-05 PROCEDURE — C1713 ANCHOR/SCREW BN/BN,TIS/BN: HCPCS | Mod: HCNC | Performed by: ORTHOPAEDIC SURGERY

## 2023-12-05 PROCEDURE — D9220A PRA ANESTHESIA: ICD-10-PCS | Mod: HCNC,ANES,, | Performed by: ANESTHESIOLOGY

## 2023-12-05 PROCEDURE — 85730 THROMBOPLASTIN TIME PARTIAL: CPT | Mod: HCNC

## 2023-12-05 PROCEDURE — 21400001 HC TELEMETRY ROOM: Mod: HCNC

## 2023-12-05 PROCEDURE — 36000710: Mod: HCNC | Performed by: ORTHOPAEDIC SURGERY

## 2023-12-05 PROCEDURE — 84484 ASSAY OF TROPONIN QUANT: CPT | Mod: HCNC | Performed by: EMERGENCY MEDICINE

## 2023-12-05 PROCEDURE — 93010 EKG 12-LEAD: ICD-10-PCS | Mod: HCNC,,, | Performed by: INTERNAL MEDICINE

## 2023-12-05 PROCEDURE — D9220A PRA ANESTHESIA: Mod: HCNC,CRNA,, | Performed by: NURSE ANESTHETIST, CERTIFIED REGISTERED

## 2023-12-05 PROCEDURE — 63600175 PHARM REV CODE 636 W HCPCS: Mod: HCNC | Performed by: STUDENT IN AN ORGANIZED HEALTH CARE EDUCATION/TRAINING PROGRAM

## 2023-12-05 DEVICE — PLUG BONE: Type: IMPLANTABLE DEVICE | Site: HIP | Status: FUNCTIONAL

## 2023-12-05 DEVICE — STEM FEMORAL CEM SZ 5 132 DEG: Type: IMPLANTABLE DEVICE | Site: HIP | Status: FUNCTIONAL

## 2023-12-05 DEVICE — CEMENT BONE SURG SMPLX P RADPQ: Type: IMPLANTABLE DEVICE | Site: HIP | Status: FUNCTIONAL

## 2023-12-05 DEVICE — IMPLANTABLE DEVICE: Type: IMPLANTABLE DEVICE | Site: HIP | Status: FUNCTIONAL

## 2023-12-05 DEVICE — SLEEVE FEM C-TAPER UNI +5MM: Type: IMPLANTABLE DEVICE | Site: HIP | Status: FUNCTIONAL

## 2023-12-05 RX ORDER — OXYCODONE HYDROCHLORIDE 5 MG/1
5 TABLET ORAL EVERY 6 HOURS PRN
Status: DISCONTINUED | OUTPATIENT
Start: 2023-12-05 | End: 2023-12-06

## 2023-12-05 RX ORDER — FENTANYL CITRATE 50 UG/ML
25-200 INJECTION, SOLUTION INTRAMUSCULAR; INTRAVENOUS EVERY 5 MIN PRN
Status: DISCONTINUED | OUTPATIENT
Start: 2023-12-05 | End: 2023-12-05 | Stop reason: HOSPADM

## 2023-12-05 RX ORDER — SODIUM CHLORIDE 0.9 % (FLUSH) 0.9 %
10 SYRINGE (ML) INJECTION
Status: DISCONTINUED | OUTPATIENT
Start: 2023-12-05 | End: 2023-12-05 | Stop reason: HOSPADM

## 2023-12-05 RX ORDER — MIDAZOLAM HYDROCHLORIDE 1 MG/ML
.5-4 INJECTION INTRAMUSCULAR; INTRAVENOUS
Status: DISCONTINUED | OUTPATIENT
Start: 2023-12-05 | End: 2023-12-05 | Stop reason: HOSPADM

## 2023-12-05 RX ORDER — PHENYLEPHRINE HYDROCHLORIDE 10 MG/ML
INJECTION INTRAVENOUS
Status: DISCONTINUED | OUTPATIENT
Start: 2023-12-05 | End: 2023-12-05

## 2023-12-05 RX ORDER — AMOXICILLIN 250 MG
1 CAPSULE ORAL 2 TIMES DAILY
Status: DISCONTINUED | OUTPATIENT
Start: 2023-12-05 | End: 2023-12-08 | Stop reason: HOSPADM

## 2023-12-05 RX ORDER — CEFAZOLIN SODIUM 1 G/3ML
INJECTION, POWDER, FOR SOLUTION INTRAMUSCULAR; INTRAVENOUS
Status: DISCONTINUED | OUTPATIENT
Start: 2023-12-05 | End: 2023-12-05

## 2023-12-05 RX ORDER — VANCOMYCIN HYDROCHLORIDE 1 G/20ML
INJECTION, POWDER, LYOPHILIZED, FOR SOLUTION INTRAVENOUS
Status: DISCONTINUED | OUTPATIENT
Start: 2023-12-05 | End: 2023-12-05 | Stop reason: HOSPADM

## 2023-12-05 RX ORDER — METHOCARBAMOL 500 MG/1
1000 TABLET, FILM COATED ORAL ONCE
Status: COMPLETED | OUTPATIENT
Start: 2023-12-05 | End: 2023-12-05

## 2023-12-05 RX ORDER — TRANEXAMIC ACID 100 MG/ML
INJECTION, SOLUTION INTRAVENOUS
Status: DISCONTINUED | OUTPATIENT
Start: 2023-12-05 | End: 2023-12-05

## 2023-12-05 RX ORDER — SODIUM CHLORIDE 9 MG/ML
INJECTION, SOLUTION INTRAVENOUS CONTINUOUS
Status: DISCONTINUED | OUTPATIENT
Start: 2023-12-05 | End: 2023-12-05

## 2023-12-05 RX ORDER — MORPHINE SULFATE 2 MG/ML
2 INJECTION, SOLUTION INTRAMUSCULAR; INTRAVENOUS EVERY 6 HOURS PRN
Status: DISCONTINUED | OUTPATIENT
Start: 2023-12-05 | End: 2023-12-05

## 2023-12-05 RX ORDER — LIDOCAINE HYDROCHLORIDE 20 MG/ML
INJECTION INTRAVENOUS
Status: DISCONTINUED | OUTPATIENT
Start: 2023-12-05 | End: 2023-12-05

## 2023-12-05 RX ORDER — ACETAMINOPHEN 500 MG
1000 TABLET ORAL EVERY 6 HOURS
Status: DISCONTINUED | OUTPATIENT
Start: 2023-12-05 | End: 2023-12-05

## 2023-12-05 RX ORDER — ESCITALOPRAM OXALATE 10 MG/1
10 TABLET ORAL DAILY
Status: DISCONTINUED | OUTPATIENT
Start: 2023-12-05 | End: 2023-12-08 | Stop reason: HOSPADM

## 2023-12-05 RX ORDER — MUPIROCIN 20 MG/G
1 OINTMENT TOPICAL 2 TIMES DAILY
Status: DISCONTINUED | OUTPATIENT
Start: 2023-12-05 | End: 2023-12-08 | Stop reason: HOSPADM

## 2023-12-05 RX ORDER — FENTANYL CITRATE 50 UG/ML
50 INJECTION, SOLUTION INTRAMUSCULAR; INTRAVENOUS
Status: COMPLETED | OUTPATIENT
Start: 2023-12-05 | End: 2023-12-05

## 2023-12-05 RX ORDER — FENTANYL CITRATE 50 UG/ML
75 INJECTION, SOLUTION INTRAMUSCULAR; INTRAVENOUS
Status: COMPLETED | OUTPATIENT
Start: 2023-12-05 | End: 2023-12-05

## 2023-12-05 RX ORDER — ROPIVACAINE HYDROCHLORIDE 2 MG/ML
0.1 INJECTION, SOLUTION EPIDURAL; INFILTRATION; PERINEURAL CONTINUOUS
Status: DISCONTINUED | OUTPATIENT
Start: 2023-12-05 | End: 2023-12-08

## 2023-12-05 RX ORDER — LIDOCAINE HYDROCHLORIDE 10 MG/ML
1 INJECTION, SOLUTION EPIDURAL; INFILTRATION; INTRACAUDAL; PERINEURAL
Status: DISCONTINUED | OUTPATIENT
Start: 2023-12-05 | End: 2023-12-05 | Stop reason: HOSPADM

## 2023-12-05 RX ORDER — DEXAMETHASONE SODIUM PHOSPHATE 4 MG/ML
INJECTION, SOLUTION INTRA-ARTICULAR; INTRALESIONAL; INTRAMUSCULAR; INTRAVENOUS; SOFT TISSUE
Status: DISCONTINUED | OUTPATIENT
Start: 2023-12-05 | End: 2023-12-05

## 2023-12-05 RX ORDER — ONDANSETRON 2 MG/ML
4 INJECTION INTRAMUSCULAR; INTRAVENOUS DAILY PRN
Status: DISCONTINUED | OUTPATIENT
Start: 2023-12-05 | End: 2023-12-05 | Stop reason: HOSPADM

## 2023-12-05 RX ORDER — TALC
6 POWDER (GRAM) TOPICAL NIGHTLY PRN
Status: DISCONTINUED | OUTPATIENT
Start: 2023-12-05 | End: 2023-12-08 | Stop reason: HOSPADM

## 2023-12-05 RX ORDER — ROCURONIUM BROMIDE 10 MG/ML
INJECTION, SOLUTION INTRAVENOUS
Status: DISCONTINUED | OUTPATIENT
Start: 2023-12-05 | End: 2023-12-05

## 2023-12-05 RX ORDER — PREGABALIN 75 MG/1
75 CAPSULE ORAL NIGHTLY
Status: DISCONTINUED | OUTPATIENT
Start: 2023-12-05 | End: 2023-12-05 | Stop reason: HOSPADM

## 2023-12-05 RX ORDER — ROPIVACAINE HYDROCHLORIDE 5 MG/ML
INJECTION, SOLUTION EPIDURAL; INFILTRATION; PERINEURAL
Status: COMPLETED | OUTPATIENT
Start: 2023-12-05 | End: 2023-12-05

## 2023-12-05 RX ORDER — PROPOFOL 10 MG/ML
VIAL (ML) INTRAVENOUS
Status: DISCONTINUED | OUTPATIENT
Start: 2023-12-05 | End: 2023-12-05

## 2023-12-05 RX ORDER — SODIUM CHLORIDE 0.9 % (FLUSH) 0.9 %
10 SYRINGE (ML) INJECTION
Status: DISCONTINUED | OUTPATIENT
Start: 2023-12-05 | End: 2023-12-08 | Stop reason: HOSPADM

## 2023-12-05 RX ORDER — BISACODYL 10 MG
10 SUPPOSITORY, RECTAL RECTAL DAILY PRN
Status: DISCONTINUED | OUTPATIENT
Start: 2023-12-05 | End: 2023-12-07

## 2023-12-05 RX ORDER — DILTIAZEM HYDROCHLORIDE 240 MG/1
240 CAPSULE, COATED, EXTENDED RELEASE ORAL ONCE
Status: COMPLETED | OUTPATIENT
Start: 2023-12-05 | End: 2023-12-05

## 2023-12-05 RX ORDER — ONDANSETRON 2 MG/ML
INJECTION INTRAMUSCULAR; INTRAVENOUS
Status: DISCONTINUED | OUTPATIENT
Start: 2023-12-05 | End: 2023-12-05

## 2023-12-05 RX ORDER — MUPIROCIN 20 MG/G
OINTMENT TOPICAL
Status: DISCONTINUED | OUTPATIENT
Start: 2023-12-05 | End: 2023-12-05 | Stop reason: HOSPADM

## 2023-12-05 RX ORDER — ACETAMINOPHEN 650 MG/20.3ML
650 LIQUID ORAL EVERY 6 HOURS
Status: DISCONTINUED | OUTPATIENT
Start: 2023-12-06 | End: 2023-12-06

## 2023-12-05 RX ORDER — DILTIAZEM HYDROCHLORIDE 120 MG/1
240 CAPSULE, COATED, EXTENDED RELEASE ORAL DAILY
Status: DISCONTINUED | OUTPATIENT
Start: 2023-12-05 | End: 2023-12-08 | Stop reason: HOSPADM

## 2023-12-05 RX ORDER — KETAMINE HCL IN 0.9 % NACL 50 MG/5 ML
SYRINGE (ML) INTRAVENOUS
Status: DISCONTINUED | OUTPATIENT
Start: 2023-12-05 | End: 2023-12-05

## 2023-12-05 RX ORDER — METHOCARBAMOL 500 MG/1
500 TABLET, FILM COATED ORAL EVERY 6 HOURS PRN
Status: DISCONTINUED | OUTPATIENT
Start: 2023-12-05 | End: 2023-12-06

## 2023-12-05 RX ORDER — FENTANYL CITRATE 50 UG/ML
25 INJECTION, SOLUTION INTRAMUSCULAR; INTRAVENOUS EVERY 5 MIN PRN
Status: DISCONTINUED | OUTPATIENT
Start: 2023-12-05 | End: 2023-12-05 | Stop reason: HOSPADM

## 2023-12-05 RX ORDER — POLYETHYLENE GLYCOL 3350 17 G/17G
17 POWDER, FOR SOLUTION ORAL DAILY
Status: DISCONTINUED | OUTPATIENT
Start: 2023-12-05 | End: 2023-12-07

## 2023-12-05 RX ORDER — HALOPERIDOL 5 MG/ML
0.5 INJECTION INTRAMUSCULAR EVERY 10 MIN PRN
Status: DISCONTINUED | OUTPATIENT
Start: 2023-12-05 | End: 2023-12-05 | Stop reason: HOSPADM

## 2023-12-05 RX ORDER — ONDANSETRON 2 MG/ML
4 INJECTION INTRAMUSCULAR; INTRAVENOUS EVERY 12 HOURS PRN
Status: DISCONTINUED | OUTPATIENT
Start: 2023-12-05 | End: 2023-12-05

## 2023-12-05 RX ORDER — ESMOLOL HYDROCHLORIDE 10 MG/ML
INJECTION INTRAVENOUS
Status: DISCONTINUED | OUTPATIENT
Start: 2023-12-05 | End: 2023-12-05

## 2023-12-05 RX ORDER — MUPIROCIN 20 MG/G
1 OINTMENT TOPICAL
Status: DISCONTINUED | OUTPATIENT
Start: 2023-12-05 | End: 2023-12-05 | Stop reason: HOSPADM

## 2023-12-05 RX ORDER — ACETAMINOPHEN 500 MG
1000 TABLET ORAL EVERY 8 HOURS
Status: DISCONTINUED | OUTPATIENT
Start: 2023-12-05 | End: 2023-12-05 | Stop reason: HOSPADM

## 2023-12-05 RX ORDER — FENTANYL CITRATE 50 UG/ML
INJECTION, SOLUTION INTRAMUSCULAR; INTRAVENOUS
Status: DISCONTINUED | OUTPATIENT
Start: 2023-12-05 | End: 2023-12-05

## 2023-12-05 RX ADMIN — ONDANSETRON 4 MG: 2 INJECTION INTRAMUSCULAR; INTRAVENOUS at 01:12

## 2023-12-05 RX ADMIN — IOHEXOL 75 ML: 350 INJECTION, SOLUTION INTRAVENOUS at 02:12

## 2023-12-05 RX ADMIN — Medication 6 MG: at 11:12

## 2023-12-05 RX ADMIN — ESMOLOL HYDROCHLORIDE 20 MG: 100 INJECTION, SOLUTION INTRAVENOUS at 12:12

## 2023-12-05 RX ADMIN — DILTIAZEM HYDROCHLORIDE 240 MG: 240 CAPSULE, COATED, EXTENDED RELEASE ORAL at 11:12

## 2023-12-05 RX ADMIN — SENNOSIDES AND DOCUSATE SODIUM 1 TABLET: 8.6; 5 TABLET ORAL at 08:12

## 2023-12-05 RX ADMIN — SODIUM CHLORIDE: 9 INJECTION, SOLUTION INTRAVENOUS at 02:12

## 2023-12-05 RX ADMIN — MUPIROCIN: 20 OINTMENT TOPICAL at 09:12

## 2023-12-05 RX ADMIN — Medication 20 MG: at 12:12

## 2023-12-05 RX ADMIN — MUPIROCIN 1 G: 20 OINTMENT TOPICAL at 08:12

## 2023-12-05 RX ADMIN — FENTANYL CITRATE 75 MCG: 50 INJECTION INTRAMUSCULAR; INTRAVENOUS at 01:12

## 2023-12-05 RX ADMIN — SODIUM CHLORIDE: 9 INJECTION, SOLUTION INTRAVENOUS at 05:12

## 2023-12-05 RX ADMIN — FENTANYL CITRATE 50 MCG: 50 INJECTION INTRAMUSCULAR; INTRAVENOUS at 03:12

## 2023-12-05 RX ADMIN — VANCOMYCIN HYDROCHLORIDE 1000 MG: 1 INJECTION, POWDER, LYOPHILIZED, FOR SOLUTION INTRAVENOUS at 09:12

## 2023-12-05 RX ADMIN — PHENYLEPHRINE HYDROCHLORIDE 100 MCG: 10 INJECTION INTRAVENOUS at 01:12

## 2023-12-05 RX ADMIN — DEXAMETHASONE SODIUM PHOSPHATE 4 MG: 4 INJECTION, SOLUTION INTRAMUSCULAR; INTRAVENOUS at 12:12

## 2023-12-05 RX ADMIN — PHENYLEPHRINE HYDROCHLORIDE 100 MCG: 10 INJECTION INTRAVENOUS at 12:12

## 2023-12-05 RX ADMIN — METHOCARBAMOL 1000 MG: 500 TABLET ORAL at 03:12

## 2023-12-05 RX ADMIN — ROPIVACAINE HYDROCHLORIDE 20 ML: 5 INJECTION EPIDURAL; INFILTRATION; PERINEURAL at 11:12

## 2023-12-05 RX ADMIN — PHENYLEPHRINE HYDROCHLORIDE 200 MCG: 10 INJECTION INTRAVENOUS at 12:12

## 2023-12-05 RX ADMIN — TRANEXAMIC ACID 1000 MG: 100 INJECTION INTRAVENOUS at 12:12

## 2023-12-05 RX ADMIN — PROPOFOL 120 MG: 10 INJECTION, EMULSION INTRAVENOUS at 12:12

## 2023-12-05 RX ADMIN — ACETAMINOPHEN 1000 MG: 500 TABLET ORAL at 05:12

## 2023-12-05 RX ADMIN — TRANEXAMIC ACID 1000 MG: 100 INJECTION INTRAVENOUS at 01:12

## 2023-12-05 RX ADMIN — LIDOCAINE HYDROCHLORIDE 100 MG: 20 INJECTION INTRAVENOUS at 12:12

## 2023-12-05 RX ADMIN — CEFTRIAXONE SODIUM 1 G: 1 INJECTION, POWDER, FOR SOLUTION INTRAMUSCULAR; INTRAVENOUS at 04:12

## 2023-12-05 RX ADMIN — ROPIVACAINE HYDROCHLORIDE 0.1 ML/HR: 2 INJECTION, SOLUTION EPIDURAL; INFILTRATION at 02:12

## 2023-12-05 RX ADMIN — CEFAZOLIN 2 G: 2 INJECTION, POWDER, FOR SOLUTION INTRAMUSCULAR; INTRAVENOUS at 08:12

## 2023-12-05 RX ADMIN — ACETAMINOPHEN 650 MG: 650 SOLUTION ORAL at 11:12

## 2023-12-05 RX ADMIN — SODIUM CHLORIDE: 0.9 INJECTION, SOLUTION INTRAVENOUS at 12:12

## 2023-12-05 RX ADMIN — ROCURONIUM BROMIDE 50 MG: 10 INJECTION INTRAVENOUS at 12:12

## 2023-12-05 RX ADMIN — FENTANYL CITRATE 50 MCG: 50 INJECTION, SOLUTION INTRAMUSCULAR; INTRAVENOUS at 12:12

## 2023-12-05 RX ADMIN — SUGAMMADEX 200 MG: 100 INJECTION, SOLUTION INTRAVENOUS at 01:12

## 2023-12-05 RX ADMIN — ACETAMINOPHEN 1000 MG: 500 TABLET ORAL at 03:12

## 2023-12-05 RX ADMIN — CEFAZOLIN 2 G: 330 INJECTION, POWDER, FOR SOLUTION INTRAMUSCULAR; INTRAVENOUS at 12:12

## 2023-12-05 RX ADMIN — POLYETHYLENE GLYCOL 3350 17 G: 17 POWDER, FOR SOLUTION ORAL at 02:12

## 2023-12-05 NOTE — ANESTHESIA PREPROCEDURE EVALUATION
[facility]  Anesthesia Pre-Operative Evaluation   12/05/2023        Tigist Murry, 1940  [id number]  Procedure(s) (LRB):  HEMIARTHROPLASTY, HIP, POSTERIOR APPROACH vs ARABELLA - LEFT, milady (Left)    Subjective    Tigist Murry is a 83 y.o. female w/ a significant PMHx of  Afib on xarelto, HTN, CKD3, mild dementia presented after a fall in her bathroom at her group home. Found to have left femoral neck fracture.      Patient now presents for above procedure(s).     Prev Airway: None documented.    LDA:        Peripheral IV - Single Lumen 12/05/23 0019 20 G Anterior;Proximal;Right Forearm (Active)   Site Assessment Clean;Dry;Intact 12/05/23 0019   Extremity Assessment Distal to IV No redness;No swelling 12/05/23 0019   Line Status Blood return noted;Flushed 12/05/23 0019   Dressing Status Clean;Dry;Intact 12/05/23 0019   Dressing Intervention First dressing 12/05/23 0019   Number of days: 0            Urethral Catheter 12/05/23 0410 Silicone 16 Fr. (Active)   Number of days: 0       Drips:    sodium chloride 0.9%         Patient Active Problem List   Diagnosis    Overactive bladder    Essential hypertension    Diverticulosis    Anxiety    Psoriasis    Carpal tunnel syndrome on right    Osteopenia    Hyperlipidemia    Situational depression    PAF (paroxysmal atrial fibrillation)    Aortic atherosclerosis    Arterial tortuosity    Gastroesophageal reflux disease    Left ventricular diastolic dysfunction with preserved systolic function    Senile purpura    Pulmonary nodule    Long term current use of anticoagulant    CKD (chronic kidney disease) stage 3, GFR 30-59 ml/min    Body mass index (BMI) 20.0-20.9, adult    Calcified granuloma of lung    Non-traumatic rhabdomyolysis    Syncope and collapse    Atrial fibrillation and flutter    Weakness    Memory impairment    Influenza A    Sequelae of protein-calorie malnutrition    Parkinsonism, unspecified Parkinsonism type    Alzheimer's disease with  late onset (CODE)    Closed displaced fracture of left femoral neck    Closed dislocation of left shoulder    Acute cystitis without hematuria       Review of patient's allergies indicates:   Allergen Reactions    Ciprofloxacin Other (See Comments)     Other reaction(s): Nausea  Other reaction(s): Nausea       Current Inpatient Medications:    acetaminophen  1,000 mg Oral Q8H    [START ON 12/6/2023] cefTRIAXone (ROCEPHIN) IVPB  1 g Intravenous Q24H    diltiaZEM  240 mg Oral Daily    EScitalopram oxalate  10 mg Oral Daily    pregabalin  75 mg Oral QHS       No current facility-administered medications on file prior to encounter.     Current Outpatient Medications on File Prior to Encounter   Medication Sig Dispense Refill    diltiaZEM (CARDIZEM CD) 240 MG 24 hr capsule Take 1 capsule (240 mg total) by mouth once daily. 90 capsule 3    EScitalopram oxalate (LEXAPRO) 10 MG tablet Take 1 tablet (10 mg total) by mouth once daily. 90 tablet 1    MYRBETRIQ 50 mg Tb24 Take 1 tablet (50 mg total) by mouth once daily. 90 tablet 3    rivaroxaban (XARELTO) 20 mg Tab Take 1 tablet (20 mg total) by mouth once daily. 90 tablet 3       Past Surgical History:   Procedure Laterality Date    ABDOMINAL SURGERY      BREAST BIOPSY Left     excisional    CARPAL TUNNEL RELEASE      HYSTERECTOMY      @27yrs of age    KNEE ARTHROSCOPY W/ DEBRIDEMENT  '05    Right    OOPHORECTOMY      @27yrs of age    PARTIAL HYSTERECTOMY         Social History:  Tobacco Use: Low Risk  (12/5/2023)    Patient History     Smoking Tobacco Use: Never     Smokeless Tobacco Use: Never     Passive Exposure: Not on file       Alcohol Use: Not on file       Objective    Vital Signs Range:  BMI Readings from Last 1 Encounters:   03/02/23 20.73 kg/m²       Temp:  [36.4 °C (97.6 °F)-36.6 °C (97.8 °F)]   Pulse:  []   Resp:  [16-20]   BP: (155-181)/()   SpO2:  [95 %-97 %]        Significant Labs:        Component Value Date/Time    WBC 11.68 12/05/2023 0013     HGB 15.2 12/05/2023 0013    HCT 43.9 12/05/2023 0013     12/05/2023 0013     12/05/2023 0013    K 4.1 12/05/2023 0013     12/05/2023 0013    CO2 22 (L) 12/05/2023 0013     (H) 12/05/2023 0013    BUN 17 12/05/2023 0013    CREATININE 0.8 12/05/2023 0013    MG 2.1 12/05/2023 0013    PHOS 3.0 12/05/2023 0013    CALCIUM 9.7 12/05/2023 0013    ALBUMIN 3.7 12/05/2023 0013    PROT 6.8 12/05/2023 0013    ALKPHOS 85 12/05/2023 0013    BILITOT 0.8 12/05/2023 0013    AST 54 (H) 12/05/2023 0013    ALT 66 (H) 12/05/2023 0013    INR 1.8 (H) 12/05/2023 0329    HGBA1C 5.3 12/04/2023 2342        Please see Results Review for additional labs.     Diagnostic Studies: All relevant studies, reviewed.      EKG:   Results for orders placed or performed during the hospital encounter of 11/29/22   EKG 12-lead    Collection Time: 11/29/22  5:51 PM    Narrative    Test Reason : A41.9,    Vent. Rate : 111 BPM     Atrial Rate : 366 BPM     P-R Int : 000 ms          QRS Dur : 068 ms      QT Int : 292 ms       P-R-T Axes : 000 080 190 degrees     QTc Int : 397 ms    Atrial flutter with variable A-V block  Abnormal ECG  When compared with ECG of 10-FEB-2022 10:42,  Atrial flutter has replaced Sinus rhythm  Confirmed by Shasta Snider MD ([id number]) on 12/1/2022 9:35:57 AM    Referred By: [email or web address]   SELF           Confirmed By:Shasta Snider MD       ECHO:  Results for orders placed during the hospital encounter of 03/02/23    Echo    Interpretation Summary  · The left ventricle is normal in size with concentric remodeling and normal systolic function.  · The estimated ejection fraction is 55%.  · Normal left ventricular diastolic function.  · Normal right ventricular size with normal right ventricular systolic function.  · Severe left atrial enlargement.  · Moderate right atrial enlargement.  · Mild mitral regurgitation.  · Mild-to-moderate aortic regurgitation.  · The estimated PA systolic pressure is 29 mmHg.  ·  Normal central venous pressure (3 mmHg).        Pre-op Assessment    I have reviewed the Patient Summary Reports.     I have reviewed the Nursing Notes. I have reviewed the NPO Status.   I have reviewed the Medications.     Review of Systems  Anesthesia Hx:   History of prior surgery of interest to airway management or planning:            Denies Personal Hx of Anesthesia complications.                    Cardiovascular:     Hypertension    Dysrhythmias atrial fibrillation                                   Renal/:  Chronic Renal Disease, CKD                Hepatic/GI:     GERD             Neurological:           Parkonsonism     Dementia                         Psych:   anxiety depression                   Anesthesia Plan  Type of Anesthesia, risks & benefits discussed:    Anesthesia Type: Gen ETT, Regional  Intra-op Monitoring Plan: Standard ASA Monitors  Post Op Pain Control Plan: multimodal analgesia and IV/PO Opioids PRN  Induction:  IV  Airway Plan: Direct, Post-Induction  ASA Score: 3  Day of Surgery Review of History & Physical: H&P Update referred to the surgeon/provider.    Ready For Surgery From Anesthesia Perspective.     .

## 2023-12-05 NOTE — ED NOTES
Telemetry Verification   Patient placed on Telemetry Box  Verified with War Room  Box # [id number]   Monitor Tech     Rate  89   Rhythm  AFIB

## 2023-12-05 NOTE — SUBJECTIVE & OBJECTIVE
Past Medical History:   Diagnosis Date    Anticoagulant long-term use     Anxiety     Arthritis     Atrial fibrillation     Carpal tunnel syndrome on right     Diverticulosis     Encounter for blood transfusion     HTN (hypertension)     Hyperlipidemia     Osteopenia     Overactive bladder     Palpitations     Patellar tendonitis 4/13    saw [place] bone     Psoriasis     S/P partial hysterectomy     Situational depression     Supraventricular tachycardia        Past Surgical History:   Procedure Laterality Date    ABDOMINAL SURGERY      BREAST BIOPSY Left     excisional    CARPAL TUNNEL RELEASE      HYSTERECTOMY      @27yrs of age    KNEE ARTHROSCOPY W/ DEBRIDEMENT  '05    Right    OOPHORECTOMY      @27yrs of age    PARTIAL HYSTERECTOMY         Review of patient's allergies indicates:   Allergen Reactions    Ciprofloxacin Other (See Comments)     Other reaction(s): Nausea  Other reaction(s): Nausea       No current facility-administered medications on file prior to encounter.     Current Outpatient Medications on File Prior to Encounter   Medication Sig    diltiaZEM (CARDIZEM CD) 240 MG 24 hr capsule Take 1 capsule (240 mg total) by mouth once daily.    EScitalopram oxalate (LEXAPRO) 10 MG tablet Take 1 tablet (10 mg total) by mouth once daily.    MYRBETRIQ 50 mg Tb24 Take 1 tablet (50 mg total) by mouth once daily.    rivaroxaban (XARELTO) 20 mg Tab Take 1 tablet (20 mg total) by mouth once daily.     Family History       Problem Relation (Age of Onset)    Heart failure Mother (80)    No Known Problems Sister, Brother, Daughter, Daughter, Daughter          Tobacco Use    Smoking status: Never    Smokeless tobacco: Never   Substance and Sexual Activity    Alcohol use: No    Drug use: No    Sexual activity: Not Currently     Partners: Male     Review of Systems   Constitutional:  Negative for activity change, appetite change, chills and fever.   HENT:  Negative for congestion, hearing loss and rhinorrhea.     Eyes:  Negative for discharge, itching and visual disturbance.   Respiratory:  Negative for apnea, cough and shortness of breath.    Cardiovascular:  Negative for chest pain, palpitations and leg swelling.   Gastrointestinal:  Negative for abdominal distention, abdominal pain, constipation, diarrhea, nausea and vomiting.   Endocrine: Negative for cold intolerance and heat intolerance.   Genitourinary:  Negative for dysuria and hematuria.   Musculoskeletal:  Negative for back pain, neck pain and neck stiffness.   Skin:  Negative for rash and wound.   Neurological:  Negative for dizziness, seizures, light-headedness and headaches.   Psychiatric/Behavioral:  Negative for agitation, confusion and suicidal ideas.      Objective:     Vital Signs (Most Recent):  Temp: 97.8 °F (36.6 °C) (12/05/23 0418)  Pulse: 92 (12/05/23 0434)  Resp: 18 (12/05/23 0333)  BP: (!) 155/85 (12/05/23 0345)  SpO2: 97 % (12/05/23 0434) Vital Signs (24h Range):  Temp:  [97.6 °F (36.4 °C)-97.8 °F (36.6 °C)] 97.8 °F (36.6 °C)  Pulse:  [] 92  Resp:  [16-20] 18  SpO2:  [95 %-97 %] 97 %  BP: (155-181)/() 155/85        There is no height or weight on file to calculate BMI.     Physical Exam  Vitals reviewed.   Constitutional:       General: She is not in acute distress.     Appearance: She is well-developed.   HENT:      Head: Normocephalic and atraumatic.      Nose: Nose normal. No rhinorrhea.      Mouth/Throat:      Mouth: Mucous membranes are moist.   Eyes:      General: No scleral icterus.        Right eye: No discharge.         Left eye: No discharge.      Pupils: Pupils are equal, round, and reactive to light.   Neck:      Vascular: No JVD.   Cardiovascular:      Rate and Rhythm: Normal rate and regular rhythm.      Heart sounds: Normal heart sounds. No murmur heard.     No friction rub.   Pulmonary:      Effort: Pulmonary effort is normal. No respiratory distress.      Breath sounds: Normal breath sounds. No wheezing.   Abdominal:       General: Bowel sounds are normal. There is no distension.      Palpations: Abdomen is soft.      Tenderness: There is no abdominal tenderness.   Musculoskeletal:      Cervical back: Normal range of motion and neck supple.      Comments: ROM limited on left side upper and lower 2/2 pain   Skin:     General: Skin is warm and dry.   Neurological:      General: No focal deficit present.      Mental Status: She is alert and oriented to person, place, and time.   Psychiatric:         Mood and Affect: Mood normal.         Behavior: Behavior normal.              CRANIAL NERVES     CN III, IV, VI   Pupils are equal, round, and reactive to light.       Significant Labs: All pertinent labs within the past 24 hours have been reviewed.  CBC:   Recent Labs   Lab 12/05/23  0013   WBC 11.68   HGB 15.2   HCT 43.9        CMP:   Recent Labs   Lab 12/05/23  0013      K 4.1      CO2 22*   *   BUN 17   CREATININE 0.8   CALCIUM 9.7   PROT 6.8   ALBUMIN 3.7   BILITOT 0.8   ALKPHOS 85   AST 54*   ALT 66*   ANIONGAP 11       Significant Imaging: I have reviewed all pertinent imaging results/findings within the past 24 hours.

## 2023-12-05 NOTE — HOSPITAL COURSE
Patient admitted to [facility] Medicine Team H: Hip Fracture team and started on Hip Fracture Pathway with Orthopedic surgery consult for hip fracture. Patient was seen and evaluated by Orthopedic surgery who recommended operative repair of hip fracture. Patient was medically optimized prior to surgery and was taken to OR after optimization on 12/5/2023. Patient underwent left hip hemiarthroplasty by Dr. Tani Martines. Post-op patient WBAT with posterior hip precautions to the left lower extremity as per Orthopedics recommendation. Patient restarted on home Xarelto at 20 mg po daily for her known atrial fibrillation for long term anticoagulation so fully anticoagulated so did not need chemical DVT prophylaxis post-op. Perineural pain catheter placed by Anesthesia Pain Service with continuous infusion of Ropivacaine to help with pain control post-op and Anesthesia Pain Service managing while patient in the hospital. Patient placed on multimodal pain management post-op with Tylenol 1000 mg po every 6 hours post-op and will continue. PT/OT consulted post-op.    12/7- s/p left femoral neck fracture s/p L hip jose antonio on 12/6. Pt with left shoulder dx reduced in ED. Pain control: MM. PT/OT: WBAT LLE; posterior hip prec, Ok to remove LUE sling to mobilize with walker, sling for comfort; DVT PPx: restart home xarelto, SCDs at all times when not ambulating. Matilde menchaca'd. SNF eval pending. /58 (BP Location: Right arm, Patient Position: Lying)   Pulse 67 . Dc prn metoprolol.   [facility] has accepted pt for admit 12/8. Need to have a  BM.     12/8- PNC paused. Had a BM yesterday. Plan is for SNF today. /65   Pulse 79 Not on BB.

## 2023-12-05 NOTE — SUBJECTIVE & OBJECTIVE
Past Medical History:   Diagnosis Date    Anticoagulant long-term use     Anxiety     Arthritis     Atrial fibrillation     Carpal tunnel syndrome on right     Diverticulosis     Encounter for blood transfusion     HTN (hypertension)     Hyperlipidemia     Osteopenia     Overactive bladder     Palpitations     Patellar tendonitis 4/13    saw [place]     Psoriasis     S/P partial hysterectomy     Situational depression     Supraventricular tachycardia        Past Surgical History:   Procedure Laterality Date    ABDOMINAL SURGERY      BREAST BIOPSY Left     excisional    CARPAL TUNNEL RELEASE      HYSTERECTOMY      @27yrs of age    KNEE ARTHROSCOPY W/ DEBRIDEMENT  '05    Right    OOPHORECTOMY      @27yrs of age    PARTIAL HYSTERECTOMY         Review of patient's allergies indicates:   Allergen Reactions    Ciprofloxacin Other (See Comments)     Other reaction(s): Nausea  Other reaction(s): Nausea       Current Facility-Administered Medications   Medication    cefTRIAXone (ROCEPHIN) 1 g in dextrose 5 % in water (D5W) 100 mL IVPB (MB+)     Current Outpatient Medications   Medication Sig    diltiaZEM (CARDIZEM CD) 240 MG 24 hr capsule Take 1 capsule (240 mg total) by mouth once daily.    EScitalopram oxalate (LEXAPRO) 10 MG tablet Take 1 tablet (10 mg total) by mouth once daily.    MYRBETRIQ 50 mg Tb24 Take 1 tablet (50 mg total) by mouth once daily.    rivaroxaban (XARELTO) 20 mg Tab Take 1 tablet (20 mg total) by mouth once daily.     Family History       Problem Relation (Age of Onset)    Heart failure Mother (80)    No Known Problems Sister, Brother, Daughter, Daughter, Daughter          Tobacco Use    Smoking status: Never    Smokeless tobacco: Never   Substance and Sexual Activity    Alcohol use: No    Drug use: No    Sexual activity: Not Currently     Partners: Male     Review of Systems   Unable to perform ROS: Dementia     Objective:     Vital Signs (Most Recent):  Temp: 97.6 °F (36.4 °C) (12/04/23  2107)  Pulse: 100 (12/05/23 0412)  Resp: 18 (12/05/23 0333)  BP: (!) 155/85 (12/05/23 0345)  SpO2: 96 % (12/05/23 0412) Vital Signs (24h Range):  Temp:  [97.6 °F (36.4 °C)] 97.6 °F (36.4 °C)  Pulse:  [] 100  Resp:  [16-20] 18  SpO2:  [95 %-96 %] 96 %  BP: (155-181)/() 155/85           There is no height or weight on file to calculate BMI.    No intake or output data in the 24 hours ending 12/05/23 0413     Ortho/SPM Exam  General:  no acute distress, appears stated age   Neuro: alert and oriented x3  Psych: normal mood  Head: normocephalic, atraumatic.  Eyes: no scleral icterus  Mouth: moist mucous membranes  Cardiovascular: extremities warm and well perfused  Lungs: breathing comfortably, equal chest rise bilat  Skin: clean, dry, intact (any exceptions noted in below musculoskeletal exam)    MSK:  RUE:  - Skin intact throughout, no open wounds  - No swelling  - No ecchymosis, erythema, or signs of cellulitis  - NonTTP throughout  - AROM and PROM of the shoulder, elbow, wrist, and hand intact without pain  - Axillary/AIN/PIN/Radial/Median/Ulnar Nerves assessed in isolation without deficit  - SILT throughout  - Compartments soft  - Radial artery palpated   - Capillary Refill <3s    LUE:  - Skin intact throughout, no open wounds  - Swelling left shoulder  - Multiple ecchymoses over left arm and forearm  - TTP at shoulder  - AROM and PROM of the elbow, wrist, and hand intact without pain  - Decreased ROM shoulder 2/2 pain  - Axillary/AIN/PIN/Radial/Median/Ulnar Nerves assessed in isolation without deficit  - SILT throughout  - Compartments soft  - Radial artery palpated   - Capillary Refill <3s    RLE:  - Skin intact throughout, no open wounds  - No swelling  - No ecchymosis, erythema, or signs of cellulitis  - NonTTP throughout  - AROM and PROM of the hip, knee, ankle, and foot intact without pain  - TA/EHL/Gastroc/FHL assessed in isolation without deficit  - SILT throughout  - Compartments soft  - DP and  PT palpated  - Capillary Refill <3s  - Negative Log roll  - Negative [facility]    LLE:  - Skin intact throughout, no scars present  - No swelling  - No ecchymosis, erythema, or signs of cellulitis  - TTP at hip/proximal femur  - No tenderness to palpation of middle or distal femur  - No tenderness to palpation of proximal, middle, or distal aspects of tibia or fibula  - No tenderness to palpation of foot  - Pain with any ROM at hip  - Painless PROM of the knee and ankle  - Compartments soft  - SILT Sa/Vila/DP/SP/T  - Motor intact EHL/FHL/TA/Gastroc  - 2+ DP  - Brisk capillary refill       Spine/pelvis/axial body:  No tenderness to palpation of cervical, thoracic, or lumbar spine  No pain with compression of pelvis  No chest wall or abdominal tenderness  No decubitus ulcers       Significant Labs: All pertinent labs within the past 24 hours have been reviewed.    Significant Imaging: I have reviewed and interpreted all pertinent imaging results/findings.  XR L shoulder: anterior and inferior humeral head dislocation  XR pelvis: left femoral neck fracture in varus

## 2023-12-05 NOTE — ASSESSMENT & PLAN NOTE
Tigist Murry is a 83 y.o. female with left femoral neck fracture, closed, NVI. They take eliquis for anticoagulation at home. They required walker ambulatory assistive devices prior to this injury. Patient also with closed anterior shoulder dislocation that was reduced and placed in sling. NVI in LUE as well. Plan for L jose antonio vs ARABELLA 12/5/23    -Admitted to medicine hip fracture service for pre-operative clearance and medical evaluation  -To OR 12/5/23 for operative fixation of left femoral neck fracture  -Pt marked, booked, and consented (Angelique MEDINA) for surgery  -DVT PPx: Hold anticoagulation  -Abx: Preop abx ordered  -Labs: Prealbumin 24, UA with evidence of UTI, given rocephin in ED, Glucose 156, Albumin 3.7, Hemoglobin 15.2, Platelets 233, White blood cell count 11,6, A1c 5.3, INR 1.8, PTT 40.7. Other lab results pending.  -Bed rest, rodriguez, NPO now  -Iv: ordered for contralateral arm    Patient was explained in detail the severity of the injury that was suffered. Patient was explained the risks/benefits/and alternatives to operative management in detail including infection, bleeding, pain, nerve and vascular damage, heterotopic ossification, leg length discrepancies, rotational deformities and they express full understanding.  We discussed the 30% national first year mortality rate with hip fractures, the need for early mobilization, and the expected rehab course. She and daughter expresses full understanding of the condition and expresses that they want to proceed with surgery. The patient is admitted to the medicine hip fracture service for optimization of medical comorbidities. Will plan for OR 12/5. No guarantees were made, informed consent was obtained. All questions were answered to patient's and family's satisfaction.     Procedure Note: Left shoulder dislocation closed reduction  Patient was explained risks, benefits, and alternatives to treatment and verbalized consent to proceed. Time out was  performed and patient name, , site, and procedure were confirmed. Fentanyl was used for pain control. Dislocation was reduced under fluoroscopy. Post-reduction films were performed and confirmed adequate reduction. Patient tolerated the procedure well.

## 2023-12-05 NOTE — HPI
Tigist Murry is a 83 y.o. female with PMH of dementia, CKD3, afib on eliquis presenting with left hip and left shoulder pain. Patient was in the bathroom and had an unwitnessed fall onto her left side. She had immediate pain and inability to bear weight on the LLE and severe pain with any ROM of LUE and LLE. She denies head injury or LOC. On eliquis for blood thinners. Denies other MSK pains. Denies numbness, tingling, or paresthesias to the LLE and LUE. Lives in a group home. Uses walker assistive device for ambulation most of the time. UA with evidence of UTI. Daughter at bedside provides most of history. Patient been more forgetful last 5 months or so. Occassionally walks with parkinson like gait.

## 2023-12-05 NOTE — HPI
82 yo female with Afib on xarelto, HTN, HPLD, mild dementia presenting after a fall in her bathroom at her group home. Most of history taken by daughter at bedside as patient had just received pain meds. It is uncertain for how long she was down, if she hit her head or lost consciousness. She did have left hip and left shoulder pain after amaury found, with the pain in her shoulder more severe than her leg. She normally uses a walker to get around most of the time, but does occasionally walks with out assistance. She has no known CVA/TIA, cardiac history. Per daughter at bedside about 5 months ago she was fairly active but since moving to the group home has been a little less active than normal.     IN ER, imaging showing left shoulder displacement, left femoral frax. Ortho consulted for left shoulder displacement, which was reset. UA with signs of UTI, rocephin given. Medicine called for admit.

## 2023-12-05 NOTE — ANESTHESIA PROCEDURE NOTES
Intubation    Date/Time: 12/5/2023 12:18 PM    Performed by: Mike Renteria CRNA  Authorized by: Nii Mejía III, MD    Intubation:     Induction:  Intravenous    Intubated:  Postinduction    Mask Ventilation:  Easy mask    Attempts:  1    Attempted By:  Student (ARIELLE Huber)    Method of Intubation:  Video laryngoscopy    Blade:  Davis 3    Laryngeal View Grade: Grade I - full view of cords      Difficult Airway Encountered?: No      Complications:  None    Airway Device:  Oral endotracheal tube    Airway Device Size:  7.0    Style/Cuff Inflation:  Cuffed (inflated to minimal occlusive pressure)    Inflation Amount (mL):  8    Tube secured:  22    Secured at:  The lips    Placement Verified By:  Colorimetric ETCO2 device    Complicating Factors:  None    Findings Post-Intubation:  BS equal bilateral and atraumatic/condition of teeth unchanged

## 2023-12-05 NOTE — PROGRESS NOTES
Pharmacist Renal Dose Adjustment Note    Tigist Murry is a 83 y.o. female being treated with the medication rivaroxaban 20 mg daily for Afib.    Patient Data:    Vital Signs (Most Recent):  Temp: 97.7 °F (36.5 °C) (12/05/23 0938)  Pulse: 96 (12/05/23 1200)  Resp: 13 (12/05/23 1200)  BP: 135/70 (12/05/23 1200)  SpO2: 100 % (12/05/23 1200) Vital Signs (72h Range):  Temp:  [97.3 °F (36.3 °C)-97.8 °F (36.6 °C)]   Pulse:  []   Resp:  [12-21]   BP: (130-181)/()   SpO2:  [95 %-100 %]      Recent Labs   Lab 12/05/23  0013   CREATININE 0.8     Serum creatinine: 0.8 mg/dL 12/05/23 0013  Estimated creatinine clearance: 41.2 mL/min    Medication: rivaroxaban 20 mg daily will be changed to rivaroxaban 15 mg daily     Pharmacist's Name: Shivani Peters  Pharmacist's Extension: [phone]

## 2023-12-05 NOTE — ASSESSMENT & PLAN NOTE
Patient with dementia with likely etiology of alzheimer's dementia. Dementia is mild. The patient does not have signs of behavioral disturbance. Home dementia medications are Held or Continued: not indicated at this time . Continue non-pharmacologic interventions to prevent delirium (No VS between 11PM-5AM, activity during day, opening blinds, providing glasses/hearing aids, and up in chair during daytime). Will avoid narcotics and benzos unless absolutely necessary. PRN anti-psychotics are not prescribed at this time but can be added

## 2023-12-05 NOTE — ASSESSMENT & PLAN NOTE
Patient with Persistent (7 days or more) atrial fibrillation which is controlled currently with Calcium Channel Blocker. Patient is currently in atrial fibrillation.[id number] Score: 3. Anticoagulation being held 2/2 perioperative period; increased risk of bleeding with surgery

## 2023-12-05 NOTE — CARE UPDATE
Patient admitted this am after fall in bathroom at her group home ([facility] in [place]) where she has been for past 5 months according to her family at bedside. Patient suffered a closed left femoral neck fracture so taken to OR today and underwent left hip hemiarthroplasty. Patient to be restarted back on her home Xarelto at 20 mg po daily to for long term anticoagulation for atrial fibrillation so no other chemical DVT prophylaxis will be needed post-op. Patient to be weight bear as tolerated to left lower extremity with posterior hip precautions post-op as per Ortho. PNC in place for post-op pain control. Patient to start PT/OT tomorrow.   Patient has (3) daughters who I met this evening in her room after patient returned from surgery. Patient sleepy from her surgery and Anesthesia so mainly engaged her daughters at bedside with conversation. Sarah Murry is the power of  and she lives in [place], [place] and the other (2) daughters are from [place], [place] and [place], [place]. Family reports patient has been at the [facility] for past 5 months but still has her house that is a round the corner from the group home. Family states patient was ambulating at group home prior to this admit and used walker at times at group home. Family aware patient will likely need skilled care on discharge and they are okay with her going to a skilled facility on discharge from hospital with ultimate plan of her returning to her group home long term. I explained her assigned , Jose G and Sanam HUIZAR would assist them on finding a skilled facility to help with discharge planning.     EVIE MONGE MD  Attending Staff Physician   Department of Hospital Medicine, [facility] on [facility]  Pager: [phone]  Spectralink: [phone]

## 2023-12-05 NOTE — ANESTHESIA PROCEDURE NOTES
SIFI catheter    Patient location during procedure: pre-op   Block not for primary anesthetic.  Reason for block: at surgeon's request and post-op pain management   Post-op Pain Location: Left hip pain   Start time: 12/5/2023 10:57 AM  Timeout: 12/5/2023 10:56 AM   End time: 12/5/2023 11:07 AM    Staffing  Authorizing Provider: Tomás Stoner MD  Performing Provider: Iliana Nesbitt MD    Staffing  Performed by: Iliana Nesbitt MD  Authorized by: Tomás Stoner MD    Preanesthetic Checklist  Completed: patient identified, IV checked, site marked, risks and benefits discussed, surgical consent, monitors and equipment checked, pre-op evaluation and timeout performed  Peripheral Block  Patient position: supine  Prep: ChloraPrep and site prepped and draped  Patient monitoring: heart rate, cardiac monitor, continuous pulse ox, continuous capnometry and frequent blood pressure checks  Block type: fascia iliaca  Laterality: left  Injection technique: continuous  Needle  Needle type: Tuohy   Needle gauge: 17 G  Needle length: 3.5 in  Needle localization: anatomical landmarks and ultrasound guidance  Catheter type: spring wound  Catheter size: 19 G  Test dose: lidocaine 1.5% with Epi 1-to-200,000 and negative   -ultrasound image captured on disc.  Assessment  Injection assessment: negative aspiration, negative parasthesia and local visualized surrounding nerve  Paresthesia pain: none  Heart rate change: no  Slow fractionated injection: yes  Pain Tolerance: comfortable throughout block  Medications:    Medications: ropivacaine (NAROPIN) injection 0.5% - Perineural   20 mL - 12/5/2023 11:07:00 AM    Additional Notes  VSS.  [facility] RN monitoring vitals throughout procedure.  Patient tolerated procedure well.

## 2023-12-05 NOTE — NURSING TRANSFER
Nursing Transfer Note      12/5/2023   4:52 PM    Nurse giving handoff:evon  Nurse receiving handoff:SEAN RN    Reason patient is being transferred: recovery care complete    Transfer To: 545      Transfer via bed    Transfer with cardiac monitoring    Transported by PCT X 2  O2:2L NC    Telemetry: Box Number [id number]  Order for Tele Monitor? Yes    Additional Lines: Clayton Catheter    4eyes on Skin: yes    Medicines sent: ropivacaine, ns @ 100ml/hr    Any special needs or follow-up needed: routine    Patient belongings transferred with patient:  n/a    Chart send with patient: Yes    Notified: nurse/daughter    Patient reassessed at: 12/05/23 7786

## 2023-12-05 NOTE — CARE UPDATE
I have reviewed the chart of Tigist Murry and collaborated with Ana Hartmann MD in the care of the patient who is hospitalized for the following:    Active Hospital Problems    Diagnosis    *Closed displaced fracture of left femoral neck    Closed dislocation of left shoulder    Acute cystitis without hematuria    Elevated INR     Suspect 2/2 xarelto      Elevated LFTs    Atelectasis    Compression fracture of L1 lumbar vertebra    Alzheimer's disease with late onset (CODE)    Atrial fibrillation and flutter    Pure hypercholesterolemia    Essential hypertension          I have reviewed Tigist Murry with the multidisciplinary team during discharge huddle.       Nadege Roy PA-C  [facility]

## 2023-12-05 NOTE — OP NOTE
OP NOTE    Date of Sx: 12/05/2023    Preop Dx: Left femoral neck fracture    Postop Dx: Left femoral neck fracture    Procedure: Left hip hemiarthroplasty for femoral neck fracture    Surgeon: Tani Martines M.D.    Asst:  Bj Garza M.D    Anesthesia: GETA    EBL:  250 cc    IVF:  50 100 cc crystalloid    Implants: Joann marcell cement 5 stem, 132°, 41 mm Unitrax head, +5 sleeve    Specimens: Femoral head    Findings: Good clinical leg lengths and stable up to 90° of internal rotation with 30° of adduction and 90° of flexion    Dispo:  To PACU extubated/stable      Indications for Procedure:    The patient is an 83-year-old female who sustained a ground level fall, resulting in a left femoral neck fracture.  The risks, benefits and alternatives to surgery were discussed with the patient and family at great length prior to going to the operating room.  All questions were answered and informed consent was obtained.    Procedure in Detail:    Patient was marked in the preoperative holding area and brought to the operating room.  General endotracheal anesthesia was inducted on the patient's hospital bed.  Preoperative antibiotics were administered.  The patient was moved to the operating table the placed in the lateral decubitus position with the left hip up and all bony prominences well padded.  The left hip and lower extremity were prepped and draped in sterile fashion.  A timeout was undertaken to confirm patient, side, site, procedure, and the administration of preoperative antibiotics.  All agreed and we proceeded.    A standard posterior approach was made.  The gluteal fascia and iliotibial band were incised in line with the skin incision.  The gluteus chloe was split bluntly and the Charnley retractor placed.  The short external rotators were identified and the piriformis and conjoint tendons incised at their insertions and tagged with number 2 Ethibond suture.  A posterior capsulotomy was performed and the  corners tagged with number 1 vicryl suture.  The femoral head was removed with a power corkscrew and measured.  A 41 mm head was trialled and found to be the appropriate size.  Attention was then turned to the femur.    The femoral neck cut was made at the appropriate height.  The box osteotome was used, followed by the canal finder, then the lateralizer, then sequential canal reaming.  The femur was broached to size cement 5, then trial implants were placed and ranged with good leg lengths and stability.    The cement plug was placed, the canal brushed and irrigated and the final stem cemented in place.  Trialling was again undertaken and a 41 mm head with +5 sleeve were placed.  The hip had good clinical leg lengths and stability with the final implants.      A 17/500cc betadine soak was undertaken.  The hip was again irrigated with normal saline via pulse lavage.  The posterior capsule and short external rotators were repaired to the greater trochanter through drill holes.  The gluteal fascia and iliotibial band were repaired with number 1 vicryl in interrupted fashion.  The next layer of fascia repaired with 0 vicryl suture, the subcutaneous tissue with 2-0 vicryl suture, and the skin with Stratafix and Dermabond..   An Aquacel dressing and knee immobilizer were placed.      Instrument and sponge counts were reported correct at the end of the case.  There were no complications.  The patient was returned to the supine position on the hospital bed, extubated and taken to the recover room in stable condition.      Plan for the patient:  Immediate PT and OT with weight bearing as tolerated and posterior hip precautions.  Multimodal pain management limiting narcotics.  DVT prophylaxis times 4-6 weeks.    Tani Martines MD

## 2023-12-05 NOTE — H&P
[facility] - Emergency Dept  Hospital Medicine  History & Physical    Patient Name: Tigist Murry  MRN: [id number]  Patient Class: IP- Inpatient  Admission Date: 12/4/2023  Attending Physician: Ana Hartmann MD   Primary Care Provider: Gwen Porter MD       Patient information was obtained from patient, relative(s), past medical records, and ER records.     Subjective:     Principal Problem:Closed displaced fracture of left femoral neck    Chief Complaint:   Chief Complaint   Patient presents with    Fall     Pt coming nursing home facility after having an unwitnessed slip and fall in the bathroom. Unknown LOC. Pt complaining of left shoulder pain. No deformity or discoloration noticed. +blood thinners        HPI: 82 yo female with Afib on xarelto, HTN, HPLD, mild dementia presenting after a fall in her bathroom at her group home. Most of history taken by daughter at bedside as patient had just received pain meds. It is uncertain for how long she was down, if she hit her head or lost consciousness. She did have left hip and left shoulder pain after amaury found, with the pain in her shoulder more severe than her leg. She normally uses a walker to get around most of the time, but does occasionally walks with out assistance. She has no known CVA/TIA, cardiac history. Per daughter at bedside about 5 months ago she was fairly active but since moving to the group home has been a little less active than normal.     IN ER, imaging showing left shoulder displacement, left femoral frax. Ortho consulted for left shoulder displacement, which was reset. UA with signs of UTI, rocephin given. Medicine called for admit.       Past Medical History:   Diagnosis Date    Anticoagulant long-term use     Anxiety     Arthritis     Atrial fibrillation     Carpal tunnel syndrome on right     Diverticulosis     Encounter for blood transfusion     HTN (hypertension)     Hyperlipidemia     Osteopenia     Overactive bladder      Palpitations     Patellar tendonitis 4/13    saw [place] bone     Psoriasis     S/P partial hysterectomy     Situational depression     Supraventricular tachycardia        Past Surgical History:   Procedure Laterality Date    ABDOMINAL SURGERY      BREAST BIOPSY Left     excisional    CARPAL TUNNEL RELEASE      HYSTERECTOMY      @27yrs of age    KNEE ARTHROSCOPY W/ DEBRIDEMENT  '05    Right    OOPHORECTOMY      @27yrs of age    PARTIAL HYSTERECTOMY         Review of patient's allergies indicates:   Allergen Reactions    Ciprofloxacin Other (See Comments)     Other reaction(s): Nausea  Other reaction(s): Nausea       No current facility-administered medications on file prior to encounter.     Current Outpatient Medications on File Prior to Encounter   Medication Sig    diltiaZEM (CARDIZEM CD) 240 MG 24 hr capsule Take 1 capsule (240 mg total) by mouth once daily.    EScitalopram oxalate (LEXAPRO) 10 MG tablet Take 1 tablet (10 mg total) by mouth once daily.    MYRBETRIQ 50 mg Tb24 Take 1 tablet (50 mg total) by mouth once daily.    rivaroxaban (XARELTO) 20 mg Tab Take 1 tablet (20 mg total) by mouth once daily.     Family History       Problem Relation (Age of Onset)    Heart failure Mother (80)    No Known Problems Sister, Brother, Daughter, Daughter, Daughter          Tobacco Use    Smoking status: Never    Smokeless tobacco: Never   Substance and Sexual Activity    Alcohol use: No    Drug use: No    Sexual activity: Not Currently     Partners: Male     Review of Systems   Constitutional:  Negative for activity change, appetite change, chills and fever.   HENT:  Negative for congestion, hearing loss and rhinorrhea.    Eyes:  Negative for discharge, itching and visual disturbance.   Respiratory:  Negative for apnea, cough and shortness of breath.    Cardiovascular:  Negative for chest pain, palpitations and leg swelling.   Gastrointestinal:  Negative for abdominal distention, abdominal pain, constipation,  diarrhea, nausea and vomiting.   Endocrine: Negative for cold intolerance and heat intolerance.   Genitourinary:  Negative for dysuria and hematuria.   Musculoskeletal:  Negative for back pain, neck pain and neck stiffness.   Skin:  Negative for rash and wound.   Neurological:  Negative for dizziness, seizures, light-headedness and headaches.   Psychiatric/Behavioral:  Negative for agitation, confusion and suicidal ideas.      Objective:     Vital Signs (Most Recent):  Temp: 97.8 °F (36.6 °C) (12/05/23 0418)  Pulse: 92 (12/05/23 0434)  Resp: 18 (12/05/23 0333)  BP: (!) 155/85 (12/05/23 0345)  SpO2: 97 % (12/05/23 0434) Vital Signs (24h Range):  Temp:  [97.6 °F (36.4 °C)-97.8 °F (36.6 °C)] 97.8 °F (36.6 °C)  Pulse:  [] 92  Resp:  [16-20] 18  SpO2:  [95 %-97 %] 97 %  BP: (155-181)/() 155/85        There is no height or weight on file to calculate BMI.     Physical Exam  Vitals reviewed.   Constitutional:       General: She is not in acute distress.     Appearance: She is well-developed.   HENT:      Head: Normocephalic and atraumatic.      Nose: Nose normal. No rhinorrhea.      Mouth/Throat:      Mouth: Mucous membranes are moist.   Eyes:      General: No scleral icterus.        Right eye: No discharge.         Left eye: No discharge.      Pupils: Pupils are equal, round, and reactive to light.   Neck:      Vascular: No JVD.   Cardiovascular:      Rate and Rhythm: Normal rate and regular rhythm.      Heart sounds: Normal heart sounds. No murmur heard.     No friction rub.   Pulmonary:      Effort: Pulmonary effort is normal. No respiratory distress.      Breath sounds: Normal breath sounds. No wheezing.   Abdominal:      General: Bowel sounds are normal. There is no distension.      Palpations: Abdomen is soft.      Tenderness: There is no abdominal tenderness.   Musculoskeletal:      Cervical back: Normal range of motion and neck supple.      Comments: ROM limited on left side upper and lower 2/2 pain    Skin:     General: Skin is warm and dry.   Neurological:      General: No focal deficit present.      Mental Status: She is alert and oriented to person, place, and time.   Psychiatric:         Mood and Affect: Mood normal.         Behavior: Behavior normal.              CRANIAL NERVES     CN III, IV, VI   Pupils are equal, round, and reactive to light.       Significant Labs: All pertinent labs within the past 24 hours have been reviewed.  CBC:   Recent Labs   Lab 12/05/23  0013   WBC 11.68   HGB 15.2   HCT 43.9        CMP:   Recent Labs   Lab 12/05/23  0013      K 4.1      CO2 22*   *   BUN 17   CREATININE 0.8   CALCIUM 9.7   PROT 6.8   ALBUMIN 3.7   BILITOT 0.8   ALKPHOS 85   AST 54*   ALT 66*   ANIONGAP 11       Significant Imaging: I have reviewed all pertinent imaging results/findings within the past 24 hours.  Assessment/Plan:     * Closed displaced fracture of left femoral neck  Fracture  Surgical Risk Assessment:    Active Cardiac Issues:  Active decompensated heart failure? No   Unstable angina?  No   Significant uncontrolled arrhythmias? No   Severe valvular heart disease-Aortic or Mitral Stenosis? No   Recent MI or coronary revascularization < 30 days? No     Cardiac Risk Factors:  Intermediate/high risk surgery? No   History of CAD/ischemic heart disease? No   History of cerebrovascular disease? No   History of compensated heart failure? No   Type 2 diabetes requiring insulin? No   Serum Creatinine > 2? No   Total cardiac risk factors 0     Functional mets POOR    < 4 METs -unable to walk > 2 blocks on level ground without stopping due to symptoms  - eating, dressing, toileting, walking indoors, light housework. POOR   > 4 METs -climbing > 1 flight of stairs without stopping  -walking up hill > 1-2 blocks  -scrubbing floors  -moving furniture  - golf, bowling, dancing or tennis  -running short distance MODERATE to EXCELLENT       Hip Fracture Pathway initiated  Orthopedics  consulted.  Plan to go to the OR on 12/5.    Continue betablocker, statin; hold ACEi or ARB day of surgery   For high risk of post-op pulmonary complications, scheduled duonebs and incentive spirometry to start after surgery   For high risk of post-op delirium, minimize CNS-active meds (opiatess, benzos, anticholinergics) and sleep hygiene with windowshades open during day, no daytime naps, frequent re-orientation, private room if feasible  For probable senile osteoporosis, check Vitamin D level.  If/for Vit D deficiency and probable senile osteopenia/osteoporosis, start Vit D and Ca, follow up in Ortho clinic per new protocol  DVT prophylaxis for 4 weeks post-op  PT/OT to start on POD 1  Clayton to be removed on POD 1  Pain control:  Pregabalin 75mg PO qHS and scheduled Tylenol 1g TID.  Oxy PO prn with Morphine IV prn for pain not controlled with PO medications    Perioperative Risk Assessment:  RCRI Score 0, Class 1 risk with 3.9 % risk of cardiopulmonary complications    Patient is at intermediate risk for perioperative cardiopulmonary complications.  The benefit of surgery outweighs the risk of surgery at this time, there are currently no absolute contraindications to surgery.     NSQIP:          Acute cystitis without hematuria  Acute on UA, will continue rocephin      Closed dislocation of left shoulder  Set by ortho; maintain sling, continue to monitor      Alzheimer's disease with late onset (CODE)  Patient with dementia with likely etiology of alzheimer's dementia. Dementia is mild. The patient does not have signs of behavioral disturbance. Home dementia medications are Held or Continued: not indicated at this time . Continue non-pharmacologic interventions to prevent delirium (No VS between 11PM-5AM, activity during day, opening blinds, providing glasses/hearing aids, and up in chair during daytime). Will avoid narcotics and benzos unless absolutely necessary. PRN anti-psychotics are not prescribed at this  time but can be added    Atrial fibrillation and flutter  Patient with Persistent (7 days or more) atrial fibrillation which is controlled currently with Calcium Channel Blocker. Patient is currently in atrial fibrillation.[id number] Score: 3. Anticoagulation being held 2/2 perioperative period; increased risk of bleeding with surgery    Hyperlipidemia  Chronic, controlled, continue to monitor/ not on statin      Essential hypertension  Chronic, uncontrolled, likley 2/2 to pain. Latest blood pressure and vitals reviewed-     Temp:  [97.6 °F (36.4 °C)-97.8 °F (36.6 °C)]   Pulse:  []   Resp:  [16-20]   BP: (155-181)/()   SpO2:  [95 %-97 %] .   Home meds for hypertension were reviewed and noted below.   Hypertension Medications               diltiaZEM (CARDIZEM CD) 240 MG 24 hr capsule Take 1 capsule (240 mg total) by mouth once daily.            While in the hospital, will manage blood pressure as follows; Continue home antihypertensive regimen    Will utilize p.r.n. blood pressure medication only if patient's blood pressure greater than 180/110 and she develops symptoms such as worsening chest pain or shortness of breath.      VTE Risk Mitigation (From admission, onward)           Ordered     IP VTE HIGH RISK PATIENT  Once         12/05/23 0433     Place sequential compression device  Until discontinued         12/05/23 0433                            Lexx Subramanian MD  Department of Hospital Medicine  [facility] - Emergency Dept

## 2023-12-05 NOTE — TRANSFER OF CARE
Anesthesia Transfer of Care Note    Patient: Tigist Murry    Procedure(s) Performed: Procedure(s) (LRB):  HEMIARTHROPLASTY, HIP, POSTERIOR APPROACH left (Left)    Patient location: PACU    Anesthesia Type: general    Transport from OR: Transported from OR on 6-10 L/min O2 by face mask with adequate spontaneous ventilation    Post pain: adequate analgesia    Post assessment: no apparent anesthetic complications and tolerated procedure well    Post vital signs: stable    Level of consciousness: awake    Nausea/Vomiting: no nausea/vomiting    Complications: none    Transfer of care protocol was followed      Last vitals:

## 2023-12-05 NOTE — ED TRIAGE NOTES
Tigist Murry, a 83 y.o. female presents to the ED w/ complaint of unwitnessed fall at nursing home. Patient does not remember falling patient endorses pain to the left shoulder and left leg. Patient does take blood thinners.     Triage note:  Chief Complaint   Patient presents with    Fall     Pt coming nursing home facility after having an unwitnessed slip and fall in the bathroom. Unknown LOC. Pt complaining of left shoulder pain. No deformity or discoloration noticed. +blood thinners     Review of patient's allergies indicates:   Allergen Reactions    Ciprofloxacin Other (See Comments)     Other reaction(s): Nausea  Other reaction(s): Nausea     Past Medical History:   Diagnosis Date    Anticoagulant long-term use     Anxiety     Arthritis     Atrial fibrillation     Carpal tunnel syndrome on right     Diverticulosis     Encounter for blood transfusion     HTN (hypertension)     Hyperlipidemia     Osteopenia     Overactive bladder     Palpitations     Patellar tendonitis 4/13    saw [place] bone     Psoriasis     S/P partial hysterectomy     Situational depression     Supraventricular tachycardia

## 2023-12-05 NOTE — ED PROVIDER NOTES
Encounter Date: 12/4/2023       History     Chief Complaint   Patient presents with    Fall     Pt coming nursing home facility after having an unwitnessed slip and fall in the bathroom. Unknown LOC. Pt complaining of left shoulder pain. No deformity or discoloration noticed. +blood thinners     Patient is an 83-year-old female with past medical history of AFib on Eliquis, hypertension, hyperlipidemia who presents after a fall in her bathroom at the group home where she lives.  She was down for an unknown amount of time.  She is pain in her left hip and left shoulder.  It is unclear whether she hit her head or had loss of consciousness.  Patient reports the pain in her shoulder is more severe than her leg.    The history is provided by the patient and a relative.     Review of patient's allergies indicates:   Allergen Reactions    Ciprofloxacin Other (See Comments)     Other reaction(s): Nausea  Other reaction(s): Nausea     Past Medical History:   Diagnosis Date    Anticoagulant long-term use     Anxiety     Arthritis     Atrial fibrillation     Carpal tunnel syndrome on right     Diverticulosis     Encounter for blood transfusion     HTN (hypertension)     Hyperlipidemia     Osteopenia     Overactive bladder     Palpitations     Patellar tendonitis 4/13    saw [place]     Psoriasis     S/P partial hysterectomy     Situational depression     Supraventricular tachycardia      Past Surgical History:   Procedure Laterality Date    ABDOMINAL SURGERY      BREAST BIOPSY Left     excisional    CARPAL TUNNEL RELEASE      HEMIARTHROPLASTY, HIP, POSTERIOR APPROACH Left 12/5/2023    Procedure: HEMIARTHROPLASTY, HIP, POSTERIOR APPROACH left;  Surgeon: Tani Martines MD;  Location: [facility] OR [address] [place] [address];  Service: Orthopedics;  Laterality: Left;    HYSTERECTOMY      @27yrs of age    KNEE ARTHROSCOPY W/ DEBRIDEMENT  '05    Right    OOPHORECTOMY      @27yrs of age    PARTIAL HYSTERECTOMY       Family History   Problem  Relation Age of Onset    Heart failure Mother 80    No Known Problems Sister     No Known Problems Brother     No Known Problems Daughter     No Known Problems Daughter     No Known Problems Daughter      Social History     Tobacco Use    Smoking status: Never    Smokeless tobacco: Never   Substance Use Topics    Alcohol use: No    Drug use: No         Physical Exam     Initial Vitals [12/04/23 2107]   BP Pulse Resp Temp SpO2   (!) 181/109 87 16 97.6 °F (36.4 °C) 95 %      MAP       --         Physical Exam    Nursing note and vitals reviewed.  Constitutional: She appears well-developed and well-nourished. She is not diaphoretic. No distress.   HENT:   Head: Normocephalic and atraumatic.   Eyes: Conjunctivae and EOM are normal.   Neck: Neck supple.   Normal range of motion.  Cardiovascular:  Normal rate and regular rhythm.           Pulmonary/Chest: No respiratory distress.   Abdominal: Abdomen is soft.   Musculoskeletal:      Cervical back: Normal range of motion and neck supple.      Comments: TTP of L shoulder with deformity  TTP of L hip with L leg shortened compared to the R     Neurological: She is alert and oriented to person, place, and time. GCS score is 15. GCS eye subscore is 4. GCS verbal subscore is 5. GCS motor subscore is 6.   Skin: Skin is warm and dry.   Psychiatric: She has a normal mood and affect. Her behavior is normal. Judgment and thought content normal.         ED Course   Orthopedic Injury    Date/Time: 12/5/2023 3:52 AM    Performed by: SAYDA Nuñez MD  Authorized by: Marah Flood MD    Location procedure was performed:  [facility] EMERGENCY DEPARTMENT  Consent Done?:  Yes  Universal Protocol:     Verbal consent obtained?: Yes      Consent given by:  Patient    Patient identity confirmed:  Verbally with patient  Injury:     Injury location:  Shoulder    Location details:  Left shoulder    Injury type:  Dislocation    Dislocation type: anterior      Chronicity:  New    Hill-Sachs  deformity?: No        Pre-procedure assessment:     Neurovascular status: Neurovascularly intact      Distal perfusion: normal      Neurological function: normal      Range of motion: normal      Local anesthesia used?: No      Patient sedated?: No        Selections made in this section will also lock the Injury type section above.:     Manipulation performed?: Yes      Reduction method:  External rotation, traction and counter traction and scapular manipulation    Reduction method:  External rotation, traction and counter traction and scapular manipulation    Reduction method:  External rotation, traction and counter traction and scapular manipulation    Reduction method:  External rotation, traction and counter traction and scapular manipulation    Reduction method:  External rotation, traction and counter traction and scapular manipulation    Reduction method:  External rotation, traction and counter traction and scapular manipulation    Reduction successful?: Yes      Confirmation: Reduction confirmed by x-ray      Immobilization:  Sling  Post-procedure assessment:     Distal perfusion: normal      Neurological function: normal      Range of motion: normal      Patient tolerance:  Patient tolerated the procedure well with no immediate complications    Labs Reviewed   COMPREHENSIVE METABOLIC PANEL - Abnormal; Notable for the following components:       Result Value    CO2 22 (*)     Glucose 156 (*)     AST 54 (*)     ALT 66 (*)     All other components within normal limits   CBC W/ AUTO DIFFERENTIAL - Abnormal; Notable for the following components:    MCH 31.1 (*)     Gran # (ANC) 10.1 (*)     Lymph # 0.7 (*)     Gran % 86.6 (*)     Lymph % 5.6 (*)     All other components within normal limits   B-TYPE NATRIURETIC PEPTIDE - Abnormal; Notable for the following components:     (*)     All other components within normal limits   URINALYSIS, REFLEX TO URINE CULTURE - Abnormal; Notable for the following  components:    Appearance, UA Hazy (*)     Specific Gravity, UA >=1.030 (*)     Protein, UA 1+ (*)     Glucose, UA 1+ (*)     Nitrite, UA Positive (*)     Leukocytes, UA 3+ (*)     All other components within normal limits    Narrative:     Specimen Source->Urine   PROTIME-INR - Abnormal; Notable for the following components:    Prothrombin Time 18.2 (*)     INR 1.8 (*)     All other components within normal limits   APTT - Abnormal; Notable for the following components:    aPTT 40.7 (*)     All other components within normal limits   URINALYSIS MICROSCOPIC - Abnormal; Notable for the following components:    RBC, UA 6 (*)     WBC, UA >100 (*)     All other components within normal limits    Narrative:     Specimen Source->Urine   MAGNESIUM   TROPONIN I   TSH   PREALBUMIN   VITAMIN D   HEMOGLOBIN A1C   PHOSPHORUS   TRANSFERRIN   TRANSFERRIN    Narrative:     ADD ON A1C PER DR BHARTI KEENE/ORDER# [id number] @ 3:26AM    Add on Phos and Transferrin per Dr. Keene @ 03:03 am to order #   [id number]   PHOSPHORUS    Narrative:     ADD ON A1C PER DR BHARTI KEENE/ORDER# [id number] @ 3:26AM    Add on Phos and Transferrin per Dr. Keene @ 03:03 am to order #   [id number]   HEMOGLOBIN A1C    Narrative:     ADD ON A1C PER DR BHARTI KEENE/ORDER# [id number] @ 3:26AM    Add on Phos and Transferrin per Dr. Keene @ 03:03 am to order #   [id number]        ECG Results              EKG 12-lead (Final result)  Result time 12/06/23 00:23:04      Final result by Interface, Lab In [facility] (12/06/23 00:23:04)                   Narrative:    Test Reason : W19.XXXA,    Vent. Rate : 105 BPM     Atrial Rate : 384 BPM     P-R Int : 000 ms          QRS Dur : 068 ms      QT Int : 296 ms       P-R-T Axes : 000 013 167 degrees     QTc Int : 391 ms    Atrial fibrillation  Abnormal ECG  When compared with ECG of 02-MAR-2023 14:15,  No significant change was found  Confirmed by DELIA JANSEN MD ([id number]) on 12/6/2023 12:22:50  AM    Referred By: [email or web address]   SELF           Confirmed By:DELIA JANSEN MD                                  Imaging Results              X-Ray Chest 1 View (Final result)  Result time 12/05/23 04:01:04      Final result by Corin Arevalo MD (12/05/23 04:01:04)                   Impression:      Please see above.      Electronically signed by: Corin Arevalo MD  Date:    12/05/2023  Time:    04:01               Narrative:    EXAMINATION:  XR CHEST 1 VIEW    CLINICAL HISTORY:  Preop;    TECHNIQUE:  Single frontal view of the chest was performed.    COMPARISON:  11/29/2022    FINDINGS:  Cardiac monitoring leads overlie the chest.  The cardiomediastinal silhouette is unchanged in size and configuration.  There is atherosclerosis of the thoracic aorta.  There are low lung volumes with bibasilar subsegmental atelectasis.  There is diffuse coarse interstitial lung markings.  No evidence of confluent airspace consolidation, substantial volume of pleural fluid or pneumothorax.  Visualized osseous structures appear intact with degenerative change.                                       X-Ray Shoulder Trauma 3 view Left (Final result)  Result time 12/05/23 03:58:38      Final result by Corin Arevalo MD (12/05/23 03:58:38)                   Impression:      Interval reduction of previously identified left shoulder dislocation.      Electronically signed by: Corin Arevalo MD  Date:    12/05/2023  Time:    03:58               Narrative:    EXAMINATION:  XR SHOULDER TRAUMA 3 VIEW LEFT    CLINICAL HISTORY:  Shoulder dislocation;    TECHNIQUE:  Three views of the left shoulder were performed.    COMPARISON  12/05/2023    FINDINGS:  Interval reduction of previously identified left shoulder dislocation.  The humeral head appears appropriately seated within the glenoid.  Osseous structures appear intact.  The acromioclavicular joint appears maintained with degenerative arthrosis.                                        CT Chest Abdomen  Pelvis With IV Contrast (XPD) NO Oral Contrast (Final result)  Result time 12/05/23 03:47:20      Final result by Luís Rodarte MD (12/05/23 03:47:20)                   Impression:      1. New compression deformity of the superior endplate of L1 with less than 25% height loss compared to CT from 09/16/2022, age indeterminate.  2. Stable left adrenal gland nodule.  3. Enlarging retroperitoneal lymph node measuring 1.7 cm with mass effect on the IVC and left renal vein.  Further evaluation as clinically indicated.  4. Stable 1.0 cm pulmonary nodule in the right lower lobe, unchanged compared to CT from 2016 favoring benign process.  5. Anteroinferior dislocation of the left shoulder with the humeral head positioned between the left scapula and rib cage.  Associated soft tissue stranding/edema in the left supraclavicular and axillary regions.  Surrounding left shoulder musculature also appears enlarged.  Possible non localized hematoma associated with dislocation.  6. Additional findings, as above.  This report was flagged in Epic as abnormal.    Electronically signed by resident: Ramila De La Torre  Date:    12/05/2023  Time:    02:49    Electronically signed by: Luís Rodarte MD  Date:    12/05/2023  Time:    03:47               Narrative:    EXAMINATION:  CT CHEST ABDOMEN PELVIS WITH IV CONTRAST (XPD)    CLINICAL HISTORY:  Polytrauma, blunt;    TECHNIQUE:  Low dose axial images, sagittal and coronal reformations were obtained from the neck base to the pubic symphysis after the administration of 75 cc Omnipaque 350 intravenous contrast.  Oral contrast was not administered.    COMPARISON:  Left shoulder radiograph 12/05/2023, CT urogram 09/16/2022, CT renal study 08/11/2021, and CT chest 09/23/2016.    FINDINGS:  Base of Neck: No significant abnormality.    CHEST:    -Heart: Right atrial enlargement.  No pericardial effusion. Multi-vessel calcific atherosclerosis of the coronary arteries.    -Pulmonary vasculature:  Pulmonary arteries distribute normally.  No central pulmonary artery filling defect to suggest pulmonary embolus.    -Tracie/Mediastinum: No pathologic darby enlargement.    -Trachea and Proximal airways: Patent.    -Lungs/Pleura: Calcified granuloma in the right lower lobe.  Bilateral bandlike opacifications in the lung bases favored to represent scarring or subsegmental atelectasis..  Stable 1.0 cm right lower lobe nodule, unchanged compared to CT chest from 09/23/2016 favoring benign process.    -Esophagus: Normal course and caliber.    ABDOMEN:    - Liver: Normal in size.  Subcentimeter hepatic hypodensity in the left hepatic dome (axial series 3, image 29, too small to characterize.  Geographic area of hypo attenuation along the falciform ligament, likely representing region of focal fatty infiltration.    - Gallbladder: The gallbladder is surgically absent.    - Bile Ducts: Mild intrahepatic ductal dilation and prominent common bile duct measuring 0.7 cm, possibly relating to changes from cholecystectomy.    - Stomach/Duodenum: Small hiatal hernia.  The stomach and duodenum are otherwise unremarkable.    - Spleen: Unremarkable.    - Pancreas: Unremarkable.    - Adrenals: Stable 1.3 cm left adrenal gland nodule.    - Kidneys/ureters/urinary bladder: Normal in size and location.  Bilateral parapelvic renal cysts.  Subcentimeter bilateral renal hypodensities, too small to characterize.  No hydronephrosis or stones.  The ureters appear normal in course and caliber without evidence of ureteral dilatation.  The urinary bladder is unremarkable.    - Retroperitoneum: Retroperitoneal lymph node measuring 1.7 cm with mass effect on the IVC and left renal vein (axial series 3, image 58), previously 1.3 cm.    -peritoneum: No free intraperitoneal fluid or free intraperitoneal air.    PELVIS:    - Reproductive: The uterus is surgically absent.    BOWEL/MESENTERY:    No evidence of bowel obstruction or inflammatory process.  Scattered colonic diverticulosis without adjacent inflammatory changes to suggest acute diverticulitis.  The appendix is not definitely visualized and there are no inflammatory changes in the right lower quadrant.    VASCULATURE: Left-sided aortic arch with 3 branch vessels.  No aneurysm.  Moderate calcific atherosclerosis of the aorta and bilateral iliac vessels.  The portal veins, SMV, and splenic vein are patent.    BONES: Osteopenia.  Compression fractures of L1 with less than 25% height loss, new compared to CT from 09/16/2022.  Stable compression fracture of T9.    EXTRATHORACIC/EXTRAPERITONEAL SOFT TISSUES: Calcifications of the subcutaneous fat overlying the right gluteal muscle and smaller calcific foci in the subcutaneous fat overlying the bilateral gluteal muscles, likely representing injection site granulomas. Anteroinferior dislocation of the left shoulder, correlating with finding on shoulder radiograph from 12/05/2023 with stranding/edema in the surrounding soft tissues of the left axilla and left supraclavicular regions, likely relating to the dislocation.  Surrounding musculature also appears to be enlarged.  Possible nonlocalized hematoma associated with dislocation.                                       CT Cervical Spine Without Contrast (Final result)  Result time 12/05/23 03:58:20      Final result by Luís Rodarte MD (12/05/23 03:58:20)                   Impression:      1. No evidence of acute intracranial pathology.  2. No acute fracture or traumatic dislocation of the cervical spine.  3. Degenerative changes of the cervical spine with multilevel neural foraminal narrowing, as detailed above.  No significant spinal canal stenosis.  4. Grade 1 anterolisthesis C3 on C4.  Degenerative changes in the facet joints throughout the cervical spine.  5. Additional findings as above.    Electronically signed by resident: Ramila De La Torre  Date:    12/05/2023  Time:    03:13    Electronically signed  by: Luís Rodarte MD  Date:    12/05/2023  Time:    03:58               Narrative:    EXAMINATION:  CT HEAD WITHOUT CONTRAST; CT CERVICAL SPINE WITHOUT CONTRAST    CLINICAL HISTORY:  Head trauma, minor (Age >= 65y);; Neck trauma (Age >= 65y);    TECHNIQUE:  Low dose axial CT images obtained throughout the head and cervical spine without the use of intravenous contrast.  Axial, sagittal and coronal reconstructions were performed.    COMPARISON:  MRI brain 09/29/2022 left shoulder x-ray 12/05/2023.    FINDINGS:  CT head:    Intracranial compartment:    Generalized cerebral volume loss with compensatory dilation of the ventricles and sulci.  No evidence of hydrocephalus.    Supratentorial white matter hypoattenuation, nonspecific and suggestive of chronic microvascular ischemic change.  No parenchymal mass, hemorrhage, edema or major vascular distribution infarct.    No extra-axial blood or fluid collections.    No fracture. Mastoid air cells and paranasal sinuses are essentially clear.    Postop change right cataract surgery.    CT cervical spine:    Exam limited by motion artifact.    Alignment: Grade 1 anterolisthesis C3 on C4.    Vertebrae: No fracture.  The dens, lateral masses, and occipital condyles are intact.  No suspicious lytic or sclerotic lesion.    Discs: Multilevel disc height loss.    Degenerative findings: Degenerative change of the cervical spine with multilevel facet joint arthropathy and uncovertebral spurring.  No significant spinal canal stenosis.  Severe left C2-C3 moderate left C3-C4 mild right C4-C5, severe right and moderate left C5-C6, mild bilateral C6-C7, and mild bilateral C7-T1 neural foraminal narrowing.    Paraspinal muscles & soft tissues: Mild biapical fibronodular scarring.  Partially visualized soft tissue stranding in the left supraclavicular and axillary regions possibly related to left shoulder dislocation injury demonstrated radiographically.                                        CT Head Without Contrast (Final result)  Result time 12/05/23 03:58:20      Final result by Luís Rodarte MD (12/05/23 03:58:20)                   Impression:      1. No evidence of acute intracranial pathology.  2. No acute fracture or traumatic dislocation of the cervical spine.  3. Degenerative changes of the cervical spine with multilevel neural foraminal narrowing, as detailed above.  No significant spinal canal stenosis.  4. Grade 1 anterolisthesis C3 on C4.  Degenerative changes in the facet joints throughout the cervical spine.  5. Additional findings as above.    Electronically signed by resident: Ramila De La Torre  Date:    12/05/2023  Time:    03:13    Electronically signed by: Luís Rodarte MD  Date:    12/05/2023  Time:    03:58               Narrative:    EXAMINATION:  CT HEAD WITHOUT CONTRAST; CT CERVICAL SPINE WITHOUT CONTRAST    CLINICAL HISTORY:  Head trauma, minor (Age >= 65y);; Neck trauma (Age >= 65y);    TECHNIQUE:  Low dose axial CT images obtained throughout the head and cervical spine without the use of intravenous contrast.  Axial, sagittal and coronal reconstructions were performed.    COMPARISON:  MRI brain 09/29/2022 left shoulder x-ray 12/05/2023.    FINDINGS:  CT head:    Intracranial compartment:    Generalized cerebral volume loss with compensatory dilation of the ventricles and sulci.  No evidence of hydrocephalus.    Supratentorial white matter hypoattenuation, nonspecific and suggestive of chronic microvascular ischemic change.  No parenchymal mass, hemorrhage, edema or major vascular distribution infarct.    No extra-axial blood or fluid collections.    No fracture. Mastoid air cells and paranasal sinuses are essentially clear.    Postop change right cataract surgery.    CT cervical spine:    Exam limited by motion artifact.    Alignment: Grade 1 anterolisthesis C3 on C4.    Vertebrae: No fracture.  The dens, lateral masses, and occipital condyles are intact.  No  suspicious lytic or sclerotic lesion.    Discs: Multilevel disc height loss.    Degenerative findings: Degenerative change of the cervical spine with multilevel facet joint arthropathy and uncovertebral spurring.  No significant spinal canal stenosis.  Severe left C2-C3 moderate left C3-C4 mild right C4-C5, severe right and moderate left C5-C6, mild bilateral C6-C7, and mild bilateral C7-T1 neural foraminal narrowing.    Paraspinal muscles & soft tissues: Mild biapical fibronodular scarring.  Partially visualized soft tissue stranding in the left supraclavicular and axillary regions possibly related to left shoulder dislocation injury demonstrated radiographically.                                       X-Ray Femur 2 View Left (Final result)  Result time 12/05/23 01:04:56      Final result by Luís Rodarte MD (12/05/23 01:04:56)                   Impression:      No acute displaced pelvic fracture.    Acute displaced fracture left femoral neck with distal fragment displaced superolaterally resulting in 90° coxa vara with end to side abutment of the femoral neck fragments.      Electronically signed by: Luís Rodarte MD  Date:    12/05/2023  Time:    01:04               Narrative:    EXAMINATION:  XR PELVIS ROUTINE AP; XR FEMUR 2 VIEW LEFT    CLINICAL HISTORY:  fall;; left leg pain;    TECHNIQUE:  AP view of the pelvis was performed.  Left femur two views were performed.    COMPARISON:  None.    FINDINGS:  No displaced pelvic fracture identified.  Acute displaced fracture left femoral neck with distal fragment displaced superolaterally resulting in 90° coxa vara with end to side abutment of the femoral neck fragments.  No additional acute fracture of the mid to distal left femur identified.                                       X-Ray Shoulder Trauma Left (Final result)  Result time 12/05/23 01:02:36      Final result by Luís Rodarte MD (12/05/23 01:02:36)                   Impression:      No acute  fracture.    Anteroinferior glenohumeral dislocation.      Electronically signed by: Luís Rodarte MD  Date:    12/05/2023  Time:    01:02               Narrative:    EXAMINATION:  XR SHOULDER TRAUMA 3 VIEW LEFT    CLINICAL HISTORY:  Unspecified fall, initial encounter    TECHNIQUE:  Three views of the left shoulder were performed.    COMPARISON:  None    FINDINGS:  Bones: No fracture.  No lytic or blastic lesion.    Joints: Anteroinferior glenohumeral dislocation.  Acromioclavicular joint is unremarkable.    Soft tissues: Soft tissue swelling left upper arm.                                       X-Ray Pelvis Routine AP (Final result)  Result time 12/05/23 01:04:56   Procedure changed from X-Ray Hip 2 or 3 views Left (with Pelvis when performed)     Final result by Luís Rodarte MD (12/05/23 01:04:56)                   Impression:      No acute displaced pelvic fracture.    Acute displaced fracture left femoral neck with distal fragment displaced superolaterally resulting in 90° coxa vara with end to side abutment of the femoral neck fragments.      Electronically signed by: Luís Rodarte MD  Date:    12/05/2023  Time:    01:04               Narrative:    EXAMINATION:  XR PELVIS ROUTINE AP; XR FEMUR 2 VIEW LEFT    CLINICAL HISTORY:  fall;; left leg pain;    TECHNIQUE:  AP view of the pelvis was performed.  Left femur two views were performed.    COMPARISON:  None.    FINDINGS:  No displaced pelvic fracture identified.  Acute displaced fracture left femoral neck with distal fragment displaced superolaterally resulting in 90° coxa vara with end to side abutment of the femoral neck fragments.  No additional acute fracture of the mid to distal left femur identified.                                       Medications   acetaminophen tablet 1,000 mg (1,000 mg Oral Given 12/4/23 3610)   fentaNYL 50 mcg/mL injection 75 mcg (75 mcg Intravenous Given 12/5/23 0151)   iohexoL (OMNIPAQUE 350) injection 75 mL (75 mLs  Intravenous Given 12/5/23 0213)   fentaNYL 50 mcg/mL injection 50 mcg (50 mcg Intravenous Given 12/5/23 0314)   cefTRIAXone (ROCEPHIN) 1 g in dextrose 5 % in water (D5W) 100 mL IVPB (MB+) (0 g Intravenous Stopped 12/5/23 0448)   vancomycin (VANCOCIN) 1,000 mg in dextrose 5 % (D5W) 250 mL IVPB (Vial-Mate) (0 mg Intravenous Stopped 12/5/23 1123)   ceFAZolin 2 g in dextrose 5 % in water (D5W) 50 mL IVPB (MB+) (0 g Intravenous Stopped 12/6/23 0628)   methocarbamoL tablet 1,000 mg (1,000 mg Oral Given 12/5/23 1524)   diltiaZEM 24 hr capsule 240 mg (240 mg Oral Given 12/5/23 2308)   ALPRAZolam tablet 0.5 mg (0.5 mg Oral Given 12/7/23 1753)     Medical Decision Making  Pt with L shoulder dislocated, now reduced and L hip fracture  Patient's pain treated with iv fentanyl which also helped with the reduction  She does have signs of a UTI, started on rocephin  Admitted to  for hip fracture    Amount and/or Complexity of Data Reviewed  Labs: ordered.  Radiology: ordered.    Risk  Prescription drug management.  Parenteral controlled substances.  Decision regarding hospitalization.                                      Clinical Impression:  Final diagnoses:  [W19.XXXA] Fall  [S43.005A] Dislocated shoulder, left, initial encounter (Primary)  [S72.002A] Closed fracture of neck of left femur, initial encounter  [N30.00] Acute cystitis without hematuria  [S32.010A] Closed compression fracture of body of L1 vertebra          ED Disposition Condition    Admit Stable                Marah Flood MD  12/09/23 9516

## 2023-12-05 NOTE — ASSESSMENT & PLAN NOTE
Fracture  Surgical Risk Assessment:    Active Cardiac Issues:  Active decompensated heart failure? No   Unstable angina?  No   Significant uncontrolled arrhythmias? No   Severe valvular heart disease-Aortic or Mitral Stenosis? No   Recent MI or coronary revascularization < 30 days? No     Cardiac Risk Factors:  Intermediate/high risk surgery? No   History of CAD/ischemic heart disease? No   History of cerebrovascular disease? No   History of compensated heart failure? No   Type 2 diabetes requiring insulin? No   Serum Creatinine > 2? No   Total cardiac risk factors 0     Functional mets POOR    < 4 METs -unable to walk > 2 blocks on level ground without stopping due to symptoms  - eating, dressing, toileting, walking indoors, light housework. POOR   > 4 METs -climbing > 1 flight of stairs without stopping  -walking up hill > 1-2 blocks  -scrubbing floors  -moving furniture  - golf, bowling, dancing or tennis  -running short distance MODERATE to EXCELLENT       Hip Fracture Pathway initiated  Orthopedics consulted.  Plan to go to the OR on 12/5.    Continue betablocker, statin; hold ACEi or ARB day of surgery   For high risk of post-op pulmonary complications, scheduled duonebs and incentive spirometry to start after surgery   For high risk of post-op delirium, minimize CNS-active meds (opiatess, benzos, anticholinergics) and sleep hygiene with windowshades open during day, no daytime naps, frequent re-orientation, private room if feasible  For probable senile osteoporosis, check Vitamin D level.  If/for Vit D deficiency and probable senile osteopenia/osteoporosis, start Vit D and Ca, follow up in Ortho clinic per new protocol  DVT prophylaxis for 4 weeks post-op  PT/OT to start on POD 1  Clayton to be removed on POD 1  Pain control:  Pregabalin 75mg PO qHS and scheduled Tylenol 1g TID.  Oxy PO prn with Morphine IV prn for pain not controlled with PO medications    Perioperative Risk Assessment:  RCRI Score 0, Class 1  risk with 3.9 % risk of cardiopulmonary complications    Patient is at intermediate risk for perioperative cardiopulmonary complications.  The benefit of surgery outweighs the risk of surgery at this time, there are currently no absolute contraindications to surgery.     NSQIP:

## 2023-12-05 NOTE — CONSULTS
[facility] - Emergency Dept  Orthopedics  Consult Note    Patient Name: Tigist Murry  MRN: [id number]  Admission Date: 12/4/2023  Hospital Length of Stay: 0 days  Attending Provider: Ana Hartmann MD  Primary Care Provider: Gwen Porter MD      Inpatient consult to Orthopedic Surgery  Consult performed by: SAYDA Nuñez MD  Consult ordered by: Marah Flood MD        Subjective:     Principal Problem:<principal problem not specified>    Chief Complaint:   Chief Complaint   Patient presents with    Fall     Pt coming nursing home facility after having an unwitnessed slip and fall in the bathroom. Unknown LOC. Pt complaining of left shoulder pain. No deformity or discoloration noticed. +blood thinners        HPI: Tigist Murry is a 83 y.o. female with PMH of dementia, CKD3, afib on eliquis presenting with left hip and left shoulder pain. Patient was in the bathroom and had an unwitnessed fall onto her left side. She had immediate pain and inability to bear weight on the LLE and severe pain with any ROM of LUE and LLE. She denies head injury or LOC. On eliquis for blood thinners. Denies other MSK pains. Denies numbness, tingling, or paresthesias to the LLE and LUE. Lives in a group home. Uses walker assistive device for ambulation most of the time. UA with evidence of UTI. Daughter at bedside provides most of history. Patient been more forgetful last 5 months or so. Occassionally walks with parkinson like gait.       Past Medical History:   Diagnosis Date    Anticoagulant long-term use     Anxiety     Arthritis     Atrial fibrillation     Carpal tunnel syndrome on right     Diverticulosis     Encounter for blood transfusion     HTN (hypertension)     Hyperlipidemia     Osteopenia     Overactive bladder     Palpitations     Patellar tendonitis 4/13    saw [place] [facility]     Psoriasis     S/P partial hysterectomy     Situational depression     Supraventricular tachycardia        Past  Surgical History:   Procedure Laterality Date    ABDOMINAL SURGERY      BREAST BIOPSY Left     excisional    CARPAL TUNNEL RELEASE      HYSTERECTOMY      @27yrs of age    KNEE ARTHROSCOPY W/ DEBRIDEMENT  '05    Right    OOPHORECTOMY      @27yrs of age    PARTIAL HYSTERECTOMY         Review of patient's allergies indicates:   Allergen Reactions    Ciprofloxacin Other (See Comments)     Other reaction(s): Nausea  Other reaction(s): Nausea       Current Facility-Administered Medications   Medication    cefTRIAXone (ROCEPHIN) 1 g in dextrose 5 % in water (D5W) 100 mL IVPB (MB+)     Current Outpatient Medications   Medication Sig    diltiaZEM (CARDIZEM CD) 240 MG 24 hr capsule Take 1 capsule (240 mg total) by mouth once daily.    EScitalopram oxalate (LEXAPRO) 10 MG tablet Take 1 tablet (10 mg total) by mouth once daily.    MYRBETRIQ 50 mg Tb24 Take 1 tablet (50 mg total) by mouth once daily.    rivaroxaban (XARELTO) 20 mg Tab Take 1 tablet (20 mg total) by mouth once daily.     Family History       Problem Relation (Age of Onset)    Heart failure Mother (80)    No Known Problems Sister, Brother, Daughter, Daughter, Daughter          Tobacco Use    Smoking status: Never    Smokeless tobacco: Never   Substance and Sexual Activity    Alcohol use: No    Drug use: No    Sexual activity: Not Currently     Partners: Male     Review of Systems   Unable to perform ROS: Dementia     Objective:     Vital Signs (Most Recent):  Temp: 97.6 °F (36.4 °C) (12/04/23 2107)  Pulse: 100 (12/05/23 0412)  Resp: 18 (12/05/23 0333)  BP: (!) 155/85 (12/05/23 0345)  SpO2: 96 % (12/05/23 0412) Vital Signs (24h Range):  Temp:  [97.6 °F (36.4 °C)] 97.6 °F (36.4 °C)  Pulse:  [] 100  Resp:  [16-20] 18  SpO2:  [95 %-96 %] 96 %  BP: (155-181)/() 155/85           There is no height or weight on file to calculate BMI.    No intake or output data in the 24 hours ending 12/05/23 0413     Ortho/SPM Exam  General:  no acute distress, appears  stated age   Neuro: alert and oriented x3  Psych: normal mood  Head: normocephalic, atraumatic.  Eyes: no scleral icterus  Mouth: moist mucous membranes  Cardiovascular: extremities warm and well perfused  Lungs: breathing comfortably, equal chest rise bilat  Skin: clean, dry, intact (any exceptions noted in below musculoskeletal exam)    MSK:  RUE:  - Skin intact throughout, no open wounds  - No swelling  - No ecchymosis, erythema, or signs of cellulitis  - NonTTP throughout  - AROM and PROM of the shoulder, elbow, wrist, and hand intact without pain  - Axillary/AIN/PIN/Radial/Median/Ulnar Nerves assessed in isolation without deficit  - SILT throughout  - Compartments soft  - Radial artery palpated   - Capillary Refill <3s    LUE:  - Skin intact throughout, no open wounds  - Swelling left shoulder  - Multiple ecchymoses over left arm and forearm  - TTP at shoulder  - AROM and PROM of the elbow, wrist, and hand intact without pain  - Decreased ROM shoulder 2/2 pain  - Axillary/AIN/PIN/Radial/Median/Ulnar Nerves assessed in isolation without deficit  - SILT throughout  - Compartments soft  - Radial artery palpated   - Capillary Refill <3s    RLE:  - Skin intact throughout, no open wounds  - No swelling  - No ecchymosis, erythema, or signs of cellulitis  - NonTTP throughout  - AROM and PROM of the hip, knee, ankle, and foot intact without pain  - TA/EHL/Gastroc/FHL assessed in isolation without deficit  - SILT throughout  - Compartments soft  - DP and PT palpated  - Capillary Refill <3s  - Negative Log roll  - Negative [facility]    LLE:  - Skin intact throughout, no scars present  - No swelling  - No ecchymosis, erythema, or signs of cellulitis  - TTP at hip/proximal femur  - No tenderness to palpation of middle or distal femur  - No tenderness to palpation of proximal, middle, or distal aspects of tibia or fibula  - No tenderness to palpation of foot  - Pain with any ROM at hip  - Painless PROM of the knee and  "ankle  - Compartments soft  - SILT Sa/Vila/DP/SP/T  - Motor intact EHL/FHL/TA/Gastroc  - 2+ DP  - Brisk capillary refill       Spine/pelvis/axial body:  No tenderness to palpation of cervical, thoracic, or lumbar spine  No pain with compression of pelvis  No chest wall or abdominal tenderness  No decubitus ulcers       Significant Labs: All pertinent labs within the past 24 hours have been reviewed.    Significant Imaging: I have reviewed and interpreted all pertinent imaging results/findings.  XR L shoulder: anterior and inferior humeral head dislocation  XR pelvis: left femoral neck fracture in varus  Assessment/Plan:     Closed dislocation of left shoulder  See plan under "Closed displaced fracture of left femoral neck"    Closed displaced fracture of left femoral neck  Tigist Murry is a 83 y.o. female with left femoral neck fracture, closed, NVI. They take eliquis for anticoagulation at home. They required walker ambulatory assistive devices prior to this injury. Patient also with closed anterior shoulder dislocation that was reduced and placed in sling. NVI in LUE as well. Plan for L jose antonio vs ARABELLA 12/5/23    -Admitted to medicine hip fracture service for pre-operative clearance and medical evaluation  -To OR 12/5/23 for operative fixation of left femoral neck fracture  -Pt marked, booked, and consented (Angelique MEDINA) for surgery  -DVT PPx: Hold anticoagulation  -Abx: Preop abx ordered  -Labs: Prealbumin 24, UA with evidence of UTI, given rocephin in ED, Glucose 156, Albumin 3.7, Hemoglobin 15.2, Platelets 233, White blood cell count 11,6, A1c 5.3, INR 1.8, PTT 40.7. Other lab results pending.  -Bed rest, rodriguez, NPO now  -Iv: ordered for contralateral arm    Patient was explained in detail the severity of the injury that was suffered. Patient was explained the risks/benefits/and alternatives to operative management in detail including infection, bleeding, pain, nerve and vascular damage, heterotopic " ossification, leg length discrepancies, rotational deformities and they express full understanding.  We discussed the 30% national first year mortality rate with hip fractures, the need for early mobilization, and the expected rehab course. She and daughter expresses full understanding of the condition and expresses that they want to proceed with surgery. The patient is admitted to the medicine hip fracture service for optimization of medical comorbidities. Will plan for OR . No guarantees were made, informed consent was obtained. All questions were answered to patient's and family's satisfaction.     Procedure Note: Left shoulder dislocation closed reduction  Patient was explained risks, benefits, and alternatives to treatment and verbalized consent to proceed. Time out was performed and patient name, , site, and procedure were confirmed. Fentanyl was used for pain control. Dislocation was reduced under fluoroscopy. Post-reduction films were performed and confirmed adequate reduction. Patient tolerated the procedure well.         SAYDA Nuñez MD  Orthopedics  [facility] - Emergency Dept

## 2023-12-05 NOTE — NURSING
Nurses Note -- 4 Eyes      12/5/2023   5:56 AM      Skin assessed during: Admit      [x] No Altered Skin Integrity Present    [x]Prevention Measures Documented      [] Yes- Altered Skin Integrity Present or Discovered   [] LDA Added if Not in Epic (Describe Wound)   [] New Altered Skin Integrity was Present on Admit and Documented in LDA   [] Wound Image Taken    Wound Care Consulted? No    Attending Nurse:  ROBINSON England    Second RN/Staff Member:  ROBINOSN Rivera

## 2023-12-05 NOTE — ASSESSMENT & PLAN NOTE
Chronic, uncontrolled, likley 2/2 to pain. Latest blood pressure and vitals reviewed-     Temp:  [97.6 °F (36.4 °C)-97.8 °F (36.6 °C)]   Pulse:  []   Resp:  [16-20]   BP: (155-181)/()   SpO2:  [95 %-97 %] .   Home meds for hypertension were reviewed and noted below.   Hypertension Medications               diltiaZEM (CARDIZEM CD) 240 MG 24 hr capsule Take 1 capsule (240 mg total) by mouth once daily.            While in the hospital, will manage blood pressure as follows; Continue home antihypertensive regimen    Will utilize p.r.n. blood pressure medication only if patient's blood pressure greater than 180/110 and she develops symptoms such as worsening chest pain or shortness of breath.

## 2023-12-06 ENCOUNTER — TELEPHONE (OUTPATIENT)
Dept: ORTHOPEDICS | Facility: CLINIC | Age: 83
End: 2023-12-06
Payer: MEDICARE

## 2023-12-06 PROBLEM — R79.89 ELEVATED LFTS: Status: RESOLVED | Noted: 2023-12-05 | Resolved: 2023-12-06

## 2023-12-06 PROBLEM — D62 ACUTE BLOOD LOSS ANEMIA: Status: ACTIVE | Noted: 2023-12-06

## 2023-12-06 LAB
ANION GAP SERPL CALC-SCNC: 10 MMOL/L (ref 8–16)
BACTERIA UR CULT: NORMAL
BACTERIA UR CULT: NORMAL
BASOPHILS # BLD AUTO: 0.01 K/UL (ref 0–0.2)
BASOPHILS NFR BLD: 0.1 % (ref 0–1.9)
BUN SERPL-MCNC: 14 MG/DL (ref 8–23)
CALCIUM SERPL-MCNC: 8.9 MG/DL (ref 8.7–10.5)
CHLORIDE SERPL-SCNC: 111 MMOL/L (ref 95–110)
CO2 SERPL-SCNC: 22 MMOL/L (ref 23–29)
CREAT SERPL-MCNC: 0.8 MG/DL (ref 0.5–1.4)
DIFFERENTIAL METHOD: ABNORMAL
EOSINOPHIL # BLD AUTO: 0 K/UL (ref 0–0.5)
EOSINOPHIL NFR BLD: 0 % (ref 0–8)
ERYTHROCYTE [DISTWIDTH] IN BLOOD BY AUTOMATED COUNT: 12.9 % (ref 11.5–14.5)
EST. GFR  (NO RACE VARIABLE): >60 ML/MIN/1.73 M^2
GLUCOSE SERPL-MCNC: 113 MG/DL (ref 70–110)
HCT VFR BLD AUTO: 32.1 % (ref 37–48.5)
HGB BLD-MCNC: 10.5 G/DL (ref 12–16)
IMM GRANULOCYTES # BLD AUTO: 0.06 K/UL (ref 0–0.04)
IMM GRANULOCYTES NFR BLD AUTO: 0.4 % (ref 0–0.5)
LYMPHOCYTES # BLD AUTO: 0.8 K/UL (ref 1–4.8)
LYMPHOCYTES NFR BLD: 5.7 % (ref 18–48)
MAGNESIUM SERPL-MCNC: 1.9 MG/DL (ref 1.6–2.6)
MCH RBC QN AUTO: 30.2 PG (ref 27–31)
MCHC RBC AUTO-ENTMCNC: 32.7 G/DL (ref 32–36)
MCV RBC AUTO: 92 FL (ref 82–98)
MONOCYTES # BLD AUTO: 1.6 K/UL (ref 0.3–1)
MONOCYTES NFR BLD: 11.6 % (ref 4–15)
NEUTROPHILS # BLD AUTO: 11.2 K/UL (ref 1.8–7.7)
NEUTROPHILS NFR BLD: 82.2 % (ref 38–73)
NRBC BLD-RTO: 0 /100 WBC
PHOSPHATE SERPL-MCNC: 2.5 MG/DL (ref 2.7–4.5)
PLATELET # BLD AUTO: 200 K/UL (ref 150–450)
PMV BLD AUTO: 12.3 FL (ref 9.2–12.9)
POTASSIUM SERPL-SCNC: 4.1 MMOL/L (ref 3.5–5.1)
RBC # BLD AUTO: 3.48 M/UL (ref 4–5.4)
SODIUM SERPL-SCNC: 143 MMOL/L (ref 136–145)
WBC # BLD AUTO: 13.58 K/UL (ref 3.9–12.7)

## 2023-12-06 PROCEDURE — 84100 ASSAY OF PHOSPHORUS: CPT | Mod: HCNC | Performed by: INTERNAL MEDICINE

## 2023-12-06 PROCEDURE — 25000003 PHARM REV CODE 250: Mod: HCNC

## 2023-12-06 PROCEDURE — 25000003 PHARM REV CODE 250: Mod: HCNC | Performed by: INTERNAL MEDICINE

## 2023-12-06 PROCEDURE — 36415 COLL VENOUS BLD VENIPUNCTURE: CPT | Mod: HCNC | Performed by: INTERNAL MEDICINE

## 2023-12-06 PROCEDURE — 97165 OT EVAL LOW COMPLEX 30 MIN: CPT | Mod: HCNC

## 2023-12-06 PROCEDURE — 97530 THERAPEUTIC ACTIVITIES: CPT | Mod: HCNC

## 2023-12-06 PROCEDURE — 99231 PR SUBSEQUENT HOSPITAL CARE,LEVL I: ICD-10-PCS | Mod: HCNC,,, | Performed by: ANESTHESIOLOGY

## 2023-12-06 PROCEDURE — 97161 PT EVAL LOW COMPLEX 20 MIN: CPT | Mod: HCNC

## 2023-12-06 PROCEDURE — 85025 COMPLETE CBC W/AUTO DIFF WBC: CPT | Mod: HCNC | Performed by: INTERNAL MEDICINE

## 2023-12-06 PROCEDURE — 63600175 PHARM REV CODE 636 W HCPCS: Mod: HCNC | Performed by: INTERNAL MEDICINE

## 2023-12-06 PROCEDURE — 51798 US URINE CAPACITY MEASURE: CPT | Mod: HCNC

## 2023-12-06 PROCEDURE — 80048 BASIC METABOLIC PNL TOTAL CA: CPT | Mod: HCNC | Performed by: INTERNAL MEDICINE

## 2023-12-06 PROCEDURE — 97112 NEUROMUSCULAR REEDUCATION: CPT | Mod: HCNC

## 2023-12-06 PROCEDURE — 83735 ASSAY OF MAGNESIUM: CPT | Mod: HCNC | Performed by: INTERNAL MEDICINE

## 2023-12-06 PROCEDURE — 21400001 HC TELEMETRY ROOM: Mod: HCNC

## 2023-12-06 PROCEDURE — 99231 SBSQ HOSP IP/OBS SF/LOW 25: CPT | Mod: HCNC,,, | Performed by: ANESTHESIOLOGY

## 2023-12-06 RX ORDER — ACETAMINOPHEN 325 MG/1
650 TABLET ORAL EVERY 6 HOURS
Status: DISCONTINUED | OUTPATIENT
Start: 2023-12-06 | End: 2023-12-07

## 2023-12-06 RX ORDER — METOPROLOL TARTRATE 1 MG/ML
5 INJECTION, SOLUTION INTRAVENOUS EVERY 5 MIN PRN
Status: DISCONTINUED | OUTPATIENT
Start: 2023-12-06 | End: 2023-12-07

## 2023-12-06 RX ORDER — METHOCARBAMOL 500 MG/1
500 TABLET, FILM COATED ORAL 4 TIMES DAILY
Status: DISCONTINUED | OUTPATIENT
Start: 2023-12-06 | End: 2023-12-06

## 2023-12-06 RX ORDER — METHOCARBAMOL 500 MG/1
500 TABLET, FILM COATED ORAL EVERY 6 HOURS
Status: DISCONTINUED | OUTPATIENT
Start: 2023-12-06 | End: 2023-12-08 | Stop reason: HOSPADM

## 2023-12-06 RX ADMIN — ACETAMINOPHEN 650 MG: 650 SOLUTION ORAL at 05:12

## 2023-12-06 RX ADMIN — ACETAMINOPHEN 650 MG: 650 SOLUTION ORAL at 11:12

## 2023-12-06 RX ADMIN — RIVAROXABAN 15 MG: 15 TABLET, FILM COATED ORAL at 05:12

## 2023-12-06 RX ADMIN — CEFTRIAXONE 1 G: 1 INJECTION, POWDER, FOR SOLUTION INTRAMUSCULAR; INTRAVENOUS at 09:12

## 2023-12-06 RX ADMIN — METOROPROLOL TARTRATE 5 MG: 5 INJECTION, SOLUTION INTRAVENOUS at 01:12

## 2023-12-06 RX ADMIN — CEFAZOLIN 2 G: 2 INJECTION, POWDER, FOR SOLUTION INTRAMUSCULAR; INTRAVENOUS at 05:12

## 2023-12-06 RX ADMIN — SENNOSIDES AND DOCUSATE SODIUM 1 TABLET: 8.6; 5 TABLET ORAL at 10:12

## 2023-12-06 RX ADMIN — MUPIROCIN 1 G: 20 OINTMENT TOPICAL at 09:12

## 2023-12-06 RX ADMIN — METHOCARBAMOL 500 MG: 500 TABLET ORAL at 05:12

## 2023-12-06 RX ADMIN — ACETAMINOPHEN 650 MG: 325 TABLET ORAL at 05:12

## 2023-12-06 RX ADMIN — DILTIAZEM HYDROCHLORIDE 240 MG: 120 CAPSULE, COATED, EXTENDED RELEASE ORAL at 09:12

## 2023-12-06 RX ADMIN — ESCITALOPRAM OXALATE 10 MG: 10 TABLET ORAL at 09:12

## 2023-12-06 RX ADMIN — MUPIROCIN 1 G: 20 OINTMENT TOPICAL at 10:12

## 2023-12-06 RX ADMIN — POLYETHYLENE GLYCOL 3350 17 G: 17 POWDER, FOR SOLUTION ORAL at 09:12

## 2023-12-06 RX ADMIN — METHOCARBAMOL 500 MG: 500 TABLET ORAL at 11:12

## 2023-12-06 RX ADMIN — SENNOSIDES AND DOCUSATE SODIUM 1 TABLET: 8.6; 5 TABLET ORAL at 09:12

## 2023-12-06 NOTE — PLAN OF CARE
[facility] - Surgery  Initial Discharge Assessment       Primary Care Provider: Gwen Porter MD    Admission Diagnosis: Fall [W19.XXXA]  Acute cystitis without hematuria [N30.00]  Closed fracture of neck of left femur, initial encounter [S72.002A]  Dislocated shoulder, left, initial encounter [S43.005A]  Closed displaced fracture of left femoral neck [S72.002A]  Closed compression fracture of body of L1 vertebra [S32.010A]    Admission Date: 12/4/2023  Expected Discharge Date: 12/8/2023    Transition of Care Barriers: Other (see comments) (Dementia)    Payor: [employer] MEDICARE / Plan: [employer] MEDICARE HMO / Product Type: Capitation /     Extended Emergency Contact Information  Primary Emergency Contact: LovelySarah  Mobile Phone: [phone]  Relation: Daughter    Discharge Plan A: (P) Skilled Nursing Facility  Discharge Plan B: Home Health, Home with family      [facility] [place] - [address]  [address] [facility] [address]  [place] [address]  Phone: [phone] Fax: [phone]    [facility] - [facility] [address]  [address] [address]  Phone: [phone] Fax: [phone]    [id number] - [place] - [address] [facility] [facility] [address] [facility] [address] [facility] [address]  [address] [place] [address]  Phone: [phone] Fax: [phone]    [facility] - [place] - [address]  [address] [place] [address]  Phone: [phone] Fax: [phone]    [facility] - [place] - [address]  [address]  [place] [address]  Phone: [phone] Fax: [phone]      Initial Assessment (most recent)       Adult Discharge Assessment - 12/06/23 1525          Discharge Assessment    Assessment Type Discharge Planning Assessment     Source of Information family   daughter - Colette Roubion    Communicated CLARKE with patient/caregiver Yes      Do you have help at home or someone to help you manage your care at home? Yes     Home Layout Able to live on 1st floor     Equipment Currently Used at Home walker, rolling   Transfer wheelchair    Do you currently have service(s) that help you manage your care at home? No     Do you take prescription medications? Yes     Do you have prescription coverage? Yes     Do you have any problems affording any of your prescribed medications? No     Is the patient taking medications as prescribed? yes     How do you get to doctors appointments? family or friend will provide     Are you on dialysis? No     Do you take coumadin? No     DME Needed Upon Discharge  none     Discharge Plan discussed with: Adult children     Transition of Care Barriers Other (see comments)   Dementia    Discharge Plan A Skilled Nursing Facility     Discharge Plan B Home Health;Home with family        Physical Activity    On average, how many days per week do you engage in moderate to strenuous exercise (like a brisk walk)? 0 days     On average, how many minutes do you engage in exercise at this level? 0 min        Financial Resource Strain    How hard is it for you to pay for the very basics like food, housing, medical care, and heating? Not hard at all        Housing Stability    In the last 12 months, was there a time when you were not able to pay the mortgage or rent on time? No     In the last 12 months, was there a time when you did not have a steady place to sleep or slept in a shelter (including now)? No        Transportation Needs    In the past 12 months, has lack of transportation kept you from medical appointments or from getting medications? No     In the past 12 months, has lack of transportation kept you from meetings, work, or from getting things needed for daily living? No        Food Insecurity    Within the past 12 months, you worried that your food would run out before you got the money to buy more. Never true     Within the  past 12 months, the food you bought just didn't last and you didn't have money to get more. Never true                   Patient lives at [facility]. Family agrees with SNF  placement requesting highest rated facility within patient insurance. Discharge pending patient medical stability and accepting facility.

## 2023-12-06 NOTE — TELEPHONE ENCOUNTER
Spoke with pt's daughter, Sarah Murry [phone]   Provided my name and contact info should any questions or concerns arise post discharge.

## 2023-12-06 NOTE — ASSESSMENT & PLAN NOTE
Tigist Murry is a 83 y.o. female with left femoral neck fracture s/p L hip jose antonio on 12/6      Pain control: MM  PT/OT: WBAT LLE, posterior hip prec  DVT PPx: restart home xarelto, SCDs at all times when not ambulating  Abx: postop Ancef  Labs: hgb 10.5  Matilde: nikolai today    Dispo: f/u PT recs

## 2023-12-06 NOTE — PLAN OF CARE
PT eval completed- see note for details, goals and POC established.     Problem: Physical Therapy  Goal: Physical Therapy Goal  Description: Goals to be met by: 23     Patient will increase functional independence with mobility by performin. Supine to sit with MInimal Assistance  2. Sit to stand transfer with Minimal Assistance  3. Bed to chair transfer with Minimal Assistance using Rolling Walker  4. Gait  x 50 feet with Minimal Assistance using Rolling Walker.   5. Sitting at edge of bed x8 minutes with Stand-by Assistance    Outcome: Ongoing, Progressing   2023

## 2023-12-06 NOTE — NURSING
Nurses Note -- 4 Eyes      12/5/2023   7:26 PM      Skin assessed during: Q Shift Change      [x] No Altered Skin Integrity Present    []Prevention Measures Documented      [] Yes- Altered Skin Integrity Present or Discovered   [] LDA Added if Not in Epic (Describe Wound)   [] New Altered Skin Integrity was Present on Admit and Documented in LDA   [] Wound Image Taken    Wound Care Consulted? No    Attending Nurse:  Christy Kent lpn    Second RN/Staff Member:   dora oliveira RN

## 2023-12-06 NOTE — PT/OT/SLP EVAL
Occupational Therapy   Co-Evaluation/Treatment     Name: Tigist Murry  MRN: [id number]  Admitting Diagnosis: Closed displaced fracture of left femoral neck  Recent Surgery: Procedure(s) (LRB):  HEMIARTHROPLASTY, HIP, POSTERIOR APPROACH left (Left) 1 Day Post-Op    Recommendations:     Discharge Recommendations: Moderate Intensity Therapy  Discharge Equipment Recommendations:  none  Barriers to discharge:  None    Assessment:     Tigist Murry is a 83 y.o. female with a medical diagnosis of Closed displaced fracture of left femoral neck.  She presents with the following performance deficits affecting function: weakness, impaired endurance, impaired self care skills, impaired functional mobility, gait instability, impaired balance, impaired cognition, decreased coordination, decreased upper extremity function, decreased lower extremity function, decreased safety awareness, pain, impaired coordination, decreased ROM, orthopedic precautions.      Pt with fair tolerance to the session this date. Pt is mostly limited by increased pain, fear of falling, and confusion this date. Pt required Max A x 2 for all bed mobility and to perform 2 partial stands this date. Pt educated on posterior hip precautions throughout the session. At this time she is far from her functional baseline. Pt would benefit from continued skilled acute OT services during this admission in order to maximize independence and safety with ADLs and functional mobility to ensure safe return to PLOF in the least restrictive environment. Patient currently demonstrates a need for moderate intensity therapy on a daily basis post acute secondary to a decline in functional status due to injury    Rehab Prognosis: Good; patient would benefit from acute skilled OT services to address these deficits and reach maximum level of function.       Plan:     Patient to be seen daily to address the above listed problems via self-care/home management, therapeutic  activities, therapeutic exercises, neuromuscular re-education  Plan of Care Expires: 01/06/23  Plan of Care Reviewed with: patient, daughter    Subjective     Chief Complaint: pain  Patient/Family Comments/goals: To return to PLOF    Occupational Profile:  Living Environment: Pt is currently residing in a small group home with no FER and handicap accessible bathrooms with a WIS.   Previous level of function: Pt required supervision/Min A for ADLs and used a RW for mobility most of the time. Pt's independence depended on her state of cognition on a daily basis as it would fluctuate.   Roles and Routines: pt was very active up until 5 months ago when she began to reside in this group home.   Equipment Used at Home: walker, rolling (transport wheelchair ordered)  Assistance upon Discharge: Pt will have assistance from staff at group home     Pain/Comfort:  Pain Rating 1: other (see comments) (not rated)  Location - Side 1: Left  Location - Orientation 1: generalized  Location 1: hip  Pain Addressed 1: Reposition, Distraction, Cessation of Activity  Pain Rating Post-Intervention 1: other (see comments) (not rated)    Patients cultural, spiritual, Episcopal conflicts given the current situation: no    Objective:     Co-evaluation/treatment performed due to patient's multiple deficits requiring two skilled therapists to appropriately and safely assess patient's strength and endurance while facilitating functional tasks in addition to accommodating for patient's activity tolerance.     Communicated with: RN prior to session.  Patient found HOB elevated with perineural catheter, telemetry, peripheral IV, rodriguez catheter upon OT entry to room.    General Precautions: Standard, fall  Orthopedic Precautions: N/A, LLE weight bearing as tolerated, LLE posterior precautions  Braces: UE Sling (For comfort)  Respiratory Status: Room air    Occupational Performance:    Bed Mobility:    Patient completed Rolling/Turning to Left with   maximal assistance  Patient completed Scooting/Bridging with maximal assistance and 2 persons  Patient completed Supine to Sit with maximal assistance and 2 persons  Patient completed Sit to Supine with maximal assistance and 2 persons  Pt sat EOB with mostly Max A progressing to SBA at best briefly. Pt with a posterior lean throughout.   Cues given throughout to maintain BLE on the floor and to lean anteriorly     Functional Mobility/Transfers:  Patient completed Sit <> Stand Transfer with maximal assistance and of 2 persons  with  no assistive device   Performed 2 trials with Pt only able to come to 50% of a stand  B knees blocked   Pt resistive throughout due to fear and required Max physical and verbal cues to participate    Activities of Daily Living:  Upper Body Dressing: total assistance : to adjust gown and fix UE sling while sitting EOB     Cognitive/Visual Perceptual:  Cognitive/Psychosocial Skills:     -       Follows Commands/attention:Follows one-step commands  -       Memory: Impaired STM, Impaired LTM, and Poor immediate recall  -       Safety awareness/insight to disability: impaired   -       Mood/Affect/Coping skills/emotional control: Anxious  Visual/Perceptual:      -Intact visual field     Physical Exam:  Balance:  Static Sitting   stand by assistance and maximal assistance   Dynamic Sitting   maximal assistance   Static Standing   maximal assistance and of 2 persons   Dynamic Standing    N/A     Upper Extremity Function:   Dominance: Right   Left UE Right UE   UE Edema None noted None noted   UE ROM Limited by sling  WFL except Sh flexion due to previous rotator cuff injury    UE Strength N/A due to recent subluxation/pain  WFL except Shoulder flexion. Shoulder 3/5, Elbow 4/5    Strength WFL WFL   Sensation    -       Intact    -       Intact   Fine Motor Skills:     -       Intact  Left hand thumb/finger opposition skills    -       Intact  Right hand thumb/finger opposition skills   Gross  Motor Skills:   Impaired    WFL       [facility] 6 Click ADL:  AMPAC Total Score: 15    Treatment & Education:  Therapist provided facilitation and instruction of proper body mechanics and fall prevention strategies during tasks listed above.  Educated on LLE WBAT and posterior hip precautions with sign placed in room for reminder   Instructed patient to use call light to have nursing staff assist with needs/transfers.  Discussed OT POC and answered all questions within OT scope of practice.  Whiteboard updated     Patient left HOB elevated with all lines intact, call button in reach, bed alarm on, daughter present and RN notified    GOALS:   Multidisciplinary Problems       Occupational Therapy Goals          Problem: Occupational Therapy    Goal Priority Disciplines Outcome Interventions   Occupational Therapy Goal     OT, PT/OT Ongoing, Progressing    Description: Goals to be met by: 12/20/23     Patient will increase functional independence with ADLs by performing:    UE Dressing with Minimal Assistance.  LE Dressing with Moderate Assistance and Assistive Devices as needed.  Grooming while EOB with Supervision.  Toileting from bedside commode with Moderate Assistance for hygiene and clothing management.   Toilet transfer to bedside commode with Moderate Assistance.                         History:     Past Medical History:   Diagnosis Date    Anticoagulant long-term use     Anxiety     Arthritis     Atrial fibrillation     Carpal tunnel syndrome on right     Diverticulosis     Encounter for blood transfusion     HTN (hypertension)     Hyperlipidemia     Osteopenia     Overactive bladder     Palpitations     Patellar tendonitis 4/13    saw [place]     Psoriasis     S/P partial hysterectomy     Situational depression     Supraventricular tachycardia          Past Surgical History:   Procedure Laterality Date    ABDOMINAL SURGERY      BREAST BIOPSY Left     excisional    CARPAL TUNNEL RELEASE       HEMIARTHROPLASTY, HIP, POSTERIOR APPROACH Left 12/5/2023    Procedure: HEMIARTHROPLASTY, HIP, POSTERIOR APPROACH left;  Surgeon: Tani Martines MD;  Location: [facility] OR [address] [place] [address];  Service: Orthopedics;  Laterality: Left;    HYSTERECTOMY      @27yrs of age    KNEE ARTHROSCOPY W/ DEBRIDEMENT  '05    Right    OOPHORECTOMY      @27yrs of age    PARTIAL HYSTERECTOMY         Time Tracking:     OT Date of Treatment: 12/06/23  OT Start Time: 0904  OT Stop Time: 0938  OT Total Time (min): 34 min    Billable Minutes:Evaluation 10  Neuromuscular Re-education 24 12/6/2023

## 2023-12-06 NOTE — ASSESSMENT & PLAN NOTE
Long term current use of anticoagulant   Patient with Persistent (7 days or more) atrial fibrillation which is controlled currently with Diltiazem  mg po daily. Patient is currently in atrial fibrillation.[id number] Score: 3. Anticoagulation indicated and resumed post-op with home  Xarelto 15 mg po daily (adjusted for weight, age and renal dosing).

## 2023-12-06 NOTE — ASSESSMENT & PLAN NOTE
"Present on admit and noted on CT scan of chest, abdomen and pelvis to have "New compression deformity of the superior endplate of L1 with less than 25% height loss compared to CT from 09/16/2022, age indeterminate." Patient denies any low back pain. No treatment or intervention needed.     "

## 2023-12-06 NOTE — ASSESSMENT & PLAN NOTE
Present on admit. U/A on admit with >100 WBC, nitrate positive and occasional bacteria. Due to her dementia unknown if has any UTI symptoms so will treat. Plan 3 day course of IV Ceftriaxone to treat. Final urine culture grew multiple organisms but none in predominance.

## 2023-12-06 NOTE — ANESTHESIA POST-OP PAIN MANAGEMENT
Acute Pain Service Progress Note    Tigist Murry is a 83 y.o., female, [id number].    Surgery:  HEMIARTHROPLASTY, HIP, POSTERIOR APPROACH left (Left: Hip)     Post Op Day #: 1    Catheter type: perineural  L SIFI 15ml/3h    Infusion type: Ropivacaine 0.2%  15ml/3h    Problem List:    Active Hospital Problems    Diagnosis  POA    *Closed displaced fracture of left femoral neck s/p hemiarthroplasty on 12/5/2023 [S72.002A]  Yes    Acute blood loss anemia [D62]  No    Closed dislocation of left shoulder [S43.005A]  Yes    Acute cystitis without hematuria [N30.00]  Yes    Elevated LFTs [R79.89]  Yes    Atelectasis [J98.11]  Yes    Compression fracture of L1 lumbar vertebra [S32.010A]  Yes    Alzheimer's disease with late onset (CODE) [G30.1]  Yes    Atrial fibrillation and flutter [I48.91, I48.92]  Yes    Pure hypercholesterolemia [E78.00]  Yes    Essential hypertension [I10]  Yes     Chronic      Resolved Hospital Problems    Diagnosis Date Resolved POA    Elevated INR [R79.1] 12/05/2023 Yes     Suspect 2/2 xarelto         Subjective:     General appearance of alert, oriented, no complaints   Pain with rest: 1    Numbers   Pain with movement: 4    Numbers   Side Effects    1. Pruritis No    2. Nausea No    3. Motor Blockade No, 1=Ability to bend knees and ankles    4. Sedation No, 1=awake and alert    Objective:     Catheter level    Catheter site clean, dry, intact         Vitals   Vitals:    12/06/23 1257   BP:    Pulse: 88   Resp:    Temp:         Labs    Admission on 12/04/2023   Component Date Value Ref Range Status    Sodium 12/05/2023 140  136 - 145 mmol/L Final    Potassium 12/05/2023 4.1  3.5 - 5.1 mmol/L Final    Chloride 12/05/2023 107  95 - 110 mmol/L Final    CO2 12/05/2023 22 (L)  23 - 29 mmol/L Final    Glucose 12/05/2023 156 (H)  70 - 110 mg/dL Final    BUN 12/05/2023 17  8 - 23 mg/dL Final    Creatinine 12/05/2023 0.8  0.5 - 1.4 mg/dL Final    Calcium 12/05/2023 9.7  8.7 - 10.5 mg/dL Final    Total  Protein 12/05/2023 6.8  6.0 - 8.4 g/dL Final    Albumin 12/05/2023 3.7  3.5 - 5.2 g/dL Final    Total Bilirubin 12/05/2023 0.8  0.1 - 1.0 mg/dL Final    Alkaline Phosphatase 12/05/2023 85  55 - 135 U/L Final    AST 12/05/2023 54 (H)  10 - 40 U/L Final    ALT 12/05/2023 66 (H)  10 - 44 U/L Final    eGFR 12/05/2023 >60.0  >60 mL/min/1.73 m^2 Final    Anion Gap 12/05/2023 11  8 - 16 mmol/L Final    WBC 12/05/2023 11.68  3.90 - 12.70 K/uL Final    RBC 12/05/2023 4.88  4.00 - 5.40 M/uL Final    Hemoglobin 12/05/2023 15.2  12.0 - 16.0 g/dL Final    Hematocrit 12/05/2023 43.9  37.0 - 48.5 % Final    MCV 12/05/2023 90  82 - 98 fL Final    MCH 12/05/2023 31.1 (H)  27.0 - 31.0 pg Final    MCHC 12/05/2023 34.6  32.0 - 36.0 g/dL Final    RDW 12/05/2023 12.8  11.5 - 14.5 % Final    Platelets 12/05/2023 233  150 - 450 K/uL Final    MPV 12/05/2023 10.8  9.2 - 12.9 fL Final    Immature Granulocytes 12/05/2023 0.3  0.0 - 0.5 % Final    Gran # (ANC) 12/05/2023 10.1 (H)  1.8 - 7.7 K/uL Final    Immature Grans (Abs) 12/05/2023 0.04  0.00 - 0.04 K/uL Final    Lymph # 12/05/2023 0.7 (L)  1.0 - 4.8 K/uL Final    Mono # 12/05/2023 0.9  0.3 - 1.0 K/uL Final    Eos # 12/05/2023 0.0  0.0 - 0.5 K/uL Final    Baso # 12/05/2023 0.01  0.00 - 0.20 K/uL Final    nRBC 12/05/2023 0  0 /100 WBC Final    Gran % 12/05/2023 86.6 (H)  38.0 - 73.0 % Final    Lymph % 12/05/2023 5.6 (L)  18.0 - 48.0 % Final    Mono % 12/05/2023 7.4  4.0 - 15.0 % Final    Eosinophil % 12/05/2023 0.0  0.0 - 8.0 % Final    Basophil % 12/05/2023 0.1  0.0 - 1.9 % Final    Differential Method 12/05/2023 Automated   Final    Magnesium 12/05/2023 2.1  1.6 - 2.6 mg/dL Final    BNP 12/05/2023 259 (H)  0 - 99 pg/mL Final    Troponin I 12/05/2023 <0.006  0.000 - 0.026 ng/mL Final    TSH 12/05/2023 0.495  0.400 - 4.000 uIU/mL Final    Specimen UA 12/05/2023 Urine, Clean Catch   Final    Color, UA 12/05/2023 Yellow  Yellow, Straw, [place]ilet Final    Appearance, UA 12/05/2023 Hazy (A)  Clear  Final    pH, UA 12/05/2023 5.0  5.0 - 8.0 Final    Specific Gravity, UA 12/05/2023 >=1.030 (A)  1.005 - 1.030 Final    Protein, UA 12/05/2023 1+ (A)  Negative Final    Glucose, UA 12/05/2023 1+ (A)  Negative Final    Ketones, UA 12/05/2023 Negative  Negative Final    Bilirubin (UA) 12/05/2023 Negative  Negative Final    Occult Blood UA 12/05/2023 Negative  Negative Final    Nitrite, UA 12/05/2023 Positive (A)  Negative Final    Leukocytes, UA 12/05/2023 3+ (A)  Negative Final    Blood Culture, Routine 12/05/2023 No Growth to date   Preliminary    Blood Culture, Routine 12/05/2023 No Growth to date   Preliminary    Blood Culture, Routine 12/05/2023 No Growth to date   Preliminary    Blood Culture, Routine 12/05/2023 No Growth to date   Preliminary    Prothrombin Time 12/05/2023 18.2 (H)  9.0 - 12.5 sec Final    INR 12/05/2023 1.8 (H)  0.8 - 1.2 Final    aPTT 12/05/2023 40.7 (H)  21.0 - 32.0 sec Final    Prealbumin 12/05/2023 24  20 - 43 mg/dL Final    Group & Rh 12/05/2023 B POS   Final    Indirect [place] 12/05/2023 NEG   Final    Specimen Outdate 12/05/2023 12/08/2023 23:59   Final    Vit D, 25-Hydroxy 12/05/2023 31  30 - 96 ng/mL Final    Transferrin 12/05/2023 242  200 - 375 mg/dL Final    Phosphorus 12/05/2023 3.0  2.7 - 4.5 mg/dL Final    Hemoglobin A1C 12/04/2023 5.3  4.0 - 5.6 % Final    Estimated Avg Glucose 12/04/2023 105  68 - 131 mg/dL Final    RBC, UA 12/05/2023 6 (H)  0 - 4 /hpf Final    WBC, UA 12/05/2023 >100 (H)  0 - 5 /hpf Final    Bacteria 12/05/2023 Occasional  None-Occ /hpf Final    Squam Epithel, UA 12/05/2023 1  /hpf Final    Hyaline Casts, UA 12/05/2023 0  0-1/lpf /lpf Final    Microscopic Comment 12/05/2023 SEE COMMENT   Final    Urine Culture, Routine 12/05/2023 Multiple organisms isolated. None in predominance.  Repeat if   Final    Urine Culture, Routine 12/05/2023 clinically necessary.   Final    UNIT NUMBER 12/05/2023 [id number]   Preliminary    Product Code 12/05/2023 F5661J41    Preliminary    DISPENSE STATUS 12/05/2023 CROSSMATCHED   Preliminary    CODING SYSTEM 12/05/2023 [id number]   Preliminary    Unit Blood Type Code 12/05/2023 7300   Preliminary    Unit Blood Type 12/05/2023 B POS   Preliminary    Unit Expiration 12/05/2023 202312142359   Preliminary    CROSSMATCH INTERPRETATION 12/05/2023 Compatible   Preliminary    UNIT NUMBER 12/05/2023 [id number]   Preliminary    Product Code 12/05/2023 [id number]   Preliminary    DISPENSE STATUS 12/05/2023 CROSSMATCHED   Preliminary    CODING SYSTEM 12/05/2023 [id number]   Preliminary    Unit Blood Type Code 12/05/2023 7300   Preliminary    Unit Blood Type 12/05/2023 B POS   Preliminary    Unit Expiration 12/05/2023 202312142359   Preliminary    CROSSMATCH INTERPRETATION 12/05/2023 Compatible   Preliminary    WBC 12/05/2023 12.57  3.90 - 12.70 K/uL Final    RBC 12/05/2023 4.33  4.00 - 5.40 M/uL Final    Hemoglobin 12/05/2023 13.1  12.0 - 16.0 g/dL Final    Hematocrit 12/05/2023 40.3  37.0 - 48.5 % Final    MCV 12/05/2023 93  82 - 98 fL Final    MCH 12/05/2023 30.3  27.0 - 31.0 pg Final    MCHC 12/05/2023 32.5  32.0 - 36.0 g/dL Final    RDW 12/05/2023 13.0  11.5 - 14.5 % Final    Platelets 12/05/2023 218  150 - 450 K/uL Final    MPV 12/05/2023 10.9  9.2 - 12.9 fL Final    Immature Granulocytes 12/05/2023 0.6 (H)  0.0 - 0.5 % Final    Gran # (ANC) 12/05/2023 11.3 (H)  1.8 - 7.7 K/uL Final    Immature Grans (Abs) 12/05/2023 0.07 (H)  0.00 - 0.04 K/uL Final    Lymph # 12/05/2023 0.5 (L)  1.0 - 4.8 K/uL Final    Mono # 12/05/2023 0.7  0.3 - 1.0 K/uL Final    Eos # 12/05/2023 0.0  0.0 - 0.5 K/uL Final    Baso # 12/05/2023 0.03  0.00 - 0.20 K/uL Final    nRBC 12/05/2023 0  0 /100 WBC Final    Gran % 12/05/2023 89.7 (H)  38.0 - 73.0 % Final    Lymph % 12/05/2023 3.9 (L)  18.0 - 48.0 % Final    Mono % 12/05/2023 5.6  4.0 - 15.0 % Final    Eosinophil % 12/05/2023 0.0  0.0 - 8.0 % Final    Basophil % 12/05/2023 0.2  0.0 - 1.9 % Final    Differential Method  12/05/2023 Automated   Final    WBC 12/06/2023 13.58 (H)  3.90 - 12.70 K/uL Final    RBC 12/06/2023 3.48 (L)  4.00 - 5.40 M/uL Final    Hemoglobin 12/06/2023 10.5 (L)  12.0 - 16.0 g/dL Final    Hematocrit 12/06/2023 32.1 (L)  37.0 - 48.5 % Final    MCV 12/06/2023 92  82 - 98 fL Final    MCH 12/06/2023 30.2  27.0 - 31.0 pg Final    MCHC 12/06/2023 32.7  32.0 - 36.0 g/dL Final    RDW 12/06/2023 12.9  11.5 - 14.5 % Final    Platelets 12/06/2023 200  150 - 450 K/uL Final    MPV 12/06/2023 12.3  9.2 - 12.9 fL Final    Immature Granulocytes 12/06/2023 0.4  0.0 - 0.5 % Final    Gran # (ANC) 12/06/2023 11.2 (H)  1.8 - 7.7 K/uL Final    Immature Grans (Abs) 12/06/2023 0.06 (H)  0.00 - 0.04 K/uL Final    Lymph # 12/06/2023 0.8 (L)  1.0 - 4.8 K/uL Final    Mono # 12/06/2023 1.6 (H)  0.3 - 1.0 K/uL Final    Eos # 12/06/2023 0.0  0.0 - 0.5 K/uL Final    Baso # 12/06/2023 0.01  0.00 - 0.20 K/uL Final    nRBC 12/06/2023 0  0 /100 WBC Final    Gran % 12/06/2023 82.2 (H)  38.0 - 73.0 % Final    Lymph % 12/06/2023 5.7 (L)  18.0 - 48.0 % Final    Mono % 12/06/2023 11.6  4.0 - 15.0 % Final    Eosinophil % 12/06/2023 0.0  0.0 - 8.0 % Final    Basophil % 12/06/2023 0.1  0.0 - 1.9 % Final    Differential Method 12/06/2023 Automated   Final    Sodium 12/06/2023 143  136 - 145 mmol/L Final    Potassium 12/06/2023 4.1  3.5 - 5.1 mmol/L Final    Chloride 12/06/2023 111 (H)  95 - 110 mmol/L Final    CO2 12/06/2023 22 (L)  23 - 29 mmol/L Final    Glucose 12/06/2023 113 (H)  70 - 110 mg/dL Final    BUN 12/06/2023 14  8 - 23 mg/dL Final    Creatinine 12/06/2023 0.8  0.5 - 1.4 mg/dL Final    Calcium 12/06/2023 8.9  8.7 - 10.5 mg/dL Final    Anion Gap 12/06/2023 10  8 - 16 mmol/L Final    eGFR 12/06/2023 >60.0  >60 mL/min/1.73 m^2 Final    Magnesium 12/06/2023 1.9  1.6 - 2.6 mg/dL Final    Phosphorus 12/06/2023 2.5 (L)  2.7 - 4.5 mg/dL Final        Meds   Current Facility-Administered Medications   Medication Dose Route Frequency Provider Last Rate  Last Admin    acetaminophen tablet 650 mg  650 mg Oral Q6H Ana Hartmann MD        bisacodyL suppository 10 mg  10 mg Rectal Daily PRN Lexx Subramanian MD        cefTRIAXone (ROCEPHIN) 1 g in dextrose 5 % in water (D5W) 100 mL IVPB (MB+)  1 g Intravenous Q24H Lexx Subramanian MD   Stopped at 12/06/23 1022    diltiaZEM 24 hr capsule 240 mg  240 mg Oral Daily Lexx Subramanian MD   240 mg at 12/06/23 0951    EScitalopram oxalate tablet 10 mg  10 mg Oral Daily Lexx Subramanian MD   10 mg at 12/06/23 0951    melatonin tablet 6 mg  6 mg Oral Nightly PRN Lexx Subramanian MD   6 mg at 12/05/23 2312    methocarbamoL tablet 500 mg  500 mg Oral Q6H Zunilda Voss MD   500 mg at 12/06/23 1124    metoprolol injection 5 mg  5 mg Intravenous Q5 Min PRN Zunilda Sneed PA-C   5 mg at 12/06/23 0126    mupirocin 2 % ointment 1 g  1 g Nasal BID Lexx Subramanian MD   1 g at 12/06/23 0951    polyethylene glycol packet 17 g  17 g Oral Daily Lexx Subramanian MD   17 g at 12/06/23 0951    rivaroxaban tablet 15 mg  15 mg Oral Daily with dinner Ana Hartmann MD        ROPIvacaine (PF) 2 mg/ml (0.2%) solution  0.1 mL/hr Perineural Continuous Lexx Subramanian MD 0.1 mL/hr at 12/05/23 1453 0.1 mL/hr at 12/05/23 1453    senna-docusate 8.6-50 mg per tablet 1 tablet  1 tablet Oral BID Lexx Subramanian MD   1 tablet at 12/06/23 0951    sodium chloride 0.9% flush 10 mL  10 mL Intravenous PRN Lexx Subramanian MD            Anticoagulant dose xarelto at 15mg with dinner.    Assessment:     Pain control adequate    Plan:     Patient doing well, continue present treatment.    1) Continue SIFI PNC at current infusion rate: 15ml/3h    2) Continue multimodals including   Tylenol 650mg q6h  Robaxin 500 4xdaily  Escitalopram 10mg    3) No PRN requirements currently      Case discussed with staff, Dr. Reynolds; final recommendations per attestation above.     Thank you for your consult and allowing us to participate in the care of this patient. We will continue to  follow along. Please call the Acute Pain Service/Anesthesia if you have any questions or concerns.        Zunilda Schultzseloy Anesthesiology PGY3

## 2023-12-06 NOTE — SUBJECTIVE & OBJECTIVE
"Principal Problem:Closed displaced fracture of left femoral neck    Principal Orthopedic Problem: s/p L hip jose antonio on 12/5    Interval History: NAEON. Pain controlled. L arm in sling. Mobilize with PT today.    Review of patient's allergies indicates:   Allergen Reactions    Ciprofloxacin Other (See Comments)     Other reaction(s): Nausea  Other reaction(s): Nausea       Current Facility-Administered Medications   Medication    acetaminophen oral solution 650 mg    bisacodyL suppository 10 mg    cefTRIAXone (ROCEPHIN) 1 g in dextrose 5 % in water (D5W) 100 mL IVPB (MB+)    diltiaZEM 24 hr capsule 240 mg    EScitalopram oxalate tablet 10 mg    melatonin tablet 6 mg    methocarbamoL tablet 500 mg    metoprolol injection 5 mg    mupirocin 2 % ointment 1 g    polyethylene glycol packet 17 g    rivaroxaban tablet 15 mg    ROPIvacaine (PF) 2 mg/ml (0.2%) solution    senna-docusate 8.6-50 mg per tablet 1 tablet    sodium chloride 0.9% flush 10 mL     Objective:     Vital Signs (Most Recent):  Temp: 98.4 °F (36.9 °C) (12/06/23 0814)  Pulse: 91 (12/06/23 0814)  Resp: 18 (12/06/23 0814)  BP: 139/68 (12/06/23 0814)  SpO2: 96 % (12/06/23 0814) Vital Signs (24h Range):  Temp:  [96.3 °F (35.7 °C)-98.4 °F (36.9 °C)] 98.4 °F (36.9 °C)  Pulse:  [] 91  Resp:  [12-22] 18  SpO2:  [94 %-100 %] 96 %  BP: (117-161)/(57-96) 139/68     Weight: 49.9 kg (109 lb 15.8 oz)  Height: 5' 3" (160 cm)  Body mass index is 19.48 kg/m².      Intake/Output Summary (Last 24 hours) at 12/6/2023 0842  Last data filed at 12/6/2023 0315  Gross per 24 hour   Intake 2330.06 ml   Output 1100 ml   Net 1230.06 ml        Ortho/SPM Exam     Awake/alert/oriented x3, No acute distress, Afebrile, Vital signs stable  Good inspiratory effort with unlaboured breathing  Dressings c/d/i  NVI in operative limb     Significant Labs: CBC:   Recent Labs   Lab 12/05/23  0013 12/05/23  1520 12/06/23  0342   WBC 11.68 12.57 13.58*   HGB 15.2 13.1 10.5*   HCT 43.9 40.3 32.1* "    218 200     All pertinent labs within the past 24 hours have been reviewed.    Significant Imaging: I have reviewed all pertinent imaging results/findings.

## 2023-12-06 NOTE — NURSING
Patient return from PACU. alert ,drowsy in bed . Tele monitor intact . Oxygen at 2  liters per NC . Ropivacaine intact .1ml/hr to left abdomen area. 18 gauge to left hand with Ns  at 100 cc/hr. Left sling to right upper arm intact noted left shoulder swollen .Gauze and tegaderm Dressing to left hip intact. SCD and FCD applied . Clayton intact with  yellow urine in  bag.no noted distress . Family  at bedside.

## 2023-12-06 NOTE — ANESTHESIA POST-OP PAIN MANAGEMENT
Acute Pain Service Progress Note    Tigist Murry is a 83 y.o., female, [id number].    Surgery:  HEMIARTHROPLASTY, HIP, POSTERIOR APPROACH left (Left: Hip)     Post Op Day #: 2    Catheter type: perineural  L SIFI 15ml/3h    Infusion type: Ropivacaine 0.2%  15ml/3h    Problem List:    Active Hospital Problems    Diagnosis  POA    *Closed displaced fracture of left femoral neck s/p hemiarthroplasty on 12/5/2023 [S72.002A]  Yes    Closed dislocation of left shoulder [S43.005A]  Yes    Acute cystitis without hematuria [N30.00]  Yes    Elevated LFTs [R79.89]  Yes    Atelectasis [J98.11]  Yes    Compression fracture of L1 lumbar vertebra [S32.010A]  Yes    Alzheimer's disease with late onset (CODE) [G30.1]  Yes    Atrial fibrillation and flutter [I48.91, I48.92]  Yes    Pure hypercholesterolemia [E78.00]  Yes    Essential hypertension [I10]  Yes     Chronic      Resolved Hospital Problems    Diagnosis Date Resolved POA    Elevated INR [R79.1] 12/05/2023 Yes     Suspect 2/2 xarelto         Subjective:     General appearance of alert, oriented, no complaints   Pain with rest: 1    Numbers   Pain with movement: 4    Numbers   Side Effects    1. Pruritis No    2. Nausea No    3. Motor Blockade No, 1=Ability to bend knees and ankles    4. Sedation No, 1=awake and alert    Objective:     Catheter level    Catheter site clean, dry, intact         Vitals   Vitals:    12/06/23 0814   BP: 139/68   Pulse: 91   Resp: 18   Temp: 36.9 °C (98.4 °F)        Labs    Admission on 12/04/2023   Component Date Value Ref Range Status    Sodium 12/05/2023 140  136 - 145 mmol/L Final    Potassium 12/05/2023 4.1  3.5 - 5.1 mmol/L Final    Chloride 12/05/2023 107  95 - 110 mmol/L Final    CO2 12/05/2023 22 (L)  23 - 29 mmol/L Final    Glucose 12/05/2023 156 (H)  70 - 110 mg/dL Final    BUN 12/05/2023 17  8 - 23 mg/dL Final    Creatinine 12/05/2023 0.8  0.5 - 1.4 mg/dL Final    Calcium 12/05/2023 9.7  8.7 - 10.5 mg/dL Final    Total Protein  12/05/2023 6.8  6.0 - 8.4 g/dL Final    Albumin 12/05/2023 3.7  3.5 - 5.2 g/dL Final    Total Bilirubin 12/05/2023 0.8  0.1 - 1.0 mg/dL Final    Alkaline Phosphatase 12/05/2023 85  55 - 135 U/L Final    AST 12/05/2023 54 (H)  10 - 40 U/L Final    ALT 12/05/2023 66 (H)  10 - 44 U/L Final    eGFR 12/05/2023 >60.0  >60 mL/min/1.73 m^2 Final    Anion Gap 12/05/2023 11  8 - 16 mmol/L Final    WBC 12/05/2023 11.68  3.90 - 12.70 K/uL Final    RBC 12/05/2023 4.88  4.00 - 5.40 M/uL Final    Hemoglobin 12/05/2023 15.2  12.0 - 16.0 g/dL Final    Hematocrit 12/05/2023 43.9  37.0 - 48.5 % Final    MCV 12/05/2023 90  82 - 98 fL Final    MCH 12/05/2023 31.1 (H)  27.0 - 31.0 pg Final    MCHC 12/05/2023 34.6  32.0 - 36.0 g/dL Final    RDW 12/05/2023 12.8  11.5 - 14.5 % Final    Platelets 12/05/2023 233  150 - 450 K/uL Final    MPV 12/05/2023 10.8  9.2 - 12.9 fL Final    Immature Granulocytes 12/05/2023 0.3  0.0 - 0.5 % Final    Gran # (ANC) 12/05/2023 10.1 (H)  1.8 - 7.7 K/uL Final    Immature Grans (Abs) 12/05/2023 0.04  0.00 - 0.04 K/uL Final    Lymph # 12/05/2023 0.7 (L)  1.0 - 4.8 K/uL Final    Mono # 12/05/2023 0.9  0.3 - 1.0 K/uL Final    Eos # 12/05/2023 0.0  0.0 - 0.5 K/uL Final    Baso # 12/05/2023 0.01  0.00 - 0.20 K/uL Final    nRBC 12/05/2023 0  0 /100 WBC Final    Gran % 12/05/2023 86.6 (H)  38.0 - 73.0 % Final    Lymph % 12/05/2023 5.6 (L)  18.0 - 48.0 % Final    Mono % 12/05/2023 7.4  4.0 - 15.0 % Final    Eosinophil % 12/05/2023 0.0  0.0 - 8.0 % Final    Basophil % 12/05/2023 0.1  0.0 - 1.9 % Final    Differential Method 12/05/2023 Automated   Final    Magnesium 12/05/2023 2.1  1.6 - 2.6 mg/dL Final    BNP 12/05/2023 259 (H)  0 - 99 pg/mL Final    Troponin I 12/05/2023 <0.006  0.000 - 0.026 ng/mL Final    TSH 12/05/2023 0.495  0.400 - 4.000 uIU/mL Final    Specimen UA 12/05/2023 Urine, Clean Catch   Final    Color, UA 12/05/2023 Yellow  Yellow, Straw, Yamilet Final    Appearance, UA 12/05/2023 Hazy (A)  Clear Final     pH, UA 12/05/2023 5.0  5.0 - 8.0 Final    Specific Gravity, UA 12/05/2023 >=1.030 (A)  1.005 - 1.030 Final    Protein, UA 12/05/2023 1+ (A)  Negative Final    Glucose, UA 12/05/2023 1+ (A)  Negative Final    Ketones, UA 12/05/2023 Negative  Negative Final    Bilirubin (UA) 12/05/2023 Negative  Negative Final    Occult Blood UA 12/05/2023 Negative  Negative Final    Nitrite, UA 12/05/2023 Positive (A)  Negative Final    Leukocytes, UA 12/05/2023 3+ (A)  Negative Final    Blood Culture, Routine 12/05/2023 No Growth to date   Preliminary    Blood Culture, Routine 12/05/2023 No Growth to date   Preliminary    Blood Culture, Routine 12/05/2023 No Growth to date   Preliminary    Blood Culture, Routine 12/05/2023 No Growth to date   Preliminary    Prothrombin Time 12/05/2023 18.2 (H)  9.0 - 12.5 sec Final    INR 12/05/2023 1.8 (H)  0.8 - 1.2 Final    aPTT 12/05/2023 40.7 (H)  21.0 - 32.0 sec Final    Prealbumin 12/05/2023 24  20 - 43 mg/dL Final    Group & Rh 12/05/2023 B POS   Final    Indirect [place] 12/05/2023 NEG   Final    Specimen Outdate 12/05/2023 12/08/2023 23:59   Final    Vit D, 25-Hydroxy 12/05/2023 31  30 - 96 ng/mL Final    Transferrin 12/05/2023 242  200 - 375 mg/dL Final    Phosphorus 12/05/2023 3.0  2.7 - 4.5 mg/dL Final    Hemoglobin A1C 12/04/2023 5.3  4.0 - 5.6 % Final    Estimated Avg Glucose 12/04/2023 105  68 - 131 mg/dL Final    RBC, UA 12/05/2023 6 (H)  0 - 4 /hpf Final    WBC, UA 12/05/2023 >100 (H)  0 - 5 /hpf Final    Bacteria 12/05/2023 Occasional  None-Occ /hpf Final    Squam Epithel, UA 12/05/2023 1  /hpf Final    Hyaline Casts, UA 12/05/2023 0  0-1/lpf /lpf Final    Microscopic Comment 12/05/2023 SEE COMMENT   Final    Urine Culture, Routine 12/05/2023 Multiple organisms isolated. None in predominance.  Repeat if   Final    Urine Culture, Routine 12/05/2023 clinically necessary.   Final    UNIT NUMBER 12/05/2023 [id number]   Preliminary    Product Code 12/05/2023 P6967P01   Preliminary     DISPENSE STATUS 12/05/2023 CROSSMATCHED   Preliminary    CODING SYSTEM 12/05/2023 [id number]   Preliminary    Unit Blood Type Code 12/05/2023 7300   Preliminary    Unit Blood Type 12/05/2023 B POS   Preliminary    Unit Expiration 12/05/2023 202312142359   Preliminary    CROSSMATCH INTERPRETATION 12/05/2023 Compatible   Preliminary    UNIT NUMBER 12/05/2023 [id number]   Preliminary    Product Code 12/05/2023 H3046A18   Preliminary    DISPENSE STATUS 12/05/2023 CROSSMATCHED   Preliminary    CODING SYSTEM 12/05/2023 [id number]   Preliminary    Unit Blood Type Code 12/05/2023 7300   Preliminary    Unit Blood Type 12/05/2023 B POS   Preliminary    Unit Expiration 12/05/2023 202312142359   Preliminary    CROSSMATCH INTERPRETATION 12/05/2023 Compatible   Preliminary    WBC 12/05/2023 12.57  3.90 - 12.70 K/uL Final    RBC 12/05/2023 4.33  4.00 - 5.40 M/uL Final    Hemoglobin 12/05/2023 13.1  12.0 - 16.0 g/dL Final    Hematocrit 12/05/2023 40.3  37.0 - 48.5 % Final    MCV 12/05/2023 93  82 - 98 fL Final    MCH 12/05/2023 30.3  27.0 - 31.0 pg Final    MCHC 12/05/2023 32.5  32.0 - 36.0 g/dL Final    RDW 12/05/2023 13.0  11.5 - 14.5 % Final    Platelets 12/05/2023 218  150 - 450 K/uL Final    MPV 12/05/2023 10.9  9.2 - 12.9 fL Final    Immature Granulocytes 12/05/2023 0.6 (H)  0.0 - 0.5 % Final    Gran # (ANC) 12/05/2023 11.3 (H)  1.8 - 7.7 K/uL Final    Immature Grans (Abs) 12/05/2023 0.07 (H)  0.00 - 0.04 K/uL Final    Lymph # 12/05/2023 0.5 (L)  1.0 - 4.8 K/uL Final    Mono # 12/05/2023 0.7  0.3 - 1.0 K/uL Final    Eos # 12/05/2023 0.0  0.0 - 0.5 K/uL Final    Baso # 12/05/2023 0.03  0.00 - 0.20 K/uL Final    nRBC 12/05/2023 0  0 /100 WBC Final    Gran % 12/05/2023 89.7 (H)  38.0 - 73.0 % Final    Lymph % 12/05/2023 3.9 (L)  18.0 - 48.0 % Final    Mono % 12/05/2023 5.6  4.0 - 15.0 % Final    Eosinophil % 12/05/2023 0.0  0.0 - 8.0 % Final    Basophil % 12/05/2023 0.2  0.0 - 1.9 % Final    Differential Method 12/05/2023 Automated    Final    WBC 12/06/2023 13.58 (H)  3.90 - 12.70 K/uL Final    RBC 12/06/2023 3.48 (L)  4.00 - 5.40 M/uL Final    Hemoglobin 12/06/2023 10.5 (L)  12.0 - 16.0 g/dL Final    Hematocrit 12/06/2023 32.1 (L)  37.0 - 48.5 % Final    MCV 12/06/2023 92  82 - 98 fL Final    MCH 12/06/2023 30.2  27.0 - 31.0 pg Final    MCHC 12/06/2023 32.7  32.0 - 36.0 g/dL Final    RDW 12/06/2023 12.9  11.5 - 14.5 % Final    Platelets 12/06/2023 200  150 - 450 K/uL Final    MPV 12/06/2023 12.3  9.2 - 12.9 fL Final    Immature Granulocytes 12/06/2023 0.4  0.0 - 0.5 % Final    Gran # (ANC) 12/06/2023 11.2 (H)  1.8 - 7.7 K/uL Final    Immature Grans (Abs) 12/06/2023 0.06 (H)  0.00 - 0.04 K/uL Final    Lymph # 12/06/2023 0.8 (L)  1.0 - 4.8 K/uL Final    Mono # 12/06/2023 1.6 (H)  0.3 - 1.0 K/uL Final    Eos # 12/06/2023 0.0  0.0 - 0.5 K/uL Final    Baso # 12/06/2023 0.01  0.00 - 0.20 K/uL Final    nRBC 12/06/2023 0  0 /100 WBC Final    Gran % 12/06/2023 82.2 (H)  38.0 - 73.0 % Final    Lymph % 12/06/2023 5.7 (L)  18.0 - 48.0 % Final    Mono % 12/06/2023 11.6  4.0 - 15.0 % Final    Eosinophil % 12/06/2023 0.0  0.0 - 8.0 % Final    Basophil % 12/06/2023 0.1  0.0 - 1.9 % Final    Differential Method 12/06/2023 Automated   Final    Sodium 12/06/2023 143  136 - 145 mmol/L Final    Potassium 12/06/2023 4.1  3.5 - 5.1 mmol/L Final    Chloride 12/06/2023 111 (H)  95 - 110 mmol/L Final    CO2 12/06/2023 22 (L)  23 - 29 mmol/L Final    Glucose 12/06/2023 113 (H)  70 - 110 mg/dL Final    BUN 12/06/2023 14  8 - 23 mg/dL Final    Creatinine 12/06/2023 0.8  0.5 - 1.4 mg/dL Final    Calcium 12/06/2023 8.9  8.7 - 10.5 mg/dL Final    Anion Gap 12/06/2023 10  8 - 16 mmol/L Final    eGFR 12/06/2023 >60.0  >60 mL/min/1.73 m^2 Final    Magnesium 12/06/2023 1.9  1.6 - 2.6 mg/dL Final    Phosphorus 12/06/2023 2.5 (L)  2.7 - 4.5 mg/dL Final        Meds   Current Facility-Administered Medications   Medication Dose Route Frequency Provider Last Rate Last Admin     acetaminophen oral solution 650 mg  650 mg Oral Q6H Ana Hartmann MD   650 mg at 12/06/23 0558    bisacodyL suppository 10 mg  10 mg Rectal Daily PRN Lexx Subramanian MD        cefTRIAXone (ROCEPHIN) 1 g in dextrose 5 % in water (D5W) 100 mL IVPB (MB+)  1 g Intravenous Q24H Lexx Subramanian MD        diltiaZEM 24 hr capsule 240 mg  240 mg Oral Daily Lexx Subramanian MD        EScitalopram oxalate tablet 10 mg  10 mg Oral Daily Lexx Subramanian MD        melatonin tablet 6 mg  6 mg Oral Nightly PRN Lexx Subramanian MD   6 mg at 12/05/23 2312    methocarbamoL tablet 500 mg  500 mg Oral QID Zunilda Voss MD        metoprolol injection 5 mg  5 mg Intravenous Q5 Min PRN Zunilda Sneed PA-C   5 mg at 12/06/23 0126    mupirocin 2 % ointment 1 g  1 g Nasal BID Lexx Subramanian MD   1 g at 12/05/23 2030    polyethylene glycol packet 17 g  17 g Oral Daily Lexx Subramanian MD   17 g at 12/05/23 1430    rivaroxaban tablet 15 mg  15 mg Oral Daily with dinner Ana Hartmann MD        ROPIvacaine (PF) 2 mg/ml (0.2%) solution  0.1 mL/hr Perineural Continuous Lexx Subramanian MD 0.1 mL/hr at 12/05/23 1453 0.1 mL/hr at 12/05/23 1453    senna-docusate 8.6-50 mg per tablet 1 tablet  1 tablet Oral BID Lexx Subramanian MD   1 tablet at 12/05/23 2030    sodium chloride 0.9% flush 10 mL  10 mL Intravenous PRN Lexx Subramanian MD            Anticoagulant dose xarelto at 15mg with dinner.    Assessment:     Pain control adequate    Plan:     Patient doing well, continue present treatment.    1) Continue SIFI PNC at current infusion rate: 15ml/3h. Pause/pull in am    2) Continue multimodals including   Tylenol 1000mg q8h  Robaxin 500mg 4xdaily    3) No PRN requirements currently      Case discussed with staff, Dr. Reynolds; final recommendations per attestation above.     Thank you for your consult and allowing us to participate in the care of this patient. We will continue to follow along. Please call the Acute Pain Service/Anesthesia if you have  any questions or concerns.        Hannah Schmucker [facility] PGY3

## 2023-12-06 NOTE — ANESTHESIA POSTPROCEDURE EVALUATION
Anesthesia Post Evaluation    Patient: Tigist Murry    Procedure(s) Performed: Procedure(s) (LRB):  HEMIARTHROPLASTY, HIP, POSTERIOR APPROACH left (Left)    Final Anesthesia Type: general      Patient location during evaluation: PACU  Patient participation: Yes- Able to Participate  Level of consciousness: awake and alert  Post-procedure vital signs: reviewed and stable  Pain management: adequate  Airway patency: patent    PONV status at discharge: No PONV  Anesthetic complications: no      Cardiovascular status: blood pressure returned to baseline  Respiratory status: unassisted  Hydration status: euvolemic  Follow-up not needed.              Vitals Value Taken Time   /69 12/05/23 2242   Temp 36.2 °C (97.2 °F) 12/05/23 2242   Pulse 118 12/05/23 2242   Resp 16 12/05/23 2242   SpO2 97 % 12/05/23 2242         Event Time   Out of Recovery 12/05/2023 15:45:00         Pain/Kika Score: Pain Rating Prior to Med Admin: 5 (12/5/2023  3:24 PM)  Pain Rating Post Med Admin: 4 (12/5/2023  4:36 AM)  Kika Score: 9 (12/5/2023  3:45 PM)

## 2023-12-06 NOTE — NURSING
Nurses Note -- 4 Eyes      12/6/2023   12:15 PM      Skin assessed during: Daily Assessment      [x] No Altered Skin Integrity Present    []Prevention Measures Documented      [] Yes- Altered Skin Integrity Present or Discovered   [] LDA Added if Not in Epic (Describe Wound)   [] New Altered Skin Integrity was Present on Admit and Documented in LDA   [] Wound Image Taken    Wound Care Consulted? No; gen. Bruising; LUE extremity bruising.    Attending Nurse:  Carrie Batista RN    Second RN/Staff Member: Kathi Bronson RN

## 2023-12-06 NOTE — PLAN OF CARE
12/06/23 1543   Post-Acute Status   Post-Acute Authorization Placement   Post-Acute Placement Status Referrals Sent   Discharge Plan   Discharge Plan A Skilled Nursing Facility     SW faxed referrals to [facility], [facility], [facility], [facility], and [facility] via [employer] for review. SW informed of pt's choice by CM Jose G. SW will follow up as needed.    Sanam Freire LMSW  Case Management   [facility]   Ext. [phone]

## 2023-12-06 NOTE — PLAN OF CARE
Problem: Occupational Therapy  Goal: Occupational Therapy Goal  Description: Goals to be met by: 12/20/23     Patient will increase functional independence with ADLs by performing:    UE Dressing with Minimal Assistance.  LE Dressing with Moderate Assistance and Assistive Devices as needed.  Grooming while EOB with Supervision.  Toileting from bedside commode with Moderate Assistance for hygiene and clothing management.   Toilet transfer to bedside commode with Moderate Assistance.    Outcome: Ongoing, Progressing

## 2023-12-06 NOTE — ASSESSMENT & PLAN NOTE
Patient underwent left hip hemiarthroplasty by Dr. Tani Martines on 12/5/2023 for femoral neck fracture.   Patient reports no pain in left hip.   Plan is to start PT/OT for gait training and strengthening and restoration of ADL's. Patient is weight bear as tolerated to left lower extremity, posterior hip precautions as per Orthopedic recommendations.   Patient restarted on home Xarelto post-op so takes long term for her anticoagulation for her atrial fibrillation so fully anticoagulated so needs no other chemical DVT prophylaxis post-op.   Orthopedics is following and managing hip fracture and surgical site.   Perineural pain catheter in place with continuous Ropivacaine infusion for pain control and being managed by Anesthesia Pain Service.   Patient on multimodal pain management post-op with Tylenol 650 mg po every 6 hours and Robaxin 500 mg po 4 times daily post-op and will continue.  Discontinue Clayton to gravity.

## 2023-12-06 NOTE — PROGRESS NOTES
"[facility] - Surgery  Orthopedics  Progress Note    Patient Name: Tigist Murry  MRN: [id number]  Admission Date: 12/4/2023  Hospital Length of Stay: 1 days  Attending Provider: Ana Hartmann MD  Primary Care Provider: Gwen Porter MD  Follow-up For: Procedure(s) (LRB):  HEMIARTHROPLASTY, HIP, POSTERIOR APPROACH left (Left)    Post-Operative Day: 1 Day Post-Op  Subjective:     Principal Problem:Closed displaced fracture of left femoral neck    Principal Orthopedic Problem: s/p L hip jose antonio on 12/5    Interval History: NAEON. Pain controlled. L arm in sling. Mobilize with PT today.    Review of patient's allergies indicates:   Allergen Reactions    Ciprofloxacin Other (See Comments)     Other reaction(s): Nausea  Other reaction(s): Nausea       Current Facility-Administered Medications   Medication    acetaminophen oral solution 650 mg    bisacodyL suppository 10 mg    cefTRIAXone (ROCEPHIN) 1 g in dextrose 5 % in water (D5W) 100 mL IVPB (MB+)    diltiaZEM 24 hr capsule 240 mg    EScitalopram oxalate tablet 10 mg    melatonin tablet 6 mg    methocarbamoL tablet 500 mg    metoprolol injection 5 mg    mupirocin 2 % ointment 1 g    polyethylene glycol packet 17 g    rivaroxaban tablet 15 mg    ROPIvacaine (PF) 2 mg/ml (0.2%) solution    senna-docusate 8.6-50 mg per tablet 1 tablet    sodium chloride 0.9% flush 10 mL     Objective:     Vital Signs (Most Recent):  Temp: 98.4 °F (36.9 °C) (12/06/23 0814)  Pulse: 91 (12/06/23 0814)  Resp: 18 (12/06/23 0814)  BP: 139/68 (12/06/23 0814)  SpO2: 96 % (12/06/23 0814) Vital Signs (24h Range):  Temp:  [96.3 °F (35.7 °C)-98.4 °F (36.9 °C)] 98.4 °F (36.9 °C)  Pulse:  [] 91  Resp:  [12-22] 18  SpO2:  [94 %-100 %] 96 %  BP: (117-161)/(57-96) 139/68     Weight: 49.9 kg (109 lb 15.8 oz)  Height: 5' 3" (160 cm)  Body mass index is 19.48 kg/m².      Intake/Output Summary (Last 24 hours) at 12/6/2023 0842  Last data filed at 12/6/2023 0315  Gross per 24 hour   Intake " 2330.06 ml   Output 1100 ml   Net 1230.06 ml        Ortho/SPM Exam     Awake/alert/oriented x3, No acute distress, Afebrile, Vital signs stable  Good inspiratory effort with unlaboured breathing  Dressings c/d/i  NVI in operative limb   LUE in sling  L forearm hematoma stable    Significant Labs: CBC:   Recent Labs   Lab 12/05/23  0013 12/05/23  1520 12/06/23  0342   WBC 11.68 12.57 13.58*   HGB 15.2 13.1 10.5*   HCT 43.9 40.3 32.1*    218 200     All pertinent labs within the past 24 hours have been reviewed.    Significant Imaging: I have reviewed all pertinent imaging results/findings. XR of L forearm and L elbow with no acute fx  Assessment/Plan:     * Closed displaced fracture of left femoral neck s/p hemiarthroplasty on 12/5/2023  Tigist Murry is a 83 y.o. female with left femoral neck fracture s/p L hip jose antonio on 12/6. Pt with left shoulder dx reduced in ED.       Pain control: MM  PT/OT: WBAT LLE; posterior hip prec, Ok to remove LUE sling to mobilize with walker, sling for comfort;   DVT PPx: restart home xarelto, SCDs at all times when not ambulating  Abx: postop Ancef  Labs: hgb 10.5  Clayton: dc today    Dispo: f/u PT recs      Closed dislocation of left shoulder  Ok to remove sling to mobilize with PT.           Bj Garza MD  Orthopedics  [facility] - Surgery

## 2023-12-06 NOTE — PLAN OF CARE
Problem: Infection  Goal: Absence of Infection Signs and Symptoms  Outcome: Ongoing, Progressing     Problem: Adult Inpatient Plan of Care  Goal: Plan of Care Review  Outcome: Ongoing, Progressing  Goal: Patient-Specific Goal (Individualized)  Outcome: Ongoing, Progressing  Goal: Absence of Hospital-Acquired Illness or Injury  Outcome: Ongoing, Progressing  Goal: Optimal Comfort and Wellbeing  Outcome: Ongoing, Progressing  Goal: Readiness for Transition of Care  Outcome: Ongoing, Progressing     Problem: Adjustment to Injury (Hip Fracture Medical Management)  Goal: Optimal Coping with Change in Health Status  Outcome: Ongoing, Progressing     Problem: Bleeding (Hip Fracture Medical Management)  Goal: Absence of Bleeding  Outcome: Ongoing, Progressing     Problem: Bowel Elimination Impaired (Hip Fracture Medical Management)  Goal: Effective Bowel Elimination  Outcome: Ongoing, Progressing     Problem: Cognitive Decline Risk (Hip Fracture Medical Management)  Goal: Baseline Cognitive Function Maintained  Outcome: Ongoing, Progressing     Problem: Embolism (Hip Fracture Medical Management)  Goal: Absence of Embolism  Outcome: Ongoing, Progressing     Problem: Fracture Stabilization and Management (Hip Fracture Medical Management)  Goal: Fracture Stability  Outcome: Ongoing, Progressing     Problem: Functional Ability Impaired (Hip Fracture Medical Management)  Goal: Optimal Functional Performance  Outcome: Ongoing, Progressing     Problem: Pain (Hip Fracture Medical Management)  Goal: Acceptable Pain Level  Outcome: Ongoing, Progressing     Problem: Urinary Elimination Impaired (Hip Fracture Medical Management)  Goal: Effective Urinary Elimination  Outcome: Ongoing, Progressing     Problem: Bleeding (Surgery Nonspecified)  Goal: Absence of Bleeding  Outcome: Ongoing, Progressing     Problem: Bowel Motility Impaired (Surgery Nonspecified)  Goal: Effective Bowel Elimination  Outcome: Ongoing, Progressing     Problem:  Fluid and Electrolyte Imbalance (Surgery Nonspecified)  Goal: Fluid and Electrolyte Balance  Outcome: Ongoing, Progressing     Problem: Glycemic Control Impaired (Surgery Nonspecified)  Goal: Blood Glucose Level Within Targeted Range  Outcome: Ongoing, Progressing     Problem: Infection (Surgery Nonspecified)  Goal: Absence of Infection Signs and Symptoms  Outcome: Ongoing, Progressing     Problem: Ongoing Anesthesia Effects (Surgery Nonspecified)  Goal: Anesthesia/Sedation Recovery  Outcome: Ongoing, Progressing     Problem: Pain (Surgery Nonspecified)  Goal: Acceptable Pain Control  Outcome: Ongoing, Progressing     Problem: Postoperative Nausea and Vomiting (Surgery Nonspecified)  Goal: Nausea and Vomiting Relief  Outcome: Ongoing, Progressing     Problem: Postoperative Urinary Retention (Surgery Nonspecified)  Goal: Effective Urinary Elimination  Outcome: Ongoing, Progressing     Problem: Respiratory Compromise (Surgery Nonspecified)  Goal: Effective Oxygenation and Ventilation  Outcome: Ongoing, Progressing     Problem: Device-Related Complication Risk (Anesthesia/Analgesia, Neuraxial)  Goal: Safe Infusion Delivery Completion  Outcome: Ongoing, Progressing     Problem: Infection (Anesthesia/Analgesia, Neuraxial)  Goal: Absence of Infection Signs and Symptoms  Outcome: Ongoing, Progressing     Problem: Nausea and Vomiting (Anesthesia/Analgesia, Neuraxial)  Goal: Nausea and Vomiting Relief  Outcome: Ongoing, Progressing     Problem: Pain (Anesthesia/Analgesia, Neuraxial)  Goal: Effective Pain Control  Outcome: Ongoing, Progressing     Problem: Respiratory Compromise (Anesthesia/Analgesia, Neuraxial)  Goal: Effective Oxygenation and Ventilation  Outcome: Ongoing, Progressing     Problem: Sensorimotor Impairment (Anesthesia/Analgesia, Neuraxial)  Goal: Baseline Motor Function  Outcome: Ongoing, Progressing     Problem: Urinary Retention (Anesthesia/Analgesia, Neuraxial)  Goal: Effective Urinary  Elimination  Outcome: Ongoing, Progressing     Problem: Fall Injury Risk  Goal: Absence of Fall and Fall-Related Injury  Outcome: Ongoing, Progressing     Problem: Skin Injury Risk Increased  Goal: Skin Health and Integrity  Outcome: Ongoing, Progressing      normal

## 2023-12-06 NOTE — CARE UPDATE
"RAPID RESPONSE NURSE CHART REVIEW        Chart Reviewed: 12/06/2023, 6:22 AM    MRN: [id number]  Bed: 545/545 A    Dx: Closed displaced fracture of left femoral neck    Tigist Murry has a past medical history of Anticoagulant long-term use, Anxiety, Arthritis, Atrial fibrillation, Carpal tunnel syndrome on right, Diverticulosis, Encounter for blood transfusion, HTN (hypertension), Hyperlipidemia, Osteopenia, Overactive bladder, Palpitations, Patellar tendonitis, Psoriasis, S/P partial hysterectomy, Situational depression, and Supraventricular tachycardia.    Last VS: BP (!) 117/57 (BP Location: Right arm, Patient Position: Lying)   Pulse 89   Temp 97.8 °F (36.6 °C)   Resp 18   Ht 5' 3" (1.6 m)   Wt 49.9 kg (109 lb 15.8 oz)   LMP  (LMP Unknown)   SpO2 95%   Breastfeeding No   BMI 19.48 kg/m²     24H Vital Sign Range:  Temp:  [96.3 °F (35.7 °C)-98.2 °F (36.8 °C)]   Pulse:  []   Resp:  [12-22]   BP: (117-161)/(57-96)   SpO2:  [94 %-100 %]     Level of Consciousness (AVPU): alert    Recent Labs     12/05/23  0013 12/05/23  1520 12/06/23  0342   WBC 11.68 12.57 13.58*   HGB 15.2 13.1 10.5*   HCT 43.9 40.3 32.1*    218 200       Recent Labs     12/05/23  0013 12/06/23  0342    143   K 4.1 4.1    111*   CO2 22* 22*   BUN 17 14   CREATININE 0.8 0.8   * 113*   PHOS 3.0 2.5*   MG 2.1 1.9        No results for input(s): "PH", "PCO2", "PO2", "HCO3", "POCSATURATED", "BE" in the last 72 hours.     OXYGEN:  Flow (L/min): 2          MEWS score: 1    Bedside RNFariha  contacted during rounds for HR and BP. Noticed patient did not have scheduled BP in AM due to scheduled procedure. Gave recommendation to contact MD for missed AM medications. No additional concerns verbalized at this time. Instructed to call [phone] for further concerns or assistance.    Gordon Pablo RN        "

## 2023-12-06 NOTE — PROGRESS NOTES
[facility] Medicine  Progress Note    Patient Name: Tigist Murry  MRN: [id number]  Patient Class: IP- Inpatient   Admission Date: 12/4/2023  Length of Stay: 1 days  Attending Physician: Ana Hartmann MD  Primary Care Provider: Gwen Porter MD        Subjective:     Principal Problem:Closed displaced fracture of left femoral neck        HPI:  84 yo female with Afib on xarelto, HTN, HPLD, mild dementia presenting after a fall in her bathroom at her group home. Most of history taken by daughter at bedside as patient had just received pain meds. It is uncertain for how long she was down, if she hit her head or lost consciousness. She did have left hip and left shoulder pain after amaury found, with the pain in her shoulder more severe than her leg. She normally uses a walker to get around most of the time, but does occasionally walks with out assistance. She has no known CVA/TIA, cardiac history. Per daughter at bedside about 5 months ago she was fairly active but since moving to the group home has been a little less active than normal.     IN ER, imaging showing left shoulder displacement, left femoral frax. Ortho consulted for left shoulder displacement, which was reset. UA with signs of UTI, rocephin given. Medicine called for admit.       Overview/Hospital Course:  Patient admitted to [facility] Medicine Team H: Hip Fracture team and started on Hip Fracture Pathway with Orthopedic surgery consult for hip fracture. Patient was seen and evaluated by Orthopedic surgery who recommended operative repair of hip fracture. Patient was medically optimized prior to surgery and was taken to OR after optimization on 12/5/2023. Patient underwent left hip hemiarthroplasty by Dr. Tani Martines. Post-op patient WBAT with posterior hip precautions to the left lower extremity as per Orthopedics recommendation. Patient restarted on home Xarelto at 20 mg po daily for her known atrial fibrillation for  long term anticoagulation so fully anticoagulated so did not need chemical DVT prophylaxis post-op. Perineural pain catheter placed by Anesthesia Pain Service with continuous infusion of Ropivacaine to help with pain control post-op and Anesthesia Pain Service managing while patient in the hospital. Patient placed on multimodal pain management post-op with Tylenol 1000 mg po every 6 hours post-op and will continue. PT/OT consulted post-op.      Interval History: Patient's daughter, Sarah Murry at bedside this am. Sarah is from [place], [place] and is the healthcare power of . Patient anxious this am on exam and kept talking about DR. Soria and if I knew him. Daughter states Dr. Soria used to be her dentist. Patient kept saying Dr. Soira was the only doctor she knew and wanted to know if I had talked to him. I stated I did not know DR. Soria and that I was a doctor also and she was surprised to find out she was in the hospital and that I was a doctor. Daughter stated they had multiple family members who are doctors in the family. Patient has known dementia and definitely confused on exam today and daughter states this is not unusual for the patient. Patent had no idea she had surgery yesterday as she is POD # 1 from left hip hemiarthroplasty to repair left femoral neck fracture. Perineural catheter in place and on multimodal pain meds for pain control. Patient appeared comfortable on exam and was not expressing she was having any pain currently. Daughter wanted to speak to case management about discharge planning and I did speak with her floor , Jose G and he stated he would be by room today to talk to daughter. Her daughter stated her and her sisters would be rotating staying with the patient as they all live out of town. Patient to start PT/OT today. I personally reviewed patient's labs from today. Patient's Hgb decreased from 13.1 yesterday to 10.5 today and suspected blood loss related to hip fracture  surgery. Final urine culture from admit grew multiple organisms but none in predominance.     Review of Systems   Unable to perform ROS: Dementia     Objective:     Vital Signs (Most Recent):  Temp: 97.9 °F (36.6 °C) (12/06/23 1156)  Pulse: 88 (12/06/23 1257)  Resp: 18 (12/06/23 1156)  BP: 132/62 (12/06/23 1156)  SpO2: 93 % (12/06/23 1156) on 2 liters of oxygen Vital Signs (24h Range):  Temp:  [96.3 °F (35.7 °C)-98.4 °F (36.9 °C)] 97.9 °F (36.6 °C)  Pulse:  [] 88  Resp:  [15-21] 18  SpO2:  [93 %-100 %] 93 %  BP: (117-144)/(57-87) 132/62     Weight: 49.9 kg (109 lb 15.8 oz)  Body mass index is 19.48 kg/m².    Intake/Output Summary (Last 24 hours) at 12/6/2023 1506  Last data filed at 12/6/2023 1115  Gross per 24 hour   Intake 830.06 ml   Output 800 ml   Net 30.06 ml         Physical Exam  Vitals and nursing note reviewed.   Constitutional:       General: She is awake. She is not in acute distress.     Appearance: Normal appearance. She is underweight. She is not ill-appearing.      Interventions: Nasal cannula in place.   Cardiovascular:      Rate and Rhythm: Normal rate and regular rhythm.      Heart sounds: Normal heart sounds. No murmur heard.     No friction rub. No gallop.   Pulmonary:      Effort: Pulmonary effort is normal. No respiratory distress.      Breath sounds: Normal breath sounds. No wheezing.   Abdominal:      General: Abdomen is flat. Bowel sounds are normal. There is no distension.      Palpations: Abdomen is soft.      Tenderness: There is no abdominal tenderness.   Musculoskeletal:      Right lower leg: No edema.      Left lower leg: No edema.   Skin:     General: Skin is warm.      Findings: No erythema.      Comments: Surgical bandage in place to left hip and bandage is clean and dry and intact. Left knee immobilizer in place.    Neurological:      Mental Status: She is alert. She is disoriented.      Comments: Oriented to self only   Psychiatric:         Behavior: Behavior is  cooperative.         Cognition and Memory: Cognition is impaired. Memory is impaired.             Significant Labs: CBC:   Recent Labs   Lab 12/05/23  0013 12/05/23  1520 12/06/23  0342   WBC 11.68 12.57 13.58*   HGB 15.2 13.1 10.5*   HCT 43.9 40.3 32.1*    218 200     CMP:   Recent Labs   Lab 12/05/23  0013 12/06/23  0342    143   K 4.1 4.1    111*   CO2 22* 22*   * 113*   BUN 17 14   CREATININE 0.8 0.8   CALCIUM 9.7 8.9   PROT 6.8  --    ALBUMIN 3.7  --    BILITOT 0.8  --    ALKPHOS 85  --    AST 54*  --    ALT 66*  --    ANIONGAP 11 10     Magnesium:   Recent Labs   Lab 12/05/23 0013 12/06/23 0342   MG 2.1 1.9     Urine Culture, Routine   Date Value Ref Range Status   12/05/2023   Final    Multiple organisms isolated. None in predominance.  Repeat if   12/05/2023 clinically necessary.  Final     Lab Results   Component Value Date    MFVDITHA11TN 31 12/05/2023       Significant Imaging: I have reviewed all pertinent imaging results/findings within the past 24 hours.    Assessment/Plan:      * Closed displaced fracture of left femoral neck s/p hemiarthroplasty on 12/5/2023  Patient underwent left hip hemiarthroplasty by Dr. Tani Martines on 12/5/2023 for femoral neck fracture.   Patient reports no pain in left hip.   Plan is to start PT/OT for gait training and strengthening and restoration of ADL's. Patient is weight bear as tolerated to left lower extremity, posterior hip precautions as per Orthopedic recommendations.   Patient restarted on home Xarelto post-op so takes long term for her anticoagulation for her atrial fibrillation so fully anticoagulated so needs no other chemical DVT prophylaxis post-op.   Orthopedics is following and managing hip fracture and surgical site.   Perineural pain catheter in place with continuous Ropivacaine infusion for pain control and being managed by Anesthesia Pain Service.   Patient on multimodal pain management post-op with Tylenol 650 mg po every  6 hours and Robaxin 500 mg po 4 times daily post-op and will continue.  Discontinue [place] to gravity.     Acute blood loss anemia  Patient's anemia is currently controlled. Has not received any PRBCs to date. Etiology likely due to acute blood loss which was from hip fracture and surgery and expected blood loss.  Current CBC reviewed-   Lab Results   Component Value Date    HGB 10.5 (L) 12/06/2023    HCT 32.1 (L) 12/06/2023     Monitor serial CBC and transfuse if patient becomes hemodynamically unstable, symptomatic or H/H drops below 7/21.    Closed dislocation of left shoulder  Patient found to have anterolateral left shoulder dislocation on admit. Orthopedics consulted and reduced shoulder dislocation successfully in ED with sedation.  Per Ortho, patient placed in sling to left arm for comfort. Ortho states okay to remove sling to mobilize with therapy.   Pain controlled and continue multimodal pain meds to treat.      Acute cystitis without hematuria  Present on admit. U/A on admit with >100 WBC, nitrate positive and occasional bacteria. Due to her dementia unknown if has any UTI symptoms so will treat. Plan 3 day course of IV Ceftriaxone to treat. Final urine culture grew multiple organisms but none in predominance.       Atrial fibrillation and flutter  Long term current use of anticoagulant   Patient with Persistent (7 days or more) atrial fibrillation which is controlled currently with Diltiazem  mg po daily. Patient is currently in atrial fibrillation.[id number] Score: 3. Anticoagulation indicated and resumed post-op with home  Xarelto 15 mg po daily (adjusted for weight, age and renal dosing).     Essential hypertension  Chronic, controlled on current regimen. Latest blood pressure and vitals reviewed-     Temp:  [96.3 °F (35.7 °C)-98.4 °F (36.9 °C)]   Pulse:  []   Resp:  [15-21]   BP: (117-144)/(57-75)   SpO2:  [93 %-100 %] .   Home meds for hypertension were reviewed and noted below.  "  Hypertension Medications               diltiaZEM (CARDIZEM CD) 240 MG 24 hr capsule Take 1 capsule (240 mg total) by mouth once daily.            While in the hospital, will manage blood pressure as follows; Continue home antihypertensive regimen with Diltiazem.     Will utilize p.r.n. blood pressure medication only if patient's blood pressure greater than 180/110 and she develops symptoms such as worsening chest pain or shortness of breath.    Alzheimer's disease with late onset (CODE)  Patient with dementia with likely etiology of alzheimer's dementia. Dementia is mild. The patient does not have signs of behavioral disturbance. Home dementia medications are Held or Continued: not indicated at this time . Continue non-pharmacologic interventions to prevent delirium (No VS between 11PM-5AM, activity during day, opening blinds, providing glasses/hearing aids, and up in chair during daytime). Will avoid narcotics and benzos unless absolutely necessary. PRN anti-psychotics are not prescribed at this time but can be added    Pure hypercholesterolemia  Chronic, controlled, continue to monitor. Patient not on medical treatment as outpatient.       Compression fracture of L1 lumbar vertebra  Present on admit and noted on CT scan of chest, abdomen and pelvis to have "New compression deformity of the superior endplate of L1 with less than 25% height loss compared to CT from 09/16/2022, age indeterminate." Patient denies any low back pain. No treatment or intervention needed.         VTE Risk Mitigation (From admission, onward)           Ordered     rivaroxaban tablet 15 mg  With dinner         12/05/23 1659     Place sequential compression device  Until discontinued         12/05/23 0972     Place sequential compression device  Until discontinued         12/05/23 0433                    Discharge Planning   CLARKE: 12/8/2023     Code Status: Full Code   Is the patient medically ready for discharge?: No    Reason for patient " still in hospital (select all that apply): Patient trending condition  Discharge Plan A: Skilled Nursing Facility            Ana Hartmann MD  Department of Hospital Medicine   [facility] - Surgery

## 2023-12-06 NOTE — ASSESSMENT & PLAN NOTE
Chronic, controlled on current regimen. Latest blood pressure and vitals reviewed-     Temp:  [96.3 °F (35.7 °C)-98.4 °F (36.9 °C)]   Pulse:  []   Resp:  [15-21]   BP: (117-144)/(57-75)   SpO2:  [93 %-100 %] .   Home meds for hypertension were reviewed and noted below.   Hypertension Medications               diltiaZEM (CARDIZEM CD) 240 MG 24 hr capsule Take 1 capsule (240 mg total) by mouth once daily.            While in the hospital, will manage blood pressure as follows; Continue home antihypertensive regimen with Diltiazem.     Will utilize p.r.n. blood pressure medication only if patient's blood pressure greater than 180/110 and she develops symptoms such as worsening chest pain or shortness of breath.

## 2023-12-06 NOTE — PT/OT/SLP EVAL
Physical Therapy Co-Evaluation and Treatment    OT present for coeval due to pt's multiple medical comorbidities and functional/cognition deficits requiring two skilled therapists to appropriately progress pt's musculoskeletal strength, neuromuscular control, and endurance while taking into consideration medical acuity and pt safety.    Patient Name: Tigist Murry   MRN: [id number]  Recent Surgery: Procedure(s) (LRB):  HEMIARTHROPLASTY, HIP, POSTERIOR APPROACH left (Left) 1 Day Post-Op    Recommendations:     Discharge Recommendations: Moderate Intensity Therapy   Discharge Equipment Recommendations: none   Barriers to discharge: None    Assessment:     Tigist Murry is a 83 y.o. female admitted with a medical diagnosis of Closed displaced fracture of left femoral neck. She presents with the following impairments/functional limitations: weakness, impaired endurance, impaired self care skills, impaired functional mobility, gait instability, impaired balance, impaired cognition, decreased coordination, decreased safety awareness, decreased lower extremity function, decreased upper extremity function, decreased ROM, orthopedic precautions.     Pt receptive and tolerated PT co-eval with OT fairly. Pt and pt's daughter educated on WBAT: left lower extremity, and posterior hip precautions prior to start of treatment with handout given. 2 sit <> stand trials performed from EOB with HHA. Pt able to achieve hip clearance but unable to achieve full upright posture due to lack of bilateral knee ext and forward flexed posture. Patient currently demonstrates a need for moderate intensity therapy on a daily basis post acute secondary to a decline in functional status due to surgical procedure.    Rehab Prognosis: Good; patient would benefit from acute PT services to address these deficits and reach maximum level of function.    Plan:     During this hospitalization, patient to be seen 4 x/week (Hip pathway 12/6, 12/7, 12/8,  then 4x/week after) to address the above listed problems via gait training, therapeutic activities, therapeutic exercises, neuromuscular re-education    Plan of Care Expires: 01/05/24    Subjective     Chief Complaint: L leg pain  Patient Comments/Goals: Pt agreeable to PT  Pain/Comfort:  Pain Rating 1:  (Pt did not rate)  Location - Side 1: Left  Location - Orientation 1: generalized  Location 1: hip  Pain Addressed 1: Reposition, Cessation of Activity, Distraction  Pain Rating Post-Intervention 1:  (not rated)    Patient History:     Living Environment: Pt lives at [facility] with no FER and handicap walk-in shower  Prior Level of Function: Pt primarily mod I using RW but mobility dependent on state of cognition   DME owned: RW (Pt's daughter reports they ordered a transport w/c recently)  Caregiver Assistance: staff at group home    Objective:     Communicated with RN prior to session. Patient found HOB elevated with perineural catheter, telemetry, peripheral IV, FCD, bed alarm, rodriguez catheter upon PT entry to room.    General Precautions: Standard, fall   Orthopedic Precautions: LLE weight bearing as tolerated, LLE posterior precautions   Braces: UE Sling    Respiratory Status: Room air    Exams:  RLE ROM: WFL  RLE Strength:  grossly 3+/5  LLE ROM: WFL  LLE Strength:  grossly 3/5  Sensation:    -       Intact    Functional Mobility: LUE sling donned during mobility  Gait belt applied - Yes  Bed Mobility  Scooting: maximal assistance and of 2 persons  Supine to Sit: maximal assistance and of 2 persons for LE management and trunk management  Sit to Supine: maximal assistance and of 2 persons for LE management and trunk management    Transfers  Sit to Stand: maximal assistance and of 2 persons with hand-held assist and with cues for foot placement x2 Trials  PT/OT blocking knees during transfer   Pt able to achieve hip clearance but only 50% of full upright posture due to lack on knee extension and forward  flexed posture    Balance  Sitting: POOR: N/A  Standin: N/A      Therapeutic Activities and Exercises:  Patient educated on role of acute care PT and PT POC, safety while in hospital including calling nurse for mobility, and call light usage.  Educated about weightbearing precautions and provided cuing for adherence as appropriate during session.  All questions answered within the scope of PT.  White board updated accordingly.      AM-PAC 6 CLICK MOBILITY  Total Score:10    Patient left HOB elevated with all lines intact, call button in reach, RN notified, bed alarm on, and daughter present.    GOALS:   Multidisciplinary Problems       Physical Therapy Goals          Problem: Physical Therapy    Goal Priority Disciplines Outcome Goal Variances Interventions   Physical Therapy Goal     PT, PT/OT Ongoing, Progressing     Description: Goals to be met by: 23     Patient will increase functional independence with mobility by performin. Supine to sit with MInimal Assistance  2. Sit to stand transfer with Minimal Assistance  3. Bed to chair transfer with Minimal Assistance using Rolling Walker  4. Gait  x 50 feet with Minimal Assistance using Rolling Walker.   5. Sitting at edge of bed x8 minutes with Stand-by Assistance                         History:     Past Medical History:   Diagnosis Date    Anticoagulant long-term use     Anxiety     Arthritis     Atrial fibrillation     Carpal tunnel syndrome on right     Diverticulosis     Encounter for blood transfusion     HTN (hypertension)     Hyperlipidemia     Osteopenia     Overactive bladder     Palpitations     Patellar tendonitis     saw [place]     Psoriasis     S/P partial hysterectomy     Situational depression     Supraventricular tachycardia        Past Surgical History:   Procedure Laterality Date    ABDOMINAL SURGERY      BREAST BIOPSY Left     excisional    CARPAL TUNNEL RELEASE      HEMIARTHROPLASTY, HIP, POSTERIOR APPROACH Left  12/5/2023    Procedure: HEMIARTHROPLASTY, HIP, POSTERIOR APPROACH left;  Surgeon: Tani Martines MD;  Location: [facility] OR [address] [place] [address];  Service: Orthopedics;  Laterality: Left;    HYSTERECTOMY      @27yrs of age    KNEE ARTHROSCOPY W/ DEBRIDEMENT  '05    Right    OOPHORECTOMY      @27yrs of age    PARTIAL HYSTERECTOMY         Time Tracking:     PT Received On: 12/06/23  PT Start Time: 0904  PT Stop Time: 0938  PT Total Time (min): 34 min     Billable Minutes: Evaluation 10 and Therapeutic Activity 24    12/6/2023

## 2023-12-06 NOTE — PLAN OF CARE
SW completed LOCET and faxed PASRR to state. Waiting on 142.    Sanam Freire LMSW  Case Management   [facility]   Ext. [phone]

## 2023-12-06 NOTE — ASSESSMENT & PLAN NOTE
Patient found to have anterolateral left shoulder dislocation on admit. Orthopedics consulted and reduced shoulder dislocation successfully in ED with sedation.  Per Ortho, patient placed in sling to left arm for comfort. Ortho states okay to remove sling to mobilize with therapy.   Pain controlled and continue multimodal pain meds to treat.

## 2023-12-06 NOTE — ACP (ADVANCE CARE PLANNING)
IP Sepsis Screen (most recent)       Sepsis Screen (IP) - 12/06/23 0931       Is the patient's history or complaint suggestive of a possible infection? Yes  -AF    Are there at least two of the following signs and symptoms present? Yes  -AF    Sepsis signs/symptoms - Tachycardia Tachycardia     >90  -AF    Sepsis signs/symptoms - WBC WBC < 4,000 or WBC > 12,000  -AF    Are any of the following organ dysfunction criteria present and not considered to be due to a chronic condition? Yes  -AF    Organ Dysfunction Criteria INR > 1.5 or aPTT > 60  -AF    Initiate Sepsis Protocol No  -AF    Reason sepsis not considered Other - Required comments  -AF              User Key  (r) = Recorded By, (t) = Taken By, (c) = Cosigned By      Initials Name    Nadege Jean PA-C                 +UTI- treating with IV ceftriaxone   Tachycardic- afib history- missed home dose of Cardizem yesterday- hemodynamically stable   Elevated INR- takes xarelto at home     Spoke with attending- no lactic acid warranted

## 2023-12-06 NOTE — ASSESSMENT & PLAN NOTE
Patient's anemia is currently controlled. Has not received any PRBCs to date. Etiology likely due to acute blood loss which was from hip fracture and surgery and expected blood loss.  Current CBC reviewed-   Lab Results   Component Value Date    HGB 10.5 (L) 12/06/2023    HCT 32.1 (L) 12/06/2023     Monitor serial CBC and transfuse if patient becomes hemodynamically unstable, symptomatic or H/H drops below 7/21.

## 2023-12-06 NOTE — SUBJECTIVE & OBJECTIVE
Interval History: Patient's daughter, Sarah Murry at bedside this am. Sarah is from [place], [place] and is the healthcare power of . Patient anxious this am on exam and kept talking about DR. Soria and if I knew him. Daughter states Dr. Soria used to be her dentist. Patient kept saying Dr. Soria was the only doctor she knew and wanted to know if I had talked to him. I stated I did not know DR. Soria and that I was a doctor also and she was surprised to find out she was in the hospital and that I was a doctor. Daughter stated they had multiple family members who are doctors in the family. Patient has known dementia and definitely confused on exam today and daughter states this is not unusual for the patient. Patent had no idea she had surgery yesterday as she is POD # 1 from left hip hemiarthroplasty to repair left femoral neck fracture. Perineural catheter in place and on multimodal pain meds for pain control. Patient appeared comfortable on exam and was not expressing she was having any pain currently. Daughter wanted to speak to case management about discharge planning and I did speak with her floor , Jose G and he stated he would be by room today to talk to daughter. Her daughter stated her and her sisters would be rotating staying with the patient as they all live out of town. Patient to start PT/OT today. I personally reviewed patient's labs from today. Patient's Hgb decreased from 13.1 yesterday to 10.5 today and suspected blood loss related to hip fracture surgery. Final urine culture from admit grew multiple organisms but none in predominance.     Review of Systems   Unable to perform ROS: Dementia     Objective:     Vital Signs (Most Recent):  Temp: 97.9 °F (36.6 °C) (12/06/23 1156)  Pulse: 88 (12/06/23 1257)  Resp: 18 (12/06/23 1156)  BP: 132/62 (12/06/23 1156)  SpO2: 93 % (12/06/23 1156) on 2 liters of oxygen Vital Signs (24h Range):  Temp:  [96.3 °F (35.7 °C)-98.4 °F (36.9 °C)] 97.9 °F (36.6  °C)  Pulse:  [] 88  Resp:  [15-21] 18  SpO2:  [93 %-100 %] 93 %  BP: (117-144)/(57-87) 132/62     Weight: 49.9 kg (109 lb 15.8 oz)  Body mass index is 19.48 kg/m².    Intake/Output Summary (Last 24 hours) at 12/6/2023 1506  Last data filed at 12/6/2023 1115  Gross per 24 hour   Intake 830.06 ml   Output 800 ml   Net 30.06 ml         Physical Exam  Vitals and nursing note reviewed.   Constitutional:       General: She is awake. She is not in acute distress.     Appearance: Normal appearance. She is underweight. She is not ill-appearing.      Interventions: Nasal cannula in place.   Cardiovascular:      Rate and Rhythm: Normal rate and regular rhythm.      Heart sounds: Normal heart sounds. No murmur heard.     No friction rub. No gallop.   Pulmonary:      Effort: Pulmonary effort is normal. No respiratory distress.      Breath sounds: Normal breath sounds. No wheezing.   Abdominal:      General: Abdomen is flat. Bowel sounds are normal. There is no distension.      Palpations: Abdomen is soft.      Tenderness: There is no abdominal tenderness.   Musculoskeletal:      Right lower leg: No edema.      Left lower leg: No edema.   Skin:     General: Skin is warm.      Findings: No erythema.      Comments: Surgical bandage in place to left hip and bandage is clean and dry and intact. Left knee immobilizer in place.    Neurological:      Mental Status: She is alert. She is disoriented.      Comments: Oriented to self only   Psychiatric:         Behavior: Behavior is cooperative.         Cognition and Memory: Cognition is impaired. Memory is impaired.             Significant Labs: CBC:   Recent Labs   Lab 12/05/23  0013 12/05/23  1520 12/06/23  0342   WBC 11.68 12.57 13.58*   HGB 15.2 13.1 10.5*   HCT 43.9 40.3 32.1*    218 200     CMP:   Recent Labs   Lab 12/05/23  0013 12/06/23  0342    143   K 4.1 4.1    111*   CO2 22* 22*   * 113*   BUN 17 14   CREATININE 0.8 0.8   CALCIUM 9.7 8.9   PROT  6.8  --    ALBUMIN 3.7  --    BILITOT 0.8  --    ALKPHOS 85  --    AST 54*  --    ALT 66*  --    ANIONGAP 11 10     Magnesium:   Recent Labs   Lab 12/05/23  0013 12/06/23  0342   MG 2.1 1.9     Urine Culture, Routine   Date Value Ref Range Status   12/05/2023   Final    Multiple organisms isolated. None in predominance.  Repeat if   12/05/2023 clinically necessary.  Final     Lab Results   Component Value Date    [id number] 31 12/05/2023       Significant Imaging: I have reviewed all pertinent imaging results/findings within the past 24 hours.

## 2023-12-07 PROBLEM — E83.39 HYPOPHOSPHATEMIA: Status: ACTIVE | Noted: 2023-12-07

## 2023-12-07 PROBLEM — Z71.89 GOALS OF CARE, COUNSELING/DISCUSSION: Status: ACTIVE | Noted: 2023-12-07

## 2023-12-07 LAB
ANION GAP SERPL CALC-SCNC: 9 MMOL/L (ref 8–16)
BASOPHILS # BLD AUTO: 0.03 K/UL (ref 0–0.2)
BASOPHILS NFR BLD: 0.4 % (ref 0–1.9)
BUN SERPL-MCNC: 18 MG/DL (ref 8–23)
CALCIUM SERPL-MCNC: 8.5 MG/DL (ref 8.7–10.5)
CHLORIDE SERPL-SCNC: 111 MMOL/L (ref 95–110)
CO2 SERPL-SCNC: 23 MMOL/L (ref 23–29)
CREAT SERPL-MCNC: 0.7 MG/DL (ref 0.5–1.4)
DIFFERENTIAL METHOD: ABNORMAL
EOSINOPHIL # BLD AUTO: 0 K/UL (ref 0–0.5)
EOSINOPHIL NFR BLD: 0.1 % (ref 0–8)
ERYTHROCYTE [DISTWIDTH] IN BLOOD BY AUTOMATED COUNT: 13.3 % (ref 11.5–14.5)
EST. GFR  (NO RACE VARIABLE): >60 ML/MIN/1.73 M^2
GLUCOSE SERPL-MCNC: 99 MG/DL (ref 70–110)
HCT VFR BLD AUTO: 27.8 % (ref 37–48.5)
HGB BLD-MCNC: 9.1 G/DL (ref 12–16)
IMM GRANULOCYTES # BLD AUTO: 0.07 K/UL (ref 0–0.04)
IMM GRANULOCYTES NFR BLD AUTO: 0.8 % (ref 0–0.5)
LYMPHOCYTES # BLD AUTO: 1 K/UL (ref 1–4.8)
LYMPHOCYTES NFR BLD: 11.2 % (ref 18–48)
MAGNESIUM SERPL-MCNC: 2 MG/DL (ref 1.6–2.6)
MCH RBC QN AUTO: 31.4 PG (ref 27–31)
MCHC RBC AUTO-ENTMCNC: 32.7 G/DL (ref 32–36)
MCV RBC AUTO: 96 FL (ref 82–98)
MONOCYTES # BLD AUTO: 0.9 K/UL (ref 0.3–1)
MONOCYTES NFR BLD: 11 % (ref 4–15)
NEUTROPHILS # BLD AUTO: 6.6 K/UL (ref 1.8–7.7)
NEUTROPHILS NFR BLD: 76.5 % (ref 38–73)
NRBC BLD-RTO: 0 /100 WBC
PHOSPHATE SERPL-MCNC: 2.1 MG/DL (ref 2.7–4.5)
PLATELET # BLD AUTO: 132 K/UL (ref 150–450)
PMV BLD AUTO: 12.5 FL (ref 9.2–12.9)
POTASSIUM SERPL-SCNC: 4 MMOL/L (ref 3.5–5.1)
RBC # BLD AUTO: 2.9 M/UL (ref 4–5.4)
SARS-COV-2 RNA RESP QL NAA+PROBE: NOT DETECTED
SODIUM SERPL-SCNC: 143 MMOL/L (ref 136–145)
WBC # BLD AUTO: 8.56 K/UL (ref 3.9–12.7)

## 2023-12-07 PROCEDURE — 80048 BASIC METABOLIC PNL TOTAL CA: CPT | Mod: HCNC | Performed by: INTERNAL MEDICINE

## 2023-12-07 PROCEDURE — 99231 SBSQ HOSP IP/OBS SF/LOW 25: CPT | Mod: HCNC,,, | Performed by: ANESTHESIOLOGY

## 2023-12-07 PROCEDURE — 99231 PR SUBSEQUENT HOSPITAL CARE,LEVL I: ICD-10-PCS | Mod: HCNC,,, | Performed by: ANESTHESIOLOGY

## 2023-12-07 PROCEDURE — 97112 NEUROMUSCULAR REEDUCATION: CPT | Mod: HCNC

## 2023-12-07 PROCEDURE — 36415 COLL VENOUS BLD VENIPUNCTURE: CPT | Mod: HCNC | Performed by: INTERNAL MEDICINE

## 2023-12-07 PROCEDURE — 84100 ASSAY OF PHOSPHORUS: CPT | Mod: HCNC | Performed by: INTERNAL MEDICINE

## 2023-12-07 PROCEDURE — 87635 SARS-COV-2 COVID-19 AMP PRB: CPT | Mod: HCNC | Performed by: HOSPITALIST

## 2023-12-07 PROCEDURE — 97535 SELF CARE MNGMENT TRAINING: CPT | Mod: HCNC

## 2023-12-07 PROCEDURE — 97530 THERAPEUTIC ACTIVITIES: CPT | Mod: HCNC

## 2023-12-07 PROCEDURE — 51798 US URINE CAPACITY MEASURE: CPT | Mod: HCNC

## 2023-12-07 PROCEDURE — 25000003 PHARM REV CODE 250: Mod: HCNC | Performed by: INTERNAL MEDICINE

## 2023-12-07 PROCEDURE — 25000003 PHARM REV CODE 250: Mod: HCNC

## 2023-12-07 PROCEDURE — 83735 ASSAY OF MAGNESIUM: CPT | Mod: HCNC | Performed by: INTERNAL MEDICINE

## 2023-12-07 PROCEDURE — 63600175 PHARM REV CODE 636 W HCPCS: Mod: HCNC | Performed by: INTERNAL MEDICINE

## 2023-12-07 PROCEDURE — 25000003 PHARM REV CODE 250: Mod: HCNC | Performed by: HOSPITALIST

## 2023-12-07 PROCEDURE — 21400001 HC TELEMETRY ROOM: Mod: HCNC

## 2023-12-07 PROCEDURE — 85025 COMPLETE CBC W/AUTO DIFF WBC: CPT | Mod: HCNC | Performed by: INTERNAL MEDICINE

## 2023-12-07 RX ORDER — BISACODYL 10 MG
10 SUPPOSITORY, RECTAL RECTAL 2 TIMES DAILY PRN
Status: DISCONTINUED | OUTPATIENT
Start: 2023-12-07 | End: 2023-12-08 | Stop reason: HOSPADM

## 2023-12-07 RX ORDER — POLYETHYLENE GLYCOL 3350 17 G/17G
17 POWDER, FOR SOLUTION ORAL 2 TIMES DAILY
Status: DISCONTINUED | OUTPATIENT
Start: 2023-12-07 | End: 2023-12-08 | Stop reason: HOSPADM

## 2023-12-07 RX ORDER — ACETAMINOPHEN 500 MG
1000 TABLET ORAL EVERY 8 HOURS
Status: DISCONTINUED | OUTPATIENT
Start: 2023-12-07 | End: 2023-12-08 | Stop reason: HOSPADM

## 2023-12-07 RX ORDER — ACETAMINOPHEN 500 MG
1000 TABLET ORAL EVERY 6 HOURS
Status: DISCONTINUED | OUTPATIENT
Start: 2023-12-07 | End: 2023-12-07

## 2023-12-07 RX ORDER — ALPRAZOLAM 0.5 MG/1
0.5 TABLET ORAL ONCE
Status: COMPLETED | OUTPATIENT
Start: 2023-12-07 | End: 2023-12-07

## 2023-12-07 RX ADMIN — ROPIVACAINE HYDROCHLORIDE 0.1 ML/HR: 2 INJECTION, SOLUTION EPIDURAL; INFILTRATION at 03:12

## 2023-12-07 RX ADMIN — MUPIROCIN 1 G: 20 OINTMENT TOPICAL at 09:12

## 2023-12-07 RX ADMIN — RIVAROXABAN 15 MG: 15 TABLET, FILM COATED ORAL at 05:12

## 2023-12-07 RX ADMIN — SENNOSIDES AND DOCUSATE SODIUM 1 TABLET: 8.6; 5 TABLET ORAL at 09:12

## 2023-12-07 RX ADMIN — ACETAMINOPHEN 650 MG: 325 TABLET ORAL at 05:12

## 2023-12-07 RX ADMIN — POLYETHYLENE GLYCOL 3350 17 G: 17 POWDER, FOR SOLUTION ORAL at 09:12

## 2023-12-07 RX ADMIN — CEFTRIAXONE 1 G: 1 INJECTION, POWDER, FOR SOLUTION INTRAMUSCULAR; INTRAVENOUS at 10:12

## 2023-12-07 RX ADMIN — ACETAMINOPHEN 1000 MG: 500 TABLET ORAL at 09:12

## 2023-12-07 RX ADMIN — Medication 6 MG: at 09:12

## 2023-12-07 RX ADMIN — METHOCARBAMOL 500 MG: 500 TABLET ORAL at 12:12

## 2023-12-07 RX ADMIN — ALPRAZOLAM 0.5 MG: 0.5 TABLET ORAL at 05:12

## 2023-12-07 RX ADMIN — ESCITALOPRAM OXALATE 10 MG: 10 TABLET ORAL at 09:12

## 2023-12-07 RX ADMIN — ACETAMINOPHEN 1000 MG: 500 TABLET ORAL at 02:12

## 2023-12-07 RX ADMIN — BISACODYL 10 MG: 10 SUPPOSITORY RECTAL at 02:12

## 2023-12-07 RX ADMIN — DILTIAZEM HYDROCHLORIDE 240 MG: 120 CAPSULE, COATED, EXTENDED RELEASE ORAL at 09:12

## 2023-12-07 RX ADMIN — METHOCARBAMOL 500 MG: 500 TABLET ORAL at 05:12

## 2023-12-07 RX ADMIN — ACETAMINOPHEN 650 MG: 325 TABLET ORAL at 12:12

## 2023-12-07 NOTE — PROGRESS NOTES
"[facility] - [facility]  Orthopedics  Progress Note    Patient Name: Tigist Murry  MRN: [id number]  Admission Date: 12/4/2023  Hospital Length of Stay: 2 days  Attending Provider: Dora Salazar MD  Primary Care Provider: Gwen Porter MD  Follow-up For: Procedure(s) (LRB):  HEMIARTHROPLASTY, HIP, POSTERIOR APPROACH left (Left)    Post-Operative Day: 2 Days Post-Op  Subjective:     Principal Problem:Closed displaced fracture of left femoral neck    Principal Orthopedic Problem: s/p L hip jose antonio on 12/5     Interval History: NAEON. VSS. Pain controlled. PNC in place. Hgb 9.1. L arm in sling. Mobilize with PT today     Review of patient's allergies indicates:   Allergen Reactions    Ciprofloxacin Other (See Comments)     Other reaction(s): Nausea  Other reaction(s): Nausea       Current Facility-Administered Medications   Medication    acetaminophen tablet 650 mg    bisacodyL suppository 10 mg    cefTRIAXone (ROCEPHIN) 1 g in dextrose 5 % in water (D5W) 100 mL IVPB (MB+)    diltiaZEM 24 hr capsule 240 mg    EScitalopram oxalate tablet 10 mg    melatonin tablet 6 mg    methocarbamoL tablet 500 mg    metoprolol injection 5 mg    mupirocin 2 % ointment 1 g    polyethylene glycol packet 17 g    rivaroxaban tablet 15 mg    ROPIvacaine (PF) 2 mg/ml (0.2%) solution    senna-docusate 8.6-50 mg per tablet 1 tablet    sodium chloride 0.9% flush 10 mL     Objective:     Vital Signs (Most Recent):  Temp: 98.2 °F (36.8 °C) (12/07/23 0457)  Pulse: 73 (12/07/23 0457)  Resp: 20 (12/07/23 0457)  BP: (!) 126/58 (12/07/23 0457)  SpO2: 97 % (12/07/23 0457) Vital Signs (24h Range):  Temp:  [97.8 °F (36.6 °C)-98.4 °F (36.9 °C)] 98.2 °F (36.8 °C)  Pulse:  [] 73  Resp:  [17-20] 20  SpO2:  [93 %-97 %] 97 %  BP: (118-139)/(58-68) 126/58     Weight: 49.9 kg (109 lb 15.8 oz)  Height: 5' 3" (160 cm)  Body mass index is 19.48 kg/m².      Intake/Output Summary (Last 24 hours) at 12/7/2023 0702  Last data filed at 12/7/2023 0522  Gross per " 24 hour   Intake 50 ml   Output 300 ml   Net -250 ml       Ortho/SPM Exam    Awake/alert/oriented x3, No acute distress, Afebrile, Vital signs stable  Good inspiratory effort with unlaboured breathing  Dressings c/d/i  NVI in operative limb   LUE in sling  L forearm hematoma stable    Significant Labs: CBC:   Recent Labs   Lab 12/05/23  1520 12/06/23  0342 12/07/23  0353   WBC 12.57 13.58* 8.56   HGB 13.1 10.5* 9.1*   HCT 40.3 32.1* 27.8*    200 132*     CMP:   Recent Labs   Lab 12/06/23  0342 12/07/23  0353    143   K 4.1 4.0   * 111*   CO2 22* 23   * 99   BUN 14 18   CREATININE 0.8 0.7   CALCIUM 8.9 8.5*   ANIONGAP 10 9     All pertinent labs within the past 24 hours have been reviewed.    Significant Imaging: I have reviewed all pertinent imaging results/findings.    Assessment/Plan:     Closed displaced fracture of left femoral neck s/p hemiarthroplasty on 12/5/2023  Tigist Murry is a 83 y.o. female with left femoral neck fracture s/p L hip jose antonio on 12/6. Pt with left shoulder dx reduced in ED.         Pain control: MM  PT/OT: WBAT LLE; posterior hip prec, Ok to remove LUE sling to mobilize with walker, sling for comfort;   DVT PPx: restart home xarelto, SCDs at all times when not ambulating  Abx: postop Ancef  Labs: hgb 9.1  Matilde: nikolai      Dispo: f/u PT recs      Lashay Santiago PA-C  Orthopedics  [facility] - Surgery

## 2023-12-07 NOTE — SUBJECTIVE & OBJECTIVE
Interval History: see above    Review of Systems   Constitutional:  Positive for activity change. Negative for appetite change and fever.   HENT:  Negative for trouble swallowing.    Respiratory:  Negative for shortness of breath.    Cardiovascular:  Negative for chest pain and leg swelling.   Gastrointestinal:  Negative for constipation, diarrhea, nausea and vomiting.   Genitourinary:  Negative for difficulty urinating and hematuria.   Neurological:  Negative for numbness and headaches.   Psychiatric/Behavioral:  Negative for behavioral problems.      Objective:     Vital Signs (Most Recent):  Temp: 97.5 °F (36.4 °C) (12/07/23 0745)  Pulse: 67 (12/07/23 0745)  Resp: 16 (12/07/23 0745)  BP: (!) 121/58 (12/07/23 0745)  SpO2: (!) 92 % (12/07/23 0745) Vital Signs (24h Range):  Temp:  [97.5 °F (36.4 °C)-98.4 °F (36.9 °C)] 97.5 °F (36.4 °C)  Pulse:  [] 67  Resp:  [16-20] 16  SpO2:  [92 %-97 %] 92 %  BP: (118-133)/(58-64) 121/58     Weight: 49.9 kg (109 lb 15.8 oz)  Body mass index is 19.48 kg/m².    Intake/Output Summary (Last 24 hours) at 12/7/2023 0826  Last data filed at 12/7/2023 0522  Gross per 24 hour   Intake 50 ml   Output 300 ml   Net -250 ml         Physical Exam  Constitutional:       General: She is not in acute distress.     Appearance: Normal appearance.   HENT:      Head: Normocephalic and atraumatic.      Nose: Nose normal.      Mouth/Throat:      Mouth: Mucous membranes are moist.   Eyes:      General: No scleral icterus.     Extraocular Movements: Extraocular movements intact.      Pupils: Pupils are equal, round, and reactive to light.   Cardiovascular:      Rate and Rhythm: Normal rate and regular rhythm.      Pulses: Normal pulses.      Heart sounds: Normal heart sounds.   Pulmonary:      Effort: Pulmonary effort is normal.      Breath sounds: Normal breath sounds. No wheezing or rhonchi.   Chest:      Chest wall: No tenderness.   Abdominal:      General: Abdomen is flat. Bowel sounds are  normal. There is no distension.      Palpations: Abdomen is soft.      Tenderness: There is no abdominal tenderness. There is no right CVA tenderness, left CVA tenderness, guarding or rebound.   Musculoskeletal:         General: No swelling, tenderness, deformity or signs of injury. Normal range of motion.      Cervical back: Normal range of motion and neck supple. No rigidity or tenderness.      Comments: Left hip- bandage intact   Skin:     General: Skin is warm and dry.      Coloration: Skin is not jaundiced or pale.      Findings: No erythema or rash.   Neurological:      General: No focal deficit present.      Mental Status: She is alert and oriented to person, place, and time. Mental status is at baseline.      Cranial Nerves: No cranial nerve deficit.      Motor: No weakness.   Psychiatric:         Mood and Affect: Mood normal.         Behavior: Behavior normal.         Thought Content: Thought content normal.         Judgment: Judgment normal.             Significant Labs: All pertinent labs within the past 24 hours have been reviewed.  CBC:   Recent Labs   Lab 12/05/23  1520 12/06/23  0342 12/07/23  0353   WBC 12.57 13.58* 8.56   HGB 13.1 10.5* 9.1*   HCT 40.3 32.1* 27.8*    200 132*     CMP:   Recent Labs   Lab 12/06/23  0342 12/07/23  0353    143   K 4.1 4.0   * 111*   CO2 22* 23   * 99   BUN 14 18   CREATININE 0.8 0.7   CALCIUM 8.9 8.5*   ANIONGAP 10 9       Significant Imaging: I have reviewed all pertinent imaging results/findings within the past 24 hours.

## 2023-12-07 NOTE — ASSESSMENT & PLAN NOTE
Chronic, controlled on current regimen. Latest blood pressure and vitals reviewed-     Temp:  [97.5 °F (36.4 °C)-98.4 °F (36.9 °C)]   Pulse:  []   Resp:  [16-20]   BP: (118-133)/(58-64)   SpO2:  [92 %-97 %] .   Home meds for hypertension were reviewed and noted below.   Hypertension Medications               diltiaZEM (CARDIZEM CD) 240 MG 24 hr capsule Take 1 capsule (240 mg total) by mouth once daily.            While in the hospital, will manage blood pressure as follows; Continue home antihypertensive regimen with Diltiazem.     Will utilize p.r.n. blood pressure medication only if patient's blood pressure greater than 180/110 and she develops symptoms such as worsening chest pain or shortness of breath.

## 2023-12-07 NOTE — PROGRESS NOTES
[facility] Medicine  Progress Note    Patient Name: Tigist Murry  MRN: [id number]  Patient Class: IP- Inpatient   Admission Date: 12/4/2023  Length of Stay: 2 days  Attending Physician: Dora Salazar MD  Primary Care Provider: Gwen Porter MD        Subjective:     Principal Problem:Closed displaced fracture of left femoral neck        HPI:  84 yo female with Afib on xarelto, HTN, HPLD, mild dementia presenting after a fall in her bathroom at her group home. Most of history taken by daughter at bedside as patient had just received pain meds. It is uncertain for how long she was down, if she hit her head or lost consciousness. She did have left hip and left shoulder pain after amaury found, with the pain in her shoulder more severe than her leg. She normally uses a walker to get around most of the time, but does occasionally walks with out assistance. She has no known CVA/TIA, cardiac history. Per daughter at bedside about 5 months ago she was fairly active but since moving to the group home has been a little less active than normal.     IN ER, imaging showing left shoulder displacement, left femoral frax. Ortho consulted for left shoulder displacement, which was reset. UA with signs of UTI, rocephin given. Medicine called for admit.       Overview/Hospital Course:  Patient admitted to [facility] Medicine Team H: Hip Fracture team and started on Hip Fracture Pathway with Orthopedic surgery consult for hip fracture. Patient was seen and evaluated by Orthopedic surgery who recommended operative repair of hip fracture. Patient was medically optimized prior to surgery and was taken to OR after optimization on 12/5/2023. Patient underwent left hip hemiarthroplasty by Dr. Tani Martines. Post-op patient WBAT with posterior hip precautions to the left lower extremity as per Orthopedics recommendation. Patient restarted on home Xarelto at 20 mg po daily for her known atrial fibrillation for long  term anticoagulation so fully anticoagulated so did not need chemical DVT prophylaxis post-op. Perineural pain catheter placed by Anesthesia Pain Service with continuous infusion of Ropivacaine to help with pain control post-op and Anesthesia Pain Service managing while patient in the hospital. Patient placed on multimodal pain management post-op with Tylenol 1000 mg po every 6 hours post-op and will continue. PT/OT consulted post-op.    12/7- s/p left femoral neck fracture s/p L hip jose antonio on 12/6. Pt with left shoulder dx reduced in ED. Pain control: MM. PT/OT: WBAT LLE; posterior hip prec, Ok to remove LUE sling to mobilize with walker, sling for comfort; DVT PPx: restart home xarelto, SCDs at all times when not ambulating. Matilde dc'd. SNF eval pending. /58 (BP Location: Right arm, Patient Position: Lying)   Pulse 67 . Dc prn metoprolol.   [facility] has accepted pt for admit 12/8. Need to have a  BM.       Interval History: see above    Review of Systems   Constitutional:  Positive for activity change. Negative for appetite change and fever.   HENT:  Negative for trouble swallowing.    Respiratory:  Negative for shortness of breath.    Cardiovascular:  Negative for chest pain and leg swelling.   Gastrointestinal:  Negative for constipation, diarrhea, nausea and vomiting.   Genitourinary:  Negative for difficulty urinating and hematuria.   Neurological:  Negative for numbness and headaches.   Psychiatric/Behavioral:  Negative for behavioral problems.      Objective:     Vital Signs (Most Recent):  Temp: 97.5 °F (36.4 °C) (12/07/23 0745)  Pulse: 67 (12/07/23 0745)  Resp: 16 (12/07/23 0745)  BP: (!) 121/58 (12/07/23 0745)  SpO2: (!) 92 % (12/07/23 0745) Vital Signs (24h Range):  Temp:  [97.5 °F (36.4 °C)-98.4 °F (36.9 °C)] 97.5 °F (36.4 °C)  Pulse:  [] 67  Resp:  [16-20] 16  SpO2:  [92 %-97 %] 92 %  BP: (118-133)/(58-64) 121/58     Weight: 49.9 kg (109 lb 15.8 oz)  Body mass index is 19.48  kg/m².    Intake/Output Summary (Last 24 hours) at 12/7/2023 0884  Last data filed at 12/7/2023 0522  Gross per 24 hour   Intake 50 ml   Output 300 ml   Net -250 ml         Physical Exam  Constitutional:       General: She is not in acute distress.     Appearance: Normal appearance.   HENT:      Head: Normocephalic and atraumatic.      Nose: Nose normal.      Mouth/Throat:      Mouth: Mucous membranes are moist.   Eyes:      General: No scleral icterus.     Extraocular Movements: Extraocular movements intact.      Pupils: Pupils are equal, round, and reactive to light.   Cardiovascular:      Rate and Rhythm: Normal rate and regular rhythm.      Pulses: Normal pulses.      Heart sounds: Normal heart sounds.   Pulmonary:      Effort: Pulmonary effort is normal.      Breath sounds: Normal breath sounds. No wheezing or rhonchi.   Chest:      Chest wall: No tenderness.   Abdominal:      General: Abdomen is flat. Bowel sounds are normal. There is no distension.      Palpations: Abdomen is soft.      Tenderness: There is no abdominal tenderness. There is no right CVA tenderness, left CVA tenderness, guarding or rebound.   Musculoskeletal:         General: No swelling, tenderness, deformity or signs of injury. Normal range of motion.      Cervical back: Normal range of motion and neck supple. No rigidity or tenderness.      Comments: Left hip- bandage intact   Skin:     General: Skin is warm and dry.      Coloration: Skin is not jaundiced or pale.      Findings: No erythema or rash.   Neurological:      General: No focal deficit present.      Mental Status: She is alert and oriented to person, place, and time. Mental status is at baseline.      Cranial Nerves: No cranial nerve deficit.      Motor: No weakness.   Psychiatric:         Mood and Affect: Mood normal.         Behavior: Behavior normal.         Thought Content: Thought content normal.         Judgment: Judgment normal.             Significant Labs: All pertinent  labs within the past 24 hours have been reviewed.  CBC:   Recent Labs   Lab 12/05/23  1520 12/06/23  0342 12/07/23  0353   WBC 12.57 13.58* 8.56   HGB 13.1 10.5* 9.1*   HCT 40.3 32.1* 27.8*    200 132*     CMP:   Recent Labs   Lab 12/06/23  0342 12/07/23  0353    143   K 4.1 4.0   * 111*   CO2 22* 23   * 99   BUN 14 18   CREATININE 0.8 0.7   CALCIUM 8.9 8.5*   ANIONGAP 10 9       Significant Imaging: I have reviewed all pertinent imaging results/findings within the past 24 hours.    Assessment/Plan:      * Closed displaced fracture of left femoral neck s/p hemiarthroplasty on 12/5/2023  Patient underwent left hip hemiarthroplasty by Dr. Tani Martines on 12/5/2023 for femoral neck fracture.   Patient reports no pain in left hip.   Plan is to start PT/OT for gait training and strengthening and restoration of ADL's. Patient is weight bear as tolerated to left lower extremity, posterior hip precautions as per Orthopedic recommendations.   Patient restarted on home Xarelto post-op so takes long term for her anticoagulation for her atrial fibrillation so fully anticoagulated so needs no other chemical DVT prophylaxis post-op.   Orthopedics is following and managing hip fracture and surgical site.   Perineural pain catheter in place with continuous Ropivacaine infusion for pain control and being managed by Anesthesia Pain Service.   Patient on multimodal pain management post-op with Tylenol 650 mg po every 6 hours and Robaxin 500 mg po 4 times daily post-op and will continue.  Discontinue Clayton to gravity.   12/7- stable, POD 2    Closed dislocation of left shoulder  Patient found to have anterolateral left shoulder dislocation on admit. Orthopedics consulted and reduced shoulder dislocation successfully in ED with sedation.  Per Ortho, patient placed in sling to left arm for comfort. Ortho states okay to remove sling to mobilize with therapy.   Pain controlled and continue multimodal pain meds to  "treat.      Compression fracture of L1 lumbar vertebra  Present on admit and noted on CT scan of chest, abdomen and pelvis to have "New compression deformity of the superior endplate of L1 with less than 25% height loss compared to CT from 09/16/2022, age indeterminate." Patient denies any low back pain. No treatment or intervention needed.       Long term current use of anticoagulant  This patient has long term use on an anticoagulant with [employer] Anticoagulant(s): xarelto . Their long term anticoagulation will be Held or Continued: continued. They are on long term anticoagulation due to Reason for Anticoagulation: Atrial fibrillation.     Essential hypertension  Chronic, controlled on current regimen. Latest blood pressure and vitals reviewed-     Temp:  [97.5 °F (36.4 °C)-98.4 °F (36.9 °C)]   Pulse:  []   Resp:  [16-20]   BP: (118-133)/(58-64)   SpO2:  [92 %-97 %] .   Home meds for hypertension were reviewed and noted below.   Hypertension Medications               diltiaZEM (CARDIZEM CD) 240 MG 24 hr capsule Take 1 capsule (240 mg total) by mouth once daily.            While in the hospital, will manage blood pressure as follows; Continue home antihypertensive regimen with Diltiazem.     Will utilize p.r.n. blood pressure medication only if patient's blood pressure greater than 180/110 and she develops symptoms such as worsening chest pain or shortness of breath.    Atrial fibrillation and flutter  Long term current use of anticoagulant   Patient with Persistent (7 days or more) atrial fibrillation which is controlled currently with Diltiazem  mg po daily. Patient is currently in atrial fibrillation.[id number] Score: 3. Anticoagulation indicated and resumed post-op with home  Xarelto 15 mg po daily (adjusted for weight, age and renal dosing).     Alzheimer's disease with late onset (CODE)  Patient with dementia with likely etiology of alzheimer's dementia. Dementia is mild. The patient does not have " signs of behavioral disturbance. Home dementia medications are Held or Continued: not indicated at this time . Continue non-pharmacologic interventions to prevent delirium (No VS between 11PM-5AM, activity during day, opening blinds, providing glasses/hearing aids, and up in chair during daytime). Will avoid narcotics and benzos unless absolutely necessary. PRN anti-psychotics are not prescribed at this time but can be added    Goals of care, counseling/discussion  - Patient has (3) daughters: Sarah Murry who is the power of  and she lives in [place], [place] and the other (2) daughters are from [place], [place] and [place], [place].  - SNF on discharge.     Acute blood loss anemia  Patient's anemia is currently controlled. Has not received any PRBCs to date. Etiology likely due to acute blood loss which was from hip fracture and surgery and expected blood loss.  Current CBC reviewed-   Lab Results   Component Value Date    HGB 10.5 (L) 12/06/2023    HCT 32.1 (L) 12/06/2023     Monitor serial CBC and transfuse if patient becomes hemodynamically unstable, symptomatic or H/H drops below 7/21.    Atelectasis  I have personally reviewed Chest X-ray. Patient with atelectasis on interpretation. Likely Non-Obstructive atelectasis secondary to immobility. Signs and symptoms include Hypoxemia Will begin treatment with incentive spirometry.    Acute cystitis without hematuria  Present on admit. U/A on admit with >100 WBC, nitrate positive and occasional bacteria. Due to her dementia unknown if has any UTI symptoms so will treat. Plan 3 day course of IV Ceftriaxone to treat. Final urine culture grew multiple organisms but none in predominance.       Pure hypercholesterolemia  Chronic, controlled, continue to monitor. Patient not on medical treatment as outpatient.         VTE Risk Mitigation (From admission, onward)           Ordered     rivaroxaban tablet 15 mg  With dinner         12/05/23 1659     Place sequential  compression device  Until discontinued         12/05/23 0925     Place sequential compression device  Until discontinued         12/05/23 0433                    Discharge Planning   CLARKE: 12/8/2023     Code Status: Full Code   Is the patient medically ready for discharge?: No    Reason for patient still in hospital (select all that apply): Patient trending condition  Discharge Plan A: Skilled Nursing Facility          Dora Salazar MD  Department of [facility] Medicine   [facility] - Surgery

## 2023-12-07 NOTE — PT/OT/SLP PROGRESS
Physical Therapy Co-Treatment    OT present for cotreat due to pt's multiple medical comorbidities and functional/cognition deficits requiring two skilled therapists to appropriately progress pt's musculoskeletal strength, neuromuscular control, and endurance while taking into consideration medical acuity and pt safety.    Patient Name:  Tigist Murry   MRN:  [id number]    Recommendations:     Discharge Recommendations: Moderate Intensity Therapy  Discharge Equipment Recommendations: to be determined by next level of care  Barriers to discharge: None    Assessment:     Tigist Murry is a 83 y.o. female admitted with a medical diagnosis of Closed displaced fracture of left femoral neck.  She presents with the following impairments/functional limitations: weakness, impaired endurance, impaired self care skills, impaired functional mobility, gait instability, impaired balance, decreased upper extremity function, decreased lower extremity function, decreased ROM, impaired cognition, decreased coordination, orthopedic precautions, decreased safety awareness     Pt agreeable and participated in therapy fairly well but activity limited due to increased pain with mobility. 3 sit <> stand trials performed this session from EOB with RW and max A of 2 persons. Pt able to achieve upright posture on last 2 trials. Patient continues to demonstrate the need for moderate intensity therapy on a daily basis post acute exhibited by decreased independence with self-care and functional mobility.    Rehab Prognosis: Good; patient would benefit from acute skilled PT services to address these deficits and reach maximum level of function.    Recent Surgery: Procedure(s) (LRB):  HEMIARTHROPLASTY, HIP, POSTERIOR APPROACH left (Left) 2 Days Post-Op    Plan:     During this hospitalization, patient to be seen 4 x/week (Hip pathway 12/6, 12/7. 12/8, then 4x/week after) to address the identified rehab impairments via gait training, therapeutic  "activities, therapeutic exercises, neuromuscular re-education and progress toward the following goals:    Plan of Care Expires:  01/05/24    Subjective     Chief Complaint: L hip pain  Patient/Family Comments/goals: "You don't know how much it hurts"  Pain/Comfort:  Pain Rating 1:  (Pt did not rate)  Location - Side 1: Left  Location - Orientation 1: generalized  Location 1: hip  Pain Addressed 1: Reposition, Distraction  Pain Rating Post-Intervention 1:  (not rated)      Objective:     Communicated with RN prior to session.  Patient found HOB elevated with perineural catheter, peripheral IV, FCD, bed alarm, telemetry, SCD upon PT entry to room.     General Precautions: Standard, fall  Orthopedic Precautions: LLE weight bearing as tolerated, LLE posterior precautions  Braces: UE Sling  Respiratory Status: Room air     Functional Mobility:    Bed Mobility:   Rolling: to L with maximal assistance and of 2 persons  Supine > Sit: maximal assistance and of 2 persons  Sit > Supine: maximal assistance and of 2 persons    Transfers:   Sit <> Stand Transfer: maximal assistance and of 2 persons from EOB using rolling walker  x3 Trials  PT blocking L knee  Trial 1: pt unable to achieve upright posture  Trial 2 & 3: pt needed verbal and tactile cues for anterior weight shift, knee and hip extension for full upright posture.  Pt stood ~30-45 sec each trial    Balance:   Sitting balance: FAIR+: Maintains balance through MINIMAL excursions of active trunk motion  Pt sat EOB with min A initially due to posture lean with feet not flat on floor that progressed to SBA primarily with minimal cues foot position on floor for balance  Pt able to perform ADLs with OT while sitting EOB  Standing balance:   0: Needs MAXIMAL assist to maintain   Pt needed max A of 2 persons to stand with RW due tactile cues needed for knee and hip extension and anterior weight shift.      AM-PAC 6 CLICK MOBILITY  Turning over in bed (including adjusting " bedclothes, sheets and blankets)?: 2  Sitting down on and standing up from a chair with arms (e.g., wheelchair, bedside commode, etc.): 2  Moving from lying on back to sitting on the side of the bed?: 2  Moving to and from a bed to a chair (including a wheelchair)?: 1  Need to walk in hospital room?: 1  Climbing 3-5 steps with a railing?: 1  Basic Mobility Total Score: 9       Treatment & Education:  Pt educated on tip to reduce fall risk and safety with mobility and using call button for assistance from nursing staff with OOB mobility.  All questions answered within the scope of PT.  White board updated accordingly.      Patient left HOB elevated with all lines intact, call button in reach, bed alarm on, and daughter present..    GOALS:   Multidisciplinary Problems       Physical Therapy Goals          Problem: Physical Therapy    Goal Priority Disciplines Outcome Goal Variances Interventions   Physical Therapy Goal     PT, PT/OT Ongoing, Progressing     Description: Goals to be met by: 23     Patient will increase functional independence with mobility by performin. Supine to sit with MInimal Assistance  2. Sit to stand transfer with Minimal Assistance  3. Bed to chair transfer with Minimal Assistance using Rolling Walker  4. Gait  x 50 feet with Minimal Assistance using Rolling Walker.   5. Sitting at edge of bed x8 minutes with Stand-by Assistance                         Time Tracking:     PT Received On: 23  PT Start Time: 1117     PT Stop Time: 1149  PT Total Time (min): 32 min     Billable Minutes: Neuromuscular Re-education 32    Treatment Type: Treatment  PT/PTA: PT           2023

## 2023-12-07 NOTE — PT/OT/SLP PROGRESS
Occupational Therapy   Co-Treatment    Name: Tigist Murry  MRN: [id number]  Admitting Diagnosis:  Closed displaced fracture of left femoral neck  2 Days Post-Op    Recommendations:     Discharge Recommendations: Moderate Intensity Therapy  Discharge Equipment Recommendations:  to be determined by next level of care  Barriers to discharge:  None    Assessment:     Tigist Murry is a 83 y.o. female with a medical diagnosis of Closed displaced fracture of left femoral neck.  She presents with the following performance deficits affecting function:  weakness, impaired endurance, impaired self care skills, impaired functional mobility, gait instability, impaired balance, impaired cardiopulmonary response to activity, pain, decreased safety awareness, decreased lower extremity function, decreased upper extremity function, impaired cognition, orthopedic precautions.     Pt agreeable to therapy and demonstrated progress towards her goals this date. Pt was able to sit EOB with less assistance this date and participate in a grooming task. She was then able to perform 3 sit to stands with more upright posture and the use of a RW. Pt tolerated standing for 45 seconds at best during 2nd trial. Pt continues to be limited by increased pain and mild confusion. Pt would benefit from continued skilled acute OT services during this admission in order to maximize independence and safety with ADLs and functional mobility to ensure safe return to PLOF in the least restrictive environment. Patient currently demonstrates a need for moderate intensity therapy on a daily basis post acute secondary to a decline in functional status due to injury    Rehab Prognosis:  Good; patient would benefit from acute skilled OT services to address these deficits and reach maximum level of function.       Plan:     Patient to be seen daily to address the above listed problems via self-care/home management, therapeutic activities, therapeutic exercises,  "neuromuscular re-education  Plan of Care Expires: 01/06/23  Plan of Care Reviewed with: patient, daughter    Subjective     "I want to walk, but I can't"     Chief Complaint: Pain  Patient/Family Comments/goals: To return to PLOF  Pain/Comfort:  Pain Rating 1: other (see comments) (not rated)  Location - Side 1: Left  Location - Orientation 1: generalized  Location 1: hip  Pain Addressed 1: Reposition, Distraction  Pain Rating Post-Intervention 1: other (see comments) (not rated)    Objective:     Co-evaluation/treatment performed due to patient's multiple deficits requiring two skilled therapists to appropriately and safely assess patient's strength and endurance while facilitating functional tasks in addition to accommodating for patient's activity tolerance.     Communicated with: RN prior to session.  Patient found HOB elevated with telemetry, perineural catheter, SCD, FCD, peripheral IV upon OT entry to room.    General Precautions: Standard, fall    Orthopedic Precautions:LLE weight bearing as tolerated, LLE posterior precautions  Braces: UE Sling (for comfort)  Respiratory Status: Room air     Occupational Performance:     Bed Mobility:    Patient completed Rolling/Turning to Left with  maximal assistance  Patient completed Scooting/Bridging with maximal assistance and 2 persons  Patient completed Supine to Sit with maximal assistance and 2 persons  Patient completed Sit to Supine with maximal assistance and 2 persons   Pt sat EOB with Mod A progressing to SBA     Functional Mobility/Transfers:  Patient completed Sit <> Stand Transfer with maximal assistance and of 2 persons  with  rolling walker   Performed 3 trials   LLE blocked and assistance provided to bring hands to walker   Pt with more upright posture this date and able to stand for 45 seconds at best     Activities of Daily Living:  Grooming: stand by assistance : to wash her face sitting EOB with SBA for sitting balance   Upper Body Dressing: " maximal assistance : to doff sling   Lower Body Dressing: total assistance : to don/doff shoes at bed level       [facility] 6 Click ADL: 15    Treatment & Education:  Therapist provided facilitation and instruction of proper body mechanics and fall prevention strategies during tasks listed above.  Instructed patient to sit in bedside chair daily to increase OOB/activity tolerance.  Instructed patient to use call light to have nursing staff assist with needs/transfers.  Discussed OT POC and answered all questions within OT scope of practice.  Whiteboard updated   Pillow placed under injured arm for comfort, Pt reports discomfort from sling       Patient left HOB elevated with all lines intact, call button in reach, and daughter  present    GOALS:   Multidisciplinary Problems       Occupational Therapy Goals          Problem: Occupational Therapy    Goal Priority Disciplines Outcome Interventions   Occupational Therapy Goal     OT, PT/OT Ongoing, Progressing    Description: Goals to be met by: 12/20/23     Patient will increase functional independence with ADLs by performing:    UE Dressing with Minimal Assistance.  LE Dressing with Moderate Assistance and Assistive Devices as needed.  Grooming while EOB with Supervision.  Toileting from bedside commode with Moderate Assistance for hygiene and clothing management.   Toilet transfer to bedside commode with Moderate Assistance.                         Time Tracking:     OT Date of Treatment: 12/07/23  OT Start Time: 1116  OT Stop Time: 1149  OT Total Time (min): 33 min    Billable Minutes:Self Care/Home Management 13  Therapeutic Activity 20    OT/CHELSY: OT          12/7/2023

## 2023-12-07 NOTE — ASSESSMENT & PLAN NOTE
Long term current use of anticoagulant   Patient with Persistent (7 days or more) atrial fibrillation which is controlled currently with Diltiazem  mg po daily. Patient is currently in atrial fibrillation.[id number] Score: 3. Anticoagulation indicated and resumed post-op with home  Xarelto 15 mg po daily (adjusted for weight, age and renal dosing).

## 2023-12-07 NOTE — ASSESSMENT & PLAN NOTE
- Patient has (3) daughters: Sarah Murry who is the power of  and she lives in [place], [place] and the other (2) daughters are from [place], [place] and [place], [place].  - SNF on discharge.

## 2023-12-07 NOTE — PLAN OF CARE
Problem: Adult Inpatient Plan of Care  Goal: Plan of Care Review  Outcome: Ongoing, Progressing  Goal: Patient-Specific Goal (Individualized)  Outcome: Ongoing, Progressing  Goal: Absence of Hospital-Acquired Illness or Injury  Outcome: Ongoing, Progressing  Goal: Optimal Comfort and Wellbeing  Outcome: Ongoing, Progressing  Goal: Readiness for Transition of Care  Outcome: Ongoing, Progressing     Problem: Adjustment to Injury (Hip Fracture Medical Management)  Goal: Optimal Coping with Change in Health Status  Outcome: Ongoing, Progressing     Problem: Bleeding (Hip Fracture Medical Management)  Goal: Absence of Bleeding  Outcome: Ongoing, Progressing     Problem: Bowel Elimination Impaired (Hip Fracture Medical Management)  Goal: Effective Bowel Elimination  Outcome: Ongoing, Progressing     Problem: Cognitive Decline Risk (Hip Fracture Medical Management)  Goal: Baseline Cognitive Function Maintained  Outcome: Ongoing, Progressing     Problem: Embolism (Hip Fracture Medical Management)  Goal: Absence of Embolism  Outcome: Ongoing, Progressing     Problem: Fracture Stabilization and Management (Hip Fracture Medical Management)  Goal: Fracture Stability  Outcome: Ongoing, Progressing     Problem: Functional Ability Impaired (Hip Fracture Medical Management)  Goal: Optimal Functional Performance  Outcome: Ongoing, Progressing     Problem: Pain (Hip Fracture Medical Management)  Goal: Acceptable Pain Level  Outcome: Ongoing, Progressing     Problem: Urinary Elimination Impaired (Hip Fracture Medical Management)  Goal: Effective Urinary Elimination  Outcome: Ongoing, Progressing     Problem: Bleeding (Surgery Nonspecified)  Goal: Absence of Bleeding  Outcome: Ongoing, Progressing     Problem: Bowel Motility Impaired (Surgery Nonspecified)  Goal: Effective Bowel Elimination  Outcome: Ongoing, Progressing     Problem: Fluid and Electrolyte Imbalance (Surgery Nonspecified)  Goal: Fluid and Electrolyte Balance  Outcome:  Ongoing, Progressing     Problem: Glycemic Control Impaired (Surgery Nonspecified)  Goal: Blood Glucose Level Within Targeted Range  Outcome: Ongoing, Progressing     Problem: Infection (Surgery Nonspecified)  Goal: Absence of Infection Signs and Symptoms  Outcome: Ongoing, Progressing     Problem: Ongoing Anesthesia Effects (Surgery Nonspecified)  Goal: Anesthesia/Sedation Recovery  Outcome: Ongoing, Progressing     Problem: Pain (Surgery Nonspecified)  Goal: Acceptable Pain Control  Outcome: Ongoing, Progressing     Problem: Postoperative Nausea and Vomiting (Surgery Nonspecified)  Goal: Nausea and Vomiting Relief  Outcome: Ongoing, Progressing     Problem: Postoperative Urinary Retention (Surgery Nonspecified)  Goal: Effective Urinary Elimination  Outcome: Ongoing, Progressing     Problem: Respiratory Compromise (Surgery Nonspecified)  Goal: Effective Oxygenation and Ventilation  Outcome: Ongoing, Progressing     Problem: Device-Related Complication Risk (Anesthesia/Analgesia, Neuraxial)  Goal: Safe Infusion Delivery Completion  Outcome: Ongoing, Progressing     Problem: Infection (Anesthesia/Analgesia, Neuraxial)  Goal: Absence of Infection Signs and Symptoms  Outcome: Ongoing, Progressing     Problem: Nausea and Vomiting (Anesthesia/Analgesia, Neuraxial)  Goal: Nausea and Vomiting Relief  Outcome: Ongoing, Progressing     Problem: Pain (Anesthesia/Analgesia, Neuraxial)  Goal: Effective Pain Control  Outcome: Ongoing, Progressing     Problem: Respiratory Compromise (Anesthesia/Analgesia, Neuraxial)  Goal: Effective Oxygenation and Ventilation  Outcome: Ongoing, Progressing     Problem: Sensorimotor Impairment (Anesthesia/Analgesia, Neuraxial)  Goal: Baseline Motor Function  Outcome: Ongoing, Progressing     Problem: Urinary Retention (Anesthesia/Analgesia, Neuraxial)  Goal: Effective Urinary Elimination  Outcome: Ongoing, Progressing     Problem: Fall Injury Risk  Goal: Absence of Fall and Fall-Related  Injury  Outcome: Ongoing, Progressing     Problem: Skin Injury Risk Increased  Goal: Skin Health and Integrity  Outcome: Ongoing, Progressing

## 2023-12-07 NOTE — ADDENDUM NOTE
Addendum  created 12/07/23 1331 by Mary Reynolds MD    Charge Capture section accepted, Cosign clinical note with attestation

## 2023-12-07 NOTE — ASSESSMENT & PLAN NOTE
I have personally reviewed Chest X-ray. Patient with atelectasis on interpretation. Likely Non-Obstructive atelectasis secondary to immobility. Signs and symptoms include Hypoxemia Will begin treatment with incentive spirometry.

## 2023-12-07 NOTE — ASSESSMENT & PLAN NOTE
Patient underwent left hip hemiarthroplasty by Dr. Tani Martines on 12/5/2023 for femoral neck fracture.   Patient reports no pain in left hip.   Plan is to start PT/OT for gait training and strengthening and restoration of ADL's. Patient is weight bear as tolerated to left lower extremity, posterior hip precautions as per Orthopedic recommendations.   Patient restarted on home Xarelto post-op so takes long term for her anticoagulation for her atrial fibrillation so fully anticoagulated so needs no other chemical DVT prophylaxis post-op.   Orthopedics is following and managing hip fracture and surgical site.   Perineural pain catheter in place with continuous Ropivacaine infusion for pain control and being managed by Anesthesia Pain Service.   Patient on multimodal pain management post-op with Tylenol 650 mg po every 6 hours and Robaxin 500 mg po 4 times daily post-op and will continue.  Discontinue Clayton to gravity.   12/7- stable, POD 2

## 2023-12-07 NOTE — ASSESSMENT & PLAN NOTE
This patient has long term use on an anticoagulant with [employer] Anticoagulant(s): xarelto . Their long term anticoagulation will be Held or Continued: continued. They are on long term anticoagulation due to Reason for Anticoagulation: Atrial fibrillation.

## 2023-12-07 NOTE — ADDENDUM NOTE
Addendum  created 12/06/23 2021 by Mary Reynolds MD    Charge Capture section accepted, Cosign clinical note with attestation

## 2023-12-08 ENCOUNTER — HOSPITAL ENCOUNTER (INPATIENT)
Facility: HOSPITAL | Age: 83
LOS: 21 days | Discharge: HOME HEALTH | DRG: 560 | End: 2023-12-29
Attending: HOSPITALIST | Admitting: HOSPITALIST
Payer: MEDICARE

## 2023-12-08 VITALS
OXYGEN SATURATION: 95 % | DIASTOLIC BLOOD PRESSURE: 55 MMHG | HEIGHT: 63 IN | TEMPERATURE: 98 F | WEIGHT: 110 LBS | RESPIRATION RATE: 16 BRPM | SYSTOLIC BLOOD PRESSURE: 127 MMHG | BODY MASS INDEX: 19.49 KG/M2 | HEART RATE: 81 BPM

## 2023-12-08 DIAGNOSIS — S72.002A CLOSED DISPLACED FRACTURE OF LEFT FEMORAL NECK: Primary | ICD-10-CM

## 2023-12-08 DIAGNOSIS — F33.1 MODERATE EPISODE OF RECURRENT MAJOR DEPRESSIVE DISORDER: ICD-10-CM

## 2023-12-08 DIAGNOSIS — S72.002A CLOSED DISPLACED FRACTURE OF LEFT FEMORAL NECK: ICD-10-CM

## 2023-12-08 DIAGNOSIS — I10 ESSENTIAL HYPERTENSION: ICD-10-CM

## 2023-12-08 DIAGNOSIS — I48.0 PAF (PAROXYSMAL ATRIAL FIBRILLATION): ICD-10-CM

## 2023-12-08 LAB
ANION GAP SERPL CALC-SCNC: 7 MMOL/L (ref 8–16)
BASOPHILS # BLD AUTO: 0.06 K/UL (ref 0–0.2)
BASOPHILS NFR BLD: 0.7 % (ref 0–1.9)
BUN SERPL-MCNC: 17 MG/DL (ref 8–23)
CALCIUM SERPL-MCNC: 8.8 MG/DL (ref 8.7–10.5)
CHLORIDE SERPL-SCNC: 111 MMOL/L (ref 95–110)
CO2 SERPL-SCNC: 22 MMOL/L (ref 23–29)
CREAT SERPL-MCNC: 0.7 MG/DL (ref 0.5–1.4)
DIFFERENTIAL METHOD: ABNORMAL
EOSINOPHIL # BLD AUTO: 0.1 K/UL (ref 0–0.5)
EOSINOPHIL NFR BLD: 1.1 % (ref 0–8)
ERYTHROCYTE [DISTWIDTH] IN BLOOD BY AUTOMATED COUNT: 13.5 % (ref 11.5–14.5)
EST. GFR  (NO RACE VARIABLE): >60 ML/MIN/1.73 M^2
GLUCOSE SERPL-MCNC: 80 MG/DL (ref 70–110)
HCT VFR BLD AUTO: 27.2 % (ref 37–48.5)
HGB BLD-MCNC: 8.4 G/DL (ref 12–16)
IMM GRANULOCYTES # BLD AUTO: 0.08 K/UL (ref 0–0.04)
IMM GRANULOCYTES NFR BLD AUTO: 1 % (ref 0–0.5)
LYMPHOCYTES # BLD AUTO: 1.1 K/UL (ref 1–4.8)
LYMPHOCYTES NFR BLD: 13.6 % (ref 18–48)
MAGNESIUM SERPL-MCNC: 2 MG/DL (ref 1.6–2.6)
MCH RBC QN AUTO: 29.9 PG (ref 27–31)
MCHC RBC AUTO-ENTMCNC: 30.9 G/DL (ref 32–36)
MCV RBC AUTO: 97 FL (ref 82–98)
MONOCYTES # BLD AUTO: 0.8 K/UL (ref 0.3–1)
MONOCYTES NFR BLD: 9.9 % (ref 4–15)
NEUTROPHILS # BLD AUTO: 6 K/UL (ref 1.8–7.7)
NEUTROPHILS NFR BLD: 73.7 % (ref 38–73)
NRBC BLD-RTO: 0 /100 WBC
PHOSPHATE SERPL-MCNC: 2.2 MG/DL (ref 2.7–4.5)
PLATELET # BLD AUTO: 168 K/UL (ref 150–450)
PMV BLD AUTO: 12.3 FL (ref 9.2–12.9)
POTASSIUM SERPL-SCNC: 4 MMOL/L (ref 3.5–5.1)
RBC # BLD AUTO: 2.81 M/UL (ref 4–5.4)
SODIUM SERPL-SCNC: 140 MMOL/L (ref 136–145)
WBC # BLD AUTO: 8.11 K/UL (ref 3.9–12.7)

## 2023-12-08 PROCEDURE — 85025 COMPLETE CBC W/AUTO DIFF WBC: CPT | Mod: HCNC | Performed by: INTERNAL MEDICINE

## 2023-12-08 PROCEDURE — 63600175 PHARM REV CODE 636 W HCPCS: Mod: HCNC | Performed by: INTERNAL MEDICINE

## 2023-12-08 PROCEDURE — 25000003 PHARM REV CODE 250: Mod: HCNC | Performed by: INTERNAL MEDICINE

## 2023-12-08 PROCEDURE — 97530 THERAPEUTIC ACTIVITIES: CPT | Mod: HCNC

## 2023-12-08 PROCEDURE — 97535 SELF CARE MNGMENT TRAINING: CPT | Mod: HCNC

## 2023-12-08 PROCEDURE — 11000004 HC SNF PRIVATE: Mod: HCNC

## 2023-12-08 PROCEDURE — 83735 ASSAY OF MAGNESIUM: CPT | Mod: HCNC | Performed by: INTERNAL MEDICINE

## 2023-12-08 PROCEDURE — 25000003 PHARM REV CODE 250: Mod: HCNC | Performed by: HOSPITALIST

## 2023-12-08 PROCEDURE — 84100 ASSAY OF PHOSPHORUS: CPT | Mod: HCNC | Performed by: INTERNAL MEDICINE

## 2023-12-08 PROCEDURE — 25000003 PHARM REV CODE 250: Mod: HCNC

## 2023-12-08 PROCEDURE — 99231 SBSQ HOSP IP/OBS SF/LOW 25: CPT | Mod: HCNC,,, | Performed by: ANESTHESIOLOGY

## 2023-12-08 PROCEDURE — 36415 COLL VENOUS BLD VENIPUNCTURE: CPT | Mod: HCNC | Performed by: INTERNAL MEDICINE

## 2023-12-08 PROCEDURE — 99231 PR SUBSEQUENT HOSPITAL CARE,LEVL I: ICD-10-PCS | Mod: HCNC,,, | Performed by: ANESTHESIOLOGY

## 2023-12-08 PROCEDURE — 80048 BASIC METABOLIC PNL TOTAL CA: CPT | Mod: HCNC | Performed by: INTERNAL MEDICINE

## 2023-12-08 RX ORDER — DILTIAZEM HYDROCHLORIDE 120 MG/1
240 CAPSULE, COATED, EXTENDED RELEASE ORAL DAILY
Status: DISCONTINUED | OUTPATIENT
Start: 2023-12-09 | End: 2023-12-29 | Stop reason: HOSPADM

## 2023-12-08 RX ORDER — AMOXICILLIN 250 MG
1 CAPSULE ORAL 2 TIMES DAILY
Qty: 60 TABLET | Refills: 0 | Status: SHIPPED | OUTPATIENT
Start: 2023-12-08

## 2023-12-08 RX ORDER — TALC
6 POWDER (GRAM) TOPICAL NIGHTLY PRN
Qty: 30 TABLET | Refills: 0 | Status: SHIPPED | OUTPATIENT
Start: 2023-12-08

## 2023-12-08 RX ORDER — SODIUM,POTASSIUM PHOSPHATES 280-250MG
1 POWDER IN PACKET (EA) ORAL
Status: DISPENSED | OUTPATIENT
Start: 2023-12-08 | End: 2023-12-10

## 2023-12-08 RX ORDER — POLYETHYLENE GLYCOL 3350 17 G/17G
17 POWDER, FOR SOLUTION ORAL 2 TIMES DAILY
Status: DISCONTINUED | OUTPATIENT
Start: 2023-12-08 | End: 2023-12-29 | Stop reason: HOSPADM

## 2023-12-08 RX ORDER — SODIUM,POTASSIUM PHOSPHATES 280-250MG
1 POWDER IN PACKET (EA) ORAL
Status: DISCONTINUED | OUTPATIENT
Start: 2023-12-08 | End: 2023-12-08 | Stop reason: HOSPADM

## 2023-12-08 RX ORDER — METHOCARBAMOL 500 MG/1
500 TABLET, FILM COATED ORAL 4 TIMES DAILY
Status: COMPLETED | OUTPATIENT
Start: 2023-12-08 | End: 2023-12-18

## 2023-12-08 RX ORDER — ACETAMINOPHEN 500 MG
500 TABLET ORAL EVERY 6 HOURS PRN
Qty: 30 TABLET | Refills: 0 | Status: ON HOLD | OUTPATIENT
Start: 2023-12-08 | End: 2023-12-27 | Stop reason: HOSPADM

## 2023-12-08 RX ORDER — AMOXICILLIN 250 MG
1 CAPSULE ORAL 2 TIMES DAILY
Status: DISCONTINUED | OUTPATIENT
Start: 2023-12-08 | End: 2023-12-29 | Stop reason: HOSPADM

## 2023-12-08 RX ORDER — ESCITALOPRAM OXALATE 10 MG/1
10 TABLET ORAL DAILY
Status: DISCONTINUED | OUTPATIENT
Start: 2023-12-09 | End: 2023-12-29 | Stop reason: HOSPADM

## 2023-12-08 RX ORDER — BISACODYL 10 MG
10 SUPPOSITORY, RECTAL RECTAL 2 TIMES DAILY PRN
Qty: 30 SUPPOSITORY | Refills: 0 | Status: ON HOLD | OUTPATIENT
Start: 2023-12-08 | End: 2023-12-27 | Stop reason: HOSPADM

## 2023-12-08 RX ORDER — TALC
6 POWDER (GRAM) TOPICAL NIGHTLY PRN
Status: DISCONTINUED | OUTPATIENT
Start: 2023-12-08 | End: 2023-12-10

## 2023-12-08 RX ORDER — CALCIUM CARBONATE 200(500)MG
500 TABLET,CHEWABLE ORAL 2 TIMES DAILY PRN
Status: DISCONTINUED | OUTPATIENT
Start: 2023-12-08 | End: 2023-12-29 | Stop reason: HOSPADM

## 2023-12-08 RX ORDER — SODIUM,POTASSIUM PHOSPHATES 280-250MG
1 POWDER IN PACKET (EA) ORAL
Qty: 8 PACKET | Refills: 0 | Status: ON HOLD | OUTPATIENT
Start: 2023-12-08 | End: 2023-12-27 | Stop reason: HOSPADM

## 2023-12-08 RX ORDER — ACETAMINOPHEN 325 MG/1
650 TABLET ORAL EVERY 6 HOURS PRN
Status: DISCONTINUED | OUTPATIENT
Start: 2023-12-08 | End: 2023-12-29 | Stop reason: HOSPADM

## 2023-12-08 RX ORDER — ACETAMINOPHEN 500 MG
500 TABLET ORAL EVERY 6 HOURS PRN
Status: DISCONTINUED | OUTPATIENT
Start: 2023-12-08 | End: 2023-12-11

## 2023-12-08 RX ORDER — METHOCARBAMOL 500 MG/1
500 TABLET, FILM COATED ORAL EVERY 6 HOURS
Qty: 40 TABLET | Refills: 0 | Status: ON HOLD | OUTPATIENT
Start: 2023-12-08 | End: 2023-12-27 | Stop reason: HOSPADM

## 2023-12-08 RX ORDER — POLYETHYLENE GLYCOL 3350 17 G/17G
17 POWDER, FOR SOLUTION ORAL 2 TIMES DAILY
Qty: 510 G | Refills: 0 | Status: SHIPPED | OUTPATIENT
Start: 2023-12-08

## 2023-12-08 RX ADMIN — SENNOSIDES AND DOCUSATE SODIUM 1 TABLET: 8.6; 5 TABLET ORAL at 09:12

## 2023-12-08 RX ADMIN — POTASSIUM & SODIUM PHOSPHATES POWDER PACK 280-160-250 MG 1 PACKET: 280-160-250 PACK at 12:12

## 2023-12-08 RX ADMIN — METHOCARBAMOL 500 MG: 500 TABLET ORAL at 12:12

## 2023-12-08 RX ADMIN — METHOCARBAMOL 500 MG: 500 TABLET ORAL at 09:12

## 2023-12-08 RX ADMIN — METHOCARBAMOL 500 MG: 500 TABLET ORAL at 05:12

## 2023-12-08 RX ADMIN — ACETAMINOPHEN 1000 MG: 500 TABLET ORAL at 05:12

## 2023-12-08 RX ADMIN — POTASSIUM & SODIUM PHOSPHATES POWDER PACK 280-160-250 MG 1 PACKET: 280-160-250 PACK at 09:12

## 2023-12-08 RX ADMIN — ACETAMINOPHEN 650 MG: 325 TABLET ORAL at 09:12

## 2023-12-08 RX ADMIN — Medication 6 MG: at 09:12

## 2023-12-08 RX ADMIN — CEFTRIAXONE 1 G: 1 INJECTION, POWDER, FOR SOLUTION INTRAMUSCULAR; INTRAVENOUS at 09:12

## 2023-12-08 RX ADMIN — ESCITALOPRAM OXALATE 10 MG: 10 TABLET ORAL at 09:12

## 2023-12-08 RX ADMIN — POLYETHYLENE GLYCOL 3350 17 G: 17 POWDER, FOR SOLUTION ORAL at 09:12

## 2023-12-08 RX ADMIN — DILTIAZEM HYDROCHLORIDE 240 MG: 120 CAPSULE, COATED, EXTENDED RELEASE ORAL at 09:12

## 2023-12-08 RX ADMIN — MUPIROCIN 1 G: 20 OINTMENT TOPICAL at 09:12

## 2023-12-08 RX ADMIN — RIVAROXABAN 15 MG: 15 TABLET, FILM COATED ORAL at 09:12

## 2023-12-08 NOTE — PT/OT/SLP PROGRESS
Physical Therapy Co-Treatment    OT present for cotreat due to pt's multiple medical comorbidities and functional/cognition deficits requiring two skilled therapists to appropriately progress pt's musculoskeletal strength, neuromuscular control, and endurance while taking into consideration medical acuity and pt safety.    Patient Name:  Tigist Murry   MRN:  [id number]    Recommendations:     Discharge Recommendations: Moderate Intensity Therapy  Discharge Equipment Recommendations: to be determined by next level of care  Barriers to discharge: None    Assessment:     Tigist Murry is a 83 y.o. female admitted with a medical diagnosis of Closed displaced fracture of left femoral neck.  She presents with the following impairments/functional limitations: weakness, impaired endurance, impaired self care skills, impaired functional mobility, gait instability, impaired balance, decreased coordination, decreased upper extremity function, decreased lower extremity function, impaired cognition, pain, decreased safety awareness, decreased ROM, orthopedic precautions     Pt agreeable and tolerated PT session fairly today. Pt able to complete 3 sit <> stand trials this session with assistance from PT and OT. Pt able to stand ~1 min on last sit <> stand attempt. Patient continues to demonstrate the need for moderate intensity therapy on a daily basis post acute exhibited by decreased independence with self-care and functional mobility.    Rehab Prognosis: Good; patient would benefit from acute skilled PT services to address these deficits and reach maximum level of function.    Recent Surgery: Procedure(s) (LRB):  HEMIARTHROPLASTY, HIP, POSTERIOR APPROACH left (Left) 3 Days Post-Op    Plan:     During this hospitalization, patient to be seen 4 x/week to address the identified rehab impairments via gait training, therapeutic activities, therapeutic exercises, neuromuscular re-education and progress toward the following  "goals:    Plan of Care Expires:  01/05/24    Subjective     Chief Complaint: L hip pain with movement  Patient/Family Comments/goals: "I feel like I'm going backwards"  Pain/Comfort:  Pain Rating 1:  (pt did not rate)  Location - Side 1: Left  Location - Orientation 1: generalized  Location 1: hip  Pain Addressed 1: Reposition, Distraction  Pain Rating Post-Intervention 1:  (pt did not rate)      Objective:     Communicated with RN prior to session.  Patient found HOB elevated with telemetry, SCD, FCD, PureWick upon PT entry to room.     General Precautions: Standard, fall  Orthopedic Precautions: LLE weight bearing as tolerated, LLE posterior precautions  Braces: N/A  Respiratory Status: Room air     Functional Mobility:    Bed Mobility:   Rolling: to L with maximal assistance and of 2 persons  Supine > Sit: maximal assistance and of 2 persons  Sit > Supine: maximal assistance and of 2 persons    Transfers:   Sit <> Stand Transfer: maximal assistance and of 2 persons from EOB using rolling walker  for 2 Trials and HHA for 1 Trial  PT blocking L knee and foot  Pt needed verbal and tactile cues for knee and hip extension for upright posture  Pt needed tactile cues for hand placement on RW for trials 1 and 2  Pt stood ~ 1 min on last trial with max A of 2 persons using HHA    Balance:   Sitting balance: FAIR: Cannot move trunk without losing balance  Standing balance:   0: Needs MAXIMAL assist to maintain       AM-PAC 6 CLICK MOBILITY  Turning over in bed (including adjusting bedclothes, sheets and blankets)?: 2  Sitting down on and standing up from a chair with arms (e.g., wheelchair, bedside commode, etc.): 2  Moving from lying on back to sitting on the side of the bed?: 2  Moving to and from a bed to a chair (including a wheelchair)?: 1  Need to walk in hospital room?: 1  Climbing 3-5 steps with a railing?: 1  Basic Mobility Total Score: 9       Treatment & Education:  Transfer training performed this session with " pt needing max A of 2 persons to facilitate upright posture.  Pt educated on tip to reduce fall risk and safety with mobility and using call button for assistance from nursing staff with bed mobility.  All questions answered within the scope of PT.  White board updated accordingly.      Patient left HOB elevated with all lines intact, call button in reach, and daughter present..    GOALS:   Multidisciplinary Problems       Physical Therapy Goals          Problem: Physical Therapy    Goal Priority Disciplines Outcome Goal Variances Interventions   Physical Therapy Goal     PT, PT/OT Ongoing, Progressing     Description: Goals to be met by: 23     Patient will increase functional independence with mobility by performin. Supine to sit with MInimal Assistance  2. Sit to stand transfer with Minimal Assistance  3. Bed to chair transfer with Minimal Assistance using Rolling Walker  4. Gait  x 50 feet with Minimal Assistance using Rolling Walker.   5. Sitting at edge of bed x8 minutes with Stand-by Assistance                         Time Tracking:     PT Received On: 23  PT Start Time: 1330     PT Stop Time: 1353  PT Total Time (min): 23 min     Billable Minutes: Therapeutic Activity 23    Treatment Type: Treatment  PT/PTA: PT           2023

## 2023-12-08 NOTE — ANESTHESIA POST-OP PAIN MANAGEMENT
Acute Pain Service Progress Note    Tigist Murry is a 83 y.o., female, [id number].    Surgery:  HEMIARTHROPLASTY, HIP, POSTERIOR APPROACH left (Left: Hip)     Post Op Day #: 3    Catheter type: perineural  L SIFI 15ml/3h    Infusion type: Ropivacaine 0.2%  15ml/3h    Problem List:    Active Hospital Problems    Diagnosis  POA    *Closed displaced fracture of left femoral neck s/p hemiarthroplasty on 12/5/2023 [S72.002A]  Yes    Goals of care, counseling/discussion [Z71.89]  Not Applicable    Hypophosphatemia [E83.39]  No    Acute blood loss anemia [D62]  No    Closed dislocation of left shoulder [S43.005A]  Yes    Acute cystitis without hematuria [N30.00]  Yes    Atelectasis [J98.11]  Yes    Compression fracture of L1 lumbar vertebra [S32.010A]  Yes    Alzheimer's disease with late onset (CODE) [G30.1]  Yes    Atrial fibrillation and flutter [I48.91, I48.92]  Yes    Long term current use of anticoagulant [Z79.01]  Not Applicable    Pure hypercholesterolemia [E78.00]  Yes    Essential hypertension [I10]  Yes     Chronic      Resolved Hospital Problems    Diagnosis Date Resolved POA    Elevated INR [R79.1] 12/05/2023 Yes     Suspect 2/2 xarelto      Elevated LFTs [R79.89] 12/06/2023 Yes       Subjective:     General appearance of alert, oriented, no complaints   Pain with rest: 1    Numbers   Pain with movement: 5    Numbers   Side Effects    1. Pruritis No    2. Nausea No    3. Motor Blockade No, 1=Ability to bend knees and ankles    4. Sedation No, 1=awake and alert    Objective:     Catheter site clean, dry, intact      Vitals   Vitals:    12/08/23 0745   BP: 118/65   Pulse: 79   Resp: 16   Temp: 36.4 °C (97.6 °F)        Labs    No results displayed because visit has over 200 results.           Meds   Current Facility-Administered Medications   Medication Dose Route Frequency Provider Last Rate Last Admin    acetaminophen tablet 1,000 mg  1,000 mg Oral Q8H Zunilda Voss MD   1,000 mg at 12/08/23 0531     bisacodyL suppository 10 mg  10 mg Rectal BID PRN Dora Salazar MD        cefTRIAXone (ROCEPHIN) 1 g in dextrose 5 % in water (D5W) 100 mL IVPB (MB+)  1 g Intravenous Q24H Lexx Subramanian MD   Stopped at 12/07/23 1054    diltiaZEM 24 hr capsule 240 mg  240 mg Oral Daily Lexx Subramanian MD   240 mg at 12/07/23 0932    EScitalopram oxalate tablet 10 mg  10 mg Oral Daily Lexx Subramanian MD   10 mg at 12/07/23 0932    melatonin tablet 6 mg  6 mg Oral Nightly PRN Lexx Subramanian MD   6 mg at 12/07/23 2156    methocarbamoL tablet 500 mg  500 mg Oral Q6H Zunilda Voss MD   500 mg at 12/08/23 0531    mupirocin 2 % ointment 1 g  1 g Nasal BID Lexx Subramanian MD   1 g at 12/07/23 0900    polyethylene glycol packet 17 g  17 g Oral BID Dora Salazar MD   17 g at 12/07/23 2156    rivaroxaban tablet 15 mg  15 mg Oral Daily with dinner Ana Hartmann MD   15 mg at 12/07/23 1752    senna-docusate 8.6-50 mg per tablet 1 tablet  1 tablet Oral BID Lexx Subramanian MD   1 tablet at 12/07/23 2156    sodium chloride 0.9% flush 10 mL  10 mL Intravenous PRN Lexx Subramanian MD            Anticoagulant dose xarelto at 15mg with dinner.    Assessment:     Pain control adequate    Plan:     Patient doing well, continue present treatment.    1) PNC paused, will pull prior to discharge if patient tolerating pain.    2) Continue multimodals including   Tylenol 650mg q6h  Robaxin 500mg q6h  Escitalopram 10mg daily    3) No PRN requirements currently      Case discussed with staff, Dr. Reynolds; final recommendations per attestation above.     Thank you for your consult and allowing us to participate in the care of this patient. We will continue to follow along. Please call the Acute Pain Service/Anesthesia if you have any questions or concerns.      Ama Camacho MD  PGY-1  [facility]

## 2023-12-08 NOTE — ASSESSMENT & PLAN NOTE
Patient's anemia is currently controlled. Has not received any PRBCs to date. Etiology likely due to acute blood loss which was from hip fracture and surgery and expected blood loss.  Current CBC reviewed-   Lab Results   Component Value Date    HGB 8.4 (L) 12/08/2023    HCT 27.2 (L) 12/08/2023     Monitor serial CBC and transfuse if patient becomes hemodynamically unstable, symptomatic or H/H drops below 7/21.

## 2023-12-08 NOTE — ASSESSMENT & PLAN NOTE
Present on admit. U/A on admit with >100 WBC, nitrate positive and occasional bacteria. Due to her dementia unknown if has any UTI symptoms so will treat. Plan 3 day course of IV Ceftriaxone to treat. Final urine culture grew multiple organisms but none in predominance.     12/8- competes rocephin today

## 2023-12-08 NOTE — ASSESSMENT & PLAN NOTE
Patient has Abnormal Phosphorus: hypophosphatemia. Will continue to monitor electrolytes closely. Will replace the affected electrolytes and repeat labs to be done after interventions completed. The patient's phosphorus results have been reviewed and are listed below.  Recent Labs   Lab 12/08/23  0457   PHOS 2.2*      Neutraphos x 2 days

## 2023-12-08 NOTE — PROGRESS NOTES
Pt resting comfortably.  Left SIFl PNC has been paused. Dressing CDI.  Catheter discontinued, tip intact.  Pt tolerated well.  Educated regarding continued pain management.  Understanding verbalized.

## 2023-12-08 NOTE — ASSESSMENT & PLAN NOTE
Patient underwent left hip hemiarthroplasty by Dr. Tani Martines on 12/5/2023 for femoral neck fracture.   Patient reports no pain in left hip.   Plan is to start PT/OT for gait training and strengthening and restoration of ADL's. Patient is weight bear as tolerated to left lower extremity, posterior hip precautions as per Orthopedic recommendations.   Patient restarted on home Xarelto post-op so takes long term for her anticoagulation for her atrial fibrillation so fully anticoagulated so needs no other chemical DVT prophylaxis post-op.   Orthopedics is following and managing hip fracture and surgical site.   Perineural pain catheter in place with continuous Ropivacaine infusion for pain control and being managed by Anesthesia Pain Service.   Patient on multimodal pain management post-op with Tylenol 650 mg po every 6 hours and Robaxin 500 mg po 4 times daily post-op and will continue.  Discontinue Clayton to gravity.   12/7- stable, POD 2  12/8- PNC pauses, will dc to SNF if pain is stable

## 2023-12-08 NOTE — NURSING
Nurses Note -- 4 Eyes      12/8/2023   5:43 PM      Skin assessed during: Daily Assessment      [] No Altered Skin Integrity Present    []Prevention Measures Documented      [x] Yes- Altered Skin Integrity Present or Discovered   [] LDA Added if Not in Epic (Describe Wound)   [] New Altered Skin Integrity was Present on Admit and Documented in LDA   [] Wound Image Taken  Generalized bruising to Left shoulder from fall at home.    Wound Care Consulted? No, addressed by MD on admission    Attending Nurse:  Carrie Batista Rn     Second RN/Staff Member:   GENARO Parrish

## 2023-12-08 NOTE — PLAN OF CARE
[facility] - Surgery  Facility Transfer Orders        Admit to: SNF    Diagnoses:   Active Hospital Problems    Diagnosis  POA    *Closed displaced fracture of left femoral neck s/p hemiarthroplasty on 12/5/2023 [S72.002A]  Yes     Priority: 1 - High    Closed dislocation of left shoulder [S43.005A]  Yes     Priority: 2     Compression fracture of L1 lumbar vertebra [S32.010A]  Yes     Priority: 3     Long term current use of anticoagulant [Z79.01]  Not Applicable     Priority: 4     Essential hypertension [I10]  Yes     Priority: 4      Chronic    Atrial fibrillation and flutter [I48.91, I48.92]  Yes     Priority: 5     Alzheimer's disease with late onset (CODE) [G30.1]  Yes     Priority: 6     Goals of care, counseling/discussion [Z71.89]  Not Applicable    Hypophosphatemia [E83.39]  No    Acute blood loss anemia [D62]  No    Acute cystitis without hematuria [N30.00]  Yes    Atelectasis [J98.11]  Yes    Pure hypercholesterolemia [E78.00]  Yes      Resolved Hospital Problems    Diagnosis Date Resolved POA    Elevated INR [R79.1] 12/05/2023 Yes     Suspect 2/2 xarelto      Elevated LFTs [R79.89] 12/06/2023 Yes     Allergies:   Review of patient's allergies indicates:   Allergen Reactions    Ciprofloxacin Other (See Comments)     Other reaction(s): Nausea  Other reaction(s): Nausea       Code Status: FULL    Vitals: Routine       Diet: regular diet    Activity: Up in a chair each morning as tolerated and Ambulate with assistance to bathroom    Nursing Precautions: Aspiration , Fall, and Pressure ulcer prevention    Bed/Surface: Low Air Loss    Consults: PT to evaluate and treat- 5 times a week and OT to evaluate and treat- 5 times a week    Oxygen: room air    Dialysis: Patient is not on dialysis.         Pending Diagnostic Studies:       None                Miscellaneous Care:   Routine Skin for Bedridden Patients:  Apply moisture barrier cream to all      No wound care. Keep dressing clean and intact until f/u  with orthopedics    WBAT.      Medications: Discontinue all previous medication orders, if any. See new list below.  Current Discharge Medication List        START taking these medications    Details   acetaminophen (TYLENOL) 500 MG tablet Take 1 tablet (500 mg total) by mouth every 6 (six) hours as needed for Pain.  Qty: 30 tablet, Refills: 0      bisacodyL (DULCOLAX) 10 mg Supp Place 1 suppository (10 mg total) rectally 2 (two) times daily as needed (Until bowel movement if patient has no bowel movement for 2 days).  Qty: 30 suppository, Refills: 0      melatonin (MELATIN) 3 mg tablet Take 2 tablets (6 mg total) by mouth nightly as needed for Insomnia.  Qty: 30 tablet, Refills: 0      methocarbamoL (ROBAXIN) 500 MG Tab Take 1 tablet (500 mg total) by mouth every 6 (six) hours. for 10 days  Qty: 40 tablet, Refills: 0      polyethylene glycol (GLYCOLAX) 17 gram PwPk Take 17 g by mouth 2 (two) times daily.  Qty: 30 each, Refills: 0      potassium, sodium phosphates (PHOS-NAK) 280-160-250 mg PwPk Take 1 packet by mouth 4 (four) times daily before meals and nightly. for 2 days  Qty: 8 packet, Refills: 0      senna-docusate 8.6-50 mg (PERICOLACE) 8.6-50 mg per tablet Take 1 tablet by mouth 2 (two) times daily.  Qty: 60 tablet, Refills: 0           CONTINUE these medications which have CHANGED    Details   rivaroxaban (XARELTO) 15 mg Tab Take 1 tablet (15 mg total) by mouth daily with dinner or evening meal.  Qty: 30 tablet, Refills: 11           CONTINUE these medications which have NOT CHANGED    Details   diltiaZEM (CARDIZEM CD) 240 MG 24 hr capsule Take 1 capsule (240 mg total) by mouth once daily.  Qty: 90 capsule, Refills: 3    Associated Diagnoses: Essential hypertension; PAF (paroxysmal atrial fibrillation)      EScitalopram oxalate (LEXAPRO) 10 MG tablet Take 1 tablet (10 mg total) by mouth once daily.  Qty: 90 tablet, Refills: 1    Associated Diagnoses: Moderate episode of recurrent major depressive disorder            STOP taking these medications       MYRBETRIQ 50 mg Tb24 Comments:   Reason for Stopping:             Follow up:       Immunizations Administered as of 12/8/2023       Name Date Dose VIS Date Route Exp Date    COVID-19, MRNA, LN-S, PF (Pfizer) (Purple Cap) 10/8/2021 0.3 mL 5/10/2021 Intramuscular --    Site: Left deltoid     : [employer]     Lot: [id number]     Comment: Adminis     COVID-19, MRNA, LN-S, PF (Pfizer) (Purple Cap) 2/19/2021 0.3 mL 12/1/2020 Intramuscular --    Site: Left deltoid     : [employer]     Lot: [id number]     Comment: Adminis     COVID-19, MRNA, LN-S, PF (Pfizer) (Purple Cap) 1/29/2021 0.3 mL 12/1/2020 Intramuscular --    Site: Left deltoid     : [employer]     Lot: [id number]     Comment: Adminis             This patient has had both covid vaccinations    Some patients may experience side effects after vaccination.  These may include fever, headache, muscle or joint aches.  Most symptoms resolve with 24-48 hours and do not require urgent medical evaluation unless they persist for more than 72 hours or symptoms are concerning for an unrelated medical condition.          Dora Salazar MD

## 2023-12-08 NOTE — ASSESSMENT & PLAN NOTE
Long term current use of anticoagulant   Patient with Persistent (7 days or more) atrial fibrillation which is controlled currently with Diltiazem  mg po daily. Patient is currently in atrial fibrillation.[id number] Score: 3. Anticoagulation indicated and resumed post-op with home  Xarelto 15 mg po daily (adjusted for weight, age and renal dosing).

## 2023-12-08 NOTE — PLAN OF CARE
Problem: Adult Inpatient Plan of Care  Goal: Plan of Care Review  Outcome: Ongoing, Progressing  Goal: Patient-Specific Goal (Individualized)  Outcome: Ongoing, Progressing  Goal: Absence of Hospital-Acquired Illness or Injury  Outcome: Ongoing, Progressing  Goal: Optimal Comfort and Wellbeing  Outcome: Ongoing, Progressing  Goal: Readiness for Transition of Care  Outcome: Ongoing, Progressing     Problem: Adjustment to Injury (Hip Fracture Medical Management)  Goal: Optimal Coping with Change in Health Status  Outcome: Ongoing, Progressing     Problem: Bleeding (Hip Fracture Medical Management)  Goal: Absence of Bleeding  Outcome: Ongoing, Progressing     Problem: Bowel Elimination Impaired (Hip Fracture Medical Management)  Goal: Effective Bowel Elimination  Outcome: Ongoing, Progressing     Problem: Cognitive Decline Risk (Hip Fracture Medical Management)  Goal: Baseline Cognitive Function Maintained  Outcome: Ongoing, Progressing     Problem: Embolism (Hip Fracture Medical Management)  Goal: Absence of Embolism  Outcome: Ongoing, Progressing     Problem: Fracture Stabilization and Management (Hip Fracture Medical Management)  Goal: Fracture Stability  Outcome: Ongoing, Progressing     Problem: Functional Ability Impaired (Hip Fracture Medical Management)  Goal: Optimal Functional Performance  Outcome: Ongoing, Progressing     Problem: Pain (Hip Fracture Medical Management)  Goal: Acceptable Pain Level  Outcome: Ongoing, Progressing

## 2023-12-08 NOTE — DISCHARGE SUMMARY
[facility] Medicine  Discharge Summary      Patient Name: Tigist Murry  MRN: [id number]  DANILO: [id number]  Patient Class: IP- Inpatient  Admission Date: 12/4/2023  Hospital Length of Stay: 3 days  Discharge Date and Time: No discharge date for patient encounter.  Attending Physician: Dora Salazar MD   Discharging Provider: Dora Salazar MD  Primary Care Provider: Gwen Porter MD  [facility] Medicine Team: [facility] Dora Salazar MD  Primary Care Team: [facility]    HPI:   82 yo female with Afib on xarelto, HTN, HPLD, mild dementia presenting after a fall in her bathroom at her group home. Most of history taken by daughter at bedside as patient had just received pain meds. It is uncertain for how long she was down, if she hit her head or lost consciousness. She did have left hip and left shoulder pain after amaury found, with the pain in her shoulder more severe than her leg. She normally uses a walker to get around most of the time, but does occasionally walks with out assistance. She has no known CVA/TIA, cardiac history. Per daughter at bedside about 5 months ago she was fairly active but since moving to the group home has been a little less active than normal.     IN ER, imaging showing left shoulder displacement, left femoral frax. Ortho consulted for left shoulder displacement, which was reset. UA with signs of UTI, rocephin given. Medicine called for admit.       Procedure(s) (LRB):  HEMIARTHROPLASTY, HIP, POSTERIOR APPROACH left (Left)      Hospital Course:   Patient admitted to [facility] Medicine Team H: Hip Fracture team and started on Hip Fracture Pathway with Orthopedic surgery consult for hip fracture. Patient was seen and evaluated by Orthopedic surgery who recommended operative repair of hip fracture. Patient was medically optimized prior to surgery and was taken to OR after optimization on 12/5/2023. Patient underwent left hip hemiarthroplasty by Dr. Freire  "Bobbi. Post-op patient WBAT with posterior hip precautions to the left lower extremity as per Orthopedics recommendation. Patient restarted on home Xarelto at 20 mg po daily for her known atrial fibrillation for long term anticoagulation so fully anticoagulated so did not need chemical DVT prophylaxis post-op. Perineural pain catheter placed by Anesthesia Pain Service with continuous infusion of Ropivacaine to help with pain control post-op and Anesthesia Pain Service managing while patient in the hospital. Patient placed on multimodal pain management post-op with Tylenol 1000 mg po every 6 hours post-op and will continue. PT/OT consulted post-op.    12/7- s/p left femoral neck fracture s/p L hip jose antonio on 12/6. Pt with left shoulder dx reduced in ED. Pain control: MM. PT/OT: WBAT LLE; posterior hip prec, Ok to remove LUE sling to mobilize with walker, sling for comfort; DVT PPx: restart home xarelto, SCDs at all times when not ambulating. Matilde menchaca'd. SNF eval pending. /58 (BP Location: Right arm, Patient Position: Lying)   Pulse 67 . Dc prn metoprolol.   [facility] has accepted pt for admit 12/8. Need to have a  BM.     12/8- PNC paused. Had a BM yesterday. Plan is for SNF today. /65   Pulse 79 Not on BB.      Goals of Care Treatment Preferences:  Code Status: Full Code      Consults:   Consults (From admission, onward)          Status Ordering Provider     Inpatient consult to Social Work/Case Management  Once        Provider:  (Not yet assigned)    Acknowledged DAVID WILCOX     Inpatient consult to Social Work/Case Management  Once        Provider:  (Not yet assigned)    Acknowledged DAVID WILCOX     Inpatient consult to Orthopedic Surgery  Once        Provider:  (Not yet assigned)    Completed BHARTI KEENE            Neuro  Compression fracture of L1 lumbar vertebra  Present on admit and noted on CT scan of chest, abdomen and pelvis to have "New compression deformity of the superior endplate " "of L1 with less than 25% height loss compared to CT from 09/16/2022, age indeterminate." Patient denies any low back pain. No treatment or intervention needed.       Pulmonary  Atelectasis  I have personally reviewed Chest X-ray. Patient with atelectasis on interpretation. Likely Non-Obstructive atelectasis secondary to immobility. Signs and symptoms include Hypoxemia Will begin treatment with incentive spirometry.    Cardiac/Vascular  Pure hypercholesterolemia  Chronic, controlled, continue to monitor. Patient not on medical treatment as outpatient.       Atrial fibrillation and flutter  Long term current use of anticoagulant   Patient with Persistent (7 days or more) atrial fibrillation which is controlled currently with Diltiazem  mg po daily. Patient is currently in atrial fibrillation.[id number] Score: 3. Anticoagulation indicated and resumed post-op with home  Xarelto 15 mg po daily (adjusted for weight, age and renal dosing).     Essential hypertension  Chronic, controlled on current regimen. Latest blood pressure and vitals reviewed-     Temp:  [97.4 °F (36.3 °C)-98.2 °F (36.8 °C)]   Pulse:  [71-92]   Resp:  [16-18]   BP: (114-139)/(56-69)   SpO2:  [92 %-96 %] .   Home meds for hypertension were reviewed and noted below.   Hypertension Medications               diltiaZEM (CARDIZEM CD) 240 MG 24 hr capsule Take 1 capsule (240 mg total) by mouth once daily.            While in the hospital, will manage blood pressure as follows; Continue home antihypertensive regimen with Diltiazem.     Will utilize p.r.n. blood pressure medication only if patient's blood pressure greater than 180/110 and she develops symptoms such as worsening chest pain or shortness of breath.    Renal/  Hypophosphatemia  Patient has Abnormal Phosphorus: hypophosphatemia. Will continue to monitor electrolytes closely. Will replace the affected electrolytes and repeat labs to be done after interventions completed. The patient's " phosphorus results have been reviewed and are listed below.  Recent Labs   Lab 12/08/23  0457   PHOS 2.2*      Neutraphos x 2 days    Acute cystitis without hematuria  Present on admit. U/A on admit with >100 WBC, nitrate positive and occasional bacteria. Due to her dementia unknown if has any UTI symptoms so will treat. Plan 3 day course of IV Ceftriaxone to treat. Final urine culture grew multiple organisms but none in predominance.     12/8- competes rocephin today      Hematology  Long term current use of anticoagulant  This patient has long term use on an anticoagulant with Select Anticoagulant(s): xarelto . Their long term anticoagulation will be Held or Continued: continued. They are on long term anticoagulation due to Reason for Anticoagulation: Atrial fibrillation.     Oncology  Acute blood loss anemia  Patient's anemia is currently controlled. Has not received any PRBCs to date. Etiology likely due to acute blood loss which was from hip fracture and surgery and expected blood loss.  Current CBC reviewed-   Lab Results   Component Value Date    HGB 8.4 (L) 12/08/2023    HCT 27.2 (L) 12/08/2023     Monitor serial CBC and transfuse if patient becomes hemodynamically unstable, symptomatic or H/H drops below 7/21.    Orthopedic  * Closed displaced fracture of left femoral neck s/p hemiarthroplasty on 12/5/2023  Patient underwent left hip hemiarthroplasty by Dr. Tani Martines on 12/5/2023 for femoral neck fracture.   Patient reports no pain in left hip.   Plan is to start PT/OT for gait training and strengthening and restoration of ADL's. Patient is weight bear as tolerated to left lower extremity, posterior hip precautions as per Orthopedic recommendations.   Patient restarted on home Xarelto post-op so takes long term for her anticoagulation for her atrial fibrillation so fully anticoagulated so needs no other chemical DVT prophylaxis post-op.   Orthopedics is following and managing hip fracture and surgical  site.   Perineural pain catheter in place with continuous Ropivacaine infusion for pain control and being managed by Anesthesia Pain Service.   Patient on multimodal pain management post-op with Tylenol 650 mg po every 6 hours and Robaxin 500 mg po 4 times daily post-op and will continue.  Discontinue Clayton to gravity.   12/7- stable, POD 2  12/8- PNC pauses, will dc to SNF if pain is stable    Closed dislocation of left shoulder  Patient found to have anterolateral left shoulder dislocation on admit. Orthopedics consulted and reduced shoulder dislocation successfully in ED with sedation.  Per Ortho, patient placed in sling to left arm for comfort. Ortho states okay to remove sling to mobilize with therapy.   Pain controlled and continue multimodal pain meds to treat.      Palliative Care  Goals of care, counseling/discussion  - Patient has (3) daughters: Sarah Murry who is the power of  and she lives in [place], [place] and the other (2) daughters are from [place], [place] and [place], [place].  - SNF on discharge.     Other  Alzheimer's disease with late onset (CODE)  Patient with dementia with likely etiology of alzheimer's dementia. Dementia is mild. The patient does not have signs of behavioral disturbance. Home dementia medications are Held or Continued: not indicated at this time . Continue non-pharmacologic interventions to prevent delirium (No VS between 11PM-5AM, activity during day, opening blinds, providing glasses/hearing aids, and up in chair during daytime). Will avoid narcotics and benzos unless absolutely necessary. PRN anti-psychotics are not prescribed at this time but can be added      Final Active Diagnoses:    Diagnosis Date Noted POA    PRINCIPAL PROBLEM:  Closed displaced fracture of left femoral neck s/p hemiarthroplasty on 12/5/2023 [S72.002A] 12/05/2023 Yes    Closed dislocation of left shoulder [S43.005A] 12/05/2023 Yes    Compression fracture of L1 lumbar vertebra [S32.010A] 12/05/2023  "Yes    Long term current use of anticoagulant [Z79.01] 12/06/2017 Not Applicable    Essential hypertension [I10]  Yes     Chronic    Atrial fibrillation and flutter [I48.91, I48.92] 11/02/2022 Yes    Alzheimer's disease with late onset (CODE) [G30.1] 01/30/2023 Yes    Goals of care, counseling/discussion [Z71.89] 12/07/2023 Not Applicable    Hypophosphatemia [E83.39] 12/07/2023 No    Acute blood loss anemia [D62] 12/06/2023 No    Acute cystitis without hematuria [N30.00] 12/05/2023 Yes    Atelectasis [J98.11] 12/05/2023 Yes    Pure hypercholesterolemia [E78.00]  Yes      Problems Resolved During this Admission:    Diagnosis Date Noted Date Resolved POA    Elevated INR [R79.1] 12/05/2023 12/05/2023 Yes    Elevated LFTs [R79.89] 12/05/2023 12/06/2023 Yes       Discharged Condition: good    Disposition: Skilled Nursing Facility    Follow Up:    Patient Instructions:      ORTHOPEDIC BRACING FOR HOME USE - UPPER EXTREMITY     Order Specific Question Answer Comments   Height: 5'3"    Weight: 120    Length of need (1-99 months): 1    Laterality: Left    Product(s) ordered: Sling and swathe      Ambulatory referral/consult to Orthopedics   Standing Status: Future   Referral Priority: Routine Referral Type: Consultation   Requested Specialty: Orthopedic Surgery   Number of Visits Requested: 1     Ambulatory referral/consult to Orthopedics Fracture Care   Standing Status: Future   Referral Priority: Routine Referral Type: Consultation   Requested Specialty: Orthopedic Surgery   Number of Visits Requested: 1       Significant Diagnostic Studies: Labs: CMP   Recent Labs   Lab 12/07/23  0353 12/08/23  0457    140   K 4.0 4.0   * 111*   CO2 23 22*   GLU 99 80   BUN 18 17   CREATININE 0.7 0.7   CALCIUM 8.5* 8.8   ANIONGAP 9 7*    and CBC   Recent Labs   Lab 12/07/23  0353 12/08/23  0457   WBC 8.56 8.11   HGB 9.1* 8.4*   HCT 27.8* 27.2*   * 168       Pending Diagnostic Studies:       None         "   Medications:  Reconciled Home Medications:      Medication List        START taking these medications      acetaminophen 500 MG tablet  Commonly known as: TYLENOL  Take 1 tablet (500 mg total) by mouth every 6 (six) hours as needed for Pain.     bisacodyL 10 mg Supp  Commonly known as: DULCOLAX  Place 1 suppository (10 mg total) rectally 2 (two) times daily as needed (Until bowel movement if patient has no bowel movement for 2 days).     melatonin 3 mg tablet  Commonly known as: MELATIN  Take 2 tablets (6 mg total) by mouth nightly as needed for Insomnia.     methocarbamoL 500 MG Tab  Commonly known as: ROBAXIN  Take 1 tablet (500 mg total) by mouth every 6 (six) hours. for 10 days     polyethylene glycol 17 gram Pwpk  Commonly known as: GLYCOLAX  Take 17 g by mouth 2 (two) times daily.     potassium, sodium phosphates 280-160-250 mg Pwpk  Commonly known as: PHOS-NAK  Take 1 packet by mouth 4 (four) times daily before meals and nightly. for 2 days     senna-docusate 8.6-50 mg 8.6-50 mg per tablet  Commonly known as: PERICOLACE  Take 1 tablet by mouth 2 (two) times daily.            CHANGE how you take these medications      rivaroxaban 15 mg Tab  Commonly known as: XARELTO  Take 1 tablet (15 mg total) by mouth daily with dinner or evening meal.  What changed:   medication strength  how much to take  when to take this            CONTINUE taking these medications      diltiaZEM 240 MG 24 hr capsule  Commonly known as: CARDIZEM CD  Take 1 capsule (240 mg total) by mouth once daily.     EScitalopram oxalate 10 MG tablet  Commonly known as: LEXAPRO  Take 1 tablet (10 mg total) by mouth once daily.     MYRBETRIQ 50 mg Tb24  Generic drug: mirabegron  Take 1 tablet (50 mg total) by mouth once daily.              Indwelling Lines/Drains at time of discharge:   Lines/Drains/Airways       Epidural Line  Duration                  Perineural Analgesia/Anesthesia Assessment (Motor Function-Bromage) 12/05/23 1057 2 days                     Time spent on the discharge of patient: 35 minutes         Dora Salazar MD  Department of [facility] Medicine  [facility] - Surgery

## 2023-12-08 NOTE — NURSING
Attempted to call report to Nurse at SNF; nurse unavailable and will call back when available.    1700-ROBINSON Santiago called back for report.

## 2023-12-08 NOTE — PT/OT/SLP PROGRESS
Occupational Therapy   Co-Treatment    Name: Tigist Murry  MRN: [id number]  Admitting Diagnosis:  Closed displaced fracture of left femoral neck  3 Days Post-Op    Recommendations:     Discharge Recommendations: Moderate Intensity Therapy  Discharge Equipment Recommendations:  to be determined by next level of care  Barriers to discharge:  None    Assessment:     Tigist Murry is a 83 y.o. female with a medical diagnosis of Closed displaced fracture of left femoral neck.  She presents with the following performance deficits affecting function:  weakness, impaired endurance, impaired self care skills, impaired functional mobility, gait instability, impaired balance, pain, decreased lower extremity function, decreased upper extremity function, orthopedic precautions, impaired cognition.     Pt agreeable to therapy and tolerated the session well this date. Pt sat EOB where she participated in ADLs with good sitting balance. Pt then performed 3 stands with Pt standing at 45 seconds at best during the third stand. Pt assisted with toileting at bed level at the end. Pt would benefit from continued skilled acute OT services during this admission in order to maximize independence and safety with ADLs and functional mobility to ensure safe return to PLOF in the least restrictive environment. Patient currently demonstrates a need for moderate intensity therapy on a daily basis post acute secondary to a decline in functional status due to injury      Rehab Prognosis:  Good; patient would benefit from acute skilled OT services to address these deficits and reach maximum level of function.       Plan:     Patient to be seen daily to address the above listed problems via self-care/home management, therapeutic activities, therapeutic exercises, neuromuscular re-education  Plan of Care Expires: 01/06/24  Plan of Care Reviewed with: patient, daughter    Subjective     Chief Complaint: Pain  Patient/Family Comments/goals: To  return to PLOF  Pain/Comfort:  Pain Rating 1: other (see comments) (not rated)  Location - Side 1: Left  Location - Orientation 1: generalized  Location 1: hip  Pain Addressed 1: Reposition, Distraction  Pain Rating Post-Intervention 1: other (see comments) (not rated)    Objective:     Co-evaluation/treatment performed due to patient's multiple deficits requiring two skilled therapists to appropriately and safely assess patient's strength and endurance while facilitating functional tasks in addition to accommodating for patient's activity tolerance.     Communicated with: RN prior to session.  Patient found HOB elevated with SCD, FCD, PureWick upon OT entry to room.    General Precautions: Standard, fall    Orthopedic Precautions:LLE weight bearing as tolerated, LLE posterior precautions  Braces: N/A  Respiratory Status: Room air     Occupational Performance:     Bed Mobility:    Patient completed Rolling/Turning to Left with  maximal assistance  Patient completed Rolling/Turning to Right with maximal assistance  Patient completed Scooting/Bridging with maximal assistance and 2 persons  Patient completed Supine to Sit with maximal assistance and 2 persons  Patient completed Sit to Supine with maximal assistance and 2 persons   Pt sat EOB with Mod A progressing to SBA     Functional Mobility/Transfers:  Patient completed Sit <> Stand Transfer with maximal assistance and 2 people  with  no assistive device and rolling walker   1st two trials with RW and LLE blocked   Last trial with no AD and B knees blocked and able to stand for 45 seconds at best     Activities of Daily Living:  Feeding:  stand by assistance : to take sips of water sitting EOB   Lower Body Dressing: total assistance : To don/doff shoes at bed level   Toileting: total assistance : For ivan care at bed level     [facility] 6 Click ADL: 15    Treatment & Education:  Therapist provided facilitation and instruction of proper body mechanics and fall  prevention strategies during tasks listed above.  Instructed patient to sit in bedside chair daily to increase OOB/activity tolerance.  Instructed patient to use call light to have nursing staff assist with needs/transfers.  Discussed OT POC and answered all questions within OT scope of practice.  Whiteboard updated       Patient left HOB elevated with all lines intact, call button in reach, and daughter present    GOALS:   Multidisciplinary Problems       Occupational Therapy Goals          Problem: Occupational Therapy    Goal Priority Disciplines Outcome Interventions   Occupational Therapy Goal     OT, PT/OT Ongoing, Progressing    Description: Goals to be met by: 12/20/23     Patient will increase functional independence with ADLs by performing:    UE Dressing with Minimal Assistance.  LE Dressing with Moderate Assistance and Assistive Devices as needed.  Grooming while EOB with Supervision.  Toileting from bedside commode with Moderate Assistance for hygiene and clothing management.   Toilet transfer to bedside commode with Moderate Assistance.                         Time Tracking:     OT Date of Treatment: 12/08/23  OT Start Time: 1330  OT Stop Time: 1353  OT Total Time (min): 23 min    Billable Minutes:Self Care/Home Management 10  Neuromuscular Re-education 13    OT/CHELSY: OT          12/8/2023

## 2023-12-08 NOTE — PLAN OF CARE
12/08/23 1410   Post-Acute Status   Post-Acute Authorization Placement   Post-Acute Placement Status Set-up Complete/Auth obtained   Discharge Plan   Discharge Plan A Skilled Nursing Facility     Patient's set-up has been completed.GAYE scheduled d/c transportation to [facility]. Patient is scheduled to be picked up at 5:00 pm. GAYE provided patient's nurse with report number #[phone]; ask for the nurse for the patient. Requested  time does not guarantee arrival time.        Sanam Freire LMSW  Case Management   [facility]   Ext. [phone]

## 2023-12-08 NOTE — ASSESSMENT & PLAN NOTE
Chronic, controlled on current regimen. Latest blood pressure and vitals reviewed-     Temp:  [97.4 °F (36.3 °C)-98.2 °F (36.8 °C)]   Pulse:  [71-92]   Resp:  [16-18]   BP: (114-139)/(56-69)   SpO2:  [92 %-96 %] .   Home meds for hypertension were reviewed and noted below.   Hypertension Medications               diltiaZEM (CARDIZEM CD) 240 MG 24 hr capsule Take 1 capsule (240 mg total) by mouth once daily.            While in the hospital, will manage blood pressure as follows; Continue home antihypertensive regimen with Diltiazem.     Will utilize p.r.n. blood pressure medication only if patient's blood pressure greater than 180/110 and she develops symptoms such as worsening chest pain or shortness of breath.

## 2023-12-08 NOTE — ADDENDUM NOTE
Addendum  created 12/08/23 1235 by Mary Reynolds MD    Charge Capture section accepted, Cosign clinical note with attestation

## 2023-12-09 LAB
BLD PROD TYP BPU: NORMAL
BLD PROD TYP BPU: NORMAL
BLOOD UNIT EXPIRATION DATE: NORMAL
BLOOD UNIT EXPIRATION DATE: NORMAL
BLOOD UNIT TYPE CODE: 7300
BLOOD UNIT TYPE CODE: 7300
BLOOD UNIT TYPE: NORMAL
BLOOD UNIT TYPE: NORMAL
CODING SYSTEM: NORMAL
CODING SYSTEM: NORMAL
CROSSMATCH INTERPRETATION: NORMAL
CROSSMATCH INTERPRETATION: NORMAL
DISPENSE STATUS: NORMAL
DISPENSE STATUS: NORMAL
TRANS ERYTHROCYTES VOL PATIENT: NORMAL ML
TRANS ERYTHROCYTES VOL PATIENT: NORMAL ML

## 2023-12-09 PROCEDURE — 25000003 PHARM REV CODE 250: Mod: HCNC | Performed by: HOSPITALIST

## 2023-12-09 PROCEDURE — 11000004 HC SNF PRIVATE: Mod: HCNC

## 2023-12-09 PROCEDURE — 97110 THERAPEUTIC EXERCISES: CPT | Mod: HCNC

## 2023-12-09 PROCEDURE — 97162 PT EVAL MOD COMPLEX 30 MIN: CPT | Mod: HCNC

## 2023-12-09 PROCEDURE — 97535 SELF CARE MNGMENT TRAINING: CPT | Mod: HCNC

## 2023-12-09 PROCEDURE — 97165 OT EVAL LOW COMPLEX 30 MIN: CPT | Mod: HCNC

## 2023-12-09 RX ADMIN — METHOCARBAMOL 500 MG: 500 TABLET ORAL at 01:12

## 2023-12-09 RX ADMIN — SENNOSIDES AND DOCUSATE SODIUM 1 TABLET: 8.6; 5 TABLET ORAL at 09:12

## 2023-12-09 RX ADMIN — METHOCARBAMOL 500 MG: 500 TABLET ORAL at 09:12

## 2023-12-09 RX ADMIN — RIVAROXABAN 15 MG: 15 TABLET, FILM COATED ORAL at 05:12

## 2023-12-09 RX ADMIN — METHOCARBAMOL 500 MG: 500 TABLET ORAL at 05:12

## 2023-12-09 RX ADMIN — Medication 6 MG: at 08:12

## 2023-12-09 RX ADMIN — POTASSIUM & SODIUM PHOSPHATES POWDER PACK 280-160-250 MG 1 PACKET: 280-160-250 PACK at 09:12

## 2023-12-09 RX ADMIN — ACETAMINOPHEN 500 MG: 500 TABLET ORAL at 01:12

## 2023-12-09 RX ADMIN — SENNOSIDES AND DOCUSATE SODIUM 1 TABLET: 8.6; 5 TABLET ORAL at 08:12

## 2023-12-09 RX ADMIN — METHOCARBAMOL 500 MG: 500 TABLET ORAL at 08:12

## 2023-12-09 RX ADMIN — ACETAMINOPHEN 650 MG: 325 TABLET ORAL at 08:12

## 2023-12-09 RX ADMIN — POTASSIUM & SODIUM PHOSPHATES POWDER PACK 280-160-250 MG 1 PACKET: 280-160-250 PACK at 04:12

## 2023-12-09 RX ADMIN — POTASSIUM & SODIUM PHOSPHATES POWDER PACK 280-160-250 MG 1 PACKET: 280-160-250 PACK at 01:12

## 2023-12-09 RX ADMIN — DILTIAZEM HYDROCHLORIDE 240 MG: 120 CAPSULE, COATED, EXTENDED RELEASE ORAL at 09:12

## 2023-12-09 RX ADMIN — ACETAMINOPHEN 500 MG: 500 TABLET ORAL at 09:12

## 2023-12-09 RX ADMIN — POLYETHYLENE GLYCOL 3350 17 G: 17 POWDER, FOR SOLUTION ORAL at 09:12

## 2023-12-09 RX ADMIN — ESCITALOPRAM OXALATE 10 MG: 10 TABLET ORAL at 09:12

## 2023-12-09 NOTE — PLAN OF CARE
Problem: Occupational Therapy  Goal: Occupational Therapy Goal  Description: Goals to be met by: 12/30/23     Patient will increase functional independence with ADLs by performing:    UE Dressing with Stand-by Assistance.  LE Dressing with Moderate Assistance.  Grooming while seated at sink with Set-up Assistance.  Toileting from bedside commode with Moderate Assistance for hygiene and clothing management.   Bathing from  edge of bed with Minimal Assistance.  Toilet transfer to bedside commode with Moderate Assistance.    Outcome: Ongoing, Progressing  Eval completed, POC established

## 2023-12-09 NOTE — PT/OT/SLP EVAL
Occupational Therapy   Evaluation / Treatment session    Name: Tigist Murry  MRN: [id number]  Admit Date: 12/8/2023  Recent Surgeries: HEMIARTHROPLASTY, HIP, POSTERIOR APPROACH left         General Precautions: Standard, fall  Orthopedic Precautions:LLE posterior precautions, LLE weight bearing as tolerated   Braces: N/A    Recommendations:     Discharge Recommendations: Low Intensity Therapy  Level of Assistance Recommended: 24 hours significant assistance  Discharge Equipment Recommendations:  bedside commode, hip kit, shower chair, wheelchair  Barriers to discharge:  Decreased caregiver support    Assessment:     Tigist Murry is a 83 y.o. female with a medical diagnosis of Closed displaced fracture of left femoral neck   .  She presents with the following performance deficits affecting function: posterior lean in sitting and standing, overall rigidity, some anxiety with standing, weakness, impaired endurance, impaired self care skills, impaired functional mobility, gait instability, impaired balance, impaired cognition, decreased lower extremity function, decreased safety awareness, impaired coordination, impaired cardiopulmonary response to activity, orthopedic precautions.   Pt agreeable to initial evaluation and treatment session and tolerated well. Pt remains limited in ADLs, functional mobility, and functional transfers and is currently not performing tasks at [facility]. Pt would continue to benefit from skilled OT services to maximize functional independence with ADLs and functional mobility, reduce caregiver burden, and facilitate safe discharge in the least restrictive environment. OT recommending home health once medically appropriate for discharge.      Rehab Potential is good    Activity Tolerance: Fair    Plan:     Patient to be seen 5 x/week to address the above listed problems via self-care/home management, therapeutic exercises, therapeutic activities  Plan of Care Expires: 01/06/24  Plan of Care  "Reviewed with: patient, daughter    Subjective     Chief Complaint: none  Patient/Family Comments/goals: Pt stated, " I have 3 daughters"    Occupational Profile:  [place] [facility] [place] and handicap walk-in shower  Prior Level of Function: Pt primarily mod I using RW but mobility dependent on state of cognition   DME owned: RW (Pt's daughter reports they ordered a transport w/c recently)  Caregiver Assistance: staff at group [place]    Pain/Comfort:  Pain Rating 1: 0/10  Pain Rating Post-Intervention 1: 0/10    Patients cultural, spiritual, Mu-ism conflicts given the current situation: no    Objective:     Communicated with: RN prior to session.  Patient found supine with PureWick upon OT entry to room.    Occupational Performance:        12/09/23 1629   Eating   Did they attempt the activity? Yes   Was the activity done independently? No   Assistance Needed Setup / clean-up;Supervision   CARE Score - Eating 4   Eating Discharge Goal   Discharge Goal 5   Oral Hygiene   Did they attempt the activity? Yes   Was the activity done independently? No   Assistance Needed Setup / clean-up;Supervision   CARE Score - Oral Hygiene 4   Oral Hygiene Discharge Goal   Discharge Goal 5   Toileting Hygiene   Did they attempt the activity? Yes   Was the activity done independently? No   Assistance Needed Physical assistance   Physical Assistance Level Total assistance   Was adaptive equipment used? Yes  (bed level)   CARE Score - Toileting Hygiene 1   Toileting Hygiene Discharge Goal   Discharge Goal 4   Shower/Bathe Self   Did they attempt the activity? Yes   Was the activity done independently? No   Assistance Needed Physical assistance   Physical Assistance Level More than half   Was adaptive equipment used? Yes  (sponge bath supine in bed)   CARE Score - Shower/Bathe Self 2   Shower/Bathe Self Discharge Goal   Discharge Goal 4   Upper Body Dressing   Did they attempt the activity? Yes   Was the activity done " independently? No   Assistance Needed Physical assistance   Physical Assistance Level More than half  (sitting edge of bed, occassional assist for sitting balance / posterior lean)   CARE Score - Upper Body Dressing 2   Upper Body Dressing Discharge Goal   Discharge Goal 4   Lower Body Dressing   Did they attempt the activity? Yes   Was the activity done independently? No   Assistance Needed Physical assistance   Physical Assistance Level More than half   Was adaptive equipment used? No  (completed supine bed)   CARE Score - Lower Body Dressing 2   Lower Body Dressing Discharge Goal   Discharge Goal 4   Putting On/Taking Off Footwear   Did they attempt the activity? Yes   Was the activity done independently? No   Assistance Needed Physical assistance   Physical Assistance Level Total assistance   Was adaptive equipment used? No   CARE Score - Putting On/Taking Off Footwear 1   Putting On/Taking Off Footwear Discharge Goal   Discharge Goal 4   Personal Hygiene   Did they attempt the activity? Yes   Was the activity done independently? No   Assistance Needed Setup / clean-up;Supervision   CARE Score - Personal Hygiene 4   Roll Left and Right   Did they attempt the activity? Yes   Was the activity done independently? No   Assistance Needed Physical assistance   Physical Assistance Level 2 or more helpers   Was adaptive equipment used? Yes   CARE Score - Roll Left and Right 1   Sit to Lying   Did they attempt the activity? Yes   Was the activity done independently? No   Assistance Needed Physical assistance   Physical Assistance Level 2 or more helpers   Was adaptive equipment used? Yes   CARE Score - Sit to Lying 1   Sit to Stand   Did they attempt the activity? Yes   Was the activity done independently? No   Assistance Needed Physical assistance   Physical Assistance Level 2 or more helpers   Was adaptive equipment used? Yes   CARE Score - Sit to Stand 1   Chair/Bed-to-Chair Transfer   Did they attempt the activity?  Yes   Was the activity done independently? No   Assistance Needed Physical assistance   Physical Assistance Level 2 or more helpers   Was adaptive equipment used? No   CARE Score - Chair/Bed-to-Chair Transfer 1   Toilet Transfer   Did they attempt the activity? No   Reason if not Attempted Safety concerns   CARE Score - Toilet Transfer 88   Toilet Transfer Discharge Goal   Discharge Goal 4   Tub/Shower Transfer   Did they attempt the activity? No   Was the activity done independently? No   Reason if not Attempted Safety concerns   CARE Score - Tub/Shower Transfer -     Cognitive/Visual Perceptual:  Cognitive/Psychosocial Skills:     -       Oriented to: Person, Place, and Situation     Physical Exam:  Balance: -       fair to poor static sitting, poor standing  Dominant hand: -       right  Upper Extremity Range of Motion:     -       Right Upper Extremity: WFL  -       Left Upper Extremity: WFL  Upper Extremity Strength:    -       Right Upper Extremity: WFL  -       Left Upper Extremity: WFL    AMPAC 6 Click ADL:  AMPAC Total Score: 14    Treatment & Education:  -Education on energy conservation and task modification to maximize safety and (I) during ADLs and mobility  -Education on importance of OOB activity to improve overall activity tolerance and promote recovery  -Pt educated to call for assistance and to transfer with hospital staff only  -Provided education regarding role of OT, POC, & discharge recommendations with pt and pt's daughter verbalizing understanding.  Pt had no further questions & when asked whether there were any concerns pt reported none.    Patient left up in chair with call button in reach and daughter present    GOALS:   Multidisciplinary Problems       Occupational Therapy Goals          Problem: Occupational Therapy    Goal Priority Disciplines Outcome Interventions   Occupational Therapy Goal     OT, PT/OT Ongoing, Progressing    Description: Goals to be met by: 12/30/23     Patient  will increase functional independence with ADLs by performing:    UE Dressing with Stand-by Assistance.  LE Dressing with Moderate Assistance.  Grooming while seated at sink with Set-up Assistance.  Toileting from bedside commode with Moderate Assistance for hygiene and clothing management.   Bathing from  edge of bed with Minimal Assistance.  Toilet transfer to bedside commode with Moderate Assistance.                         History:     Past Medical History:   Diagnosis Date    Anticoagulant long-term use     Anxiety     Arthritis     Atrial fibrillation     Carpal tunnel syndrome on right     Diverticulosis     Encounter for blood transfusion     HTN (hypertension)     Hyperlipidemia     Osteopenia     Overactive bladder     Palpitations     Patellar tendonitis 4/13    saw [place] bone     Psoriasis     S/P partial hysterectomy     Situational depression     Supraventricular tachycardia          Past Surgical History:   Procedure Laterality Date    ABDOMINAL SURGERY      BREAST BIOPSY Left     excisional    CARPAL TUNNEL RELEASE      HEMIARTHROPLASTY, HIP, POSTERIOR APPROACH Left 12/5/2023    Procedure: HEMIARTHROPLASTY, HIP, POSTERIOR APPROACH left;  Surgeon: Tani Martines MD;  Location: [facility] OR [address] [place] [address];  Service: Orthopedics;  Laterality: Left;    HYSTERECTOMY      @27yrs of age    KNEE ARTHROSCOPY W/ DEBRIDEMENT  '05    Right    OOPHORECTOMY      @27yrs of age    PARTIAL HYSTERECTOMY         Time Tracking:     OT Date of Treatment: 12/09/23  OT Start Time: 1011  OT Stop Time: 1114  OT Total Time (min): 63 min    Billable Minutes:Evaluation 15  Self Care/Home Management 48    12/9/2023

## 2023-12-09 NOTE — PT/OT/SLP EVAL
Physical Therapy Evaluation    Patient Name:  Tigist Murry   MRN:  [id number]  Admit Date: 12/8/2023  Recent Surgeries: HEMIARTHROPLASTY, HIP, POSTERIOR APPROACH left     General Precautions: Standard, fall   Orthopedic Precautions: LLE weight bearing as tolerated, LLE posterior precautions   Braces: N/A    Recommendations:     Discharge Recommendations:  (Home Health PT)   Level of Assistance Recommended: 24 hours significant assistance  Discharge Equipment Recommendations: bedside commode, bath bench, wheelchair, walker, rolling   Barriers to discharge: Decreased caregiver support    Assessment:     Tigist Murry is a 83 y.o. female admitted with a medical diagnosis of  Closed displaced fracture of left femoral neck. .  Performance deficits affecting function  weakness, impaired endurance, impaired self care skills, impaired functional mobility, gait instability, impaired balance, decreased upper extremity function, decreased lower extremity function, pain, impaired skin, edema, decreased ROM, impaired cardiopulmonary response to activity, orthopedic precautions, impaired cognition. Per pt's daughter pt was Mod I with functional mobility at the group home she lives at [facility]. Pt currently with some cognitive deficits and is very fearful of falling therefore pt needing MaxA x 2people for bed mobility, transfers and limited GT in the // bars.pt will therefore  benefit from skilled PT services during this hospital admit to continue to improve transfer ability and efficiency as well as continue to progress pt's ambulation distance and cardiopulmonary endurance to maximize pt's functional independence and return to PLOF.       Rehab Potential is good     Activity Tolerance: Fair    Plan:     Patient to be seen 5 x/week to address the above listed problems via gait training, therapeutic activities, therapeutic exercises, wheelchair management/training     Plan of Care Expires: 01/08/24  Plan of Care Reviewed with:  patient    Subjective     Chief Complaint: none noted by pt.  Patient/Family Comments/goals: none noted by pt.  Pain/Comfort:  Pain Rating 1: 0/10  Pain Rating Post-Intervention 1: 0/10    Living Environment:   [place] [facility] [place] F[place] and handicap walk-in shower  Prior Level of Function: Pt primarily mod I using RW but mobility dependent on state of cognition   DME owned: RW (Pt's daughter reports they ordered a transport w/c recently)  Caregiver Assistance: staff at [facility]      Patients cultural, spiritual, Oriental orthodox conflicts given the current situation: no    Objective:     Communicated with pt's nurse prior to session.  Patient found up in chair with    upon PT entry to room.    Exams:  Cognitive Exam:  Patient is oriented to Person and not time, place and situation  Gross Motor Coordination:  WFL  Sensation:    -       Intact  Skin Integrity/Edema:      -       Skin integrity: Bruising of LUE  -       Edema: Moderate LUE  RLE ROM: WFL  RLE Strength: grossly 3+/5  LLE ROM: WFL except hip N/T d.t pain with movement  LLE Strength: grossly 3-/5 except hip N/T d.t pain with movement    Functional Mobility:     12/09/23 1512   Prior Functioning: Everyday Activities   Self Care Needed some help   Indoor Mobility (Ambulation) Independent   Stairs Unknown   Functional Cognition Needed some help   Prior Device Use Walker   Roll Left and Right   Did they attempt the activity? Yes   Was the activity done independently? No   Assistance Needed Physical assistance   Physical Assistance Level 2 or more helpers  (MaxA x 2people using BSrail per OT and nursing staff)   Was adaptive equipment used? Yes   CARE Score - Roll Left and Right 1   Sit to Lying   Did they attempt the activity? Yes   Was the activity done independently? No   Assistance Needed Physical assistance   Physical Assistance Level 2 or more helpers  (MaxA x 2people using BSrail per OT and nursing staff)   Was adaptive equipment used? Yes    CARE Score - Sit to Lying 1   Lying to Sitting on Side of Bed   Did they attempt the activity? Yes   Was the activity done independently? No   Assistance Needed Physical assistance   Physical Assistance Level 2 or more helpers  (MaxA x 2people using BSrail per OT and nursing staff)   Was adaptive equipment used? Yes   CARE Score - Lying to Sitting on Side of Bed 1   Sit to Stand   Did they attempt the activity? Yes   Was the activity done independently? No   Assistance Needed Physical assistance   Physical Assistance Level 2 or more helpers  (MaxA x 2people in the // bars d.t posterior lean and pt fearful of falling)   Was adaptive equipment used? Yes   CARE Score - Sit to Stand 1   Chair/Bed-to-Chair Transfer   Did they attempt the activity? Yes   Was the activity done independently? No   Assistance Needed Physical assistance   Physical Assistance Level 2 or more helpers  (per OT MaxA x 2people SQPT)   Was adaptive equipment used? No   CARE Score - Chair/Bed-to-Chair Transfer 1   Car Transfer   Reason if not Attempted Environmental limitations   CARE Score - Car Transfer 10   Walk 10 Feet   Reason if not Attempted Safety concerns  (pt. ambulated the length of the // bars with MaxA x 2people d.t pt with posterior lean and needing assistance to advance B L/Es and B U/E's)   CARE Score - Walk 10 Feet 88   Walk 50 Feet with Two Turns   Reason if not Attempted Safety concerns   CARE Score - Walk 50 Feet with Two Turns 88   Walk 150 Feet   Reason if not Attempted Safety concerns   CARE Score - Walk 150 Feet 88   Walking 10 Feet on Uneven Surfaces   Reason if not Attempted Safety concerns   CARE Score - Walking 10 Feet on Uneven Surfaces 88   1 Step (Curb)   Reason if not Attempted Safety concerns   CARE Score - 1 Step (Curb) 88   4 Steps   Reason if not Attempted Safety concerns   CARE Score - 4 Steps 88   12 Steps   Reason if not Attempted Safety concerns   CARE Score - 12 Steps 88   Picking Up Object   Reason if not  Attempted Safety concerns   CARE Score - Picking Up Object 88   OTHER   Uses a Wheelchair/Scooter? Yes   Wheel 50 Feet with Two Turns   Reason if not Attempted Safety concerns  (pt c/o of fatigue and B U/E weakness)   CARE Score - Wheel 50 Feet with Two Turns 88   Wheel 150 Feet   Reason if not Attempted Safety concerns   CARE Score - Wheel 150 Feet 88       Therapeutic Activities and Exercises: B l/E seated active/ assist therex x 20reps: AP, LAQs, RLE hip flexion only.    Education:  Patient provided with daily orientation and goals of this PT session.  Patient educated to transfer to bedside chair/bedside commode/bathroom with RN/PCT present.   Patient educated on Fall risk and plan of care by explanation.   Patient Verbalized Understanding.  Time provided for therapeutic counseling and discussion of current health disposition. All questions answered to satisfaction, within scope of PT practice; voiced no other concerns. White board updated in patient's room, RN notified of session.     AM-PAC 6 CLICK MOBILITY  Total Score:10     Patient left up in chair with call button in reach.    GOALS:   Multidisciplinary Problems       Physical Therapy Goals          Problem: Physical Therapy    Goal Priority Disciplines Outcome Goal Variances Interventions   Physical Therapy Goal     PT, PT/OT Ongoing, Progressing     Description: Goals to be met by: 1/9/23     Patient will increase functional independence with mobility by performing:    . Supine to sit with MInimal Assistance  . Sit to supine with MInimal Assistance  . Rolling to Left and Right with Minimal Assistance.  . Sit to stand transfer with Minimal Assistance  . Bed to chair transfer with Minimal Assistance using Rolling Walker  . Gait  x 50 feet with Minimal Assistance using Rolling Walker.   . Wheelchair propulsion x50 feet with Moderate Assistance using bilateral uppper extremities                         History:     Past Medical History:   Diagnosis Date     Anticoagulant long-term use     Anxiety     Arthritis     Atrial fibrillation     Carpal tunnel syndrome on right     Diverticulosis     Encounter for blood transfusion     HTN (hypertension)     Hyperlipidemia     Osteopenia     Overactive bladder     Palpitations     Patellar tendonitis 4/13    saw [place]     Psoriasis     S/P partial hysterectomy     Situational depression     Supraventricular tachycardia        Past Surgical History:   Procedure Laterality Date    ABDOMINAL SURGERY      BREAST BIOPSY Left     excisional    CARPAL TUNNEL RELEASE      HEMIARTHROPLASTY, HIP, POSTERIOR APPROACH Left 12/5/2023    Procedure: HEMIARTHROPLASTY, HIP, POSTERIOR APPROACH left;  Surgeon: Tani Martines MD;  Location: [facility] OR [address] [place] [address];  Service: Orthopedics;  Laterality: Left;    HYSTERECTOMY      @27yrs of age    KNEE ARTHROSCOPY W/ DEBRIDEMENT  '05    Right    OOPHORECTOMY      @27yrs of age    PARTIAL HYSTERECTOMY         Time Tracking:     PT Received On: 12/09/23  PT Start Time: 1305     PT Stop Time: 1335  PT Total Time (min): 30 min     Billable Minutes: Evaluation 20 and Therapeutic Exercise 10      12/09/2023

## 2023-12-09 NOTE — PLAN OF CARE
Problem: Physical Therapy  Goal: Physical Therapy Goal  Description: Goals to be met by: 1/9/23     Patient will increase functional independence with mobility by performing:    . Supine to sit with MInimal Assistance  . Sit to supine with MInimal Assistance  . Rolling to Left and Right with Minimal Assistance.  . Sit to stand transfer with Minimal Assistance  . Bed to chair transfer with Minimal Assistance using Rolling Walker  . Gait  x 50 feet with Minimal Assistance using Rolling Walker.   . Wheelchair propulsion x50 feet with Moderate Assistance using bilateral uppper extremities    Outcome: Ongoing, Progressing

## 2023-12-09 NOTE — NURSING
Patient arrived to floor for skilled services with admitting diagnosis of left hip fracture via stretcher. Pt required 2 person assist to wheelchair to the bed. AAOx2, incontinent of B/B.Skin assessment done: left arm and shoulder bruising. Daughter present at bedside upon arrival. POC reviewed with patient. Pt denies pain at this time, educated to use call light. Plan of care continues.

## 2023-12-10 LAB
BACTERIA BLD CULT: NORMAL
BACTERIA BLD CULT: NORMAL

## 2023-12-10 PROCEDURE — 25000003 PHARM REV CODE 250: Mod: HCNC | Performed by: FAMILY MEDICINE

## 2023-12-10 PROCEDURE — 94761 N-INVAS EAR/PLS OXIMETRY MLT: CPT | Mod: HCNC

## 2023-12-10 PROCEDURE — 25000003 PHARM REV CODE 250: Mod: HCNC | Performed by: HOSPITALIST

## 2023-12-10 PROCEDURE — 11000004 HC SNF PRIVATE: Mod: HCNC

## 2023-12-10 RX ORDER — TALC
9 POWDER (GRAM) TOPICAL NIGHTLY PRN
Status: DISCONTINUED | OUTPATIENT
Start: 2023-12-10 | End: 2023-12-29 | Stop reason: HOSPADM

## 2023-12-10 RX ADMIN — RIVAROXABAN 15 MG: 15 TABLET, FILM COATED ORAL at 05:12

## 2023-12-10 RX ADMIN — METHOCARBAMOL 500 MG: 500 TABLET ORAL at 05:12

## 2023-12-10 RX ADMIN — DILTIAZEM HYDROCHLORIDE 240 MG: 120 CAPSULE, COATED, EXTENDED RELEASE ORAL at 09:12

## 2023-12-10 RX ADMIN — ESCITALOPRAM OXALATE 10 MG: 10 TABLET ORAL at 09:12

## 2023-12-10 RX ADMIN — METHOCARBAMOL 500 MG: 500 TABLET ORAL at 09:12

## 2023-12-10 RX ADMIN — SENNOSIDES AND DOCUSATE SODIUM 1 TABLET: 8.6; 5 TABLET ORAL at 09:12

## 2023-12-10 RX ADMIN — Medication 9 MG: at 08:12

## 2023-12-10 RX ADMIN — POLYETHYLENE GLYCOL 3350 17 G: 17 POWDER, FOR SOLUTION ORAL at 08:12

## 2023-12-10 RX ADMIN — POTASSIUM & SODIUM PHOSPHATES POWDER PACK 280-160-250 MG 1 PACKET: 280-160-250 PACK at 05:12

## 2023-12-10 RX ADMIN — METHOCARBAMOL 500 MG: 500 TABLET ORAL at 03:12

## 2023-12-10 RX ADMIN — SENNOSIDES AND DOCUSATE SODIUM 1 TABLET: 8.6; 5 TABLET ORAL at 08:12

## 2023-12-10 RX ADMIN — METHOCARBAMOL 500 MG: 500 TABLET ORAL at 08:12

## 2023-12-10 RX ADMIN — ACETAMINOPHEN 500 MG: 500 TABLET ORAL at 09:12

## 2023-12-10 RX ADMIN — POLYETHYLENE GLYCOL 3350 17 G: 17 POWDER, FOR SOLUTION ORAL at 09:12

## 2023-12-10 NOTE — PROGRESS NOTES
[facility]                                  Skilled Nursing Facility                   Progress Note     Admit Date: 12/8/2023  CLARKE   [id number]  Principal Problem:  Closed displaced fracture of left femoral neck   HPI obtained from patient interview and chart review     Chief Complaint: Establish Care/ Initial Visit     HPI:   Tigist Murry is an 83 y.o. female with PMH Afib on xarelto, HTN, HPLD, mild dementia. Admitted to [facility] for left shoulder displacement, left femoral fracture injuries sustained from fall in bathroom at group home. Ortho consulted for left shoulder displacement, which was reset. Now s/p left femoral neck fracture s/p L hip jose antonio on 12/6 by Dr. Naranjo. Ortho rec WBAT LLE; posterior hip precautions and LUE sling for comfort. Home xarelto resumed for DVT prophylaxis. UTI dx in ED and was treated with 3 day course IV rocephin. Dementia with likely etiology of alzheimer's dementia without behavioral disturbance. Patient has 3 daughters: Sarah Murry who is the power of  and she lives in [place], [place] and the other 2 daughters are from [place], [place] and [place], [place].   Patient will be treated at [facility] with PT and OT to improve functional status and ability to perform ADLs.     Past Medical History: Patient has a past medical history of Anticoagulant long-term use, Anxiety, Arthritis, Atrial fibrillation, Carpal tunnel syndrome on right, Diverticulosis, Encounter for blood transfusion, HTN (hypertension), Hyperlipidemia, Osteopenia, Overactive bladder, Palpitations, Patellar tendonitis (4/13), Psoriasis, S/P partial hysterectomy, Situational depression, and Supraventricular tachycardia.    Past Surgical History: Patient has a past surgical history that includes Partial hysterectomy; Knee arthroscopy w/ debridement ('05); Carpal tunnel release; Abdominal surgery; Breast biopsy (Left); Hysterectomy;  Oophorectomy; and hemiarthroplasty, hip, posterior approach (Left, 12/5/2023).    Social History: Patient reports that she has never smoked. She has never used smokeless tobacco. She reports that she does not drink alcohol and does not use drugs.    Family History: family history includes Heart failure (age of onset: 80) in her mother; No Known Problems in her brother, daughter, daughter, daughter, and sister.    Allergies: Patient is allergic to ciprofloxacin.    ROS  Constitutional: Negative for fever.   Eyes: Negative for blurred vision, double vision   Respiratory: Negative for cough, shortness of breath   Cardiovascular: Negative for chest pain, palpitations, and leg swelling.   Gastrointestinal: Negative for abdominal pain, constipation, diarrhea, nausea, vomiting.   Genitourinary: Negative for dysuria, frequency   Musculoskeletal:  + generalized weakness. Negative for back pain and myalgias.   Skin: Negative for itching and rash.   Neurological: Negative for dizziness, headaches.   Psychiatric/Behavioral: Negative for depression. The patient is nervous/anxious.      24 hour Vital Sign Range   Temp:  [97.6 °F (36.4 °C)-97.8 °F (36.6 °C)]   Pulse:  []   Resp:  [17]   BP: (139-146)/(64-65)   SpO2:  [93 %-98 %]     Current BMI: Body mass index is 21.67 kg/m².    PEx  Constitutional: Patient appears debilitated.  No distress noted  HENT:   Head: Normocephalic and atraumatic.   Eyes: Pupils are equal, round  Neck: Normal range of motion. Neck supple.   Cardiovascular: Normal rate, regular rhythm and normal heart sounds.    Pulmonary/Chest: Effort normal and breath sounds are clear  Abdominal: Soft. Bowel sounds are normal.   Musculoskeletal: Normal range of motion.   Neurological: Alert. Positive for confusion, disorientation.  Psychiatric: Normal mood and affect. Behavior is normal.   Skin: Skin is warm and dry.   Active Wounds:       Incision/Site 12/05/23  Left Greater trochanter distal vertical (POA)  "  Dressing Appearance Dry;Clean   Drainage Amount None   Appearance Dressing in place, unable to visualize        Incision/Site 12/05/23 Left Hip (POA)   Incision WDL WDL   Dressing Appearance Dry;Intact;Clean   Drainage Amount None   Appearance Dressing in place, unable to visualize   Dressing Gauze;Transparent film        Incision/Site 12/05/23 Left Leg (POA)   Incision WDL WDL   Dressing Appearance Dry;Clean;Intact   Drainage Amount None   Appearance Dressing in place, unable to visualize   Dressing Gauze;Transparent film   Wound care consulted.      No results for input(s): "GLUCOSE", "NA", "K", "CL", "CO2", "BUN", "CREATININE", "CALCIUM", "MG" in the last 24 hours.    No results for input(s): "WBC", "RBC", "HGB", "HCT", "PLT", "MCV", "MCH", "MCHC" in the last 24 hours.    No results for input(s): "POCTGLUCOSE" in the last 168 hours.     Assessment and Plan:    Closed displaced fracture of left femoral neck s/p hemiarthroplasty on 12/5/2023  - Continue multimodal pain management with Tylenol 650 mg po every 6 hours and Robaxin 500 mg po 4 times daily   - Continue with PT/OT for gait training and strengthening and restoration of ADL's   - WBAT LLE; left posterior hip precautions   - Encourage mobility, OOB in chair, and early ambulation as appropriate  - Fall precautions   - Continue DVT prophylaxis with home xarelto  - Wound Care consulted  - Ortho Surgery follow-up while in SNF      Closed dislocation of anterolateral left shoulder  - S/P successful reduction of shoulder dislocation in ED with sedation  -  Sling to left arm for comfort.   - Okay to remove sling to mobilize with therapy per Ortho   - Continue multimodal pain meds to treat.    Compression fracture of L1 lumbar vertebra  POA to ED. CT chest, abdomen and pelvis notes "New compression deformity of the superior endplate of L1 with less than 25% height loss compared to CT from 09/16/2022, age indeterminate."   - No intervention needed presently  - " Continue multimodal pain meds to treat.    Decreased Mobility   Physical Debility  Weakness   Fall with Injury  -Patient with decreased bed mobility therefore patient is at high risk for developing wounds and deterioration of any current wounds.   - Continue with PT/OT for gait training and strengthening and restoration of ADL's   - Encourage mobility, OOB in chair, and early ambulation as appropriate  - Fall precautions   - Monitor for bowel and bladder dysfunction  - Monitor for and prevent skin breakdown and pressure ulcers  - Continue DVT prophylaxis with home xarelto    Memory Impairment  Alzheimer's disease with late onset (CODE)  SLP consulted for cognitive eval and tx  Delirium and Fall Precautions    Patient with dementia with likely etiology of alzheimer's dementia. Dementia is mild. The patient does not have signs of behavioral disturbance. Home dementia medications are Held or Continued: not indicated at this time . Continue non-pharmacologic interventions to prevent delirium (No VS between 11PM-5AM, activity during day, opening blinds, providing glasses/hearing aids, and up in chair during daytime). Will avoid narcotics and benzos unless absolutely necessary. PRN anti-psychotics are not prescribed at this time but can be added    Anxiety  Situational Depression       - Continue home lexapro 10 mg po daily    PAF (paroxysmal atrial fibrillation)   Long term current use of anticoagulant  - Continue home xarelto 15 mg po with dinner  - Continue home Cardizem 24 hr tab 240 mg daily    Left ventricular diastolic dysfunction with preserved systolic function   Stable. No SOB, LE or abdominal edema. Clinically dry.    - Monitor fluid balance with weight & I/O      - CHF education to include diet, medication, and CHF flags for MD notification    Essential Hypertension  - Chronic, stable  - No home meds  - Monitor BP  - Add therapy if needed for BP goal < 150/90  - Avoid hypotension     Stage 3 chronic kidney  disease  - Chronic, stable  - Baseline Creatinine 0.7 to 0.8  - Avoid any nephrotoxic agents including NSAIDs, aminoglycosides, IV contrast (unless absolutely necessary), gadolinium, fleets and other phosphorous-based laxatives. Caution with antibiotics.  - Renally adjust medications based on patient's creatinine clearance  - Avoid Hypotension  - Routinely monitor renal function with scheduled metabolic profile        Anticipate disposition:  Group Home with home health    Follow-up needed during SNF stay-Ortho    Follow-up needed after discharge from SNF: PCP     Future Appointments   Date Time Provider Department Center   12/19/2023  9:15 AM Lashay Santiago PA-C [facility] [address]   1/16/2024 10:45 AM Lashay Santiago PA-C [facility] [address]         I certify that SNF services are required to be given on an inpatient basis because Tigist Murry needs for skilled nursing care and/or skilled rehabilitation are required on a daily basis and such services can only practically be provided in a skilled nursing facility setting and are for an ongoing condition for which she received inpatient care in the hospital.     Total time of the visit 146 minutes   Description of non physical exam/non charting time: counseling patient on clinical conditions and therapies provided.  Extensive chart review completed including all consultation notes.  All pertinent laboratory and radiographical images reviewed.        Risa Oscar NP  Department of Hospital Medicine   [facility]     DOS: 12/10/2023       Patient note was created using MModal Dictation.  Any errors in syntax or even information may not have been identified and edited on initial review prior to signing this note.

## 2023-12-10 NOTE — PLAN OF CARE
Problem: Adult Inpatient Plan of Care  Goal: Patient-Specific Goal (Individualized)  Outcome: Ongoing, Progressing     Problem: Adult Inpatient Plan of Care  Goal: Absence of Hospital-Acquired Illness or Injury  Outcome: Ongoing, Progressing     Problem: Skin Injury Risk Increased  Goal: Skin Health and Integrity  Outcome: Ongoing, Progressing

## 2023-12-11 LAB
ANION GAP SERPL CALC-SCNC: 10 MMOL/L (ref 8–16)
BASOPHILS # BLD AUTO: 0.03 K/UL (ref 0–0.2)
BASOPHILS NFR BLD: 0.5 % (ref 0–1.9)
BUN SERPL-MCNC: 17 MG/DL (ref 8–23)
CALCIUM SERPL-MCNC: 9.3 MG/DL (ref 8.7–10.5)
CHLORIDE SERPL-SCNC: 105 MMOL/L (ref 95–110)
CO2 SERPL-SCNC: 23 MMOL/L (ref 23–29)
CREAT SERPL-MCNC: 0.6 MG/DL (ref 0.5–1.4)
DIFFERENTIAL METHOD BLD: ABNORMAL
EOSINOPHIL # BLD AUTO: 0.1 K/UL (ref 0–0.5)
EOSINOPHIL NFR BLD: 2.4 % (ref 0–8)
ERYTHROCYTE [DISTWIDTH] IN BLOOD BY AUTOMATED COUNT: 13.6 % (ref 11.5–14.5)
EST. GFR  (NO RACE VARIABLE): >60 ML/MIN/1.73 M^2
GLUCOSE SERPL-MCNC: 76 MG/DL (ref 70–110)
HCT VFR BLD AUTO: 31 % (ref 37–48.5)
HGB BLD-MCNC: 10 G/DL (ref 12–16)
IMM GRANULOCYTES # BLD AUTO: 0.08 K/UL (ref 0–0.04)
IMM GRANULOCYTES NFR BLD AUTO: 1.3 % (ref 0–0.5)
LYMPHOCYTES # BLD AUTO: 0.9 K/UL (ref 1–4.8)
LYMPHOCYTES NFR BLD: 15.8 % (ref 18–48)
MAGNESIUM SERPL-MCNC: 2.1 MG/DL (ref 1.6–2.6)
MCH RBC QN AUTO: 30.2 PG (ref 27–31)
MCHC RBC AUTO-ENTMCNC: 32.3 G/DL (ref 32–36)
MCV RBC AUTO: 94 FL (ref 82–98)
MONOCYTES # BLD AUTO: 0.7 K/UL (ref 0.3–1)
MONOCYTES NFR BLD: 12.5 % (ref 4–15)
NEUTROPHILS # BLD AUTO: 4 K/UL (ref 1.8–7.7)
NEUTROPHILS NFR BLD: 67.5 % (ref 38–73)
NRBC BLD-RTO: 1 /100 WBC
PHOSPHATE SERPL-MCNC: 3.3 MG/DL (ref 2.7–4.5)
PLATELET # BLD AUTO: 367 K/UL (ref 150–450)
PMV BLD AUTO: 10.9 FL (ref 9.2–12.9)
POTASSIUM SERPL-SCNC: 4.4 MMOL/L (ref 3.5–5.1)
RBC # BLD AUTO: 3.31 M/UL (ref 4–5.4)
SODIUM SERPL-SCNC: 138 MMOL/L (ref 136–145)
WBC # BLD AUTO: 5.94 K/UL (ref 3.9–12.7)

## 2023-12-11 PROCEDURE — 36415 COLL VENOUS BLD VENIPUNCTURE: CPT | Mod: HCNC | Performed by: HOSPITALIST

## 2023-12-11 PROCEDURE — 11000004 HC SNF PRIVATE: Mod: HCNC

## 2023-12-11 PROCEDURE — 25000003 PHARM REV CODE 250: Mod: HCNC | Performed by: NURSE PRACTITIONER

## 2023-12-11 PROCEDURE — 25000003 PHARM REV CODE 250: Mod: HCNC | Performed by: HOSPITALIST

## 2023-12-11 PROCEDURE — 84100 ASSAY OF PHOSPHORUS: CPT | Mod: HCNC | Performed by: HOSPITALIST

## 2023-12-11 PROCEDURE — 97530 THERAPEUTIC ACTIVITIES: CPT | Mod: HCNC,CO

## 2023-12-11 PROCEDURE — 80048 BASIC METABOLIC PNL TOTAL CA: CPT | Mod: HCNC | Performed by: HOSPITALIST

## 2023-12-11 PROCEDURE — 97530 THERAPEUTIC ACTIVITIES: CPT | Mod: HCNC,CQ

## 2023-12-11 PROCEDURE — 92523 SPEECH SOUND LANG COMPREHEN: CPT | Mod: HCNC

## 2023-12-11 PROCEDURE — 83735 ASSAY OF MAGNESIUM: CPT | Mod: HCNC | Performed by: HOSPITALIST

## 2023-12-11 PROCEDURE — 85025 COMPLETE CBC W/AUTO DIFF WBC: CPT | Mod: HCNC | Performed by: HOSPITALIST

## 2023-12-11 RX ORDER — ACETAMINOPHEN 500 MG
1000 TABLET ORAL EVERY 8 HOURS
Status: DISCONTINUED | OUTPATIENT
Start: 2023-12-11 | End: 2023-12-29 | Stop reason: HOSPADM

## 2023-12-11 RX ADMIN — METHOCARBAMOL 500 MG: 500 TABLET ORAL at 09:12

## 2023-12-11 RX ADMIN — METHOCARBAMOL 500 MG: 500 TABLET ORAL at 04:12

## 2023-12-11 RX ADMIN — METHOCARBAMOL 500 MG: 500 TABLET ORAL at 08:12

## 2023-12-11 RX ADMIN — SENNOSIDES AND DOCUSATE SODIUM 1 TABLET: 8.6; 5 TABLET ORAL at 08:12

## 2023-12-11 RX ADMIN — ESCITALOPRAM OXALATE 10 MG: 10 TABLET ORAL at 09:12

## 2023-12-11 RX ADMIN — ACETAMINOPHEN 650 MG: 325 TABLET ORAL at 10:12

## 2023-12-11 RX ADMIN — METHOCARBAMOL 500 MG: 500 TABLET ORAL at 01:12

## 2023-12-11 RX ADMIN — POLYETHYLENE GLYCOL 3350 17 G: 17 POWDER, FOR SOLUTION ORAL at 08:12

## 2023-12-11 RX ADMIN — SENNOSIDES AND DOCUSATE SODIUM 1 TABLET: 8.6; 5 TABLET ORAL at 09:12

## 2023-12-11 RX ADMIN — ACETAMINOPHEN 1000 MG: 500 TABLET ORAL at 09:12

## 2023-12-11 RX ADMIN — ACETAMINOPHEN 1000 MG: 500 TABLET ORAL at 01:12

## 2023-12-11 RX ADMIN — DILTIAZEM HYDROCHLORIDE 240 MG: 120 CAPSULE, COATED, EXTENDED RELEASE ORAL at 09:12

## 2023-12-11 RX ADMIN — RIVAROXABAN 15 MG: 15 TABLET, FILM COATED ORAL at 04:12

## 2023-12-11 NOTE — PT/OT/SLP EVAL
Speech Language Pathology  Evaluation    Tigist Murry   MRN: [id number]   Admitting Diagnosis: Closed displaced fracture of left femoral neck    Diet recommendations: Solid Diet Level: Regular Diet - IDDSI Level 7  Liquid Diet Level: Thin liquids - IDDSI Level 0 Standard aspiration precautions    SLP Treatment Date: 12/11/23  Speech Start Time: 0818     Speech Stop Time: 0832     Speech Total (min): 14 min       TREATMENT BILLABLE MINUTES:  Eval 14     Diagnosis: Closed displaced fracture of left femoral neck      Past Medical History:   Diagnosis Date    Anticoagulant long-term use     Anxiety     Arthritis     Atrial fibrillation     Carpal tunnel syndrome on right     Diverticulosis     Encounter for blood transfusion     HTN (hypertension)     Hyperlipidemia     Osteopenia     Overactive bladder     Palpitations     Patellar tendonitis 4/13    saw [place] [facility]     Psoriasis     S/P partial hysterectomy     Situational depression     Supraventricular tachycardia      Past Surgical History:   Procedure Laterality Date    ABDOMINAL SURGERY      BREAST BIOPSY Left     excisional    CARPAL TUNNEL RELEASE      HEMIARTHROPLASTY, HIP, POSTERIOR APPROACH Left 12/5/2023    Procedure: HEMIARTHROPLASTY, HIP, POSTERIOR APPROACH left;  Surgeon: Tani Martines MD;  Location: [facility] OR [address] [place] [address];  Service: Orthopedics;  Laterality: Left;    HYSTERECTOMY      @27yrs of age    KNEE ARTHROSCOPY W/ DEBRIDEMENT  '05    Right    OOPHORECTOMY      @27yrs of age    PARTIAL HYSTERECTOMY         Has the patient been evaluated by SLP for swallowing? : No  Keep patient NPO?: No General Precautions: Standard, fall          Social Hx: Patient lives in group home.      Subjective:  Spoke with RN prior to entering pt's room . Pt seen bedside for session. Alert and agreeable to ST. Pt upset, reporting she has been trying to call someone for the last 30 minutes re: difficulty with BM. However, call light not on upon ST arrival. Pt very  anxious t/o session.     Objective:     Cognitive Status:  Behavioral Observations: alert and confused-  Memory and Orientation: Pt IND oriented to name and city/state. Required cues to orient to situation. Not oriented to time info or name/type of place despite cues.    Attention: fluctuating attention.  Problem Solving: decreased  Pragmatics:  anxious  Executive Function: insight/awareness, organization, and planning    Language: perseveration and tangential    Auditory Comprehension: answers yes/no questions and able to follow commands    Verbal Expression:  Pt expressed simple information without assistance    Motor Speech:  WFL    Voice:  WFL    Reading:  tba    Writing:  tba    Visual-Spatial: tba    Additional Treatment:  Administered the SLUMS to further assess cognitive communication skills. Pt scored 1/30 possible points, indicating severe impairments.    Assessment:  Tigist Murry is a 83 y.o. female with a medical diagnosis of Closed displaced fracture of left femoral neck and presents with cognitive communication deficits.    Spiritual, Cultural Beliefs, Methodist Practices, Values that Affect Care: no    Discharge recommendations: Therapy Intensity Recommendations at Discharge: Moderate Intensity Therapy     Goals:   Multidisciplinary Problems       SLP Goals          Problem: SLP    Goal Priority Disciplines Outcome   SLP Goal     SLP    Description: Goals expected to be met by December 18:  1. Pt will Ox4 given mod cues, including use of external aides.  2. Pt will state safety precautions re: call light, transfers, etc given min cues.  3. Pt will recall daily events x3 given min cues.   4. Pt will participate in ongoing assessment of cognitive skills to further develop POC.                        Plan:   Patient to be seen Therapy Frequency: 3 x/week   Planned Interventions: Cognitive-Linguistic Therapy  Plan of Care expires: 01/09/24  Plan of Care reviewed with: patient  SLP Follow-up?: Yes               12/11/2023

## 2023-12-11 NOTE — PLAN OF CARE
Cognitive linguistic evaluation initiated. Presents with severe deficits at this time. Pt with mild dementia per chart, appears to be worse than baseline. ST to f/u. Melisa Mcclellan CCC-SLP 12/11/2023 9:15 AM

## 2023-12-11 NOTE — CONSULTS
[facility] - Skilled Nursing  Adult Nutrition  Consult Note    SUMMARY   Recommendations  Continue regular diet, add boost breeze daily, RD following  Goals: PO to meet 85% of EEN/EPN by next RD follow up  Nutrition Goal Status: new  Communication of RD Recs: other (comment) (POC)    Assessment and Plan  Increased nutrient needs related to wound healing as evidenced by s/p R hip fx repair.      New    Plan  Collaboration with other providers  General diet  Commercial beverage(protein) boost breeze daily    Malnutrition Assessment 12/11     Skin (Micronutrient): edema, dry, wounds unhealed  Neck/Chest (Micronutrient): muscle wasting, subcutaneous fat loss  Musculoskeletal/Lower Extremities: muscle wasting, subcutaneous fat loss           Orbital Region (Subcutaneous Fat Loss): mild depletion  Upper Arm Region (Subcutaneous Fat Loss): mild depletion  Thoracic and Lumbar Region: well nourished   Yarsani Region (Muscle Loss): mild depletion  Clavicle and Acromion Bone Region (Muscle Loss): moderate depletion  Scapular Bone Region (Muscle Loss): mild depletion  Dorsal Hand (Muscle Loss): moderate depletion  Anterior Thigh Region (Muscle Loss): moderate depletion                 Reason for Assessment  Reason For Assessment: consult  Diagnosis:  (s/p hemiplasty L hip, UTI)  Relevant Medical History: HLD, HTN, AFIB, CKD 3, GERD, diverticulosis, anxiety, debility, HF, dementia,  Interdisciplinary Rounds: attended    General Information Comments: uses walker at home, weight gain noted since last year, lives in group home, fall at home  Nutrition Discharge Planning: DC on regular diet, increased protein for 4 weeks post dc    Nutrition/Diet History    Patient Reported Diet/Restrictions/Preferences: general  Typical Food/Fluid Intake: regular meals  Spiritual, Cultural Beliefs, Caodaism Practices, Values that Affect Care: no  Food Allergies: NKFA  Factors Affecting Nutritional Intake: None identified at this  "time    Anthropometrics    Temp: 98.1 °F (36.7 °C)  Height: 5' 3" (160 cm)  Height (inches): 63 in  Weight Method: Bed Scale  Weight: 54.6 kg (120 lb 5.9 oz)  Weight (lb): 120.37 lb  Ideal Body Weight (IBW), Female: 115 lb  % Ideal Body Weight, Female (lb): 104.67 %  BMI (Calculated): 21.3  BMI Grade: 18.5-24.9 - normal  Usual Body Weight (UBW), k kg  % Usual Body Weight: 109.43  % Weight Change From Usual Weight: 9.2 %       Lab/Procedures/Meds    Pertinent Labs Reviewed: reviewed  Pertinent Labs Comments: Hg 10.0, Hct 31.0, Vit D 31  Pertinent Medications Reviewed: reviewed  Pertinent Medications Comments: senna-docusate, polyethylene gylcol    Estimated/Assessed Needs    Weight Used For Calorie Calculations: 54.6 kg (120 lb 5.9 oz)  Energy Calorie Requirements (kcal): 6262-9400  Energy Need Method: Kcal/kg (25-30 kcal/ kg)  Protein Requirements: 66-71 (x 1.2-1.3g/kg)  Weight Used For Protein Calculations: 54.6 kg (120 lb 5.9 oz)  Fluid Requirements (mL): 1365 or per MD     RDA Method (mL): 1365         Nutrition Prescription Ordered    Current Diet Order: Regular    Evaluation of Received Nutrient/Fluid Intake    I/O: no data  Energy Calories Required: meeting needs  Protein Required: not meeting needs  Fluid Required: meeting needs  Comments: LBM   Tolerance: tolerating  % Intake of Estimated Energy Needs: 75 - 100 %  % Meal Intake: 75 - 100 %    Nutrition Risk    Level of Risk/Frequency of Follow-up: low (one time per week)       Monitor and Evaluation    Food and Nutrient Intake: food and beverage intake  Food and Nutrient Adminstration: diet order  Physical Activity and Function: nutrition-related ADLs and IADLs  Biochemical Data, Medical Tests and Procedures: electrolyte and renal panel, gastrointestinal profile  Nutrition-Focused Physical Findings: overall appearance, skin       Nutrition Follow-Up    RD Follow-up?: Yes    "

## 2023-12-11 NOTE — NURSING
Safety maintained throughout shift, bed locked and in lowest position, call light in reach, Side rails up X 2. Non skid sock on when OOB. Pt remained free of fall/trauma. Pt self reports of pain treated with PO pain medication as ordered by MD. VS stable.  no reports of nausea and vomiting. Dressing to L hip CDI.   Pt has appears to have gotten rest tonight with increased dose of melatonin.( Around 5 hrs)    No signs of distress, rise and fall of chest noted, respirations  even and unlabored   Plan of care on going. No future concerns as of present.

## 2023-12-11 NOTE — PROGRESS NOTES
"                                                        [facility]                                  Skilled Nursing Facility                   Progress Note     Admit Date: 12/8/2023  CLARKE   [id number]  Principal Problem:  Closed displaced fracture of left femoral neck   HPI obtained from patient interview and chart review     Chief Complaint:  Initial Visit     HPI:   Mrs. Murry is a 83 year old female PMHx of Afib on xarelto, HTN, HPLD, mild dementia who presents to SNF following hospitalization for left femoral neck fracture s/p left hip hemiarthroplasty on 12/05 with Dr. Naranjo.  Admission to SNF for secondary weakness and debility.    Patient originally presented to [facility] ED on 12/04 after suffering a mechanical fall.  Per [facility] notes, patient suffered a her bathroom at her group home. Most of history taken by daughter at bedside as patient had just received pain meds. It is uncertain for how long she was down, if she hit her head or lost consciousness. She did have left hip and left shoulder pain after amaury found, with the pain in her shoulder more severe than her leg. She normally uses a walker to get around most of the time, but does occasionally walks with out assistance. She has no known CVA/TIA, cardiac history. Per daughter at bedside about 5 months ago she was fairly active but since moving to the group home has been a little less active than normal. Imaging revealed left shoulder displacement, left femoral neck fracture.  UA with signs of UTI. Patient underwent left hip hemiarthroplasty by Dr. Tani Martines.  No intraop complications noted,  mL, skin closed with Dermabond.  Post-op patient WBAT with posterior hip precautions to the left lower extremity. Patient restarted on home Xarelto at 20 mg po daily for her known atrial fibrillation for long term anticoagulation so fully anticoagulated.  Ok to remove LUE sling to mobilize with walker, sling for comfort." Suffered " with postoperative constipation.    Today, patient  Is confused which is her mental baseline.  She is disoriented to her age, the year and place.  Patient states that she did not sleep well last night.     Patient will be treated at [facility] with PT and OT to improve functional status and ability to perform ADLs.     Past Medical History: Patient has a past medical history of Anticoagulant long-term use, Anxiety, Arthritis, Atrial fibrillation, Carpal tunnel syndrome on right, Diverticulosis, Encounter for blood transfusion, HTN (hypertension), Hyperlipidemia, Osteopenia, Overactive bladder, Palpitations, Patellar tendonitis (4/13), Psoriasis, S/P partial hysterectomy, Situational depression, and Supraventricular tachycardia.    Past Surgical History: Patient has a past surgical history that includes Partial hysterectomy; Knee arthroscopy w/ debridement ('05); Carpal tunnel release; Abdominal surgery; Breast biopsy (Left); Hysterectomy; Oophorectomy; and hemiarthroplasty, hip, posterior approach (Left, 12/5/2023).    Social History: Patient reports that she has never smoked. She has never used smokeless tobacco. She reports that she does not drink alcohol and does not use drugs.    Family History: family history includes Heart failure (age of onset: 80) in her mother; No Known Problems in her brother, daughter, daughter, daughter, and sister.    Allergies: Patient is allergic to ciprofloxacin.    ROS  Constitutional: Negative for fever   Eyes: Negative for blurred vision, double vision   Respiratory: Negative for cough, shortness of breath   Cardiovascular: Negative for chest pain, palpitations, and leg swelling.   Gastrointestinal: Negative for abdominal pain, constipation, diarrhea, nausea, vomiting.   Genitourinary: Negative for dysuria, frequency   Musculoskeletal:  + generalized weakness.  + LUE and LLE pain  Skin: Negative for itching and rash.   Neurological: Negative for dizziness, headaches.  "  Psychiatric/Behavioral: Negative for depression. The patient is not nervous/anxious.      24 hour Vital Sign Range   Temp:  [98 °F (36.7 °C)-98.1 °F (36.7 °C)]   Pulse:  [95-97]   Resp:  [16-19]   BP: (137-147)/(66-78)   SpO2:  [96 %-97 %]     Current BMI: Body mass index is 21.32 kg/m².    PEx  Constitutional: Patient appears debilitated.  No distress noted  HENT:   Head: Normocephalic and atraumatic.   Eyes: Pupils are equal, round  Neck: Normal range of motion. Neck supple.   Cardiovascular: Normal rate, regular rhythm and normal heart sounds.    Pulmonary/Chest: Effort normal and breath sounds are clear  Abdominal: Soft. Bowel sounds are normal.   Musculoskeletal: Normal range of motion.   Neurological: Alert and oriented to person,   States incorrect age.  Disoriented to place and time.  Impaired higher level thinking.  Confusion.  Psychiatric: Normal mood and affect. Behavior is normal.   Skin: Skin is warm and dry.   Scattered areas of ecchymosis.  LUE with severe ecchymosis down to her wrist swelling to forearm.    No results for input(s): "GLUCOSE", "NA", "K", "CL", "CO2", "BUN", "CREATININE", "CALCIUM", "MG" in the last 24 hours.    Recent Labs   Lab 12/11/23  0423   WBC 5.94   RBC 3.31*   HGB 10.0*   HCT 31.0*      MCV 94   MCH 30.2   MCHC 32.3       No results for input(s): "POCTGLUCOSE" in the last 168 hours.     Assessment and Plan:    Closed displaced fracture of left femoral neck   Mechanical fall  s/p hemiarthroplasty on 12/5/2023  - PT/OT, WBAT with posterior hip precautions  - DVT PPX with home Xarelto  - maintain surgical dressing until removed by Orthopedics  - ortho KEKE to see patient weekly at SNF  - follow-up with orthopedics after discharge    Acute postoperative pain  - initiated acetaminophen 1000 mg q.8 hours, continue methocarbamol 500 mg QID.  Bowel regimen in place     Closed dislocation of left shoulder  - Patient found to have anterolateral left shoulder dislocation on " "admit. Orthopedics consulted and reduced shoulder dislocation successfully in ED with sedation.  - Per Ortho, patient placed in sling to left arm for comfort. Ortho states okay to remove sling to mobilize with therapy.      Compression fracture of L1 lumbar vertebra  - Present on admit and noted on CT scan of chest, abdomen and pelvis to have "New compression deformity of the superior endplate of L1 with less than 25% height loss compared to CT from 09/16/2022, age indeterminate." Patient denies any low back pain.   - No treatment or intervention needed.      Essential hypertension  - continue home diltiazem 24 hour capsule 20 40 mg daily'    Left ventricular diastolic dysfunction with preserved systolic function   - recent echo reviewed from 3/2/2, EF 55%  - Monitor I&Os, weights  - okay for regular diet  - no signs of fluid overload on exam    Atrial fibrillation and flutter  Long term current use of anticoagulant   - continue home diltiazem and Xarelto 15 mg daily    Acute blood loss anemia  - expected post fracture and surgery  - continue monitor twice weekly CBC, transfuse for hemoglobin < 7     Alzheimer's disease with late onset (CODE)  - Patient with dementia with likely etiology of alzheimer's dementia. Dementia is mild. The patient does not have signs of behavioral disturbance.  Delirium precautions:  - Avoid antihistamines, anticholinergics, hypnotics, and minimize opiates while controlling for pain as these medications may exacerbate delirium. Cues for day/night will assist with keeping patient calm and oriented - during daytime, please keep adequate light in room (open windows, lights on) and please keep room dim at night-time to encourage normal sleep-wake cycles. Continuing to have nursing and family reorient the patient and encourage family to visit    Depression, moderate, reoccurring  - continue escitalopram 10 mg daily    Debility   - Continue with PT/OT for gait training and strengthening and " restoration of ADL's   - Encourage mobility, OOB in chair, and early ambulation as appropriate  - Fall precautions   - Monitor for bowel and bladder dysfunction  - Monitor for and prevent skin breakdown and pressure ulcers  - Continue DVT prophylaxis with Xarelto        Anticipate disposition:  Back to group home. Patient has (3) daughters: Sarah Murry who is the power of  and she lives in [place], [place] and the other (2) daughters are from [place], [place] and [place], [place].    IP [facility] RISK OF UNPLANNED READMISSION Model: LOW    Follow-up needed during SNF stay-    Appointment not to send patient to- ortho 12/19    Follow-up needed after discharge from SNF: PCP, ortho 1/16    Future Appointments   Date Time Provider Department Center   12/19/2023  9:15 AM Lashay Santiago PA-C [facility] [address]   1/16/2024 10:45 AM Lashay Santiago PA-C [facility] [address]         I certify that SNF services are required to be given on an inpatient basis because Tigist Murry needs for skilled nursing care and/or skilled rehabilitation are required on a daily basis and such services can only practically be provided in a skilled nursing facility setting and are for an ongoing condition for which she received inpatient care in the hospital.     Total time of the visit 148 minutes   Description of non physical exam/non charting time: counseling patient on clinical conditions and therapies provided.  Extensive chart review completed including all consultation notes.  All pertinent laboratory and radiographical images reviewed.        Kayla Baca NP  Department of Hospital Medicine   [facility]     DOS: 12/11/2023       Patient note was created using MModal Dictation.  Any errors in syntax or even information may not have been identified and edited on initial review prior to signing this note.

## 2023-12-11 NOTE — PLAN OF CARE
Problem: Occupational Therapy  Goal: Occupational Therapy Goal  Description: Goals to be met by: 12/30/23     Patient will increase functional independence with ADLs by performing:    UE Dressing with Stand-by Assistance.- Ongoing   LE Dressing with Moderate Assistance.- Ongoing   Grooming while seated at sink with Set-up Assistance.- Ongoing   Toileting from bedside commode with Moderate Assistance for hygiene and clothing management. - Ongoing   Bathing from  edge of bed with Minimal Assistance.- Ongoing   Toilet transfer to bedside commode with Moderate Assistance.- Ongoing     Outcome: Ongoing, Progressing

## 2023-12-11 NOTE — PT/OT/SLP PROGRESS
"Physical Therapy Treatment    Patient Name:  Tigist Murry   MRN:  [id number]  Admit Date: 12/8/2023  Admitting Diagnosis: Closed displaced fracture of left femoral neck  Recent Surgeries:     General Precautions: Standard, fall  Orthopedic Precautions: LLE weight bearing as tolerated, LLE posterior precautions  Braces: N/A    Recommendations:     Discharge Recommendations:  (Home Health PT)  Level of Assistance Recommended at Discharge: 24 hours significant assistance  Discharge Equipment Recommendations: bedside commode, bath bench, wheelchair, walker, rolling  Barriers to discharge: Decreased caregiver support    Assessment:     Tigist Murry is a 83 y.o. female admitted with a medical diagnosis of Closed displaced fracture of left femoral neck . Pt tolerated fairly well, does require education encouragement for participation, some confusion noted nsg notified, pt would continue to benefit from skilled PT services to improve overall functional mobility, strength and endurance.  .      Performance deficits affecting function: weakness, impaired endurance, impaired self care skills, impaired functional mobility, gait instability, impaired balance, decreased upper extremity function, decreased lower extremity function, pain, impaired skin, edema, decreased ROM, impaired cardiopulmonary response to activity, orthopedic precautions, impaired cognition.    Rehab Potential is good    Activity Tolerance: Fair    Plan:     Patient to be seen 5 x/week to address the above listed problems via gait training, therapeutic activities, therapeutic exercises, wheelchair management/training    Plan of Care Expires: 01/08/24  Plan of Care Reviewed with: patient    Subjective     "I don't even know how I got here" ed/reoriented to situation.     Pain/Comfort:  Pain Rating 1: 7/10  Location - Side 1: Left  Location - Orientation 1: generalized  Location 1: leg  Pain Addressed 1: Reposition, Distraction, Cessation of Activity, Nurse " notified  Pain Rating Post-Intervention 1: 7/10 (inc with mobility, did not rate)    Patient's cultural, spiritual, Christian conflicts given the current situation:  no    Objective:     Communicated with nsg prior to session. Re pts c/o pain requesting tylenol  Patient found  with  (in wc) upon PT entry to room.   Patient Education: Education on the importance/benefits of PT services, Safety/proper tech with trfs/gait, On the importance of increasing OOB tolerance/prolong affects of bed rest, Use of the call button for A with OOB mobility/use of AD/fall risk.      Therapeutic Activities and Exercises: x15 reps AP,GS,SAQ vc/tcs/A/AA within limited tolerable ROM  Pt unable to recall hip prec ed/reviewed    Functional Mobility:  Transfers:     Sit to Stand:  maximal assistance and of 1-2 persons with grab bars  Gait: amb in II bars max A wc follow close, vc/tcs for erect posture, post lean ~ 8 tiny steps (~2 ft)  Balance: static standing x 3 trials max A post lean vcs for ant wt shifting over CLARIBEL ~ 20-30 sec    AM-PAC 6 CLICK MOBILITY  10    Patient left up in chair with call button in reach and belonging sin reach .    GOALS:   Multidisciplinary Problems       Physical Therapy Goals          Problem: Physical Therapy    Goal Priority Disciplines Outcome Goal Variances Interventions   Physical Therapy Goal     PT, PT/OT Ongoing, Progressing     Description: Goals to be met by: 1/9/23     Patient will increase functional independence with mobility by performing:    . Supine to sit with MInimal Assistance  . Sit to supine with MInimal Assistance  . Rolling to Left and Right with Minimal Assistance.  . Sit to stand transfer with Minimal Assistance  . Bed to chair transfer with Minimal Assistance using Rolling Walker  . Gait  x 50 feet with Minimal Assistance using Rolling Walker.   . Wheelchair propulsion x50 feet with Moderate Assistance using bilateral uppper extremities                         Time Tracking:     PT  Received On: 12/11/23  PT Start Time: 1021  PT Stop Time: 1051  PT Total Time (min): 30 min    Billable Minutes: Therapeutic Activity 30    Treatment Type: Treatment  PT/PTA: PTA     Number of PTA visits since last PT visit: 1     12/11/2023

## 2023-12-11 NOTE — PT/OT/SLP PROGRESS
"Occupational Therapy   Treatment    Name: Tigist Murry  MRN: [id number]  Admit Date: 12/8/2023  Admitting Diagnosis:  Closed displaced fracture of left femoral neck    General Precautions: Standard, fall   Orthopedic Precautions: LLE weight bearing as tolerated, LLE posterior precautions   Braces: N/A    Recommendations:     Discharge Recommendations:  Low Intensity Therapy  Level of Assistance Recommended at Discharge: 24 hours physical assistance for all ADL's and home management tasks  Discharge Equipment Recommendations: bedside commode, hip kit, shower chair, wheelchair  Barriers to discharge:  Decreased caregiver support    Assessment:     Tigist Murry is a 83 y.o. female with a medical diagnosis of Closed displaced fracture of left femoral neck .  She presents with performance deficits affecting function are weakness, impaired endurance, impaired self care skills, impaired functional mobility, gait instability, impaired balance, impaired cognition, decreased lower extremity function, decreased safety awareness, impaired coordination, impaired cardiopulmonary response to activity, orthopedic precautions.   Pt participated well during today's session. Pt appeared to be confused, required max redirection throughout session. Pt tolerated tx session without incident and is making progress, however, continues to demonstrate deficits with self care skills, balance, functional mobility, UB strength and endurance. Pt will benefit from continued OT services to progress towards goals.     Rehab Potential is fair    Activity tolerance:  Fair    Plan:     Patient to be seen 5 x/week to address the above listed problems via self-care/home management, therapeutic exercises, therapeutic activities    Plan of Care Expires: 01/06/24  Plan of Care Reviewed with: patient    Subjective   "I can't find my car"  Communicated with: Merline prior to session.  .    Pain/Comfort:  Pain Rating 1:  (pt did not rate)  Location - Side 1: " Left  Location - Orientation 1: generalized  Location 1: leg  Pain Addressed 1: Pre-medicate for activity, Reposition, Distraction  Pain Rating Post-Intervention 1:  (pt did not rate)    Patient's cultural, spiritual, Nondenominational conflicts given the current situation:  no    Objective:     Patient found up in chair upon OT entry to room.    Bed Mobility:    Not performed, pt seated in w/c.     Activities of Daily Living:  Not performed, already completed.     [facility] 6 Click ADL: 14    OT Exercises: Pt seated at table to perform table slides with focus on shoulder flex/ext.   Pt required max redirection and [place] assist to complete.     Treatment & Education:  Pt seated at table to engage in peg activity with focus on fine motor and attention to task. Pt required max redirection and verbal cues to complete.     Pt educated on:  - role of OT  - level of assistance  - energy conservation and task modification to maximize independence with ADL's and mobility   -  safety while performing functional transfers and self care tasks  - orthopedic precautions.   - progress towards OT goals     Patient left up in chair with call button in reach and nsg and PCT  notified.    GOALS:   Multidisciplinary Problems       Occupational Therapy Goals          Problem: Occupational Therapy    Goal Priority Disciplines Outcome Interventions   Occupational Therapy Goal     OT, PT/OT Ongoing, Progressing    Description: Goals to be met by: 12/30/23     Patient will increase functional independence with ADLs by performing:    UE Dressing with Stand-by Assistance.- Ongoing   LE Dressing with Moderate Assistance.- Ongoing   Grooming while seated at sink with Set-up Assistance.- Ongoing   Toileting from bedside commode with Moderate Assistance for hygiene and clothing management. - Ongoing   Bathing from  edge of bed with Minimal Assistance.- Ongoing   Toilet transfer to bedside commode with Moderate Assistance.- Ongoing                           Time Tracking:     OT Date of Treatment: 12/11/23  OT Start Time: 1135    OT Stop Time: 1207  OT Total Time (min): 32 min    Billable Minutes:Therapeutic Activity 32    12/11/2023  A client care conference was performed between the LOTR and WASSERMAN, prior to treatment by WASSERMAN, to discuss the patient's status, treatment plan and established goals.

## 2023-12-11 NOTE — CLINICAL REVIEW
Clinical Pharmacy Chart Review Note      Admit Date: 12/8/2023   LOS: 3 days       Tigist Murry is a 83 y.o. female admitted to SNF for PT/OT after hospitalization for closed displaced fracture of left femoral neck s/p hemiarthroplasty on 12/5/2023.    Active Hospital Problems    Diagnosis  POA    *Closed displaced fracture of left femoral neck s/p hemiarthroplasty on 12/5/2023 [S72.002A]  Yes    Closed dislocation of left shoulder [S43.005A]  Yes    Weakness [R53.1]  Yes    Memory impairment [R41.3]  Yes    CKD (chronic kidney disease) stage 3, GFR 30-59 ml/min [N18.30]  Yes     Chronic    Long term current use of anticoagulant [Z79.01]  Not Applicable    Gastroesophageal reflux disease [K21.9]  Yes     Chronic    Left ventricular diastolic dysfunction with preserved systolic function [I51.89]  Yes     Chronic    PAF (paroxysmal atrial fibrillation) [I48.0]  Yes    Situational depression [F43.21]  Yes     Chronic    Pure hypercholesterolemia [E78.00]  Yes    Overactive bladder [N32.81]  Yes     Chronic    Essential hypertension [I10]  Yes     Chronic    Anxiety [F41.9]  Yes     Chronic      Resolved Hospital Problems   No resolved problems to display.     Review of patient's allergies indicates:   Allergen Reactions    Ciprofloxacin Other (See Comments)     Other reaction(s): Nausea  Other reaction(s): Nausea     Patient Active Problem List    Diagnosis Date Noted    Goals of care, counseling/discussion 12/07/2023    Hypophosphatemia 12/07/2023    Acute blood loss anemia 12/06/2023    Closed displaced fracture of left femoral neck s/p hemiarthroplasty on 12/5/2023 12/05/2023    Closed dislocation of left shoulder 12/05/2023    Acute cystitis without hematuria 12/05/2023    Atelectasis 12/05/2023    Compression fracture of L1 lumbar vertebra 12/05/2023    Sequelae of protein-calorie malnutrition 01/30/2023    Parkinsonism, unspecified Parkinsonism type 01/30/2023    Alzheimer's disease with late onset (CODE)  01/30/2023    Influenza A 11/29/2022    Non-traumatic rhabdomyolysis 11/02/2022    Syncope and collapse 11/02/2022    Atrial fibrillation and flutter 11/02/2022    Weakness 11/02/2022    Memory impairment 11/02/2022    CKD (chronic kidney disease) stage 3, GFR 30-59 ml/min 06/15/2018    Body mass index (BMI) 20.0-20.9, adult 06/15/2018    Long term current use of anticoagulant 12/06/2017    Pulmonary nodule 01/03/2017    Senile purpura 12/27/2016    Calcified granuloma of lung 09/23/2016    Aortic atherosclerosis 05/26/2016    Arterial tortuosity 05/26/2016    Gastroesophageal reflux disease 05/26/2016    Left ventricular diastolic dysfunction with preserved systolic function 05/26/2016    PAF (paroxysmal atrial fibrillation) 04/15/2015    Situational depression     Pure hypercholesterolemia     Overactive bladder     Essential hypertension     Diverticulosis     Anxiety     Psoriasis     Carpal tunnel syndrome on right     Osteopenia        Scheduled Meds:    acetaminophen  1,000 mg Oral Q8H    diltiaZEM  240 mg Oral Daily    EScitalopram oxalate  10 mg Oral Daily    methocarbamoL  500 mg Oral QID    polyethylene glycol  17 g Oral BID    rivaroxaban  15 mg Oral Daily with dinner    senna-docusate 8.6-50 mg  1 tablet Oral BID     Continuous Infusions:   PRN Meds: acetaminophen, calcium carbonate, melatonin    OBJECTIVE:     Vital Signs (Last 24H)  Temp:  [98 °F (36.7 °C)-98.1 °F (36.7 °C)]   Pulse:  [95-97]   Resp:  [16-19]   BP: (137-147)/(66-78)   SpO2:  [96 %-97 %]     Laboratory:  CBC:   Recent Labs   Lab 12/07/23 0353 12/08/23 0457 12/11/23 0423   WBC 8.56 8.11 5.94   RBC 2.90* 2.81* 3.31*   HGB 9.1* 8.4* 10.0*   HCT 27.8* 27.2* 31.0*   * 168 367   MCV 96 97 94   MCH 31.4* 29.9 30.2   MCHC 32.7 30.9* 32.3     BMP:   Recent Labs   Lab 12/07/23 0353 12/08/23 0457 12/11/23 0423   GLU 99 80 76    140 138   K 4.0 4.0 4.4   * 111* 105   CO2 23 22* 23   BUN 18 17 17   CREATININE 0.7 0.7 0.6    CALCIUM 8.5* 8.8 9.3   MG 2.0 2.0 2.1     CMP:   Recent Labs   Lab 12/05/23  0013 12/06/23  0342 12/07/23  0353 12/08/23  0457 12/11/23  0423   *   < > 99 80 76   CALCIUM 9.7   < > 8.5* 8.8 9.3   ALBUMIN 3.7  --   --   --   --    PROT 6.8  --   --   --   --       < > 143 140 138   K 4.1   < > 4.0 4.0 4.4   CO2 22*   < > 23 22* 23      < > 111* 111* 105   BUN 17   < > 18 17 17   CREATININE 0.8   < > 0.7 0.7 0.6   ALKPHOS 85  --   --   --   --    ALT 66*  --   --   --   --    AST 54*  --   --   --   --    BILITOT 0.8  --   --   --   --     < > = values in this interval not displayed.     LFTs:   Recent Labs   Lab 12/05/23  0013   ALT 66*   AST 54*   ALKPHOS 85   BILITOT 0.8   PROT 6.8   ALBUMIN 3.7     Others:   Recent Labs   Lab 12/05/23  0013 12/05/23  0329   [id number]  --  31   TSH 0.495  --          ASSESSMENT/PLAN:     I have reviewed the medications in compliance with CMS Regulation F756 of the [place]. Based on information gathered, the following items may need to be addressed:    **According to PMH and home medication list, patient takes the following medications at home. These medications are not currently ordered at [facility]:  Myrbetriq 50 mg daily (non-formulary)    **Patient is taking escitalopam (home med) for anxiety. A gradual dose reduction is not recommended at this time. Patient to follow-up with prescribing MD as outpatient.  Monitor: mental status for depression, suicide ideation, anxiety, serotonin syndrome, hyponatremia, dizziness, drowsiness, somnolence    Medications reviewed by PharmD, please re-consult if needed.      Shivani Haywood, Pharm. D.  Clinical Pharmacist  [facility]

## 2023-12-12 PROCEDURE — 25000003 PHARM REV CODE 250: Mod: HCNC | Performed by: NURSE PRACTITIONER

## 2023-12-12 PROCEDURE — 97110 THERAPEUTIC EXERCISES: CPT | Mod: HCNC,CO

## 2023-12-12 PROCEDURE — 25000003 PHARM REV CODE 250: Mod: HCNC | Performed by: HOSPITALIST

## 2023-12-12 PROCEDURE — 97530 THERAPEUTIC ACTIVITIES: CPT | Mod: HCNC,CO

## 2023-12-12 PROCEDURE — 25000003 PHARM REV CODE 250: Mod: HCNC | Performed by: FAMILY MEDICINE

## 2023-12-12 PROCEDURE — 11000004 HC SNF PRIVATE: Mod: HCNC

## 2023-12-12 PROCEDURE — 97530 THERAPEUTIC ACTIVITIES: CPT | Mod: HCNC

## 2023-12-12 RX ADMIN — ACETAMINOPHEN 1000 MG: 500 TABLET ORAL at 01:12

## 2023-12-12 RX ADMIN — METHOCARBAMOL 500 MG: 500 TABLET ORAL at 01:12

## 2023-12-12 RX ADMIN — ACETAMINOPHEN 1000 MG: 500 TABLET ORAL at 09:12

## 2023-12-12 RX ADMIN — DILTIAZEM HYDROCHLORIDE 240 MG: 120 CAPSULE, COATED, EXTENDED RELEASE ORAL at 08:12

## 2023-12-12 RX ADMIN — ESCITALOPRAM OXALATE 10 MG: 10 TABLET ORAL at 08:12

## 2023-12-12 RX ADMIN — POLYETHYLENE GLYCOL 3350 17 G: 17 POWDER, FOR SOLUTION ORAL at 08:12

## 2023-12-12 RX ADMIN — POLYETHYLENE GLYCOL 3350 17 G: 17 POWDER, FOR SOLUTION ORAL at 09:12

## 2023-12-12 RX ADMIN — RIVAROXABAN 15 MG: 15 TABLET, FILM COATED ORAL at 05:12

## 2023-12-12 RX ADMIN — METHOCARBAMOL 500 MG: 500 TABLET ORAL at 08:12

## 2023-12-12 RX ADMIN — SENNOSIDES AND DOCUSATE SODIUM 1 TABLET: 8.6; 5 TABLET ORAL at 08:12

## 2023-12-12 RX ADMIN — METHOCARBAMOL 500 MG: 500 TABLET ORAL at 09:12

## 2023-12-12 RX ADMIN — SENNOSIDES AND DOCUSATE SODIUM 1 TABLET: 8.6; 5 TABLET ORAL at 09:12

## 2023-12-12 RX ADMIN — ACETAMINOPHEN 1000 MG: 500 TABLET ORAL at 06:12

## 2023-12-12 RX ADMIN — Medication 9 MG: at 09:12

## 2023-12-12 RX ADMIN — METHOCARBAMOL 500 MG: 500 TABLET ORAL at 05:12

## 2023-12-12 NOTE — PLAN OF CARE
Interdisciplinary team, Lalitha Pablo, RN Charge Nurse, Luzma Adams, , Margot Barber, OT, Rehab, Luzma Leon, Activities, and Georgie David, Dietician, spoke to patient and patient's daughter via phone for care plan conference, weekly status update, and therapy progress update. Tentative discharge date set for 12/29/23.

## 2023-12-12 NOTE — PLAN OF CARE
Continue regular diet, add boost breeze daily, RD following  Goals: PO to meet 85% of EEN/EPN by next RD follow up  Nutrition Goal Status: new  Communication of RD Recs: other (comment) (POC)     Assessment and Plan  Increased nutrient needs related to wound healing as evidenced by s/p R hip fx repair.        New     Plan  Collaboration with other providers  General diet  Commercial beverage(protein) boost breeze daily

## 2023-12-12 NOTE — PROGRESS NOTES
[facility]                                  Skilled Nursing Facility                   Progress Note     Admit Date: 12/8/2023  CLARKE 12/29/2023  [id number]  Principal Problem:  Closed displaced fracture of left femoral neck   HPI obtained from patient interview and chart review     Chief Complaint: Re-evaluation of medical treatment and therapy status    HPI:   Mrs. Murry is a 83 year old female PMHx of Afib on xarelto, HTN, HPLD, mild dementia who presents to SNF following hospitalization for left femoral neck fracture s/p left hip hemiarthroplasty on 12/05 with Dr. Naranjo.  Admission to SNF for secondary weakness and debility.    Interval history:.  24 hr vital sign ranges reviewed and listed below.  Continues to display her baseline confusion.  Pain appears well controlled.  Patient denies shortness of breath, abdominal discomfort, nausea, or vomiting.  Patient reports an adequate appetite.  Patient denies dysuria.  Patient reports having regular bowel movements.  Patient progessing with PT/OT-Gait: amb in II bars max A wc follow close, vc/tcs for erect posture, post lean ~ 8 tiny steps (~2 ft). Continuing to follow and treat all acute and chronic conditions.    Past Medical History: Patient has a past medical history of Anticoagulant long-term use, Anxiety, Arthritis, Atrial fibrillation, Carpal tunnel syndrome on right, Diverticulosis, Encounter for blood transfusion, HTN (hypertension), Hyperlipidemia, Osteopenia, Overactive bladder, Palpitations, Patellar tendonitis (4/13), Psoriasis, S/P partial hysterectomy, Situational depression, and Supraventricular tachycardia.    Past Surgical History: Patient has a past surgical history that includes Partial hysterectomy; Knee arthroscopy w/ debridement ('05); Carpal tunnel release; Abdominal surgery; Breast biopsy (Left); Hysterectomy; Oophorectomy; and hemiarthroplasty, hip,  posterior approach (Left, 12/5/2023).    Social History: Patient reports that she has never smoked. She has never used smokeless tobacco. She reports that she does not drink alcohol and does not use drugs.    Family History: family history includes Heart failure (age of onset: 80) in her mother; No Known Problems in her brother, daughter, daughter, daughter, and sister.    Allergies: Patient is allergic to ciprofloxacin.    ROS  Constitutional: Negative for fever   Eyes: Negative for blurred vision, double vision   Respiratory: Negative for cough, shortness of breath   Cardiovascular: Negative for chest pain, palpitations, and leg swelling.   Gastrointestinal: Negative for abdominal pain, constipation, diarrhea, nausea, vomiting.   Genitourinary: Negative for dysuria, frequency   Musculoskeletal:  + generalized weakness.  + LUE and LLE pain  Skin: Negative for itching and rash.   Neurological: Negative for dizziness, headaches.   Psychiatric/Behavioral: Negative for depression. The patient is not nervous/anxious.      24 hour Vital Sign Range   Temp:  [98.2 °F (36.8 °C)-98.5 °F (36.9 °C)]   Pulse:  [80-94]   Resp:  [16-18]   BP: (111-160)/(59-70)   SpO2:  [96 %-97 %]     Current BMI: Body mass index is 21.32 kg/m².    PEx  Constitutional: Patient appears debilitated.  No distress noted  HENT:   Head: Normocephalic and atraumatic.   Eyes: Pupils are equal, round  Neck: Normal range of motion. Neck supple.   Cardiovascular: Normal rate, regular rhythm and normal heart sounds.    Pulmonary/Chest: Effort normal and breath sounds are clear  Abdominal: Soft. Bowel sounds are normal.   Musculoskeletal: Normal range of motion.   Neurological: Alert and oriented to person,   States incorrect age.  Disoriented to place and time.  Impaired higher level thinking.  Confusion.  Psychiatric: Normal mood and affect. Behavior is normal.   Skin: Skin is warm and dry.   Scattered areas of ecchymosis.  LUE with severe ecchymosis down  "to her wrist swelling to forearm.    No results for input(s): "GLUCOSE", "NA", "K", "CL", "CO2", "BUN", "CREATININE", "CALCIUM", "MG" in the last 24 hours.    No results for input(s): "WBC", "RBC", "HGB", "HCT", "PLT", "MCV", "MCH", "MCHC" in the last 24 hours.      No results for input(s): "POCTGLUCOSE" in the last 168 hours.     Assessment and Plan:    Closed displaced fracture of left femoral neck   Mechanical fall  s/p hemiarthroplasty on 12/5/2023  - PT/OT, WBAT with posterior hip precautions  - DVT PPX with home Xarelto  - maintain surgical dressing until removed by Orthopedics  - ortho KEKE to see patient weekly at [facility]  - follow-up with orthopedics after discharge    Acute postoperative pain  - stable, continue acetaminophen 1000 mg q.8 hours, continue methocarbamol 500 mg QID.  Bowel regimen in place     Closed dislocation of left shoulder  - Patient found to have anterolateral left shoulder dislocation on admit. Orthopedics consulted and reduced shoulder dislocation successfully in ED with sedation.  - Per Ortho, patient placed in sling to left arm for comfort. Ortho states okay to remove sling to mobilize with therapy.      Compression fracture of L1 lumbar vertebra  - Present on admit and noted on CT scan of chest, abdomen and pelvis to have "New compression deformity of the superior endplate of L1 with less than 25% height loss compared to CT from 09/16/2022, age indeterminate." Patient denies any low back pain.   - No treatment or intervention needed.      Essential hypertension  - BP's labile, continue home diltiazem 24 hour capsule 20 40 mg daily'    Left ventricular diastolic dysfunction with preserved systolic function   - recent echo reviewed from 3/2/2, EF 55%  - Monitor I&Os, weights  - okay for regular diet  - no signs of fluid overload on exam    Atrial fibrillation and flutter  Long term current use of anticoagulant   - stable, continue home diltiazem and Xarelto 15 mg daily    Acute blood loss " anemia  - expected post fracture and surgery  - stable, continue monitor twice weekly CBC, transfuse for hemoglobin < 7     Alzheimer's disease with late onset (CODE)  - Patient with dementia with likely etiology of alzheimer's dementia. Dementia is mild. The patient does not have signs of behavioral disturbance.  Delirium precautions:  - Avoid antihistamines, anticholinergics, hypnotics, and minimize opiates while controlling for pain as these medications may exacerbate delirium. Cues for day/night will assist with keeping patient calm and oriented - during daytime, please keep adequate light in room (open windows, lights on) and please keep room dim at night-time to encourage normal sleep-wake cycles. Continuing to have nursing and family reorient the patient and encourage family to visit    Depression, moderate, reoccurring  - stable, continue escitalopram 10 mg daily    Debility   - Continue with PT/OT for gait training and strengthening and restoration of ADL's   - Encourage mobility, OOB in chair, and early ambulation as appropriate  - Fall precautions   - Monitor for bowel and bladder dysfunction  - Monitor for and prevent skin breakdown and pressure ulcers  - Continue DVT prophylaxis with Xarelto        Anticipate disposition:  Back to group home?  Maybe looking at new home.  Patient has (3) daughters: Sarah Murry who is the power of  and she lives in [place], [place] and the other (2) daughters are from [place], [place] and [place], [place].    IP [facility] RISK OF UNPLANNED READMISSION Model: LOW    Follow-up needed during SNF stay-    Appointment not to send patient to- ortho 12/19    Follow-up needed after discharge from SNF: PCP, ortho 1/16    Future Appointments   Date Time Provider Department Center   12/19/2023  9:15 AM Lashay Santiago PA-C [facility] [address]   1/16/2024 10:45 AM Lashay Santiago PA-C [facility] [address] Ashley M Caire, NP  Department of [facility]   [facility]     DOS: 12/12/2023       Patient note was created using MModal Dictation.  Any errors in syntax or even information may not have been identified and edited on initial review prior to signing this note.

## 2023-12-12 NOTE — PLAN OF CARE
Problem: Adult Inpatient Plan of Care  Goal: Plan of Care Review  Outcome: Ongoing, Progressing  Flowsheets (Taken 12/12/2023 1215)  Plan of Care Reviewed With: patient     Problem: Skin Injury Risk Increased  Goal: Skin Health and Integrity  Outcome: Ongoing, Progressing     Problem: Fall Injury Risk  Goal: Absence of Fall and Fall-Related Injury  Outcome: Ongoing, Progressing

## 2023-12-12 NOTE — PT/OT/SLP PROGRESS
"Occupational Therapy   Treatment    Name: Tigist Murry  MRN: [id number]  Admit Date: 12/8/2023  Admitting Diagnosis:  Closed displaced fracture of left femoral neck    General Precautions: Standard, fall   Orthopedic Precautions: LLE weight bearing as tolerated   Braces: N/A    Recommendations:     Discharge Recommendations:  Low Intensity Therapy  Level of Assistance Recommended at Discharge: 24 hours physical assistance for all ADL's and home management tasks  Discharge Equipment Recommendations: bedside commode, hip kit, shower chair, wheelchair  Barriers to discharge:  Decreased caregiver support    Assessment:     Tigist Murry is a 83 y.o. female with a medical diagnosis of Closed displaced fracture of left femoral neck .  She presents with performance deficits affecting function are weakness, impaired endurance, impaired self care skills, impaired functional mobility, gait instability, impaired balance, impaired cognition, decreased lower extremity function, decreased safety awareness, impaired coordination, impaired cardiopulmonary response to activity, orthopedic precautions.   Pt participated well during today's session. Pt tolerated tx session without incident and is making progress, however, continues to demonstrate deficits with self care skills, balance, functional mobility, UB strength and endurance. Pt will benefit from continued OT services to progress towards goals.     Rehab Potential is fair    Activity tolerance:  Fair    Plan:     Patient to be seen 5 x/week to address the above listed problems via self-care/home management, therapeutic exercises, therapeutic activities    Plan of Care Expires: 01/06/24  Plan of Care Reviewed with: patient    Subjective   "I need to find my car"  Communicated with: Merline prior to session.  .    Pain/Comfort:  Pain Rating 1:  (pt did not rate)  Location - Side 1: Right  Location - Orientation 1: generalized  Location 1: arm  Pain Addressed 1: Reposition, " Distraction  Pain Rating Post-Intervention 1:  (pt did not rate)    Patient's cultural, spiritual, Adventism conflicts given the current situation:  no    Objective:     Patient found up in chair with PT on rehab gym.     Bed Mobility:    Pt seated in w/c.     Activities of Daily Living:  Not performed already completed.     [facility] 6 Click ADL: 14    OT Exercises: UE Ergometer 6 min with no resistance applied   Patient required max verbal cueing and redirection to complete.     Treatment & Education:  Pt seated at table to engage in peg activity with focus on attention to task, fine motor and functional reaching. Pt able to complete 2/3 boards with max verbal cueing and redirection.     Pt educated on:  - role of OT  - level of assistance  - energy conservation and task modification to maximize independence with ADL's and mobility   -  safety while performing functional transfers and self care tasks  - orthopedic precautions  - progress towards OT goals     Patient left up in chair with call button in reach    GOALS:   Multidisciplinary Problems       Occupational Therapy Goals          Problem: Occupational Therapy    Goal Priority Disciplines Outcome Interventions   Occupational Therapy Goal     OT, PT/OT Ongoing, Progressing    Description: Goals to be met by: 12/30/23     Patient will increase functional independence with ADLs by performing:    UE Dressing with Stand-by Assistance.- Ongoing   LE Dressing with Moderate Assistance.- Ongoing   Grooming while seated at sink with Set-up Assistance.- Ongoing   Toileting from bedside commode with Moderate Assistance for hygiene and clothing management. - Ongoing   Bathing from  edge of bed with Minimal Assistance.- Ongoing   Toilet transfer to bedside commode with Moderate Assistance.- Ongoing                          Time Tracking:     OT Date of Treatment: 12/12/23  OT Start Time: 1125    OT Stop Time: 1150  OT Total Time (min): 25 min    Billable Minutes:Therapeutic  Activity 13  Therapeutic Exercise 12 12/12/2023

## 2023-12-12 NOTE — PT/OT/SLP PROGRESS
"Physical Therapy Treatment    Patient Name:  Tigist Murry   MRN:  [id number]  Admit Date: 12/8/2023  Admitting Diagnosis: Closed displaced fracture of left femoral neck  Recent Surgeries: HEMIARTHROPLASTY, HIP, POSTERIOR APPROACH left      General Precautions: Standard, fall  Orthopedic Precautions: LLE weight bearing as tolerated  Braces: N/A    Recommendations:     Discharge Recommendations:  (Home Health PT)  Level of Assistance Recommended at Discharge: 24 hours significant assistance  Discharge Equipment Recommendations: bedside commode, bath bench, wheelchair, walker, rolling  Barriers to discharge: Decreased caregiver support    Assessment:     Tigist Murry is a 83 y.o. female admitted with a medical diagnosis of Closed displaced fracture of left femoral neck . Patient was A and O x 1 throughout session perseverating on being able to drive back home with her children are out of town. Re-orientation provided concerning current location and encouragement to perform physical therapy related tasks.Complaining of left arm pain with observed swelling and bruising. Fear of falling limiting her ability to perform standing tasks.       Performance deficits affecting function: weakness, impaired endurance, impaired self care skills, impaired functional mobility, gait instability, impaired balance, decreased upper extremity function, decreased lower extremity function, pain, impaired skin, edema, decreased ROM, impaired cardiopulmonary response to activity, orthopedic precautions, impaired cognition.    Rehab Potential is poor    Activity Tolerance: Poor    Plan:     Patient to be seen 5 x/week to address the above listed problems via gait training, therapeutic activities, therapeutic exercises, wheelchair management/training    Plan of Care Expires: 01/08/24  Plan of Care Reviewed with: patient    Subjective     "I just had surgery today I am not sure if I can do it, how am I going to drive to the gym. "     Pain/Comfort:  Pain Rating 1:  (Did not rate)  Location - Side 1: Left  Location 1: arm    Patient's cultural, spiritual, Amish conflicts given the current situation:  no    Objective:      Patient found up in chair with  (no lines) upon PT entry to room.     Therapeutic Activities and Exercises:   Sit to stand training in // bars max A x 4. 3 minute sustained stand x 3 and 20 seconds with the 3rd.   Initially demonstrated strong retropulsion in standing without achieving end range knee extension with bilateral muscle fasciculations. Responded well to verbal cues for patient to move her hands forwards on the // bars to generate a more upright posture and was able to progress from max A to remain standing to min A while holding onto bilateral // bars.   Attempted shuffling type steps, however was unable to complete well.   Last stand patient was fatigued and unable to come up to complete standing.    Wheelchair mobility training with BUEs, minimal ability to propel the wheelchair with the LUE due to swelling and pain mod A x 100' to the therapy gym.     Functional Mobility:  Transfers:     Sit to Stand:  maximal assistance and bilateral knee blocking with parallel bars    AM-PAC 6 CLICK MOBILITY  10    Patient left up in chair with  All needs in reach. Hand off to OT in therapy gym to continue with therapy .    GOALS:   Multidisciplinary Problems       Physical Therapy Goals          Problem: Physical Therapy    Goal Priority Disciplines Outcome Goal Variances Interventions   Physical Therapy Goal     PT, PT/OT Ongoing, Progressing     Description: Goals to be met by: 1/9/23     Patient will increase functional independence with mobility by performing:    . Supine to sit with MInimal Assistance  . Sit to supine with MInimal Assistance  . Rolling to Left and Right with Minimal Assistance.  . Sit to stand transfer with Minimal Assistance  . Bed to chair transfer with Minimal Assistance using Rolling Walker  .  Gait  x 50 feet with Minimal Assistance using Rolling Walker.   . Wheelchair propulsion x50 feet with Moderate Assistance using bilateral uppper extremities                         Time Tracking:     PT Received On: 12/12/23  PT Start Time: 1055  PT Stop Time: 1124  PT Total Time (min): 29 min    Billable Minutes: Therapeutic Activity 29 min    Treatment Type: Treatment  PT/PTA: PT     Number of PTA visits since last PT visit: 0     12/12/2023

## 2023-12-12 NOTE — CARE UPDATE
IDT Notes:    Colette dtr was on call for the meeting on 12.11, Monday. Dtr explained that [place] lived [place] [facility] to her admission here at SNF and may/may not be returning to that location. Family would be meeting on the subject and with the prior facility management to make a decision.  If not returning, may need facility placement ideas and information. Dtr would be discussing with the family that  24/7 would be recommended on discharge at this point in time.    Pt demonstrates moments of confusion with disorientation to time, place and situation along with BIMS score of 7.

## 2023-12-13 PROCEDURE — 97129 THER IVNTJ 1ST 15 MIN: CPT | Mod: HCNC

## 2023-12-13 PROCEDURE — 97530 THERAPEUTIC ACTIVITIES: CPT | Mod: HCNC

## 2023-12-13 PROCEDURE — 11000004 HC SNF PRIVATE: Mod: HCNC

## 2023-12-13 PROCEDURE — 97130 THER IVNTJ EA ADDL 15 MIN: CPT | Mod: HCNC

## 2023-12-13 PROCEDURE — 25000003 PHARM REV CODE 250: Mod: HCNC | Performed by: HOSPITALIST

## 2023-12-13 PROCEDURE — 25000003 PHARM REV CODE 250: Mod: HCNC | Performed by: NURSE PRACTITIONER

## 2023-12-13 PROCEDURE — 97112 NEUROMUSCULAR REEDUCATION: CPT | Mod: HCNC

## 2023-12-13 PROCEDURE — 25000003 PHARM REV CODE 250: Mod: HCNC | Performed by: FAMILY MEDICINE

## 2023-12-13 PROCEDURE — 97535 SELF CARE MNGMENT TRAINING: CPT | Mod: HCNC

## 2023-12-13 RX ADMIN — SENNOSIDES AND DOCUSATE SODIUM 1 TABLET: 8.6; 5 TABLET ORAL at 09:12

## 2023-12-13 RX ADMIN — DILTIAZEM HYDROCHLORIDE 240 MG: 120 CAPSULE, COATED, EXTENDED RELEASE ORAL at 09:12

## 2023-12-13 RX ADMIN — Medication 9 MG: at 08:12

## 2023-12-13 RX ADMIN — SENNOSIDES AND DOCUSATE SODIUM 1 TABLET: 8.6; 5 TABLET ORAL at 08:12

## 2023-12-13 RX ADMIN — RIVAROXABAN 15 MG: 15 TABLET, FILM COATED ORAL at 05:12

## 2023-12-13 RX ADMIN — POLYETHYLENE GLYCOL 3350 17 G: 17 POWDER, FOR SOLUTION ORAL at 08:12

## 2023-12-13 RX ADMIN — METHOCARBAMOL 500 MG: 500 TABLET ORAL at 09:12

## 2023-12-13 RX ADMIN — METHOCARBAMOL 500 MG: 500 TABLET ORAL at 08:12

## 2023-12-13 RX ADMIN — ACETAMINOPHEN 1000 MG: 500 TABLET ORAL at 09:12

## 2023-12-13 RX ADMIN — ESCITALOPRAM OXALATE 10 MG: 10 TABLET ORAL at 09:12

## 2023-12-13 RX ADMIN — METHOCARBAMOL 500 MG: 500 TABLET ORAL at 01:12

## 2023-12-13 RX ADMIN — ACETAMINOPHEN 1000 MG: 500 TABLET ORAL at 01:12

## 2023-12-13 RX ADMIN — ACETAMINOPHEN 1000 MG: 500 TABLET ORAL at 05:12

## 2023-12-13 RX ADMIN — POLYETHYLENE GLYCOL 3350 17 G: 17 POWDER, FOR SOLUTION ORAL at 09:12

## 2023-12-13 RX ADMIN — METHOCARBAMOL 500 MG: 500 TABLET ORAL at 05:12

## 2023-12-13 NOTE — PT/OT/SLP PROGRESS
"Occupational Therapy   Treatment    Name: Tigist Murry  MRN: [id number]  Admit Date: 12/8/2023  Admitting Diagnosis:  Closed displaced fracture of left femoral neck    General Precautions: Standard, fall   Orthopedic Precautions: LLE weight bearing as tolerated   Braces: N/A    Recommendations:     Discharge Recommendations:  Low Intensity Therapy  Level of Assistance Recommended at Discharge: 24 hours physical assistance for all ADL's and home management tasks  Discharge Equipment Recommendations: bedside commode, wheelchair, hip kit, shower chair  Barriers to discharge:  Decreased caregiver support (increased assist needed for self care and mobility)    Assessment:     Tigist Murry is a 83 y.o. female with a medical diagnosis of Closed displaced fracture of left femoral neck .  She presents with shuffling gait and posterior lean in standing occasional c/o of left arm pain. She is making progress demonstrating increased standing tolerance. However, she needs extra time for processing with verbal and tactile cues for managing RW and safe hand positioning during sit <> stands and functional transfers. Performance deficits affecting function are weakness, impaired endurance, impaired self care skills, impaired functional mobility, gait instability, impaired balance, impaired cognition, decreased lower extremity function, decreased upper extremity function, decreased coordination, pain, decreased safety awareness, decreased ROM, impaired skin, edema, orthopedic precautions.     Rehab Potential is fair    Activity tolerance:  Fair    Plan:     Patient to be seen 5 x/week to address the above listed problems via self-care/home management, therapeutic activities, therapeutic exercises    Plan of Care Expires: 01/06/24  Plan of Care Reviewed with: patient, daughter    Subjective     Communicated with: RN prior to session. Pt stated, " It feels heavy" referring to left arm .    Pain/Comfort:  Pain Rating 1:  (pt did not " rate)  Location - Side 1: Left  Location - Orientation 1: generalized  Location 1: arm  Pain Addressed 1: Reposition, Pre-medicate for activity, Distraction, Cessation of Activity, Nurse notified  Pain Rating Post-Intervention 1: 0/10    Patient's cultural, spiritual, Jew conflicts given the current situation:  no    Objective:     Patient found up in chair with  (no lines, telesitter) upon OT entry to room.    Functional Mobility/Transfers:  Patient completed Bed <> Chair Transfer using Step Transfer technique with moderate assistance with rolling walker and verbal / tactile cues to navigate turn and for safe hand positioning  Patient completed Toilet Transfer Step Transfer technique with moderate assistance with  rolling walker and grab bars  Functional Mobility: Pt complete sit <> stand x 2 with RW to improve standing tolerance for increased (I) with self care and functional mobility. She attempted task 4 x but only stood 2 x for ~ 10 sec then reported her legs felt shaky and needed rest break.    Activities of Daily Living:  Grooming: set-up oral hygiene seated at sink and set-up for hand hygiene following toileting   Toileting: maximal assistance for clothing and posterior hygiene    [facility] 6 Click ADL: 15      Treatment & Education:  -Education on energy conservation and task modification to maximize safety and (I) during ADLs and mobility  -Education on importance of OOB activity to improve overall activity tolerance and promote recovery  -Pt educated to call for assistance and to transfer with hospital staff only  -Provided education regarding role of OT, POC, & discharge recommendations with pt and pt's daughter verbalizing understanding.  Pt had no further questions & when asked whether there were any concerns pt reported none.    Patient left up in chair, recliner with LUE elevated on pillow with  daughter present    GOALS:   Multidisciplinary Problems       Occupational Therapy Goals           Problem: Occupational Therapy    Goal Priority Disciplines Outcome Interventions   Occupational Therapy Goal     OT, PT/OT Ongoing, Progressing    Description: Goals to be met by: 12/30/23     Patient will increase functional independence with ADLs by performing:    UE Dressing with Stand-by Assistance.- Ongoing   LE Dressing with Moderate Assistance.- Ongoing   Grooming while seated at sink with Set-up Assistance.- Met   Toileting from bedside commode with Moderate Assistance for hygiene and clothing management. - Ongoing   Bathing from  edge of bed with Minimal Assistance.- Ongoing   Toilet transfer to bedside commode with Moderate Assistance.- Met                         Time Tracking:     OT Date of Treatment: 12/13/23  OT Start Time: 0858    OT Stop Time: 0950  OT Total Time (min): 52 min    Billable Minutes:Self Care/Home Management 30  Therapeutic Activity 22 12/13/2023

## 2023-12-13 NOTE — H&P
"Hospital Medicine  Skilled Nursing Facility   History and Physical Exam      Date of Service: 12/13/2023      Patient Name: Tigist Murry  MRN: [id number]  Admission Date: 12/8/2023  Attending Physician: Kar Mark MD  Primary Care Provider: Gwen Porter MD  Code Status: Full Code      Principal problem:  Closed displaced fracture of left femoral neck      Chief Complaint:   Chief Complaint   Patient presents with    Hip Injury     Admitted to OS for rehabilitation following hospital stay for left femoral neck fracture s/p hemiarthroplasty.           HPI:   "Mrs. Murry is a 83 year old female PMHx of Afib on xarelto, HTN, HPLD, mild dementia who presents to SNF following hospitalization for left femoral neck fracture s/p left hip hemiarthroplasty on 12/05 with Dr. Naranjo.  Admission to SNF for secondary weakness and debility.     Patient originally presented to [facility] ED on 12/04 after suffering a mechanical fall.  Per [facility] notes, patient suffered a her bathroom at her group home. Most of history taken by daughter at bedside as patient had just received pain meds. It is uncertain for how long she was down, if she hit her head or lost consciousness. She did have left hip and left shoulder pain after amaury found, with the pain in her shoulder more severe than her leg. She normally uses a walker to get around most of the time, but does occasionally walks with out assistance. She has no known CVA/TIA, cardiac history. Per daughter at bedside about 5 months ago she was fairly active but since moving to the group home has been a little less active than normal. Imaging revealed left shoulder displacement, left femoral neck fracture.  UA with signs of UTI. Patient underwent left hip hemiarthroplasty by Dr. Tani Martines.  No intraop complications noted,  mL, skin closed with Dermabond.  Post-op patient WBAT with posterior hip precautions to the left lower extremity. Patient restarted on home Xarelto " "at 20 mg po daily for her known atrial fibrillation for long term anticoagulation so fully anticoagulated.  Ok to remove LUE sling to mobilize with walker, sling for comfort." Suffered with postoperative constipation.     Today, patient  Is confused which is her mental baseline.  She is disoriented to her age, the year and place.  Patient states that she did not sleep well last night." Per Kayla Baca NP on 12/11/23.     She is doing okay today. She is still having left arm swelling and pain that is improving slowly since being at the hospital. She is keeping her left arm up on a pillow as much as she remembers. She says that her left shoulder feels okay and does not have any pain. She is limited in the range of motion but better than when she got to the ED. She is working more with therapy and walked 3 feet with RW and maxA.     Patient admitted with skilled services with PT and OT to improve functional status and ability to perform ADLs.       Past Medical History:   Past Medical History:   Diagnosis Date    Anticoagulant long-term use     Anxiety     Arthritis     Atrial fibrillation     Carpal tunnel syndrome on right     Diverticulosis     Encounter for blood transfusion     HTN (hypertension)     Hyperlipidemia     Osteopenia     Overactive bladder     Palpitations     Patellar tendonitis 4/13    saw [place] [facility]     Psoriasis     S/P partial hysterectomy     Situational depression     Supraventricular tachycardia        Past Surgical History:   Past Surgical History:   Procedure Laterality Date    ABDOMINAL SURGERY      BREAST BIOPSY Left     excisional    CARPAL TUNNEL RELEASE      HEMIARTHROPLASTY, HIP, POSTERIOR APPROACH Left 12/5/2023    Procedure: HEMIARTHROPLASTY, HIP, POSTERIOR APPROACH left;  Surgeon: Tani Martines MD;  Location: [facility] OR [address] [place] [address];  Service: Orthopedics;  Laterality: Left;    HYSTERECTOMY      @27yrs of age    KNEE ARTHROSCOPY W/ DEBRIDEMENT  '05    Right    OOPHORECTOMY   "    @27yrs of age    PARTIAL HYSTERECTOMY         Social History:   Tobacco Use    Smoking status: Never    Smokeless tobacco: Never   Substance and Sexual Activity    Alcohol use: No    Drug use: No    Sexual activity: Not Currently     Partners: Male       Family History:   Family History       Problem Relation (Age of Onset)    Heart failure Mother (80)    No Known Problems Sister, Brother, Daughter, Daughter, Daughter            No current facility-administered medications on file prior to encounter.     Current Outpatient Medications on File Prior to Encounter   Medication Sig    acetaminophen (TYLENOL) 500 MG tablet Take 1 tablet (500 mg total) by mouth every 6 (six) hours as needed for Pain.    bisacodyL (DULCOLAX) 10 mg Supp Unwrap and place 1 suppository (10 mg total) rectally 2 (two) times daily as needed (Until bowel movement if patient has no bowel movement for 2 days).    diltiaZEM (CARDIZEM CD) 240 MG 24 hr capsule Take 1 capsule (240 mg total) by mouth once daily.    EScitalopram oxalate (LEXAPRO) 10 MG tablet Take 1 tablet (10 mg total) by mouth once daily.    melatonin (MELATIN) 3 mg tablet Take 2 tablets (6 mg total) by mouth nightly as needed for Insomnia.    methocarbamoL (ROBAXIN) 500 MG Tab Take 1 tablet (500 mg total) by mouth every 6 (six) hours. for 10 days    polyethylene glycol (GLYCOLAX) 17 gram/dose powder Use to cap to measure 17g, mix with liquid, and take by mouth 2 (two) times daily.    rivaroxaban (XARELTO) 15 mg Tab Take 1 tablet (15 mg total) by mouth daily with dinner or evening meal.    senna-docusate 8.6-50 mg (PERICOLACE) 8.6-50 mg per tablet Take 1 tablet by mouth 2 (two) times daily.       Allergies:   Review of patient's allergies indicates:   Allergen Reactions    Ciprofloxacin Other (See Comments)     Other reaction(s): Nausea  Other reaction(s): Nausea       ROS:  Review of Systems   Constitutional:  Negative for appetite change and fever.   Respiratory:  Negative for  cough and shortness of breath.    Cardiovascular:  Negative for chest pain and palpitations.   Gastrointestinal:  Negative for abdominal pain, nausea and vomiting.   Genitourinary:  Negative for difficulty urinating and dysuria.   Musculoskeletal:  Positive for arthralgias. Negative for back pain and neck pain.   Neurological:  Positive for weakness. Negative for dizziness and light-headedness.   Psychiatric/Behavioral:  Negative for sleep disturbance. The patient is not nervous/anxious.      Objective:  Temp:  [98.7 °F (37.1 °C)]   Pulse:  [96]   Resp:  [18]   BP: (136)/(72)   SpO2:  [97 %]     Body mass index is 21.32 kg/m².      Physical Exam  Vitals and nursing note reviewed.   Constitutional:       General: She is not in acute distress.     Appearance: She is well-developed.   Cardiovascular:      Rate and Rhythm: Normal rate and regular rhythm.      Heart sounds: S1 normal and S2 normal. No murmur heard.  Pulmonary:      Effort: Pulmonary effort is normal. No respiratory distress.      Breath sounds: Normal breath sounds. No wheezing or rales.   Abdominal:      General: Bowel sounds are normal. There is no distension.      Palpations: Abdomen is soft.      Tenderness: There is no abdominal tenderness.   Musculoskeletal:      Right shoulder: No tenderness. Normal range of motion.      Left shoulder: No tenderness. Decreased range of motion.      Left elbow: Swelling present.      Left forearm: Swelling present.      Left hip: Tenderness present.      Left upper leg: Swelling present.      Right lower leg: No edema.      Left lower leg: No edema.   Skin:     General: Skin is warm and dry.      Findings: Bruising present.   Neurological:      Mental Status: She is alert and oriented to person, place, and time.   Psychiatric:         Mood and Affect: Mood normal.         Behavior: Behavior normal. Behavior is cooperative.         Thought Content: Thought content normal.       Significant Labs:   A1C:   Recent Labs    Lab 12/04/23 2342   HGBA1C 5.3     TSH:   Recent Labs   Lab 12/05/23  0013   TSH 0.495     CBC:  Recent Labs   Lab 12/11/23 0423   WBC 5.94   HGB 10.0*   HCT 31.0*      MCV 94     BMP:  Recent Labs   Lab 12/11/23 0423   GLU 76      K 4.4      CO2 23   BUN 17   CREATININE 0.6   CALCIUM 9.3   MG 2.1   PHOS 3.3       Significant Imaging: I have reviewed all pertinent imaging results/findings completed during prior hospitalization.      Assessment and Plan:  Active Diagnoses:    Diagnosis Date Noted POA    PRINCIPAL PROBLEM:  Closed displaced fracture of left femoral neck s/p hemiarthroplasty on 12/5/2023 [S72.002A] 12/05/2023 Yes    Acute blood loss anemia [D62] 12/06/2023 Yes    Closed dislocation of left shoulder [S43.005A] 12/05/2023 Yes    Atelectasis [J98.11] 12/05/2023 Yes    Alzheimer's disease with late onset (CODE) [G30.1] 01/30/2023 Yes    Parkinsonism, unspecified Parkinsonism type [G20.C] 01/30/2023 Yes    Weakness [R53.1] 11/02/2022 Yes    Memory impairment [R41.3] 11/02/2022 Yes    CKD (chronic kidney disease) stage 3, GFR 30-59 ml/min [N18.30] 06/15/2018 Yes     Chronic    Long term current use of anticoagulant [Z79.01] 12/06/2017 Not Applicable    Gastroesophageal reflux disease [K21.9] 05/26/2016 Yes     Chronic    Left ventricular diastolic dysfunction with preserved systolic function [I51.89] 05/26/2016 Yes     Chronic    PAF (paroxysmal atrial fibrillation) [I48.0] 04/15/2015 Yes    Situational depression [F43.21]  Yes     Chronic    Pure hypercholesterolemia [E78.00]  Yes    Overactive bladder [N32.81]  Yes     Chronic    Essential hypertension [I10]  Yes     Chronic    Anxiety [F41.9]  Yes     Chronic      Problems Resolved During this Admission:       Closed displaced fracture of left femoral neck   Mechanical fall  s/p hemiarthroplasty on 12/5/2023  - PT/OT, WBAT with posterior hip precautions  - DVT PPX with home Xarelto  - maintain surgical dressing until removed by  "Orthopedics  - ortho KEKE to see patient weekly at SNF  - follow-up with orthopedics after discharge  - continue acetaminophen 1000 mg q.8 hours and methocarbamol 500 mg q6 hrs.      Closed dislocation of left shoulder  - Patient found to have anterolateral left shoulder dislocation on admit. Orthopedics reduced shoulder dislocation successfully in ED with sedation.  - Per Ortho, patient placed in sling to left arm for comfort. Ortho states okay to remove sling to mobilize with therapy.   - Will repeat xrays today.      Compression fracture of L1 lumbar vertebra  - Present on admit and noted on CT scan of chest, abdomen and pelvis to have "New compression deformity of the superior endplate of L1 with less than 25% height loss compared to CT from 09/16/2022, age indeterminate."   - Patient denies any low back pain.   - No treatment or intervention needed.      Essential hypertension  - BP's labile  - continue home diltiazem  mg daily'     Left ventricular diastolic dysfunction with preserved systolic function   - recent echo reviewed from 3/2/23 with LVEF 55%  - Monitor I&Os, weights  - okay for regular diet  - no signs of fluid overload on exam     Atrial fibrillation and flutter  Long term current use of anticoagulant   - continue home diltiazem and Xarelto 15 mg daily     Acute blood loss anemia  - expected post fracture and surgery  - Continue monitor twice weekly CBC  - transfuse for hemoglobin < 7     Alzheimer's disease with late onset   - The patient does not have signs of behavioral disturbance.  Delirium precautions:  - Avoid antihistamines, anticholinergics, hypnotics, and minimize opiates while controlling for pain as these medications may exacerbate delirium. Cues for day/night will assist with keeping patient calm and oriented - during daytime, please keep adequate light in room (open windows, lights on) and please keep room dim at night-time to encourage normal sleep-wake cycles. Continuing to " have nursing and family reorient the patient and encourage family to visit     Depression, moderate, reoccurring  - continue escitalopram 10 mg daily     Debility   - Continue with PT/OT for gait training and strengthening and restoration of ADL's   - Encourage mobility, OOB in chair, and early ambulation as appropriate  - Fall precautions   - Monitor for bowel and bladder dysfunction  - Monitor for and prevent skin breakdown and pressure ulcers  - Continue DVT prophylaxis with Xarelto       IP [facility] RISK OF UNPLANNED READMISSION Model: Low      Anticipated Disposition:  Home with home health      Future Appointments   Date Time Provider Department Center   12/19/2023  9:15 AM Lashay Santiago PA-C [facility] [address]   1/16/2024 10:45 AM Lashay Santiago PA-C [facility] [address]       I certify that SNF services are required to be given on an inpatient basis because Tigist Murry needs for skilled nursing care and/or skilled rehabilitation are required on a daily basis and such services can only practically be provided in a skilled nursing facility setting and are for an ongoing condition for which she received inpatient care in the hospital.       Kar Mrak MD  Department of Hospital Medicine   [facility] - Skilled Nursing

## 2023-12-13 NOTE — PT/OT/SLP PROGRESS
"Physical Therapy Treatment    Patient Name:  Tigist Murry   MRN:  [id number]  Admit Date: 12/8/2023  Admitting Diagnosis: Closed displaced fracture of left femoral neck  Recent Surgeries: HEMIARTHROPLASTY, HIP, POSTERIOR APPROACH left      General Precautions: Standard, fall  Orthopedic Precautions: LLE weight bearing as tolerated, LLE posterior precautions  Braces: N/A    Recommendations:     Discharge Recommendations: Low Intensity Therapy  Level of Assistance Recommended at Discharge: 24 hours significant assistance  Discharge Equipment Recommendations: hip kit, bedside commode, walker, rolling, shower chair  Barriers to discharge:  (increased assist with moblity)    Assessment:     Tigist Murry is a 83 y.o. female admitted with a medical diagnosis of Closed displaced fracture of left femoral neck.    Pt pleasantly confused, cooperative, redirectable to task with max cues. Pt attempted 4 reps STS with 1 person assist and achieved 1 rep full upright standing with notable posterior lean. Pt took three steps to bedside chair with RW and presented with decreased L stance time and decreased coordination. Pt continues to benefit from skilled PT services while in house in order to address the aforementioned deficits.      Performance deficits affecting function: weakness, impaired endurance, impaired functional mobility, gait instability, impaired balance, decreased lower extremity function, decreased coordination, impaired cognition, decreased upper extremity function, decreased safety awareness, pain.    Rehab Potential is good    Activity Tolerance: Good    Plan:     Patient to be seen 5 x/week to address the above listed problems via therapeutic activities, gait training, therapeutic exercises, wheelchair management/training, neuromuscular re-education    Plan of Care Expires: 01/08/24  Plan of Care Reviewed with: patient, daughter    Subjective     "Yesterday was a good day, but today was harder to move". "     Pain/Comfort:  Pain Rating 1:  (not rated)  Location - Side 1: Left  Location - Orientation 1: generalized  Location 1: hip  Pain Addressed 1: Reposition, Distraction    Patient's cultural, spiritual, Mormon conflicts given the current situation:  no    Objective:     Communicated with RN prior to session.  Patient found up in chair with  (telesitter) upon PT entry to room.     Therapeutic Activities and Exercises:   Discussed sitting upright in chair >1hr, pt agreeable with avasym present  Required increased time for all mobility    LEB x5 min AAROM for initiation and continuation  R LE marching and LAQ x10 reps    Educated pt on PT role/POC  Educated pt on importance of OOB activity and daily ambulation   Pt educated on proper body mechanics, safety techniques, and energy conservation with PT facilitation and cueing throughout session   Pt verbalized understanding        Functional Mobility:  Transfers:     Sit to Stand:  maximal assistance with rolling walker x4 attempts. 1/4 attempt achieved upright posture due to significant posterior lean  Bed to Chair: maximal-total assistance with  rolling walker  using  Step Transfer  Gait: pt amb 3 steps RW maxA. Pt presented with decreased coordination, decreased L stance time, posterior lean, decreased RW management, unsteadiness  Balance:   Good sitting balance  Fair static standing balance  Fair-poor dynamic standing balance    AM-PAC 6 CLICK MOBILITY  11    Patient left up in chair with all lines intact, call button in reach, and RN notified.    GOALS:   Multidisciplinary Problems       Physical Therapy Goals          Problem: Physical Therapy    Goal Priority Disciplines Outcome Goal Variances Interventions   Physical Therapy Goal     PT, PT/OT Ongoing, Progressing     Description: Goals to be met by: 1/9/23     Patient will increase functional independence with mobility by performing:    . Supine to sit with MInimal Assistance  . Sit to supine with MInimal  Assistance  . Rolling to Left and Right with Minimal Assistance.  . Sit to stand transfer with Minimal Assistance  . Bed to chair transfer with Minimal Assistance using Rolling Walker  . Gait  x 50 feet with Minimal Assistance using Rolling Walker.   . Wheelchair propulsion x50 feet with Moderate Assistance using bilateral uppper extremities                         Time Tracking:     PT Received On: 12/13/23  PT Start Time: 1050  PT Stop Time: 1123  PT Total Time (min): 33 min    Billable Minutes: Therapeutic Activity 15 and Neuromuscular Re-education 15    Treatment Type: Treatment  PT/PTA: PT     Number of PTA visits since last PT visit: 0     12/13/2023

## 2023-12-13 NOTE — PT/OT/SLP PROGRESS
"Speech Language Pathology  Treatment    Tigist uMrry   MRN: [id number]   Admitting Diagnosis: Closed displaced fracture of left femoral neck    Diet recommendations: Solid Diet Level: Regular Diet - IDDSI Level 7  Liquid Diet Level: Thin liquids - IDDSI Level 0 Standard aspiration precautions    SLP Treatment Date: 12/13/23  Speech Start Time: 1534     Speech Stop Time: 1558     Speech Total (min): 24 min       TREATMENT BILLABLE MINUTES:  Speech Therapy Individual (cognitive therapy, 2 units) 24           General Precautions: Standard, fall          Subjective:  "My mind needs it because I'm losing it." "I had a sharp memory and now it all went down the drain."        Objective:      Pt found awake in bed upon entry with no visitors present. Later on in session, 4 visitors arrived to visit pt.  Pt was oriented to place/hospital, but not to [facility].  Pt was partially oriented to situation (having incurred a fall, but did not recall sustaining a hip fracture). Pt was oriented to month given verbal cues, but not to year.  SLP reviewed white board as an external aid for assisting with orientation.  Given prompting questions, pt was able to demonstrate awareness that she is a fall risks, needs to call for assistance before attempting to ambulate, and how to call for assistance. However, carryover and ability to carry out these precautions likely limited.  Pt was not able to verbally state safety problems in pictures on 2 trials. She stated effects of these problems on 1 out of 2 trials IND and solutions on 1 out of 2 trials given cues.  Perseveration of situations and responses present.  Pt completed concrete convergent categorization tasks with 75% accuracy IND/100% given cues.  Session ended when visitors arrived to allow pt to visit. Pt's daughter indicated that pt was approaching cognitive baseline.  SLP will continue to follow on trial basis to assist with orientation and safety awareness.     Assessment:  Tigist " JACOB Murry is a 83 y.o. female with a medical diagnosis of Closed displaced fracture of left femoral neck and presents with cognitive impairments.     Discharge recommendations: Therapy Intensity Recommendations at Discharge:  (tbd)     Goals:   Multidisciplinary Problems       SLP Goals          Problem: SLP    Goal Priority Disciplines Outcome   SLP Goal     SLP    Description: Goals expected to be met by December 18:  1. Pt will Ox4 given mod cues, including use of external aides.  2. Pt will state safety precautions re: call light, transfers, etc given min cues.  3. Pt will recall daily events x3 given min cues.   4. Pt will participate in ongoing assessment of cognitive skills to further develop POC.                        Plan:   Patient to be seen Therapy Frequency: 3 x/week  Planned Interventions: Cognitive-Linguistic Therapy  Plan of Care expires: 01/09/24  Plan of Care reviewed with: patient, family  SLP Follow-up?: Yes  SLP - Next Visit Date: 12/15/23             12/13/2023

## 2023-12-14 LAB
ANION GAP SERPL CALC-SCNC: 6 MMOL/L (ref 8–16)
BASOPHILS # BLD AUTO: 0.08 K/UL (ref 0–0.2)
BASOPHILS NFR BLD: 1.3 % (ref 0–1.9)
BUN SERPL-MCNC: 17 MG/DL (ref 8–23)
CALCIUM SERPL-MCNC: 9.2 MG/DL (ref 8.7–10.5)
CHLORIDE SERPL-SCNC: 108 MMOL/L (ref 95–110)
CO2 SERPL-SCNC: 24 MMOL/L (ref 23–29)
CREAT SERPL-MCNC: 0.6 MG/DL (ref 0.5–1.4)
DIFFERENTIAL METHOD BLD: ABNORMAL
EOSINOPHIL # BLD AUTO: 0.2 K/UL (ref 0–0.5)
EOSINOPHIL NFR BLD: 4 % (ref 0–8)
ERYTHROCYTE [DISTWIDTH] IN BLOOD BY AUTOMATED COUNT: 15.9 % (ref 11.5–14.5)
EST. GFR  (NO RACE VARIABLE): >60 ML/MIN/1.73 M^2
GLUCOSE SERPL-MCNC: 91 MG/DL (ref 70–110)
HCT VFR BLD AUTO: 33.5 % (ref 37–48.5)
HGB BLD-MCNC: 10.9 G/DL (ref 12–16)
IMM GRANULOCYTES # BLD AUTO: 0.21 K/UL (ref 0–0.04)
IMM GRANULOCYTES NFR BLD AUTO: 3.5 % (ref 0–0.5)
LYMPHOCYTES # BLD AUTO: 0.8 K/UL (ref 1–4.8)
LYMPHOCYTES NFR BLD: 13.5 % (ref 18–48)
MAGNESIUM SERPL-MCNC: 2.3 MG/DL (ref 1.6–2.6)
MCH RBC QN AUTO: 30.4 PG (ref 27–31)
MCHC RBC AUTO-ENTMCNC: 32.5 G/DL (ref 32–36)
MCV RBC AUTO: 94 FL (ref 82–98)
MONOCYTES # BLD AUTO: 0.6 K/UL (ref 0.3–1)
MONOCYTES NFR BLD: 10.6 % (ref 4–15)
NEUTROPHILS # BLD AUTO: 4 K/UL (ref 1.8–7.7)
NEUTROPHILS NFR BLD: 67.1 % (ref 38–73)
NRBC BLD-RTO: 0 /100 WBC
PHOSPHATE SERPL-MCNC: 3.1 MG/DL (ref 2.7–4.5)
PLATELET # BLD AUTO: 411 K/UL (ref 150–450)
PMV BLD AUTO: 10.5 FL (ref 9.2–12.9)
POTASSIUM SERPL-SCNC: 4.4 MMOL/L (ref 3.5–5.1)
RBC # BLD AUTO: 3.58 M/UL (ref 4–5.4)
SODIUM SERPL-SCNC: 138 MMOL/L (ref 136–145)
WBC # BLD AUTO: 6.02 K/UL (ref 3.9–12.7)

## 2023-12-14 PROCEDURE — 97530 THERAPEUTIC ACTIVITIES: CPT | Mod: HCNC,CO

## 2023-12-14 PROCEDURE — 25000003 PHARM REV CODE 250: Mod: HCNC | Performed by: FAMILY MEDICINE

## 2023-12-14 PROCEDURE — 11000004 HC SNF PRIVATE: Mod: HCNC

## 2023-12-14 PROCEDURE — 25000003 PHARM REV CODE 250: Mod: HCNC | Performed by: HOSPITALIST

## 2023-12-14 PROCEDURE — 97535 SELF CARE MNGMENT TRAINING: CPT | Mod: HCNC,CO

## 2023-12-14 PROCEDURE — 36415 COLL VENOUS BLD VENIPUNCTURE: CPT | Mod: HCNC | Performed by: HOSPITALIST

## 2023-12-14 PROCEDURE — 84100 ASSAY OF PHOSPHORUS: CPT | Mod: HCNC | Performed by: HOSPITALIST

## 2023-12-14 PROCEDURE — 85025 COMPLETE CBC W/AUTO DIFF WBC: CPT | Mod: HCNC | Performed by: HOSPITALIST

## 2023-12-14 PROCEDURE — 97530 THERAPEUTIC ACTIVITIES: CPT | Mod: HCNC,CQ

## 2023-12-14 PROCEDURE — 97110 THERAPEUTIC EXERCISES: CPT | Mod: HCNC,CO

## 2023-12-14 PROCEDURE — 83735 ASSAY OF MAGNESIUM: CPT | Mod: HCNC | Performed by: HOSPITALIST

## 2023-12-14 PROCEDURE — 80048 BASIC METABOLIC PNL TOTAL CA: CPT | Mod: HCNC | Performed by: HOSPITALIST

## 2023-12-14 PROCEDURE — 25000003 PHARM REV CODE 250: Mod: HCNC | Performed by: NURSE PRACTITIONER

## 2023-12-14 PROCEDURE — 97116 GAIT TRAINING THERAPY: CPT | Mod: HCNC,CQ

## 2023-12-14 RX ADMIN — ACETAMINOPHEN 1000 MG: 500 TABLET ORAL at 03:12

## 2023-12-14 RX ADMIN — ESCITALOPRAM OXALATE 10 MG: 10 TABLET ORAL at 08:12

## 2023-12-14 RX ADMIN — SENNOSIDES AND DOCUSATE SODIUM 1 TABLET: 8.6; 5 TABLET ORAL at 08:12

## 2023-12-14 RX ADMIN — POLYETHYLENE GLYCOL 3350 17 G: 17 POWDER, FOR SOLUTION ORAL at 08:12

## 2023-12-14 RX ADMIN — METHOCARBAMOL 500 MG: 500 TABLET ORAL at 08:12

## 2023-12-14 RX ADMIN — METHOCARBAMOL 500 MG: 500 TABLET ORAL at 06:12

## 2023-12-14 RX ADMIN — ACETAMINOPHEN 1000 MG: 500 TABLET ORAL at 05:12

## 2023-12-14 RX ADMIN — DILTIAZEM HYDROCHLORIDE 240 MG: 120 CAPSULE, COATED, EXTENDED RELEASE ORAL at 08:12

## 2023-12-14 RX ADMIN — METHOCARBAMOL 500 MG: 500 TABLET ORAL at 12:12

## 2023-12-14 RX ADMIN — Medication 9 MG: at 08:12

## 2023-12-14 RX ADMIN — RIVAROXABAN 15 MG: 15 TABLET, FILM COATED ORAL at 06:12

## 2023-12-14 RX ADMIN — ACETAMINOPHEN 1000 MG: 500 TABLET ORAL at 09:12

## 2023-12-14 NOTE — NURSING
Pt remains free of falls, call bell in reach, rise and fall of chest noted.  Denies any pain. Care will continue.

## 2023-12-14 NOTE — NURSING
Safety maintained throughout shift, bed locked and in lowest position, call light in reach, Side rails up X 2. Non skid sock on when OOB. Pt remained free of fall/trauma. Pt self reports of pain treated with PO pain medication as ordered by MD. VS stable.  no reports of nausea and vomiting. Dressing to L hip CDI.   No signs of distress, rise and fall of chest noted, respirations  even and unlabored   Plan of care on going. No future concerns as of present

## 2023-12-14 NOTE — CARE UPDATE
Discharge Planning/IDT follow up    SS spoke to Sarah Moreno regarding [place] Resource Guide for facility placement information along with [place] Application Quick Reference Guide.  Dtr will be here tomorrow to receive the information, explained that it would be in pt's room for her viewing.

## 2023-12-14 NOTE — PROGRESS NOTES
"                                                        [facility]                                  Skilled Nursing Facility                   Progress Note     Admit Date: 12/8/2023  CLARKE 12/29/2023  [id number]  Principal Problem:  Closed displaced fracture of left femoral neck   HPI obtained from patient interview and chart review     Chief Complaint: Re-evaluation of medical treatment and therapy status: Lab review    HPI:   Mrs. Murry is a 83 year old female PMHx of Afib on xarelto, HTN, HPLD, mild dementia who presents to SNF following hospitalization for left femoral neck fracture s/p left hip hemiarthroplasty on 12/05 with Dr. Naranjo.  Admission to SNF for secondary weakness and debility.    Interval history: All of today's labs reviewed and are listed below.  24 hr vital sign ranges reviewed and listed below, no acute abnormalities noted.  Repeat of left shoulder x-ray completed per patient complaining of increase in pain yesterday with previous dislocation and reduction, x-ray reveals,  Satisfactory reduction left anterior shoulder dislocation".  Patient remains at her mental baseline which is pleasantly confused.  Patient denies shortness of breath, abdominal discomfort, nausea, or vomiting. Patient reports an adequate appetite.  Patient denies dysuria. Patient reports having regular bowel movements.  Patient progressing with PT/OT- Gait: pt amb 3 steps RW maxA. Pt presented with decreased coordination, decreased L stance time, posterior lean, decreased RW management, unsteadiness. Continuing to follow and treat all acute and chronic conditions.    Past Medical History: Patient has a past medical history of Anticoagulant long-term use, Anxiety, Arthritis, Atrial fibrillation, Carpal tunnel syndrome on right, Diverticulosis, Encounter for blood transfusion, HTN (hypertension), Hyperlipidemia, Osteopenia, Overactive bladder, Palpitations, Patellar tendonitis (4/13), Psoriasis, " S/P partial hysterectomy, Situational depression, and Supraventricular tachycardia.    Past Surgical History: Patient has a past surgical history that includes Partial hysterectomy; Knee arthroscopy w/ debridement ('05); Carpal tunnel release; Abdominal surgery; Breast biopsy (Left); Hysterectomy; Oophorectomy; and hemiarthroplasty, hip, posterior approach (Left, 12/5/2023).    Social History: Patient reports that she has never smoked. She has never used smokeless tobacco. She reports that she does not drink alcohol and does not use drugs.    Family History: family history includes Heart failure (age of onset: 80) in her mother; No Known Problems in her brother, daughter, daughter, daughter, and sister.    Allergies: Patient is allergic to ciprofloxacin.    ROS  Constitutional: Negative for fever   Eyes: Negative for blurred vision, double vision   Respiratory: Negative for cough, shortness of breath   Cardiovascular: Negative for chest pain, palpitations, and leg swelling.   Gastrointestinal: Negative for abdominal pain, constipation, diarrhea, nausea, vomiting.   Genitourinary: Negative for dysuria, frequency   Musculoskeletal:  + generalized weakness.  + LUE and LLE pain  Skin: Negative for itching and rash.   Neurological: Negative for dizziness, headaches.   Psychiatric/Behavioral: Negative for depression. The patient is not nervous/anxious.      24 hour Vital Sign Range   Temp:  [97.5 °F (36.4 °C)]   Pulse:  [95]   Resp:  [16]   BP: (122-126)/(60-69)   SpO2:  [98 %]     Current BMI: Body mass index is 21.32 kg/m².    PEx  Constitutional: Patient appears debilitated.  No distress noted  HENT:   Head: Normocephalic and atraumatic.   Eyes: Pupils are equal, round  Neck: Normal range of motion. Neck supple.   Cardiovascular: Normal rate, regular rhythm and normal heart sounds.    Pulmonary/Chest: Effort normal and breath sounds are clear  Abdominal: Soft. Bowel sounds are normal.   Musculoskeletal: Normal range of  "motion.   Neurological: Alert and oriented to person,   States incorrect age.  Disoriented to place and time.  Impaired higher level thinking.  Confusion.  Psychiatric: Normal mood and affect. Behavior is normal.   Skin: Skin is warm and dry.   Scattered areas of ecchymosis.  LUE with severe ecchymosis down to her wrist swelling to forearm.    Recent Labs   Lab 12/14/23  0600      K 4.4      CO2 24   BUN 17   CREATININE 0.6   CALCIUM 9.2   MG 2.3       Recent Labs   Lab 12/14/23  0600   WBC 6.02   RBC 3.58*   HGB 10.9*   HCT 33.5*      MCV 94   MCH 30.4   MCHC 32.5         No results for input(s): "POCTGLUCOSE" in the last 168 hours.     Assessment and Plan:    Closed displaced fracture of left femoral neck   Mechanical fall  s/p hemiarthroplasty on 12/5/2023  - PT/OT, WBAT with posterior hip precautions  - DVT PPX with home Xarelto  - maintain surgical dressing until removed by Orthopedics  - ortho KEKE to see patient weekly at SNF  - follow-up with orthopedics after discharge    Acute postoperative pain  - stable, continue acetaminophen 1000 mg q.8 hours, continue methocarbamol 500 mg QID.  Bowel regimen in place     Closed dislocation of left shoulder  - Patient found to have anterolateral left shoulder dislocation on admit. Orthopedics consulted and reduced shoulder dislocation successfully in ED with sedation.  - Per Ortho, patient placed in sling to left arm for comfort. Ortho states okay to remove sling to mobilize with therapy.   - 12/14 repeat of left shoulder x-ray reveals- Satisfactory reduction left anterior shoulder dislocation      Compression fracture of L1 lumbar vertebra  - Present on admit and noted on CT scan of chest, abdomen and pelvis to have "New compression deformity of the superior endplate of L1 with less than 25% height loss compared to CT from 09/16/2022, age indeterminate." Patient denies any low back pain.   - No treatment or intervention needed.      Essential " hypertension  - BP's improved, continue home diltiazem 24 hour capsule 20 40 mg daily    Left ventricular diastolic dysfunction with preserved systolic function   - recent echo reviewed from 3/2/2, EF 55%  - Monitor I&Os, weekly weights  - okay for regular diet  - no signs of fluid overload on exam    Atrial fibrillation and flutter  Long term current use of anticoagulant   - stable, continue home diltiazem and Xarelto 15 mg daily    Acute blood loss anemia  - expected post fracture and surgery  - stable, continue monitor twice weekly CBC, transfuse for hemoglobin < 7     Alzheimer's disease with late onset (CODE)  - Patient with dementia with likely etiology of alzheimer's dementia. Dementia is mild. The patient does not have signs of behavioral disturbance.  Delirium precautions:  - Avoid antihistamines, anticholinergics, hypnotics, and minimize opiates while controlling for pain as these medications may exacerbate delirium. Cues for day/night will assist with keeping patient calm and oriented - during daytime, please keep adequate light in room (open windows, lights on) and please keep room dim at night-time to encourage normal sleep-wake cycles. Continuing to have nursing and family reorient the patient and encourage family to visit    Depression, moderate, reoccurring  - stable, continue escitalopram 10 mg daily    Debility   - Continue with PT/OT for gait training and strengthening and restoration of ADL's   - Encourage mobility, OOB in chair, and early ambulation as appropriate  - Fall precautions   - Monitor for bowel and bladder dysfunction  - Monitor for and prevent skin breakdown and pressure ulcers  - Continue DVT prophylaxis with Xarelto        Anticipate disposition:  Back to group home?  Maybe looking at new home.  Patient has (3) daughters: Sarah Murry who is the power of  and she lives in [place], [place] and the other (2) daughters are from [place], [place] and [place], [place].    IP [facility] RISK OF  UNPLANNED READMISSION Model: LOW    Follow-up needed during SNF stay-    Appointment not to send patient to- ortho 12/19    Follow-up needed after discharge from SNF: PCP, ortho 1/16    Future Appointments   Date Time Provider Department Center   12/19/2023  9:15 AM Lashay Santiago PA-C [facility] [address]   1/16/2024 10:45 AM Lashay Santiago PA-C [facility] [address]       I spent 47 minutes reviewing patient records, examining, and counseling the patient/ patient's family with greater than 50% of the time spent in direct patient care and coordination.      Kayla Baca NP  Department of Hospital Medicine   [facility]     DOS: 12/14/2023       Patient note was created using MModal Dictation.  Any errors in syntax or even information may not have been identified and edited on initial review prior to signing this note.

## 2023-12-14 NOTE — PT/OT/SLP PROGRESS
"Physical Therapy Treatment    Patient Name:  Tigist Murry   MRN:  [id number]  Admit Date: 12/8/2023  Admitting Diagnosis: Closed displaced fracture of left femoral neck  Recent Surgeries:     General Precautions: Standard, fall  Orthopedic Precautions: LLE weight bearing as tolerated, LLE posterior precautions  Braces: N/A    Recommendations:     Discharge Recommendations: Low Intensity Therapy  Level of Assistance Recommended at Discharge: 24 hours significant assistance  Discharge Equipment Recommendations: hip kit, bedside commode, walker, rolling, shower chair  Barriers to discharge:  (increased assist with moblity)    Assessment:     Tigist Murry is a 83 y.o. female admitted with a medical diagnosis of Closed displaced fracture of left femoral neck . Pt tolerated fair, mobility remains limited 2* to poor cognition, no carry over and pain,  pt would continue to benefit from skilled PT services to improve overall functional mobility, strength and endurance.  .      Performance deficits affecting function: weakness, impaired endurance, impaired functional mobility, gait instability, impaired balance, decreased lower extremity function, decreased coordination, impaired cognition, decreased upper extremity function, decreased safety awareness, pain.    Rehab Potential is good    Activity Tolerance: Fair    Plan:     Patient to be seen 5 x/week to address the above listed problems via therapeutic activities, gait training, therapeutic exercises, wheelchair management/training, neuromuscular re-education    Plan of Care Expires: 01/08/24  Plan of Care Reviewed with: patient, daughter    Subjective     "I'm really lost" pt requires constant reorientation/reassurance of safety/redirection throughout session.     Pain/Comfort:  Pain Rating 1:  (did not rate)  Location - Side 1: Left  Location - Orientation 1: generalized  Location 1: hip  Pain Addressed 1: Reposition, Distraction, Cessation of Activity (unsure if pre " med)  Pain Rating Post-Intervention 1:  (no further mentioned at rest)    Patient's cultural, spiritual, Faith conflicts given the current situation:  no    Objective:    Patient found with  (in wc) upon PT entry to room.     Therapeutic Activities and Exercises: 2x10 reps AP,SAQ    Functional Mobility:  Transfers:     Sit to Stand:  maximal assistance with grab bars and in II bars x 3 trials vc/tcs for tech, inc time  Gait: amb in II bar max A 2* to post lean, max vc/tcs for tech/sequencing/step by step simple instructions ~ 1.5 ft and 1 ft each with multiple tiny steps wc follow seated rest break  Balance: static standing in II bars ~ 45 sec max/mod 2* to post lean, difficulty  with ant wt shifting over CLARIBEL with cues    AM-PAC 6 CLICK MOBILITY  10    Patient left up in chair with call button in reach, camera/PCT notified, camera present, and belonging sin reach .    GOALS:   Multidisciplinary Problems       Physical Therapy Goals          Problem: Physical Therapy    Goal Priority Disciplines Outcome Goal Variances Interventions   Physical Therapy Goal     PT, PT/OT Ongoing, Progressing     Description: Goals to be met by: 1/9/23     Patient will increase functional independence with mobility by performing:    . Supine to sit with MInimal Assistance  . Sit to supine with MInimal Assistance  . Rolling to Left and Right with Minimal Assistance.  . Sit to stand transfer with Minimal Assistance  . Bed to chair transfer with Minimal Assistance using Rolling Walker  . Gait  x 50 feet with Minimal Assistance using Rolling Walker.   . Wheelchair propulsion x50 feet with Moderate Assistance using bilateral uppper extremities                         Time Tracking:     PT Received On: 12/14/23  PT Start Time: 1412  PT Stop Time: 1439  PT Total Time (min): 27 min    Billable Minutes: Gait Training 12 and Therapeutic Activity 15    Treatment Type: Treatment  PT/PTA: PTA     Number of PTA visits since last PT visit: 1      12/14/2023

## 2023-12-14 NOTE — PLAN OF CARE
Problem: Adult Inpatient Plan of Care  Goal: Plan of Care Review  Outcome: Ongoing, Progressing  Goal: Patient-Specific Goal (Individualized)  Outcome: Ongoing, Progressing  Goal: Absence of Hospital-Acquired Illness or Injury  Outcome: Ongoing, Progressing  Goal: Optimal Comfort and Wellbeing  Outcome: Ongoing, Progressing  Goal: Readiness for Transition of Care  Outcome: Ongoing, Progressing     Problem: Skin Injury Risk Increased  Goal: Skin Health and Integrity  Outcome: Ongoing, Progressing     Problem: Fall Injury Risk  Goal: Absence of Fall and Fall-Related Injury  Outcome: Ongoing, Progressing  Intervention: Identify and Manage Contributors  Flowsheets (Taken 12/14/2023 7066)  Self-Care Promotion:   BADL personal objects within reach   BADL personal routines maintained  Medication Review/Management:   medications reviewed   high-risk medications identified

## 2023-12-14 NOTE — PT/OT/SLP PROGRESS
Occupational Therapy   Treatment    Name: Tigist Murry  MRN: [id number]  Admit Date: 12/8/2023  Admitting Diagnosis:  Closed displaced fracture of left femoral neck    General Precautions: Standard, fall   Orthopedic Precautions: LLE weight bearing as tolerated   Braces: N/A    Recommendations:     Discharge Recommendations:  Low Intensity Therapy  Level of Assistance Recommended at Discharge: 24 hours physical assistance for all ADL's and home management tasks  Discharge Equipment Recommendations: bedside commode, wheelchair, hip kit, shower chair  Barriers to discharge:  Decreased caregiver support (increased assist needed for self care and mobility)    Assessment:     Tigist Murry is a 83 y.o. female with a medical diagnosis of Closed displaced fracture of left femoral neck .  She presents with performance deficits affecting function are weakness, impaired endurance, impaired self care skills, impaired functional mobility, gait instability, impaired balance, impaired cognition, decreased lower extremity function, decreased upper extremity function, decreased coordination, pain, decreased safety awareness, decreased ROM, impaired skin, edema, orthopedic precautions.   Pt participated well during today's session. Pt with increased confusion on today, required max redirection throughout. Pt requested to speak with daughter, therapist call daughter and both pt and therapist spoke with daughter at length. Pt tolerated tx session without incident and is making progress, however, continues to demonstrate deficits with self care skills, balance, functional mobility, UB strength and endurance. Pt will benefit from continued OT services to progress towards goals.     Rehab Potential is fair    Activity tolerance:  Poor    Plan:     Patient to be seen 5 x/week to address the above listed problems via self-care/home management, therapeutic activities, therapeutic exercises    Plan of Care Expires: 01/06/24  Plan of Care  "Reviewed with: patient    Subjective   "I'm lost"  Communicated with: Nsjeannie prior to session.  .    Pain/Comfort:  Pain Rating 1:  (did not rate)  Location - Side 1: Left  Location - Orientation 1: generalized  Location 1: hip  Pain Addressed 1: Pre-medicate for activity, Reposition, Distraction  Pain Rating Post-Intervention 1:  (did not rate)    Patient's cultural, spiritual, Temple conflicts given the current situation:  no    Objective:     Patient found  in bedside chair  with  ([employer]) upon OT entry to room.    Functional Mobility/Transfers:  Patient completed Sit <> Stand Transfer with maximal assistance and of 2 persons  with  no assistive device   Patient completed Bedside Chair <> Wheelchair Transfer using Stand Pivot technique with maximal assistance and of 2 persons with no assistive device    Activities of Daily Living:  Lower Body Dressing: maximal assistance and of 2 persons to thread B LE and manage pants over hips in stance.     [facility] 6 Click ADL: 15    Treatment & Education:  Pt seated at table to engage in Tangram puzzles with focus on fine motor skills, functional reaching and attention to task. Pt completed 2/2 puzzles, however, required max verbal cueing to complete.     Pt educated on:  - role of OT  - level of assistance  - energy conservation and task modification to maximize independence with ADL's and mobility   -  safety while performing functional transfers and self care tasks  - orthopedic precautions.   - progress towards OT goals     Patient left up in chair with  PTA present in rehab gym.     GOALS:   Multidisciplinary Problems       Occupational Therapy Goals          Problem: Occupational Therapy    Goal Priority Disciplines Outcome Interventions   Occupational Therapy Goal     OT, PT/OT Ongoing, Progressing    Description: Goals to be met by: 12/30/23     Patient will increase functional independence with ADLs by performing:    UE Dressing with Stand-by Assistance.- Ongoing   LE " Dressing with Moderate Assistance.- Ongoing   Grooming while seated at sink with Set-up Assistance.- Met   Toileting from bedside commode with Moderate Assistance for hygiene and clothing management. - Ongoing   Bathing from  edge of bed with Minimal Assistance.- Ongoing   Toilet transfer to bedside commode with Moderate Assistance.- Met                         Time Tracking:     OT Date of Treatment: 12/14/23  OT Start Time: 1322    OT Stop Time: 1410  OT Total Time (min): 48 min    Billable Minutes:Self Care/Home Management 15  Therapeutic Activity 18  Therapeutic Exercise 15    12/14/2023

## 2023-12-15 PROCEDURE — 97530 THERAPEUTIC ACTIVITIES: CPT | Mod: HCNC,CQ

## 2023-12-15 PROCEDURE — 94761 N-INVAS EAR/PLS OXIMETRY MLT: CPT | Mod: HCNC

## 2023-12-15 PROCEDURE — 97535 SELF CARE MNGMENT TRAINING: CPT | Mod: HCNC

## 2023-12-15 PROCEDURE — 25000003 PHARM REV CODE 250: Mod: HCNC | Performed by: NURSE PRACTITIONER

## 2023-12-15 PROCEDURE — 25000003 PHARM REV CODE 250: Mod: HCNC | Performed by: FAMILY MEDICINE

## 2023-12-15 PROCEDURE — 92507 TX SP LANG VOICE COMM INDIV: CPT | Mod: HCNC

## 2023-12-15 PROCEDURE — 11000004 HC SNF PRIVATE: Mod: HCNC

## 2023-12-15 PROCEDURE — 97116 GAIT TRAINING THERAPY: CPT | Mod: HCNC,CQ

## 2023-12-15 PROCEDURE — 25000003 PHARM REV CODE 250: Mod: HCNC | Performed by: HOSPITALIST

## 2023-12-15 RX ADMIN — ACETAMINOPHEN 1000 MG: 500 TABLET ORAL at 01:12

## 2023-12-15 RX ADMIN — ESCITALOPRAM OXALATE 10 MG: 10 TABLET ORAL at 10:12

## 2023-12-15 RX ADMIN — DILTIAZEM HYDROCHLORIDE 240 MG: 120 CAPSULE, COATED, EXTENDED RELEASE ORAL at 11:12

## 2023-12-15 RX ADMIN — METHOCARBAMOL 500 MG: 500 TABLET ORAL at 10:12

## 2023-12-15 RX ADMIN — ACETAMINOPHEN 1000 MG: 500 TABLET ORAL at 09:12

## 2023-12-15 RX ADMIN — ACETAMINOPHEN 1000 MG: 500 TABLET ORAL at 05:12

## 2023-12-15 RX ADMIN — METHOCARBAMOL 500 MG: 500 TABLET ORAL at 04:12

## 2023-12-15 RX ADMIN — METHOCARBAMOL 500 MG: 500 TABLET ORAL at 08:12

## 2023-12-15 RX ADMIN — Medication 9 MG: at 08:12

## 2023-12-15 RX ADMIN — RIVAROXABAN 15 MG: 15 TABLET, FILM COATED ORAL at 04:12

## 2023-12-15 RX ADMIN — METHOCARBAMOL 500 MG: 500 TABLET ORAL at 01:12

## 2023-12-15 RX ADMIN — SENNOSIDES AND DOCUSATE SODIUM 1 TABLET: 8.6; 5 TABLET ORAL at 08:12

## 2023-12-15 NOTE — PLAN OF CARE
Problem: Adult Inpatient Plan of Care  Goal: Plan of Care Review  Outcome: Ongoing, Progressing  Goal: Patient-Specific Goal (Individualized)  Outcome: Ongoing, Progressing  Goal: Absence of Hospital-Acquired Illness or Injury  Outcome: Ongoing, Progressing  Goal: Optimal Comfort and Wellbeing  Outcome: Ongoing, Progressing  Goal: Readiness for Transition of Care  Outcome: Ongoing, Progressing     Problem: Fall Injury Risk  Goal: Absence of Fall and Fall-Related Injury  Outcome: Ongoing, Progressing     Problem: Skin Injury Risk Increased  Goal: Skin Health and Integrity  Outcome: Ongoing, Not Progressing

## 2023-12-15 NOTE — PLAN OF CARE
Problem: Adult Inpatient Plan of Care  Goal: Plan of Care Review  Outcome: Ongoing, Progressing  Flowsheets (Taken 12/15/2023 1747)  Plan of Care Reviewed With: patient     Problem: Adult Inpatient Plan of Care  Goal: Patient-Specific Goal (Individualized)  Flowsheets (Taken 12/15/2023 1747)  Individualized Care Needs: PT/OT daily  Goal: Absence of Hospital-Acquired Illness or Injury  Intervention: Identify and Manage Fall Risk  Flowsheets (Taken 12/15/2023 1747)  Safety Promotion/Fall Prevention:   assistive device/personal item within reach   Fall Risk reviewed with patient/family   lighting adjusted   instructed to call staff for mobility  Intervention: Prevent Skin Injury  Flowsheets (Taken 12/15/2023 1747)  Body Position: weight shifting  Skin Protection: incontinence pads utilized  Intervention: Prevent and Manage VTE (Venous Thromboembolism) Risk  Flowsheets (Taken 12/15/2023 1747)  Activity Management: Up in chair - L3  VTE Prevention/Management:   bleeding risk assessed   fluids promoted   ambulation promoted

## 2023-12-15 NOTE — PLAN OF CARE
Problem: Adult Inpatient Plan of Care  Goal: Plan of Care Review  Outcome: Ongoing, Progressing  Goal: Patient-Specific Goal (Individualized)  Outcome: Ongoing, Progressing  Goal: Absence of Hospital-Acquired Illness or Injury  Outcome: Ongoing, Progressing  Goal: Optimal Comfort and Wellbeing  Outcome: Ongoing, Progressing  Goal: Readiness for Transition of Care  Outcome: Ongoing, Progressing     Problem: Skin Injury Risk Increased  Goal: Skin Health and Integrity  Outcome: Ongoing, Progressing     Problem: Fall Injury Risk  Goal: Absence of Fall and Fall-Related Injury  Outcome: Ongoing, Progressing  Intervention: Identify and Manage Contributors  Flowsheets (Taken 12/15/2023 0317)  Self-Care Promotion:   BADL personal objects within reach   BADL personal routines maintained  Medication Review/Management: medications reviewed  Intervention: Promote Injury-Free Environment  Flowsheets (Taken 12/15/2023 0317)  Safety Promotion/Fall Prevention:   assistive device/personal item within reach   side rails raised x 2   instructed to call staff for mobility   medications reviewed   /camera at bedside     Problem: Impaired Wound Healing  Goal: Optimal Wound Healing  Outcome: Ongoing, Progressing

## 2023-12-15 NOTE — PT/OT/SLP PROGRESS
Speech Language Pathology  Treatment    Tigist Murry   MRN: [id number]   Admitting Diagnosis: Closed displaced fracture of left femoral neck    Diet recommendations: Solid Diet Level: Regular  Liquid Diet Level: Thin Standard aspiration precautions    SLP Treatment Date: 12/15/23  Speech Start Time: 1535     Speech Stop Time: 1600     Speech Total (min): 25 min       TREATMENT BILLABLE MINUTES:  Speech Therapy Individual 15 and Self Care/Home Management Training 10    Has the patient been evaluated by SLP for swallowing? : Yes  Keep patient NPO?: No   General Precautions: Standard, fall          Subjective:  Awake, daughter at bedside. Daughter reports pt is not at baseline cognitively.     Pt reports left arm pain 9, NSG alerted & had already given meds.     Objective:    Pt requires mod to max A to to orient to time & place. Pt was able to identify call button to call NSG if needed. Pt required mod to max A to answer questions pertaining to problem solving photo scenes. Pt was unable to recall daily events despite max A. SLP educated pt on strategies to answer problem solving questions, pt will require reinforecement.     Assessment:  Tigist Murry is a 83 y.o. female with a medical diagnosis of Closed displaced fracture of left femoral neck and presents with cognitive impairments.    Diet recommendations:        Liquid Diet Level: Thin  & regular  Standard aspiration precautions    Discharge recommendations: Therapy Intensity Recommendations at Discharge:  (tbd)     Goals:   Multidisciplinary Problems       SLP Goals          Problem: SLP    Goal Priority Disciplines Outcome   SLP Goal     SLP    Description: Goals expected to be met by December 18:  1. Pt will Ox4 given mod cues, including use of external aides.  2. Pt will state safety precautions re: call light, transfers, etc given min cues.  3. Pt will recall daily events x3 given min cues.   4. Pt will participate in ongoing assessment of cognitive  skills to further develop POC.                        Plan:   Patient to be seen Therapy Frequency: 2 x/week  Planned Interventions: Cognitive-Linguistic Therapy  Plan of Care expires: 01/09/24  Plan of Care reviewed with: patient, daughter  SLP Follow-up?: Yes  SLP - Next Visit Date: 12/15/23             12/15/2023

## 2023-12-15 NOTE — PROGRESS NOTES
[facility]                                  Skilled Nursing Facility                   Progress Note     Admit Date: 12/8/2023  CLARKE 12/29/2023  [id number] A  Principal Problem:  Closed displaced fracture of left femoral neck   HPI obtained from patient interview and chart review     Chief Complaint: Re-evaluation of medical treatment and therapy status    HPI:   Mrs. Murry is a 83 year old female PMHx of Afib on xarelto, HTN, HPLD, mild dementia who presents to SNF following hospitalization for left femoral neck fracture s/p left hip hemiarthroplasty on 12/05 with Dr. Naranjo.  Admission to SNF for secondary weakness and debility.    Interval history:   24 hr vital sign ranges reviewed and listed below, no acute abnormalities noted.  Left forearm wrapped with Ace wrap to provide compression over hematoma, hematoma does not look any worse, bruising is improving.  Asked nursing to obtain a sling for patient to wear for comfort.  Patient remains at her mental baseline which is confused.  Patient denies shortness of breath, abdominal discomfort, nausea, or vomiting. Patient reports an adequate appetite.  Patient denies dysuria. Patient reports having regular bowel movements.  Patient progressing with PT/OT- Gait: amb in II bar max A 2* to post lean, max vc/tcs for tech/sequencing/step by step simple instructions ~ 1.5 ft and 1 ft each with multiple tiny steps wc follow seated rest break. Continuing to follow and treat all acute and chronic conditions.    Past Medical History: Patient has a past medical history of Anticoagulant long-term use, Anxiety, Arthritis, Atrial fibrillation, Carpal tunnel syndrome on right, Diverticulosis, Encounter for blood transfusion, HTN (hypertension), Hyperlipidemia, Osteopenia, Overactive bladder, Palpitations, Patellar tendonitis (4/13), Psoriasis, S/P partial hysterectomy, Situational depression, and  Supraventricular tachycardia.    Past Surgical History: Patient has a past surgical history that includes Partial hysterectomy; Knee arthroscopy w/ debridement ('05); Carpal tunnel release; Abdominal surgery; Breast biopsy (Left); Hysterectomy; Oophorectomy; and hemiarthroplasty, hip, posterior approach (Left, 12/5/2023).    Social History: Patient reports that she has never smoked. She has never used smokeless tobacco. She reports that she does not drink alcohol and does not use drugs.    Family History: family history includes Heart failure (age of onset: 80) in her mother; No Known Problems in her brother, daughter, daughter, daughter, and sister.    Allergies: Patient is allergic to ciprofloxacin.    ROS  Constitutional: Negative for fever   Eyes: Negative for blurred vision, double vision   Respiratory: Negative for cough, shortness of breath   Cardiovascular: Negative for chest pain, palpitations, and leg swelling.   Gastrointestinal: Negative for abdominal pain, constipation, diarrhea, nausea, vomiting.   Genitourinary: Negative for dysuria, frequency   Musculoskeletal:  + generalized weakness.  + LUE and LLE pain  Skin: Negative for itching and rash.   Neurological: Negative for dizziness, headaches.   Psychiatric/Behavioral: Negative for depression. The patient is not nervous/anxious.      24 hour Vital Sign Range   Temp:  [97.8 °F (36.6 °C)-98 °F (36.7 °C)]   Pulse:  []   Resp:  [16-18]   BP: (133-145)/(69-70)   SpO2:  [98 %]     Current BMI: Body mass index is 21.32 kg/m².    PEx  Constitutional: Patient appears debilitated.  No distress noted  HENT:   Head: Normocephalic and atraumatic.   Eyes: Pupils are equal, round  Neck: Normal range of motion. Neck supple.   Cardiovascular: Normal rate, regular rhythm and normal heart sounds.    Pulmonary/Chest: Effort normal and breath sounds are clear  Abdominal: Soft. Bowel sounds are normal.   Musculoskeletal: Normal range of motion.   Neurological: Alert  "and oriented to person,   States incorrect age.  Disoriented to place and time.  Impaired higher level thinking.  Confusion.  Psychiatric: Normal mood and affect. Behavior is normal.   Skin: Skin is warm and dry.   Scattered areas of ecchymosis.  LUE with severe ecchymosis down to her wrist swelling to forearm, likely hematoma.    No results for input(s): "GLUCOSE", "NA", "K", "CL", "CO2", "BUN", "CREATININE", "CALCIUM", "MG" in the last 24 hours.      No results for input(s): "WBC", "RBC", "HGB", "HCT", "PLT", "MCV", "MCH", "MCHC" in the last 24 hours.        No results for input(s): "POCTGLUCOSE" in the last 168 hours.     Assessment and Plan:    Closed displaced fracture of left femoral neck   Mechanical fall  s/p hemiarthroplasty on 12/5/2023  - PT/OT, WBAT with posterior hip precautions  - DVT PPX with home Xarelto  - maintain surgical dressing until removed by Orthopedics  - ortho KEKE to see patient weekly at SNF  - follow-up with orthopedics after discharge    Acute postoperative pain  - stable, continue acetaminophen 1000 mg q.8 hours, continue methocarbamol 500 mg QID.  Bowel regimen in place     Closed dislocation of left shoulder  - Patient found to have anterolateral left shoulder dislocation on admit. Orthopedics consulted and reduced shoulder dislocation successfully in ED with sedation.  - Per Ortho, patient placed in sling to left arm for comfort. Ortho states okay to remove sling to mobilize with therapy.   - 12/14 repeat of left shoulder x-ray reveals- Satisfactory reduction left anterior shoulder dislocation      Compression fracture of L1 lumbar vertebra  - Present on admit and noted on CT scan of chest, abdomen and pelvis to have "New compression deformity of the superior endplate of L1 with less than 25% height loss compared to CT from 09/16/2022, age indeterminate." Patient denies any low back pain.   - No treatment or intervention needed.      Essential hypertension  - BP's improved, continue " home diltiazem 24 hour capsule 20 40 mg daily    Left ventricular diastolic dysfunction with preserved systolic function   - recent echo reviewed from 3/2/2, EF 55%  - Monitor I&Os, weekly weights  - okay for regular diet  - no signs of fluid overload on exam    Atrial fibrillation and flutter  Long term current use of anticoagulant   - stable, continue home diltiazem and Xarelto 15 mg daily- considering increasing back to home dose of 20 mg now that renal function has recovered once hematoma shows more improvement    Acute blood loss anemia  - expected post fracture and surgery  - stable, continue monitor twice weekly CBC, transfuse for hemoglobin < 7     Alzheimer's disease with late onset (CODE)  - Patient with dementia with likely etiology of alzheimer's dementia. Dementia is mild. The patient does not have signs of behavioral disturbance.  Delirium precautions:  - Avoid antihistamines, anticholinergics, hypnotics, and minimize opiates while controlling for pain as these medications may exacerbate delirium. Cues for day/night will assist with keeping patient calm and oriented - during daytime, please keep adequate light in room (open windows, lights on) and please keep room dim at night-time to encourage normal sleep-wake cycles. Continuing to have nursing and family reorient the patient and encourage family to visit    Depression, moderate, reoccurring  - stable, continue escitalopram 10 mg daily    Debility   - Continue with PT/OT for gait training and strengthening and restoration of ADL's   - Encourage mobility, OOB in chair, and early ambulation as appropriate  - Fall precautions   - Monitor for bowel and bladder dysfunction  - Monitor for and prevent skin breakdown and pressure ulcers  - Continue DVT prophylaxis with Xarelto        Anticipate disposition:  Back to group home?  Maybe looking at new home.  Patient has (3) daughters: Sarah Murry who is the power of  and she lives in [place], [place] and the  other (2) daughters are from [place], [place] and [place], [place].    IP OHS RISK OF UNPLANNED READMISSION Model: LOW    Follow-up needed during SNF stay-    Appointment not to send patient to- ortho 12/19    Follow-up needed after discharge from SNF: PCP, ortho 1/16    Future Appointments   Date Time Provider Department Center   12/19/2023  9:15 AM Lashay Santiago PA-C [facility] [address] [facility]   1/16/2024 10:45 AM Lashay Santiago PA-C [facility] GEENA Baca NP  Department of Hospital Medicine   [facility]     DOS: 12/15/2023       Patient note was created using MModal Dictation.  Any errors in syntax or even information may not have been identified and edited on initial review prior to signing this note.

## 2023-12-15 NOTE — NURSING
Safety maintained throughout shift, bed locked and in lowest position, call light in reach, Side rails up X 2. Non skid sock on when OOB. Pt remained free of fall/trauma. Pt self reports of pain treated with PO pain medication as ordered by MD. VS stable.  no reports of nausea and vomiting. Dressing to L hip CD.   Telesiter active. No signs of distress, rise and fall of chest noted, respirations  even and unlabored   Plan of care on going. No future concerns as of present

## 2023-12-15 NOTE — PT/OT/SLP PROGRESS
"Physical Therapy Treatment    Patient Name:  Tigist Murry   MRN:  [id number]  Admit Date: 12/8/2023  Admitting Diagnosis: Closed displaced fracture of left femoral neck  Recent Surgeries:     General Precautions: Standard, fall  Orthopedic Precautions: LLE weight bearing as tolerated, LLE posterior precautions  Braces: N/A    Recommendations:     Discharge Recommendations: Low Intensity Therapy  Level of Assistance Recommended at Discharge: 24 hours significant assistance  Discharge Equipment Recommendations: hip kit, bedside commode, walker, rolling, shower chair  Barriers to discharge:  (increased assist with moblity)    Assessment:     Tigist Murry is a 83 y.o. female admitted with a medical diagnosis of Closed displaced fracture of left femoral neck . Pt tolerated well, pt amb today with a RW max/mod ~ 8 ft and 28 ft, A with RW mgmt anteriorly to help with post lean, some difficulty noted  with LE initiating R>L, pt would continue to benefit from skilled PT services to improve overall functional mobility, strength and endurance.  .      Performance deficits affecting function: weakness, impaired endurance, impaired functional mobility, gait instability, impaired balance, decreased lower extremity function, decreased coordination, impaired cognition, decreased upper extremity function, decreased safety awareness, pain.    Rehab Potential is good    Activity Tolerance: Fair    Plan:     Patient to be seen 5 x/week to address the above listed problems via therapeutic activities, gait training, therapeutic exercises, wheelchair management/training, neuromuscular re-education    Plan of Care Expires: 01/08/24  Plan of Care Reviewed with: patient, daughter    Subjective     " not too much energy" . Pt agreeable to therapy, "how am I gonna drive home?"    Pain/Comfort:  Pain Rating 1:  (not too bad)  Location - Side 1: Left  Location - Orientation 1: generalized  Location 1: hip  Pain Addressed 1: Pre-medicate for " activity, Reposition, Distraction  Pain Rating Post-Intervention 1:  (not too bad)    Patient's cultural, spiritual, Yazdanism conflicts given the current situation:  no    Objective:     Communicated with nsg prior to session.  Patient found with  (in wc) upon PT entry to room.     Therapeutic Activities and Exercises: 2x10 reps toe raises/heel raises/SAQ vcs/demo for tech    Functional Mobility:  Bed Mobility:     Sit to Supine: maximal assistance and of 2 persons  Transfers:     Sit to Stand:  minimum assistance, moderate assistance, and of 2 persons with RW and vcs/tcs for tech/handplacement  Bed to Chair: moderate assistance and maximal assistance with  rolling walker  using  Stand Pivot and vcs and A with RW mgmt, post lean  Gait: amb with RW mod/max ~ 8 ft and 28 ft seated rest break wc follow, A with RW mgt/advancement to assist with ant wt shifting 2* to post lean, vcs for inc B steps, some difficulty with stopping and reinitiating steps R>L    AM-PAC 6 CLICK MOBILITY  10    Patient left supine with call button in reach, camera/PCT notified pt needing to be cleaned/changed, camera present, and belonging sin reach, .    GOALS:   Multidisciplinary Problems       Physical Therapy Goals          Problem: Physical Therapy    Goal Priority Disciplines Outcome Goal Variances Interventions   Physical Therapy Goal     PT, PT/OT Ongoing, Progressing     Description: Goals to be met by: 1/9/23     Patient will increase functional independence with mobility by performing:    . Supine to sit with MInimal Assistance  . Sit to supine with MInimal Assistance  . Rolling to Left and Right with Minimal Assistance.  . Sit to stand transfer with Minimal Assistance  . Bed to chair transfer with Minimal Assistance using Rolling Walker  . Gait  x 50 feet with Minimal Assistance using Rolling Walker.   . Wheelchair propulsion x50 feet with Moderate Assistance using bilateral uppper extremities                         Time Tracking:      PT Received On: 12/15/23  PT Start Time: 1402  PT Stop Time: 1429  PT Total Time (min): 27 min    Billable Minutes: Gait Training 12 and Therapeutic Activity 15    Treatment Type: Treatment  PT/PTA: PTA     Number of PTA visits since last PT visit: 2     12/15/2023

## 2023-12-15 NOTE — NURSING
Pt remains fall/injury free, call bell within reach, rise and fall of chest noted, pt sitting up in bed with family at bedside. Care will continue.skin tears to pt LUE cleaned and covered with foam dressing attained during pt pulling up in wheelchair. Also old skin tear scab reopened because pt was picking scab to r hand cleaned with NS placed gauze and Coban.

## 2023-12-16 PROCEDURE — 25000003 PHARM REV CODE 250: Mod: HCNC | Performed by: HOSPITALIST

## 2023-12-16 PROCEDURE — 97110 THERAPEUTIC EXERCISES: CPT | Mod: HCNC,CQ

## 2023-12-16 PROCEDURE — 11000004 HC SNF PRIVATE: Mod: HCNC

## 2023-12-16 PROCEDURE — 97530 THERAPEUTIC ACTIVITIES: CPT | Mod: HCNC,CQ

## 2023-12-16 PROCEDURE — 25000003 PHARM REV CODE 250: Mod: HCNC | Performed by: FAMILY MEDICINE

## 2023-12-16 PROCEDURE — 25000003 PHARM REV CODE 250: Mod: HCNC | Performed by: NURSE PRACTITIONER

## 2023-12-16 RX ADMIN — METHOCARBAMOL 500 MG: 500 TABLET ORAL at 09:12

## 2023-12-16 RX ADMIN — ACETAMINOPHEN 1000 MG: 500 TABLET ORAL at 10:12

## 2023-12-16 RX ADMIN — DILTIAZEM HYDROCHLORIDE 240 MG: 120 CAPSULE, COATED, EXTENDED RELEASE ORAL at 10:12

## 2023-12-16 RX ADMIN — METHOCARBAMOL 500 MG: 500 TABLET ORAL at 02:12

## 2023-12-16 RX ADMIN — METHOCARBAMOL 500 MG: 500 TABLET ORAL at 04:12

## 2023-12-16 RX ADMIN — METHOCARBAMOL 500 MG: 500 TABLET ORAL at 10:12

## 2023-12-16 RX ADMIN — Medication 9 MG: at 09:12

## 2023-12-16 RX ADMIN — ESCITALOPRAM OXALATE 10 MG: 10 TABLET ORAL at 10:12

## 2023-12-16 RX ADMIN — ACETAMINOPHEN 1000 MG: 500 TABLET ORAL at 05:12

## 2023-12-16 RX ADMIN — ACETAMINOPHEN 1000 MG: 500 TABLET ORAL at 02:12

## 2023-12-16 RX ADMIN — RIVAROXABAN 15 MG: 15 TABLET, FILM COATED ORAL at 04:12

## 2023-12-16 NOTE — PLAN OF CARE
Problem: Adult Inpatient Plan of Care  Goal: Plan of Care Review  Outcome: Ongoing, Progressing  Goal: Patient-Specific Goal (Individualized)  Outcome: Ongoing, Progressing  Goal: Absence of Hospital-Acquired Illness or Injury  Outcome: Ongoing, Progressing  Goal: Optimal Comfort and Wellbeing  Outcome: Ongoing, Progressing  Goal: Readiness for Transition of Care  Outcome: Ongoing, Progressing     Problem: Skin Injury Risk Increased  Goal: Skin Health and Integrity  Outcome: Ongoing, Progressing     Problem: Fall Injury Risk  Goal: Absence of Fall and Fall-Related Injury  Outcome: Ongoing, Progressing  Intervention: Identify and Manage Contributors  Flowsheets (Taken 12/16/2023 0323)  Self-Care Promotion:   BADL personal objects within reach   BADL personal routines maintained  Medication Review/Management:   medications reviewed   high-risk medications identified  Intervention: Promote Injury-Free Environment  Flowsheets (Taken 12/16/2023 0323)  Safety Promotion/Fall Prevention:   assistive device/personal item within reach   medications reviewed   /camera at bedside   instructed to call staff for mobility     Problem: Impaired Wound Healing  Goal: Optimal Wound Healing  Outcome: Ongoing, Progressing

## 2023-12-16 NOTE — PT/OT/SLP PROGRESS
"Physical Therapy Treatment    Patient Name:  Tigist Murry   MRN:  [id number]  Admit Date: 12/8/2023  Admitting Diagnosis: Closed displaced fracture of left femoral neck  Recent Surgeries:     General Precautions: Standard, fall  Orthopedic Precautions: LLE weight bearing as tolerated, LLE posterior precautions  Braces: N/A    Recommendations:     Discharge Recommendations: Low Intensity Therapy  Level of Assistance Recommended at Discharge: 24 hours significant assistance  Discharge Equipment Recommendations: hip kit, bedside commode, walker, rolling, shower chair  Barriers to discharge:  (increased assist with moblity)    Assessment:     Tigist Murry is a 83 y.o. female admitted with a medical diagnosis of Closed displaced fracture of left femoral neck . Pt tolerated therex well,  however attempted getting pt OOB,  pt not following vc/tcs, appeared to also be resisting likely out of fear with feet sliding forward and posterior lean/push, unsafe to trf, return pt back to bed and performed supine therex today,  pt would continue to benefit from skilled PT services to improve overall functional mobility, strength and endurance.  .      Performance deficits affecting function: weakness, impaired endurance, impaired functional mobility, gait instability, impaired balance, decreased lower extremity function, decreased coordination, impaired cognition, decreased upper extremity function, decreased safety awareness, pain.    Rehab Potential is good and fair    Activity Tolerance: Fair and Poor    Plan:     Patient to be seen 5 x/week to address the above listed problems via therapeutic activities, gait training, therapeutic exercises, wheelchair management/training, neuromuscular re-education    Plan of Care Expires: 01/08/24  Plan of Care Reviewed with: patient, daughter    Subjective     "I don't sleep well" pt appeared a little sleepy upon arrival, able to engage/converse, pt agreeable to therapy. " "    Pain/Comfort:  Pain Rating 1:  (did not rate "hurts")  Location - Side 1: Left  Location - Orientation 1: generalized  Location 1: hip  Pain Addressed 1: Pre-medicate for activity, Reposition, Distraction, Cessation of Activity, Nurse notified (per nsg ms relaxer given)  Pain Rating Post-Intervention 1:  ("hurts")    Patient's cultural, spiritual, Yarsanism conflicts given the current situation:  no    Objective:     Communicated with nsg prior to session. Meds were given  Patient found with  (in bed) upon PT entry to room.     Therapeutic Activities and Exercises: 2x10 reps AP,GS,QS,SAQ with vc/tcs for tech    Functional Mobility:  Bed Mobility:     Rolling Left:  maximal assistance to replace pad, hip prec maintained  Rolling Right: maximal assistance to replace pad, hip prec maintained  Scooting: total assistance and of 2 persons with draw sheet to HOB  Supine to Sit: maximal assistance and of 2 persons HOB elev/rail vcs/tcs, post lean  Sit to Supine: maximal assistance and of 2 persons with trunk/BLE mgmt  Transfers:     Sit to Stand:  attempted maximal assistance and of 1-2 persons with rolling walker and from EOB vc/tcs difficulty following vc/tcs to get feet back, extending and anterior wt shifting 2* to post lean, unsuccessful having pt sit back down on bed  Bed to Chair: attempted maximal assistance and of 2 persons with  no AD and rolling walker  using  Stand Pivot and Squat Pivot unsuccessful, unable to follow cues and pt also appeared to be resisting likely out of fear    AM-PAC 6 CLICK MOBILITY  10    Patient left supine with call button in reach, nsg notified, camera/nsg out side door present, and belonging in reach .    GOALS:   Multidisciplinary Problems       Physical Therapy Goals          Problem: Physical Therapy    Goal Priority Disciplines Outcome Goal Variances Interventions   Physical Therapy Goal     PT, PT/OT Ongoing, Progressing     Description: Goals to be met by: 1/9/23     Patient " will increase functional independence with mobility by performing:    . Supine to sit with MInimal Assistance  . Sit to supine with MInimal Assistance  . Rolling to Left and Right with Minimal Assistance.  . Sit to stand transfer with Minimal Assistance  . Bed to chair transfer with Minimal Assistance using Rolling Walker  . Gait  x 50 feet with Minimal Assistance using Rolling Walker.   . Wheelchair propulsion x50 feet with Moderate Assistance using bilateral uppper extremities                         Time Tracking:     PT Received On: 12/16/23  PT Start Time: 1132  PT Stop Time: 1201  PT Total Time (min): 29 min    Billable Minutes: Therapeutic Activity 16 and Therapeutic Exercise 13    Treatment Type: Treatment  PT/PTA: PTA     Number of PTA visits since last PT visit: 3     12/16/2023

## 2023-12-16 NOTE — NURSING
Safety maintained throughout shift, bed locked and in lowest position, call light in reach, Side rails up X 2. Non skid sock on when OOB. Pt remained free of fall/trauma. Pt self reports of pain treated with PO pain medication as ordered by MD. VS stable.  no reports of nausea and vomiting. Dressing to L hip CD.     Pt remained anxious and confused throughout most of the shift.   Reoriented pt to situation and place multiple times throughout the shift.   Telesiter active. No signs of distress, rise and fall of chest noted, respirations  even and unlabored   Plan of care on going. No future concerns as of present

## 2023-12-17 PROCEDURE — 11000004 HC SNF PRIVATE: Mod: HCNC

## 2023-12-17 PROCEDURE — 25000003 PHARM REV CODE 250: Mod: HCNC | Performed by: FAMILY MEDICINE

## 2023-12-17 PROCEDURE — 25000003 PHARM REV CODE 250: Mod: HCNC | Performed by: NURSE PRACTITIONER

## 2023-12-17 PROCEDURE — 25000003 PHARM REV CODE 250: Mod: HCNC | Performed by: HOSPITALIST

## 2023-12-17 RX ADMIN — ACETAMINOPHEN 1000 MG: 500 TABLET ORAL at 02:12

## 2023-12-17 RX ADMIN — DILTIAZEM HYDROCHLORIDE 240 MG: 120 CAPSULE, COATED, EXTENDED RELEASE ORAL at 10:12

## 2023-12-17 RX ADMIN — RIVAROXABAN 15 MG: 15 TABLET, FILM COATED ORAL at 05:12

## 2023-12-17 RX ADMIN — Medication 9 MG: at 08:12

## 2023-12-17 RX ADMIN — METHOCARBAMOL 500 MG: 500 TABLET ORAL at 02:12

## 2023-12-17 RX ADMIN — ACETAMINOPHEN 1000 MG: 500 TABLET ORAL at 10:12

## 2023-12-17 RX ADMIN — METHOCARBAMOL 500 MG: 500 TABLET ORAL at 08:12

## 2023-12-17 RX ADMIN — METHOCARBAMOL 500 MG: 500 TABLET ORAL at 10:12

## 2023-12-17 RX ADMIN — METHOCARBAMOL 500 MG: 500 TABLET ORAL at 05:12

## 2023-12-17 RX ADMIN — ESCITALOPRAM OXALATE 10 MG: 10 TABLET ORAL at 10:12

## 2023-12-17 NOTE — PLAN OF CARE
Problem: Adult Inpatient Plan of Care  Goal: Plan of Care Review  Outcome: Ongoing, Progressing  Flowsheets (Taken 12/17/2023 1524)  Plan of Care Reviewed With: patient     Problem: Adult Inpatient Plan of Care  Goal: Patient-Specific Goal (Individualized)  Flowsheets (Taken 12/17/2023 1524)  Individualized Care Needs: Daily PT/OT  Goal: Absence of Hospital-Acquired Illness or Injury  Intervention: Identify and Manage Fall Risk  Flowsheets (Taken 12/17/2023 1524)  Safety Promotion/Fall Prevention:   assistive device/personal item within reach   Fall Risk reviewed with patient/family   lighting adjusted   /camera at bedside   instructed to call staff for mobility  Intervention: Prevent Skin Injury  Flowsheets (Taken 12/17/2023 1524)  Body Position: weight shifting  Skin Protection:   incontinence pads utilized   protective footwear used  Intervention: Prevent and Manage VTE (Venous Thromboembolism) Risk  Flowsheets (Taken 12/17/2023 1524)  Activity Management: Rolling - L1  VTE Prevention/Management:   bleeding precations maintained   bleeding risk assessed

## 2023-12-18 LAB
ANION GAP SERPL CALC-SCNC: 7 MMOL/L (ref 8–16)
BASOPHILS # BLD AUTO: 0.06 K/UL (ref 0–0.2)
BASOPHILS NFR BLD: 1 % (ref 0–1.9)
BUN SERPL-MCNC: 18 MG/DL (ref 8–23)
CALCIUM SERPL-MCNC: 9.6 MG/DL (ref 8.7–10.5)
CHLORIDE SERPL-SCNC: 106 MMOL/L (ref 95–110)
CO2 SERPL-SCNC: 25 MMOL/L (ref 23–29)
CREAT SERPL-MCNC: 0.7 MG/DL (ref 0.5–1.4)
DIFFERENTIAL METHOD BLD: ABNORMAL
EOSINOPHIL # BLD AUTO: 0.3 K/UL (ref 0–0.5)
EOSINOPHIL NFR BLD: 4.1 % (ref 0–8)
ERYTHROCYTE [DISTWIDTH] IN BLOOD BY AUTOMATED COUNT: 17.1 % (ref 11.5–14.5)
EST. GFR  (NO RACE VARIABLE): >60 ML/MIN/1.73 M^2
GLUCOSE SERPL-MCNC: 93 MG/DL (ref 70–110)
HCT VFR BLD AUTO: 37.3 % (ref 37–48.5)
HGB BLD-MCNC: 12.1 G/DL (ref 12–16)
IMM GRANULOCYTES # BLD AUTO: 0.09 K/UL (ref 0–0.04)
IMM GRANULOCYTES NFR BLD AUTO: 1.5 % (ref 0–0.5)
LYMPHOCYTES # BLD AUTO: 0.9 K/UL (ref 1–4.8)
LYMPHOCYTES NFR BLD: 14.5 % (ref 18–48)
MAGNESIUM SERPL-MCNC: 2 MG/DL (ref 1.6–2.6)
MCH RBC QN AUTO: 31.7 PG (ref 27–31)
MCHC RBC AUTO-ENTMCNC: 32.4 G/DL (ref 32–36)
MCV RBC AUTO: 98 FL (ref 82–98)
MONOCYTES # BLD AUTO: 0.6 K/UL (ref 0.3–1)
MONOCYTES NFR BLD: 9.3 % (ref 4–15)
NEUTROPHILS # BLD AUTO: 4.3 K/UL (ref 1.8–7.7)
NEUTROPHILS NFR BLD: 69.6 % (ref 38–73)
NRBC BLD-RTO: 0 /100 WBC
PHOSPHATE SERPL-MCNC: 3.6 MG/DL (ref 2.7–4.5)
PLATELET # BLD AUTO: 509 K/UL (ref 150–450)
PMV BLD AUTO: 9.7 FL (ref 9.2–12.9)
POTASSIUM SERPL-SCNC: 4.3 MMOL/L (ref 3.5–5.1)
RBC # BLD AUTO: 3.82 M/UL (ref 4–5.4)
SODIUM SERPL-SCNC: 138 MMOL/L (ref 136–145)
WBC # BLD AUTO: 6.13 K/UL (ref 3.9–12.7)

## 2023-12-18 PROCEDURE — 84100 ASSAY OF PHOSPHORUS: CPT | Mod: HCNC | Performed by: HOSPITALIST

## 2023-12-18 PROCEDURE — 97110 THERAPEUTIC EXERCISES: CPT | Mod: HCNC,CO

## 2023-12-18 PROCEDURE — 97110 THERAPEUTIC EXERCISES: CPT | Mod: HCNC

## 2023-12-18 PROCEDURE — 25000003 PHARM REV CODE 250: Mod: HCNC | Performed by: NURSE PRACTITIONER

## 2023-12-18 PROCEDURE — 97116 GAIT TRAINING THERAPY: CPT | Mod: HCNC

## 2023-12-18 PROCEDURE — 80048 BASIC METABOLIC PNL TOTAL CA: CPT | Mod: HCNC | Performed by: HOSPITALIST

## 2023-12-18 PROCEDURE — 97129 THER IVNTJ 1ST 15 MIN: CPT | Mod: HCNC

## 2023-12-18 PROCEDURE — 11000004 HC SNF PRIVATE: Mod: HCNC

## 2023-12-18 PROCEDURE — 97535 SELF CARE MNGMENT TRAINING: CPT | Mod: HCNC

## 2023-12-18 PROCEDURE — 97530 THERAPEUTIC ACTIVITIES: CPT | Mod: HCNC

## 2023-12-18 PROCEDURE — 85025 COMPLETE CBC W/AUTO DIFF WBC: CPT | Mod: HCNC | Performed by: HOSPITALIST

## 2023-12-18 PROCEDURE — 83735 ASSAY OF MAGNESIUM: CPT | Mod: HCNC | Performed by: HOSPITALIST

## 2023-12-18 PROCEDURE — 97530 THERAPEUTIC ACTIVITIES: CPT | Mod: HCNC,CO

## 2023-12-18 PROCEDURE — 36415 COLL VENOUS BLD VENIPUNCTURE: CPT | Mod: HCNC | Performed by: HOSPITALIST

## 2023-12-18 PROCEDURE — 25000003 PHARM REV CODE 250: Mod: HCNC | Performed by: HOSPITALIST

## 2023-12-18 PROCEDURE — 25000003 PHARM REV CODE 250: Mod: HCNC | Performed by: FAMILY MEDICINE

## 2023-12-18 RX ADMIN — SENNOSIDES AND DOCUSATE SODIUM 1 TABLET: 8.6; 5 TABLET ORAL at 09:12

## 2023-12-18 RX ADMIN — POLYETHYLENE GLYCOL 3350 17 G: 17 POWDER, FOR SOLUTION ORAL at 09:12

## 2023-12-18 RX ADMIN — ESCITALOPRAM OXALATE 10 MG: 10 TABLET ORAL at 09:12

## 2023-12-18 RX ADMIN — ACETAMINOPHEN 1000 MG: 500 TABLET ORAL at 09:12

## 2023-12-18 RX ADMIN — DILTIAZEM HYDROCHLORIDE 240 MG: 120 CAPSULE, COATED, EXTENDED RELEASE ORAL at 09:12

## 2023-12-18 RX ADMIN — RIVAROXABAN 15 MG: 15 TABLET, FILM COATED ORAL at 05:12

## 2023-12-18 RX ADMIN — METHOCARBAMOL 500 MG: 500 TABLET ORAL at 05:12

## 2023-12-18 RX ADMIN — METHOCARBAMOL 500 MG: 500 TABLET ORAL at 09:12

## 2023-12-18 RX ADMIN — Medication 9 MG: at 09:12

## 2023-12-18 RX ADMIN — METHOCARBAMOL 500 MG: 500 TABLET ORAL at 01:12

## 2023-12-18 RX ADMIN — ACETAMINOPHEN 1000 MG: 500 TABLET ORAL at 01:12

## 2023-12-18 NOTE — PLAN OF CARE
Recommendation/Intervention:   1) Continue current regular diet as tolerated, encourage PO intake  2) Continue Boost Breeze daily to promote adequate kcal/protein consumption  3) RD to monitor weight, labs, PO intake/tolerance, skin     Goals: PO intake to meet 85% of EEN/EPN with ONS by next RD follow-up  Nutrition Goal Status: progressing towards goal  Communication of RD Recs: other (comment) (POC)

## 2023-12-18 NOTE — PROGRESS NOTES
[facility]                                  Skilled Nursing Facility                   Progress Note     Admit Date: 12/8/2023  CLARKE 12/29/2023  [id number]  Principal Problem:  Closed displaced fracture of left femoral neck   HPI obtained from patient interview and chart review     Chief Complaint: Re-evaluation of medical treatment and therapy status: Lab review    HPI:   Mrs. Murry is a 83 year old female PMHx of Afib on xarelto, HTN, HPLD, mild dementia who presents to SNF following hospitalization for left femoral neck fracture s/p left hip hemiarthroplasty on 12/05 with Dr. Naranjo.  Admission to SNF for secondary weakness and debility.    Interval history:  All of today's labs reviewed and are listed below.  24 hr vital sign ranges reviewed and listed below, no acute abnormalities noted.  Remains at mental baseline which is very confused.  Patient denies shortness of breath, abdominal discomfort, nausea, or vomiting.  Patient reports an ok appetite.  Patient denies dysuria.  Patient reports having regular bowel movements.  Patient progressing with PT/OT. Continuing to follow and treat all acute and chronic conditions.      Past Medical History: Patient has a past medical history of Anticoagulant long-term use, Anxiety, Arthritis, Atrial fibrillation, Carpal tunnel syndrome on right, Diverticulosis, Encounter for blood transfusion, HTN (hypertension), Hyperlipidemia, Osteopenia, Overactive bladder, Palpitations, Patellar tendonitis (4/13), Psoriasis, S/P partial hysterectomy, Situational depression, and Supraventricular tachycardia.    Past Surgical History: Patient has a past surgical history that includes Partial hysterectomy; Knee arthroscopy w/ debridement ('05); Carpal tunnel release; Abdominal surgery; Breast biopsy (Left); Hysterectomy; Oophorectomy; and hemiarthroplasty, hip, posterior approach (Left,  12/5/2023).    Social History: Patient reports that she has never smoked. She has never used smokeless tobacco. She reports that she does not drink alcohol and does not use drugs.    Family History: family history includes Heart failure (age of onset: 80) in her mother; No Known Problems in her brother, daughter, daughter, daughter, and sister.    Allergies: Patient is allergic to ciprofloxacin.    ROS  Constitutional: Negative for fever   Eyes: Negative for blurred vision, double vision   Respiratory: Negative for cough, shortness of breath   Cardiovascular: Negative for chest pain, palpitations, and leg swelling.   Gastrointestinal: Negative for abdominal pain, constipation, diarrhea, nausea, vomiting.   Genitourinary: Negative for dysuria, frequency   Musculoskeletal:  + generalized weakness.  + LUE and LLE pain  Skin: Negative for itching and rash.   Neurological: Negative for dizziness, headaches.   Psychiatric/Behavioral: Negative for depression. The patient is not nervous/anxious.      24 hour Vital Sign Range   Temp:  [98.1 °F (36.7 °C)-98.3 °F (36.8 °C)]   Pulse:  [87-97]   Resp:  [17-18]   BP: (121-128)/(58-66)   SpO2:  [97 %]     Current BMI: Body mass index is 20.7 kg/m².    PEx  Constitutional: Patient appears debilitated.  No distress noted  HENT:   Head: Normocephalic and atraumatic.   Eyes: Pupils are equal, round  Neck: Normal range of motion. Neck supple.   Cardiovascular: Normal rate, regular rhythm and normal heart sounds.    Pulmonary/Chest: Effort normal and breath sounds are clear  Abdominal: Soft. Bowel sounds are normal.   Musculoskeletal: Normal range of motion.   Neurological: Alert and oriented to person,   States incorrect age.  Disoriented to place and time.  Impaired higher level thinking.  Confusion.  Psychiatric: Normal mood and affect. Behavior is normal.   Skin: Skin is warm and dry.   Scattered areas of ecchymosis.  LUE with severe ecchymosis down to her wrist swelling to forearm,  "likely hematoma.    Recent Labs   Lab 12/18/23  0548      K 4.3      CO2 25   BUN 18   CREATININE 0.7   CALCIUM 9.6   MG 2.0         Recent Labs   Lab 12/18/23  0548   WBC 6.13   RBC 3.82*   HGB 12.1   HCT 37.3   *   MCV 98   MCH 31.7*   MCHC 32.4           No results for input(s): "POCTGLUCOSE" in the last 168 hours.     Assessment and Plan:    Closed displaced fracture of left femoral neck   Mechanical fall  s/p hemiarthroplasty on 12/5/2023  - PT/OT, WBAT with posterior hip precautions  - DVT PPX with home Xarelto  - maintain surgical dressing until removed by Orthopedics  - ortho KEKE to see patient [facility] at [facility]  - follow-up with orthopedics after discharge    Acute postoperative pain  - stable, continue acetaminophen 1000 mg q.8 hours, continue methocarbamol 500 mg QID.  Bowel regimen in place     Closed dislocation of left shoulder  - Patient found to have anterolateral left shoulder dislocation on admit. Orthopedics consulted and reduced shoulder dislocation successfully in ED with sedation.  - Per Ortho, patient placed in sling to left arm for comfort. Ortho states okay to remove sling to mobilize with therapy.   - 12/14 repeat of left shoulder x-ray reveals- Satisfactory reduction left anterior shoulder dislocation      Compression fracture of L1 lumbar vertebra  - Present on admit and noted on CT scan of chest, abdomen and pelvis to have "New compression deformity of the superior endplate of L1 with less than 25% height loss compared to CT from 09/16/2022, age indeterminate." Patient denies any low back pain.   - No treatment or intervention needed.      Essential hypertension  - BP's improved, continue home diltiazem 24 hour capsule 20 40 mg daily    Left ventricular diastolic dysfunction with preserved systolic function   - recent echo reviewed from 3/2/2, EF 55%  - Monitor I&Os, weekly weights  - okay for regular diet  - no signs of fluid overload on exam    Atrial fibrillation and " ney  Long term current use of anticoagulant   - stable, continue home diltiazem and Xarelto 15 mg daily- considering increasing back to home dose of 20 mg now that renal function has recovered once hematoma shows more improvement    Acute blood loss anemia  - expected post fracture and surgery  - stable, continue monitor twice weekly CBC, transfuse for hemoglobin < 7     Alzheimer's disease with late onset (CODE)  - Patient with dementia with likely etiology of alzheimer's dementia. Dementia is mild. The patient does not have signs of behavioral disturbance.  Delirium precautions:  - Avoid antihistamines, anticholinergics, hypnotics, and minimize opiates while controlling for pain as these medications may exacerbate delirium. Cues for day/night will assist with keeping patient calm and oriented - during daytime, please keep adequate light in room (open windows, lights on) and please keep room dim at night-time to encourage normal sleep-wake cycles. Continuing to have nursing and family reorient the patient and encourage family to visit    Depression, moderate, reoccurring  - stable, continue escitalopram 10 mg daily    Debility   - Continue with PT/OT for gait training and strengthening and restoration of ADL's   - Encourage mobility, OOB in chair, and early ambulation as appropriate  - Fall precautions   - Monitor for bowel and bladder dysfunction  - Monitor for and prevent skin breakdown and pressure ulcers  - Continue DVT prophylaxis with Xarelto        Anticipate disposition:  Back to group home?  Maybe looking at new home.  Patient has (3) daughters: Sarah Murry who is the power of  and she lives in [place], [place] and the other (2) daughters are from [place], [place] and [place], [place].    IP O[facility] RISK OF UNPLANNED READMISSION Model: LOW    Follow-up needed during SNF stay-    Appointment not to send patient to- ortho 12/19    Follow-up needed after discharge from SNF: PCP, ortho 1/16    Future  Appointments   Date Time Provider Department Center   12/19/2023  9:15 AM Lashay Santiago PA-C [facility] [address]   1/16/2024 10:45 AM Lashay Santiago PA-C [facility] [address] Ashley M Caire, NP  Department of [facility]   [facility]     DOS: 12/18/2023       Patient note was created using MModal Dictation.  Any errors in syntax or even information may not have been identified and edited on initial review prior to signing this note.

## 2023-12-18 NOTE — PT/OT/SLP PROGRESS
"Occupational Therapy   Treatment    Name: Tigist Murry  MRN: [id number]  Admit Date: 12/8/2023  Admitting Diagnosis:  Closed displaced fracture of left femoral neck    General Precautions: Standard, fall   Orthopedic Precautions: LLE weight bearing as tolerated   Braces: N/A    Recommendations:     Discharge Recommendations:  Low Intensity Therapy  Level of Assistance Recommended at Discharge: 24 hours physical assistance for all ADL's and home management tasks  Discharge Equipment Recommendations: bedside commode, wheelchair, hip kit, shower chair  Barriers to discharge:  Decreased caregiver support (increased assist needed for self care and mobility)    Assessment:     Tigist Murry is a 83 y.o. female with a medical diagnosis of Closed displaced fracture of left femoral neck .  She presents with performance deficits affecting function are weakness, impaired endurance, impaired self care skills, impaired functional mobility, gait instability, impaired balance, impaired cognition, decreased lower extremity function, decreased upper extremity function, decreased coordination, pain, decreased safety awareness, decreased ROM, impaired skin, edema, orthopedic precautions.   Pt participated well during today's session. Pt required max redirection and cueing throughout session. Pt tolerated tx session without incident and is making progress, however, continues to demonstrate deficits with self care skills, balance, functional mobility, UB strength and endurance. Pt will benefit from continued OT services to progress towards goals.     Rehab Potential is fair    Activity tolerance:  Fair    Plan:     Patient to be seen 5 x/week to address the above listed problems via self-care/home management, therapeutic activities, therapeutic exercises    Plan of Care Expires: 01/06/24  Plan of Care Reviewed with: patient    Subjective   "My knee hurts a little"  Communicated with: Merline prior to session.  .    Pain/Comfort:  Pain " "Rating 1:  ("it bothers me")  Location - Side 1: Left  Location - Orientation 1: generalized  Location 1: hip  Pain Addressed 1: Pre-medicate for activity, Reposition, Distraction  Pain Rating Post-Intervention 1:  (did not rate)    Patient's cultural, spiritual, Confucianist conflicts given the current situation:  no    Objective:     Patient found up in chair with PT in rehab gym.     [facility] 6 Click ADL: 15    OT Exercises: PROM of shoulder flex/ext and elbow flex/ext performed to L UE   Bilateral Danilo 2 x10 with #1 weight. Pt perform lateral slides, forward and circular motions.     Therex performed to improve overall endurance, ROM and UB strength required for functional transfers, ADL's and w/c propulsion.     Treatment & Education:  Pt complete shape puzzle with max redirection and cuing. Noted with 2 errors.   Pt with use of pegs copy various patterns, noted with 3 errors while performing.     Both functional activities performed with focus on attention to task, fine motor and functional reaching.     Pt educated on:  - role of OT  - level of assistance  - energy conservation and task modification to maximize independence with ADL's and mobility   -  safety while performing functional transfers and self care tasks  - orthopedic precautions.   - progress towards OT goals     Patient left up in chair with  SLP present.    GOALS:   Multidisciplinary Problems       Occupational Therapy Goals          Problem: Occupational Therapy    Goal Priority Disciplines Outcome Interventions   Occupational Therapy Goal     OT, PT/OT Ongoing, Progressing    Description: Goals to be met by: 12/30/23     Patient will increase functional independence with ADLs by performing:    UE Dressing with Stand-by Assistance.- Ongoing   LE Dressing with Moderate Assistance.- Ongoing   Grooming while seated at sink with Set-up Assistance.- Met   Toileting from bedside commode with Moderate Assistance for hygiene and clothing management. - " Ongoing   Bathing from  edge of bed with Minimal Assistance.- Ongoing   Toilet transfer to bedside commode with Moderate Assistance.- Met                         Time Tracking:     OT Date of Treatment: 12/18/23  OT Start Time: 0952    OT Stop Time: 1030  OT Total Time (min): 38 min    Billable Minutes:Therapeutic Activity 23  Therapeutic Exercise 15    12/18/2023

## 2023-12-18 NOTE — PT/OT/SLP PROGRESS
"Speech Language Pathology  Treatment/Discharge    Tigist Murry   MRN: [id number]   Admitting Diagnosis: Closed displaced fracture of left femoral neck    Diet recommendations: Solid Diet Level: Regular Diet - IDDSI Level 7  Liquid Diet Level: Thin liquids - IDDSI Level 0 Monitor for s/s of aspiration and Standard aspiration precautions    SLP Treatment Date: 23  Speech Start Time: 1028     Speech Stop Time: 1055     Speech Total (min): 27 min       TREATMENT BILLABLE MINUTES:  Speech Therapy Individual (cognitive therapy) 19 and Self Care/Home Management Training 8           General Precautions: Standard, fall          Subjective:  "I don't know how I got here. I'm not driving right now." "I' not in the mood for all this." "I sure don't want any extra work right now." Pt exhibiting confusion and appearing stressed out after engaging in cognitive therapy.  SLP and NP agreed discharging from SLP services likely appropriate at this time.  Pt with baseline dementia.  Structured cognitive therapy not appearing to be beneficial.          Objective:      Pt seen for cognitive therapy while sitting up in w/c in therapy office after working with PT and OT.  Pt was awake/alert and calm, but did display confusion.  Pt was oriented to place/[facility] after SLP oriented her to type of facility. Pt was oriented to month given cues, but not to year or situation. Pt recalled her  and her 3 daughters' names.  Pt stated possible effects of problem situations with 60% accuracy IND/80% given cues.  Pt generated a list of 3 grocery items which were reviewed multiple times with SLP's guide. Following a 60 second delay, pt recalled 1 item from list IND and another item given cues.  Pt demonstrated some anxiety, stress,  and increased confusion after completion of this task. Pt stated she "was not in the mood for this." She proceeded to say "I know I have memory trouble and I don't like it" and "I don't need any extra work right " "now."  Education was provided to pt regarding role of SLP, purpose of cognitive therapy/stimulation, orientation, and plan to discontinue cognitive therapy at this time.  Pt unable to demonstrate full understanding and carryover. Structured therapy tasks appearing to cause stress to pt and not beneficial to pt at this time.  Also, pt likely at baseline for cognition (premorbid dementia). Communicated with NP via Secure Chat who was in agreement.  SLP services to sign off at this time.     Assessment:  Tigist Murry is a 83 y.o. female with a medical diagnosis of Closed displaced fracture of left femoral neck and presents with baseline cognitive impairments.  No further skilled SLP services warranted at this time.     Discharge recommendations: Therapy Intensity Recommendations at Discharge:  (no further SLP services at this time)       Plan:   SLP Follow-up?: No         12/18/2023  "

## 2023-12-18 NOTE — PT/OT/SLP PROGRESS
Physical Therapy Treatment    Patient Name:  Tigist Murry   MRN:  [id number]  Admit Date: 12/8/2023  Admitting Diagnosis: Closed displaced fracture of left femoral neck  Recent Surgeries: N/A    General Precautions: Standard, fall  Orthopedic Precautions: LLE weight bearing as tolerated, LLE posterior precautions  Braces: N/A    Recommendations:     Discharge Recommendations: Low Intensity Therapy  Level of Assistance Recommended at Discharge: 24 hours significant assistance  Discharge Equipment Recommendations: hip kit, bedside commode, walker, rolling, shower chair  Barriers to discharge: Decreased caregiver support, Other (Comment) (Increased assistance for mobility)    Assessment:     Tigist Murry is a 83 y.o. female admitted with a medical diagnosis of Closed displaced fracture of left femoral neck. Patient agreeable to PT treatment this AM. Patient continues to require significant assistance of two persons for all mobility tasks. Patient with decreased safety awareness and cognitive deficits limited overall progression of mobility. Patient pleasant throughout session. Patient will benefit from continued SNF rehabilitation services to address deficits as well as progress mobility towards maximal functional potential for improved quality of life and decreased caregiver burden.    Performance deficits affecting function: weakness, impaired endurance, impaired self care skills, impaired functional mobility, gait instability, impaired balance, impaired cognition, decreased coordination, decreased upper extremity function, decreased lower extremity function, decreased safety awareness, pain, decreased ROM, impaired cardiopulmonary response to activity, orthopedic precautions.    Rehab Potential is fair    Activity Tolerance: Fair    Plan:     Patient to be seen 5 x/week to address the above listed problems via therapeutic activities, gait training, therapeutic exercises, wheelchair management/training,  "neuromuscular re-education    Plan of Care Expires: 01/08/24  Plan of Care Reviewed with: patient    Subjective     "This side hurts."     Pain/Comfort:  Pain Rating 1: other (see comments) (Pain unable to rate pain)  Location - Side 1: Left  Location - Orientation 1: generalized  Location 1: hip  Pain Addressed 1: Pre-medicate for activity, Reposition, Distraction, Cessation of Activity  Pain Rating Post-Intervention 1: other (see comments) (Patient unable to rate pain)    Patient's cultural, spiritual, Restorationism conflicts given the current situation:  no    Objective:     Communicated with nursing staff prior to session.  Patient found supine with  (no active lines) upon PT entry to room.     Therapeutic Activities and Exercises:   Seated BLE exercises 2 x 20 reps including ankle DF, ankle PF, LAQs; cues to complete full available range throughout activity.    PT and rehab tech assisted with pulling up pants completely while patient in supine position. Repeated rolling performed to complete task.    Functional Mobility:  Bed Mobility:     Rolling Left:  maximal assistance, of 2 persons, and HOB flat with use of rails with tactile cues for placement  Rolling Right: maximal assistance, of 2 persons, and HOB flat with use of rails with tactile cues for placement  Scooting: maximal assistance and seated EOB with cues for UE support on bed to assist  Supine to Sit: maximal assistance, of 2 persons, and HOB flat with use of rails with tactile cues for placement  Transfers:     Sit to Stand:  maximal assistance and of 2 persons with rolling walker  Bed to Chair: maximal assistance and of 2 persons with  no AD  using  Stand Pivot  Gait: Patient ambulated 36 feet and 46 feet using RW with ModA x 2 and W/C in tow per third person. Patient in crouched posturing with cues to stand erect and shift weight anterior. Limited active movement at B hips; increased motion at B knee joints to advance forward with stepping. Quick, " short steps completed. Seated rest break between trials.   Balance: Posterior shift of weight in standing with MaxA x 2 for safety and stability; crouched posture present    AM-PAC 6 CLICK MOBILITY  11    Patient left up in chair with  WASSERMAN present.    GOALS:   Multidisciplinary Problems       Physical Therapy Goals          Problem: Physical Therapy    Goal Priority Disciplines Outcome Goal Variances Interventions   Physical Therapy Goal     PT, PT/OT Ongoing, Progressing     Description: Goals to be met by: 1/9/23     Patient will increase functional independence with mobility by performing:    . Supine to sit with Minimal Assistance - not met  Modifed Goal 12/18/23: Supine to sit with ModA    . Sit to supine with Minimal Assistance - not met  Modified Goal 12/18/23: Sit to supine with ModA    . Rolling to Left and Right with Minimal Assistance - not met  Modified Goal 12/18/23: Rolling to L/R with ModA    . Sit to stand transfer with Minimal Assistance - not met  Modified Goal 12/18/23: Sit to stand transfer with ModA    . Bed to chair transfer with Minimal Assistance using Rolling Walker - not met  Modified Goal 12/18/23: Bed to chair transfer with ModA    . Gait  x 50 feet with Minimal Assistance using Rolling Walker - not met  Modified Goal 12/18/23: Gait x 50 feet with ModA using RW     . Wheelchair propulsion x 50 feet with Moderate Assistance using bilateral upper extremities - In progress                         Time Tracking:     PT Received On: 12/18/23  PT Start Time: 0856  PT Stop Time: 0951  PT Total Time (min): 55 min    Billable Minutes: Gait Training 15, Therapeutic Activity 25, and Therapeutic Exercise 15    Treatment Type: Treatment  PT/PTA: PT     Number of PTA visits since last PT visit: 0     12/18/2023

## 2023-12-18 NOTE — PROGRESS NOTES
[facility] - Skilled Nursing  Adult Nutrition  Progress Note    SUMMARY     Recommendation/Intervention:   1) Continue current regular diet as tolerated, encourage PO intake  2) Continue Boost Breeze daily to promote adequate kcal/protein consumption  3) RD to monitor weight, labs, PO intake/tolerance, skin    Goals: PO intake to meet 85% of EEN/EPN with ONS by next RD follow-up  Nutrition Goal Status: progressing towards goal  Communication of RD Recs: other (comment) (POC)    Assessment and Plan    Nutrition Problem  Increased nutrient needs     Related to (etiology):   Wound healing    Signs and Symptoms (as evidenced by):   S/p R hip fx repair     Interventions/Recommendations (treatment strategy):  Collaboration with other providers  General diet  Commercial beverage(protein) boost breeze daily    Nutrition Diagnosis Status:   Continues    Malnutrition Assessment  12/11     Skin (Micronutrient): edema, dry, wounds unhealed  Neck/Chest (Micronutrient): muscle wasting, subcutaneous fat loss  Musculoskeletal/Lower Extremities: muscle wasting, subcutaneous fat loss           Orbital Region (Subcutaneous Fat Loss): mild depletion  Upper Arm Region (Subcutaneous Fat Loss): mild depletion  Thoracic and Lumbar Region: well nourished   Hinduism Region (Muscle Loss): mild depletion  Clavicle and Acromion Bone Region (Muscle Loss): moderate depletion  Scapular Bone Region (Muscle Loss): mild depletion  Dorsal Hand (Muscle Loss): moderate depletion  Anterior Thigh Region (Muscle Loss): moderate depletion     Reason for Assessment    Reason For Assessment: RD follow-up  Diagnosis:  (s/p hemiplasty L hip, UTI)  Relevant Medical History: HLD, HTN, AFIB, CKD 3, GERD, diverticulosis, anxiety, debility, HF, dementia,  Interdisciplinary Rounds: attended  General Information Comments: Pt followed by TAVIA. NFPE completed 12/11. Pt continues with good appetite, consuming % of recent meals + Boost Breeze daily. No  "N/V/D/C.  Nutrition Discharge Planning: DC on regular diet, increased protein for 4 weeks post dc    Nutrition/Diet History    Patient Reported Diet/Restrictions/Preferences: general  Typical Food/Fluid Intake: regular meals  Spiritual, Cultural Beliefs, Zoroastrianism Practices, Values that Affect Care: no  Food Allergies: NKFA  Factors Affecting Nutritional Intake: None identified at this time    Anthropometrics    Temp: 98.3 °F (36.8 °C)  Height: 5' 3" (160 cm)  Height (inches): 63 in  Weight Method: Bed Scale  Weight: 53 kg (116 lb 13.5 oz)  Weight (lb): 116.84 lb  Ideal Body Weight (IBW), Female: 115 lb  % Ideal Body Weight, Female (lb): 104.67 %  BMI (Calculated): 20.7  BMI Grade: 18.5-24.9 - normal  Usual Body Weight (UBW), k kg  4 lb weight loss since admit    Lab/Procedures/Meds    Pertinent Labs Reviewed: reviewed  Pertinent Labs Comments: Hg 12.1, Hct 37.3  - improved since last RD visit  Pertinent Medications Reviewed: reviewed  Pertinent Medications Comments:    acetaminophen  1,000 mg Oral Q8H    diltiaZEM  240 mg Oral Daily    EScitalopram oxalate  10 mg Oral Daily    methocarbamoL  500 mg Oral QID    polyethylene glycol  17 g Oral BID    rivaroxaban  15 mg Oral Daily with dinner    senna-docusate 8.6-50 mg  1 tablet Oral BID     Estimated/Assessed Needs    Weight Used For Calorie Calculations: 54.6 kg (120 lb 5.9 oz)  Energy Calorie Requirements (kcal): 1273-7607  Energy Need Method: Kcal/kg (25-30 kcal/ kg)  Protein Requirements: 66-71 (x 1.2-1.3g/kg)  Weight Used For Protein Calculations: 54.6 kg (120 lb 5.9 oz)  Fluid Requirements (mL): 1365 or per MD     RDA Method (mL): 1365    Nutrition Prescription Ordered    Current Diet Order: Regular Diet  Nutrition Order Comments: PO intake %  Oral Nutrition Supplement: Ira Breeze daily    Evaluation of Received Nutrient/Fluid Intake    I/O: no data  Energy Calories Required: meeting needs  Protein Required: meeting needs  Fluid Required: meeting " needs  Comments: LBM yesterday 12/17  Tolerance: tolerating  % Intake of Estimated Energy Needs: 75 - 100 %  % Meal Intake: 75 - 100 %    Nutrition Risk    Level of Risk/Frequency of Follow-up: low (F/u once/week)     Monitor and Evaluation    Food and Nutrient Intake: food and beverage intake  Food and Nutrient Adminstration: diet order  Physical Activity and Function: nutrition-related ADLs and IADLs  Biochemical Data, Medical Tests and Procedures: electrolyte and renal panel, gastrointestinal profile  Nutrition-Focused Physical Findings: overall appearance, skin     Nutrition Follow-Up    RD Follow-up?: Yes

## 2023-12-18 NOTE — PLAN OF CARE
Problem: Adult Inpatient Plan of Care  Goal: Plan of Care Review  Outcome: Ongoing, Progressing  Goal: Patient-Specific Goal (Individualized)  Outcome: Ongoing, Progressing  Goal: Absence of Hospital-Acquired Illness or Injury  Outcome: Ongoing, Progressing  Goal: Optimal Comfort and Wellbeing  Outcome: Ongoing, Progressing  Goal: Readiness for Transition of Care  Outcome: Ongoing, Progressing     Problem: Skin Injury Risk Increased  Goal: Skin Health and Integrity  Outcome: Ongoing, Progressing     Problem: Fall Injury Risk  Goal: Absence of Fall and Fall-Related Injury  Outcome: Ongoing, Progressing  Intervention: Promote Injury-Free Environment  Flowsheets (Taken 12/18/2023 1210)  Safety Promotion/Fall Prevention:   side rails raised x 2   assistive device/personal item within reach   lighting adjusted     Problem: Impaired Wound Healing  Goal: Optimal Wound Healing  Outcome: Ongoing, Progressing    Pt alert to self. Confused. Tolerated medications whole. Denies pain or discomfort at this time. No apparent distress noted. Dressing  remains intact.  Telesitter in place. Safety maintained.

## 2023-12-19 PROCEDURE — 94761 N-INVAS EAR/PLS OXIMETRY MLT: CPT | Mod: HCNC

## 2023-12-19 PROCEDURE — 97116 GAIT TRAINING THERAPY: CPT | Mod: HCNC,CQ

## 2023-12-19 PROCEDURE — 25000003 PHARM REV CODE 250: Mod: HCNC | Performed by: HOSPITALIST

## 2023-12-19 PROCEDURE — 97530 THERAPEUTIC ACTIVITIES: CPT | Mod: HCNC,CO

## 2023-12-19 PROCEDURE — 97110 THERAPEUTIC EXERCISES: CPT | Mod: HCNC,CO

## 2023-12-19 PROCEDURE — 11000004 HC SNF PRIVATE: Mod: HCNC

## 2023-12-19 PROCEDURE — 97110 THERAPEUTIC EXERCISES: CPT | Mod: HCNC,CQ

## 2023-12-19 PROCEDURE — 25000003 PHARM REV CODE 250: Mod: HCNC | Performed by: NURSE PRACTITIONER

## 2023-12-19 RX ADMIN — ACETAMINOPHEN 1000 MG: 500 TABLET ORAL at 09:12

## 2023-12-19 RX ADMIN — SENNOSIDES AND DOCUSATE SODIUM 1 TABLET: 8.6; 5 TABLET ORAL at 09:12

## 2023-12-19 RX ADMIN — POLYETHYLENE GLYCOL 3350 17 G: 17 POWDER, FOR SOLUTION ORAL at 09:12

## 2023-12-19 RX ADMIN — RIVAROXABAN 15 MG: 15 TABLET, FILM COATED ORAL at 04:12

## 2023-12-19 RX ADMIN — ACETAMINOPHEN 1000 MG: 500 TABLET ORAL at 06:12

## 2023-12-19 RX ADMIN — ACETAMINOPHEN 1000 MG: 500 TABLET ORAL at 01:12

## 2023-12-19 RX ADMIN — ESCITALOPRAM OXALATE 10 MG: 10 TABLET ORAL at 09:12

## 2023-12-19 RX ADMIN — DILTIAZEM HYDROCHLORIDE 240 MG: 120 CAPSULE, COATED, EXTENDED RELEASE ORAL at 09:12

## 2023-12-19 NOTE — PT/OT/SLP PROGRESS
"Occupational Therapy   Treatment    Name: Tigist Murry  MRN: [id number]  Admit Date: 12/8/2023  Admitting Diagnosis:  Closed displaced fracture of left femoral neck    General Precautions: Standard, fall   Orthopedic Precautions: LLE weight bearing as tolerated   Braces: N/A    Recommendations:     Discharge Recommendations:     Level of Assistance Recommended at Discharge: 24 hours physical assistance for all ADL's and home management tasks  Discharge Equipment Recommendations: bedside commode, wheelchair, hip kit, shower chair  Barriers to discharge:  Decreased caregiver support (increased assist needed for self care and mobility)    Assessment:     Tigist Murry is a 83 y.o. female with a medical diagnosis of Closed displaced fracture of left femoral neck .  She presents with performance deficits affecting function are weakness, impaired endurance, impaired self care skills, impaired functional mobility, gait instability, impaired balance, impaired cognition, decreased lower extremity function, decreased upper extremity function, decreased coordination, pain, decreased safety awareness, decreased ROM, impaired skin, edema, orthopedic precautions.   Pt participated well during today's session. Pt tolerated tx session without incident and is making progress, however, continues to demonstrate deficits with self care skills, balance, functional mobility, UB strength and endurance. Pt will benefit from continued OT services to progress towards goals.     Rehab Potential is fair    Activity tolerance:  Fair    Plan:     Patient to be seen 5 x/week to address the above listed problems via self-care/home management, therapeutic activities, therapeutic exercises    Plan of Care Expires: 01/06/24  Plan of Care Reviewed with: patient    Subjective   "My leg hurts a little"  Communicated with: Merline prior to session.  .    Pain/Comfort:  Pain Rating 1:  ("it hurts a little in my leg")  Location - Side 1: Left  Location - " Orientation 1: generalized  Location 1: leg  Pain Addressed 1: Pre-medicate for activity, Reposition, Distraction  Pain Rating Post-Intervention 1:  (not rated)    Patient's cultural, spiritual, Scientologist conflicts given the current situation:  no    Objective:     Patient found up in chair with PTA present in rehab gym.     [facility] 6 Click ADL: 15    OT Exercises: AROM x 2 sets of 5. Pt perform shoulder flex/ext, forward flex motion with R UE only.   Bilateral Danilo 2 x10 with #2 weight. Pt perform lateral slides, forward and circular motions.      Therex performed to improve overall endurance, ROM and UB strength required for functional transfers, ADL's and w/c propulsion.     Treatment & Education:  Pt seated at table to perform memory card matching activity with focus on attention to task, cognition, and functional reaching.    Pt educated on:  - role of OT  - level of assistance  - energy conservation and task modification to maximize independence with ADL's and mobility   -  safety while performing functional transfers and self care tasks  - orthopedic precautions.   - progress towards OT goals     Patient left up in chair with  nsg present    GOALS:   Multidisciplinary Problems       Occupational Therapy Goals          Problem: Occupational Therapy    Goal Priority Disciplines Outcome Interventions   Occupational Therapy Goal     OT, PT/OT Ongoing, Progressing    Description: Goals to be met by: 12/30/23     Patient will increase functional independence with ADLs by performing:    UE Dressing with Stand-by Assistance.- Ongoing   LE Dressing with Moderate Assistance.- Ongoing   Grooming while seated at sink with Set-up Assistance.- Met   Toileting from bedside commode with Moderate Assistance for hygiene and clothing management. - Ongoing   Bathing from  edge of bed with Minimal Assistance.- Ongoing   Toilet transfer to bedside commode with Moderate Assistance.- Met                         Time Tracking:     OT  Date of Treatment: 12/19/23  OT Start Time: 1038    OT Stop Time: 1112  OT Total Time (min): 34 min    Billable Minutes:Therapeutic Activity 17  Therapeutic Exercise 17    12/19/2023

## 2023-12-19 NOTE — PT/OT/SLP PROGRESS
"Physical Therapy Treatment    Patient Name:  Tigist Murry   MRN:  [id number]  Admit Date: 12/8/2023  Admitting Diagnosis: Closed displaced fracture of left femoral neck  Recent Surgeries:     General Precautions: Standard, fall  Orthopedic Precautions: LLE weight bearing as tolerated, LLE posterior precautions  Braces: N/A    Recommendations:     Discharge Recommendations:    Level of Assistance Recommended at Discharge: 24 hours significant assistance  Discharge Equipment Recommendations: hip kit, bedside commode, walker, rolling, shower chair  Barriers to discharge: Decreased caregiver support, Other (Comment) (Increased assistance for mobility)    Assessment:     Tigist Murry is a 83 y.o. female admitted with a medical diagnosis of Closed displaced fracture of left femoral neck . Pt tolerated well, pt following simple vcs today, pt would continue to benefit from skilled PT services to improve overall functional mobility, strength and endurance.    .      Performance deficits affecting function: weakness, impaired endurance, impaired self care skills, impaired functional mobility, gait instability, impaired balance, impaired cognition, decreased coordination, decreased upper extremity function, decreased lower extremity function, decreased safety awareness, pain, decreased ROM, impaired cardiopulmonary response to activity, orthopedic precautions.    Rehab Potential is good    Activity Tolerance: Fair    Plan:     Patient to be seen 5 x/week to address the above listed problems via therapeutic activities, gait training, therapeutic exercises, wheelchair management/training, neuromuscular re-education    Plan of Care Expires: 01/08/24  Plan of Care Reviewed with: patient    Subjective     :tired", "didn't sleep good" pt agreeable to therapy.     Pain/Comfort:  Pain Rating 1:  ("little bit" with gait, none at rest)  Location - Side 1: Left  Location - Orientation 1: generalized  Location 1: hip (and L arm)  Pain " Addressed 1: Reposition, Distraction, Cessation of Activity, Nurse notified (unsure if premeds)    Patient's cultural, spiritual, Synagogue conflicts given the current situation:  no    Objective:       Patient found with  (in wc) upon PT entry to room.     Therapeutic Activities and Exercises: 3x10 reps AP,GS,LAQ    Functional Mobility:  Transfers:     Sit to Stand:  minimum assistance and moderate assistance with rolling walker and mod/min 1st trial, mod x 1 second trial vcs for tech pt following simple cues today  Gait: amb with RW mod A ~ 23 ft and 12 ft seated rest break vcs for inc B step length R>L Right foot occ freezes with difficulty reinitiating  Wc mobility pt not propelling WC at this time 2* to LUE is still pretty painfull    AM-PAC 6 CLICK MOBILITY  11    Patient left up in chair with  awaiting OT in gym .    GOALS:   Multidisciplinary Problems       Physical Therapy Goals          Problem: Physical Therapy    Goal Priority Disciplines Outcome Goal Variances Interventions   Physical Therapy Goal     PT, PT/OT Ongoing, Progressing     Description: Goals to be met by: 1/9/23     Patient will increase functional independence with mobility by performing:    . Supine to sit with Minimal Assistance - not met  Modifed Goal 12/18/23: Supine to sit with ModA    . Sit to supine with Minimal Assistance - not met  Modified Goal 12/18/23: Sit to supine with ModA    . Rolling to Left and Right with Minimal Assistance - not met  Modified Goal 12/18/23: Rolling to L/R with ModA    . Sit to stand transfer with Minimal Assistance - not met  Modified Goal 12/18/23: Sit to stand transfer with ModA    . Bed to chair transfer with Minimal Assistance using Rolling Walker - not met  Modified Goal 12/18/23: Bed to chair transfer with ModA    . Gait  x 50 feet with Minimal Assistance using Rolling Walker - not met  Modified Goal 12/18/23: Gait x 50 feet with ModA using RW     . Wheelchair propulsion x 50 feet with Moderate  Assistance using bilateral upper extremities - In progress                         Time Tracking:     PT Received On: 12/19/23  PT Start Time: 1002  PT Stop Time: 1032  PT Total Time (min): 30 min    Billable Minutes: Gait Training 15 and Therapeutic Exercise 15    Treatment Type: Treatment  PT/PTA: PTA     Number of PTA visits since last PT visit: 1 12/19/2023

## 2023-12-19 NOTE — PLAN OF CARE
Problem: Adult Inpatient Plan of Care  Goal: Plan of Care Review  Outcome: Ongoing, Progressing  Flowsheets (Taken 12/19/2023 0246)  Plan of Care Reviewed With: patient  Goal: Patient-Specific Goal (Individualized)  Outcome: Ongoing, Progressing  Goal: Absence of Hospital-Acquired Illness or Injury  Outcome: Ongoing, Progressing  Goal: Optimal Comfort and Wellbeing  Outcome: Ongoing, Progressing  Goal: Readiness for Transition of Care  Outcome: Ongoing, Progressing     Problem: Skin Injury Risk Increased  Goal: Skin Health and Integrity  Outcome: Ongoing, Progressing     Problem: Fall Injury Risk  Goal: Absence of Fall and Fall-Related Injury  Outcome: Ongoing, Progressing     Problem: Impaired Wound Healing  Goal: Optimal Wound Healing  Outcome: Ongoing, Progressing

## 2023-12-19 NOTE — TREATMENT PLAN
Rehab Services' DME recommendations    Tigist Murry  MRN: [id number]    Per daughter pt has RW, bedside commode, bath bench, shower chair, transport chair     Wheelchair  Number of hours up in a wheelchair per day 8       Style Light weight        Justification for light weight w/c: patient cannot propel in a standard wheelchair, patient has impaired ability to participate in MRADLs, mobility limitations cannot be sifficiently resolved with a cane/walker, the home provides adequate access between rooms for a wheelchair, a wheelchair will significantly improve the ability to participate in MRADLs and will be used in the home on a regular basis, the patient has a caregiver who is available, willing, and able to provide assistance with the wheelchair, and the patient requires additional person for safety with mobility with walker    Seat Width 18 (Standard adult)    Seat Depth 18    Back Height Standard    Leg Support Elevated leg rest and Swing Away    Arm Height Full and Swing Away    Lap Belt Velcro    Accessories Anti-tippers and Safety belt    Cushion Basic    Justification for Cushion maintain skin integrity      Hip Kit Standard/Short    Home health PT, OT, and Aide      Bonnie Yarbrough, PTA 12/19/2023

## 2023-12-19 NOTE — PROGRESS NOTES
Ms. Murry was seen at [facility] today for a post-operative visit after undergoing   Left hip hemiarthroplasty for femoral neck fracture  by Dr. Martines on 12/5/2023.      Interval History:  She reports that she is doing well.  Pain is controlled.  She is taking pain medication.  Tylenol, Robaxin,   She denies fever, chills, and sweats since the time of the surgery.  She is participating in her therapy sessions.     Functional Mobility:  Bed Mobility:     Rolling Left:  maximal assistance, of 2 persons, and HOB flat with use of rails with tactile cues for placement  Rolling Right: maximal assistance, of 2 persons, and HOB flat with use of rails with tactile cues for placement  Scooting: maximal assistance and seated EOB with cues for UE support on bed to assist  Supine to Sit: maximal assistance, of 2 persons, and HOB flat with use of rails with tactile cues for placement  Transfers:     Sit to Stand:  maximal assistance and of 2 persons with rolling walker  Bed to Chair: maximal assistance and of 2 persons with  no AD  using  Stand Pivot  Gait: Patient ambulated 36 feet and 46 feet using RW with ModA x 2 and W/C in tow per third person. Patient in crouched posturing with cues to stand erect and shift weight anterior. Limited active movement at B hips; increased motion at B knee joints to advance forward with stepping. Quick, short steps completed. Seated rest break between trials.   Balance: Posterior shift of weight in standing with MaxA x 2 for safety and stability; crouched posture present    Physical exam:  Dressing is clean, dry and intact. Dressing was removed.  Incision is clean, dry, and intact.  No signs of infection noted. She has tactile stimulation to their lower leg, she denies calf pain, there is no leg edema and their pedal pulse is palpable x 2.     RADS: none done today    Assessment:  Post-op visit (2 weeks)    Plan:  Current care, treatment plan, precautions, activity level/ modifications, limitations,  rehabilitation exercises and proposed future treatment were discussed with the patient. We discussed the need to monitor for changes in symptoms and condition and report them to the physician.  Discussed importance of compliance with all appointments and follow up examinations.     WOUND CARE ORDERS  -Tigist was advised to keep the incision clean and dry for the next 24 hours after which she may wash the area with antibacterial soap in the shower.   -She not submerge until the incision is completely healed  -Patient was advised to monitor wound closely and multiple times daily for any problems. Call clinic immediately or report to ED for immediate medical attention for any complications including reopening of wound, drainage, purulence, redness, streaking, odor, pain out of proportion, fever, chills, etc.   - If there are signs of infection, please call [facility] [phone] for further instructions and to make appt to be seen.         PHYSICAL THERAPY:    - Continue therapy as ordered.        - weight bearing as tolerated         - range of motion as tolerated. Posterior hip precautions     PAIN MEDICATION:               - Pain medication: refill was not needed,               - Pain medication refill policy provided to patient for review, yes      DVT PROPHYLAXIS:               - rivaroxaban      FOLLOW UP:   - Patient will continue to be seen on SNF weekly until their discharge then he is to return to clinic for follow up.  - Future Appointments:   Future Appointments   Date Time Provider Department Center   1/16/2024 10:45 AM Lashay Santiago PA-C [facility]           If there are any questions prior to scheduled follow up, the patient was instructed to contact the office

## 2023-12-20 PROCEDURE — 97530 THERAPEUTIC ACTIVITIES: CPT | Mod: HCNC

## 2023-12-20 PROCEDURE — 97116 GAIT TRAINING THERAPY: CPT | Mod: HCNC,CQ

## 2023-12-20 PROCEDURE — 25000003 PHARM REV CODE 250: Mod: HCNC | Performed by: FAMILY MEDICINE

## 2023-12-20 PROCEDURE — 97110 THERAPEUTIC EXERCISES: CPT | Mod: HCNC,CQ

## 2023-12-20 PROCEDURE — 97110 THERAPEUTIC EXERCISES: CPT | Mod: HCNC

## 2023-12-20 PROCEDURE — 25000003 PHARM REV CODE 250: Mod: HCNC | Performed by: NURSE PRACTITIONER

## 2023-12-20 PROCEDURE — 25000003 PHARM REV CODE 250: Mod: HCNC | Performed by: HOSPITALIST

## 2023-12-20 PROCEDURE — 11000004 HC SNF PRIVATE: Mod: HCNC

## 2023-12-20 RX ADMIN — SENNOSIDES AND DOCUSATE SODIUM 1 TABLET: 8.6; 5 TABLET ORAL at 09:12

## 2023-12-20 RX ADMIN — ACETAMINOPHEN 1000 MG: 500 TABLET ORAL at 05:12

## 2023-12-20 RX ADMIN — ACETAMINOPHEN 1000 MG: 500 TABLET ORAL at 02:12

## 2023-12-20 RX ADMIN — RIVAROXABAN 15 MG: 15 TABLET, FILM COATED ORAL at 05:12

## 2023-12-20 RX ADMIN — ACETAMINOPHEN 1000 MG: 500 TABLET ORAL at 09:12

## 2023-12-20 RX ADMIN — Medication 9 MG: at 09:12

## 2023-12-20 RX ADMIN — POLYETHYLENE GLYCOL 3350 17 G: 17 POWDER, FOR SOLUTION ORAL at 09:12

## 2023-12-20 RX ADMIN — ESCITALOPRAM OXALATE 10 MG: 10 TABLET ORAL at 09:12

## 2023-12-20 RX ADMIN — DILTIAZEM HYDROCHLORIDE 240 MG: 120 CAPSULE, COATED, EXTENDED RELEASE ORAL at 09:12

## 2023-12-20 NOTE — PROGRESS NOTES
[facility]                                  Skilled Nursing Facility                   Progress Note     Admit Date: 12/8/2023  CLARKE 12/29/2023  [id number]  Principal Problem:  Closed displaced fracture of left femoral neck   HPI obtained from patient interview and chart review     Chief Complaint: Re-evaluation of medical treatment and therapy status:     HPI:   Mrs. Murry is a 83 year old female PMHx of Afib on xarelto, HTN, HPLD, mild dementia who presents to SNF following hospitalization for left femoral neck fracture s/p left hip hemiarthroplasty on 12/05 with Dr. Naranjo.  Admission to SNF for secondary weakness and debility.    Interval history:    24 hr vital sign ranges reviewed and listed below, no acute abnormalities noted.  Remains at mental baseline which is very confused.   Patient seen by Orthopedics today and care discussed with ortho NP.  Patient denies shortness of breath, abdominal discomfort, nausea, or vomiting.  Patient reports an ok appetite.  Patient denies dysuria.  Patient reports having regular bowel movements.  Patient progressing with PT/OT. Continuing to follow and treat all acute and chronic conditions.      Past Medical History: Patient has a past medical history of Anticoagulant long-term use, Anxiety, Arthritis, Atrial fibrillation, Carpal tunnel syndrome on right, Diverticulosis, Encounter for blood transfusion, HTN (hypertension), Hyperlipidemia, Osteopenia, Overactive bladder, Palpitations, Patellar tendonitis (4/13), Psoriasis, S/P partial hysterectomy, Situational depression, and Supraventricular tachycardia.    Past Surgical History: Patient has a past surgical history that includes Partial hysterectomy; Knee arthroscopy w/ debridement ('05); Carpal tunnel release; Abdominal surgery; Breast biopsy (Left); Hysterectomy; Oophorectomy; and hemiarthroplasty, hip, posterior approach (Left,  12/5/2023).    Social History: Patient reports that she has never smoked. She has never used smokeless tobacco. She reports that she does not drink alcohol and does not use drugs.    Family History: family history includes Heart failure (age of onset: 80) in her mother; No Known Problems in her brother, daughter, daughter, daughter, and sister.    Allergies: Patient is allergic to ciprofloxacin.    ROS  Constitutional: Negative for fever   Eyes: Negative for blurred vision, double vision   Respiratory: Negative for cough, shortness of breath   Cardiovascular: Negative for chest pain, palpitations, and leg swelling.   Gastrointestinal: Negative for abdominal pain, constipation, diarrhea, nausea, vomiting.   Genitourinary: Negative for dysuria, frequency   Musculoskeletal:  + generalized weakness.  + LUE and LLE pain  Skin: Negative for itching and rash.   Neurological: Negative for dizziness, headaches.   Psychiatric/Behavioral: Negative for depression. The patient is not nervous/anxious.      24 hour Vital Sign Range   Temp:  [98.3 °F (36.8 °C)]   Pulse:  [82-92]   Resp:  [17-18]   BP: (124-132)/(58-65)   SpO2:  [95 %-96 %]     Current BMI: Body mass index is 20.7 kg/m².    PEx  Constitutional: Patient appears debilitated.  No distress noted  HENT:   Head: Normocephalic and atraumatic.   Eyes: Pupils are equal, round  Neck: Normal range of motion. Neck supple.   Cardiovascular: Normal rate, regular rhythm and normal heart sounds.    Pulmonary/Chest: Effort normal and breath sounds are clear  Abdominal: Soft. Bowel sounds are normal.   Musculoskeletal: Normal range of motion.   Neurological: Alert and oriented to person,   States incorrect age.  Disoriented to place and time.  Impaired higher level thinking.  Confusion.  Psychiatric: Normal mood and affect. Behavior is normal.   Skin: Skin is warm and dry.   Scattered areas of ecchymosis.  LUE with severe ecchymosis down to her wrist swelling to forearm, likely  "hematoma.    No results for input(s): "GLUCOSE", "NA", "K", "CL", "CO2", "BUN", "CREATININE", "CALCIUM", "MG" in the last 24 hours.        No results for input(s): "WBC", "RBC", "HGB", "HCT", "PLT", "MCV", "MCH", "MCHC" in the last 24 hours.          No results for input(s): "POCTGLUCOSE" in the last 168 hours.     Assessment and Plan:    Closed displaced fracture of left femoral neck   Mechanical fall  s/p hemiarthroplasty on 12/5/2023  - PT/OT, WBAT with posterior hip precautions  - DVT PPX with home Xarelto  - maintain surgical dressing until removed by Orthopedics  - ortho KEKE to see patient [facility] at [facility]  - follow-up with orthopedics after discharge    Acute postoperative pain  - stable, continue acetaminophen 1000 mg q.8 hours, continue methocarbamol 500 mg QID.  Bowel regimen in place     Closed dislocation of left shoulder  - Patient found to have anterolateral left shoulder dislocation on admit. Orthopedics consulted and reduced shoulder dislocation successfully in ED with sedation.  - Per Ortho, patient placed in sling to left arm for comfort. Ortho states okay to remove sling to mobilize with therapy.   - 12/14 repeat of left shoulder x-ray reveals- Satisfactory reduction left anterior shoulder dislocation      Compression fracture of L1 lumbar vertebra  - Present on admit and noted on CT scan of chest, abdomen and pelvis to have "New compression deformity of the superior endplate of L1 with less than 25% height loss compared to CT from 09/16/2022, age indeterminate." Patient denies any low back pain.   - No treatment or intervention needed.      Essential hypertension  - BP's improved, continue home diltiazem 24 hour capsule 20 40 mg daily    Left ventricular diastolic dysfunction with preserved systolic function   - recent echo reviewed from 3/2/2, EF 55%  - Monitor I&Os, weekly weights  - okay for regular diet  - no signs of fluid overload on exam    Atrial fibrillation and flutter  Long term current " use of anticoagulant   - stable, continue home diltiazem and Xarelto 15 mg daily- considering increasing back to home dose of 20 mg now that renal function has recovered once hematoma shows more improvement    Acute blood loss anemia  - expected post fracture and surgery  - stable, continue monitor twice weekly CBC, transfuse for hemoglobin < 7     Alzheimer's disease with late onset (CODE)  - Patient with dementia with likely etiology of alzheimer's dementia. Dementia is mild. The patient does not have signs of behavioral disturbance.  Delirium precautions:  - Avoid antihistamines, anticholinergics, hypnotics, and minimize opiates while controlling for pain as these medications may exacerbate delirium. Cues for day/night will assist with keeping patient calm and oriented - during daytime, please keep adequate light in room (open windows, lights on) and please keep room dim at night-time to encourage normal sleep-wake cycles. Continuing to have nursing and family reorient the patient and encourage family to visit    Depression, moderate, reoccurring  - stable, continue escitalopram 10 mg daily    Debility   - Continue with PT/OT for gait training and strengthening and restoration of ADL's   - Encourage mobility, OOB in chair, and early ambulation as appropriate  - Fall precautions   - Monitor for bowel and bladder dysfunction  - Monitor for and prevent skin breakdown and pressure ulcers  - Continue DVT prophylaxis with Xarelto        Anticipate disposition:  Back to group home?  Maybe looking at new home.  Patient has (3) daughters: Sarah Murry who is the power of  and she lives in [place], [place] and the other (2) daughters are from [place], [place] and [place], [place].    IP OHS RISK OF UNPLANNED READMISSION Model: LOW    Follow-up needed during SNF stay-    Appointment not to send patient to- ortho 12/19    Follow-up needed after discharge from SNF: PCP, ortho 1/16    Future Appointments   Date Time Provider  Department Center   1/16/2024 10:45 AM Lashay Santiago PA-C [facility] Clark Baca NP  Department of [facility] Medicine   [facility]     DOS: 12/19/2023       Patient note was created using MModal Dictation.  Any errors in syntax or even information may not have been identified and edited on initial review prior to signing this note.

## 2023-12-20 NOTE — PLAN OF CARE
Problem: Occupational Therapy  Goal: Occupational Therapy Goal  Description: Goals to be met by: 12/30/23     Patient will increase functional independence with ADLs by performing:    UE Dressing with Stand-by Assistance.- Ongoing   LE Dressing with Moderate Assistance.- Ongoing   Grooming while seated at sink with Set-up Assistance.- Met   Toileting from bedside commode with Moderate Assistance for hygiene and clothing management. - Ongoing   Bathing from  edge of bed with Minimal Assistance.- Ongoing   Toilet transfer to bedside commode with Moderate Assistance.- Met    Outcome: Ongoing, Progressing

## 2023-12-20 NOTE — PT/OT/SLP PROGRESS
Physical Therapy Treatment    Patient Name:  Tigist Murry   MRN:  [id number]  Admit Date: 12/8/2023  Admitting Diagnosis: Closed displaced fracture of left femoral neck  Recent Surgeries: N/A    General Precautions: Standard, fall  Orthopedic Precautions: LLE weight bearing as tolerated, LLE posterior precautions  Braces: N/A    Recommendations:     Discharge Recommendations:    Level of Assistance Recommended at Discharge: 24 hours significant assistance  Discharge Equipment Recommendations: hip kit, bedside commode, walker, rolling, shower chair  Barriers to discharge: Decreased caregiver support, Other (Comment) (Increased assistance for mobility)    Assessment:     Tigist Murry is a 83 y.o. female admitted with a medical diagnosis of Closed displaced fracture of left femoral neck . Pt tolerated well, pt amb x 2 trials Mod A with RW. Seated TE completed, pt left at nurses station with supervision. Pt would continue to benefit from skilled PT services to improve overall functional mobility, strength and endurance.      Performance deficits affecting function: weakness, impaired endurance, impaired self care skills, impaired functional mobility, gait instability, impaired balance, impaired cognition, decreased coordination, decreased upper extremity function, decreased lower extremity function, decreased safety awareness, pain, decreased ROM, impaired cardiopulmonary response to activity, orthopedic precautions.    Rehab Potential is good    Activity Tolerance: Good    Plan:     Patient to be seen 5 x/week to address the above listed problems via therapeutic activities, gait training, therapeutic exercises, wheelchair management/training, neuromuscular re-education    Plan of Care Expires: 01/08/24  Plan of Care Reviewed with: patient    Subjective     Pt agreeable for PT.     Pain/Comfort:  Pain Rating 1:  (not rated)  Location - Side 1: Left  Location - Orientation 1: generalized  Location 1: leg  Pain  Addressed 1: Reposition, Distraction, Cessation of Activity, Pre-medicate for activity  Pain Rating Post-Intervention 1:  (did not rate)    Patient's cultural, spiritual, Zoroastrianism conflicts given the current situation:  no    Objective:       Patient found up in chair with  (no lines) upon handoff from OT    Therapeutic Activities and Exercises: Seated TE: LAQ, hip flex (LLE), hip abd/add (LLE) 2 x 10 reps for BLE strengthening. Tc, vc and demo for completion    Patient educated on role of therapy, goals of session, and benefits of out of bed mobility.   Instructed on use of call button and importance of calling nursing staff for assistance with mobility   Questions/concerns addressed within PTA scope of practice  Pt verbalized understanding     Functional Mobility:  Transfers:     Sit to Stand:  minimum assistance and moderate assistance with rolling walker. Posterior lean noted. Vc for hand placement and sequencing   Gait: pt amb 49 ft + 55 ft Mod A with RW. Tc and vc for encouragement to continue ambulating. Pt with festinating gait especially when slowing down, vc to increase step length, vc to shift weight anteriorly.     AM-PAC 6 CLICK MOBILITY  11    Patient left  up in chair  with  Nurses station present.    GOALS:   Multidisciplinary Problems       Physical Therapy Goals          Problem: Physical Therapy    Goal Priority Disciplines Outcome Goal Variances Interventions   Physical Therapy Goal     PT, PT/OT Ongoing, Progressing     Description: Goals to be met by: 1/9/23     Patient will increase functional independence with mobility by performing:    . Supine to sit with Minimal Assistance - not met  Modifed Goal 12/18/23: Supine to sit with ModA    . Sit to supine with Minimal Assistance - not met  Modified Goal 12/18/23: Sit to supine with ModA    . Rolling to Left and Right with Minimal Assistance - not met  Modified Goal 12/18/23: Rolling to L/R with ModA    . Sit to stand transfer with Minimal  Assistance - not met  Modified Goal 12/18/23: Sit to stand transfer with ModA    . Bed to chair transfer with Minimal Assistance using Rolling Walker - not met  Modified Goal 12/18/23: Bed to chair transfer with ModA    . Gait  x 50 feet with Minimal Assistance using Rolling Walker - not met  Modified Goal 12/18/23: Gait x 50 feet with ModA using RW     . Wheelchair propulsion x 50 feet with Moderate Assistance using bilateral upper extremities - In progress      Rehab Services' DME recommendations      Wheelchair  Number of hours up in a wheelchair per day 8       Style Light weight                              Justification for light weight w/c: patient cannot propel in a standard wheelchair, patient has impaired ability to participate in MRADLs, mobility limitations cannot be sifficiently resolved with a cane/walker, the home provides adequate access between rooms for a wheelchair, a wheelchair will significantly improve the ability to participate in MRADLs and will be used in the home on a regular basis, the patient has a caregiver who is available, willing, and able to provide assistance with the wheelchair, and the patient requires additional person for safety with mobility with walker     Seat Width 18 (Standard adult)     Seat Depth 18     Back Height Standard     Leg Support Elevated leg rest and Swing Away     Arm Height Full and Swing Away     Lap Belt Velcro     Accessories Anti-tippers and Safety belt     Cushion Basic     Justification for Cushion maintain skin integrity        Hip Kit Standard/Short     Home health PT, OT, and Aide                               Time Tracking:     PT Received On: 12/20/23  PT Start Time: 1452  PT Stop Time: 1525  PT Total Time (min): 33 min    Billable Minutes: Gait Training 20 and Therapeutic Exercise 13    Treatment Type: Treatment  PT/PTA: PTA     Number of PTA visits since last PT visit: 1 12/20/2023

## 2023-12-20 NOTE — PLAN OF CARE
Problem: Adult Inpatient Plan of Care  Goal: Plan of Care Review  Outcome: Ongoing, Progressing  Flowsheets (Taken 12/20/2023 0232)  Plan of Care Reviewed With: patient  Goal: Patient-Specific Goal (Individualized)  Outcome: Ongoing, Progressing  Goal: Absence of Hospital-Acquired Illness or Injury  Outcome: Ongoing, Progressing  Goal: Optimal Comfort and Wellbeing  Outcome: Ongoing, Progressing  Goal: Readiness for Transition of Care  Outcome: Ongoing, Progressing     Problem: Skin Injury Risk Increased  Goal: Skin Health and Integrity  Outcome: Ongoing, Progressing     Problem: Fall Injury Risk  Goal: Absence of Fall and Fall-Related Injury  Outcome: Ongoing, Progressing  Intervention: Identify and Manage Contributors  Flowsheets (Taken 12/20/2023 0232)  Self-Care Promotion:   independence encouraged   BADL personal objects within reach   safe use of adaptive equipment encouraged  Medication Review/Management: medications reviewed     Problem: Impaired Wound Healing  Goal: Optimal Wound Healing  Outcome: Ongoing, Progressing

## 2023-12-20 NOTE — PLAN OF CARE
Interdisciplinary team, Lalitha Pablo, RN Charge Nurse, Margot Barber, OT, Rehab, and Mariano Lindo, spoke to patient and patient's daughter for care plan conference, weekly status update, and therapy progress update. Tentative discharge date set for 12/29/23.

## 2023-12-20 NOTE — CONSULTS
[facility] - Skilled Nursing  Wound Care    Patient Name:  Tigist Murry   MRN:  [id number]  Date: 12/20/2023  Diagnosis: Closed displaced fracture of left femoral neck    History:     Past Medical History:   Diagnosis Date    Anticoagulant long-term use     Anxiety     Arthritis     Atrial fibrillation     Carpal tunnel syndrome on right     Diverticulosis     Encounter for blood transfusion     HTN (hypertension)     Hyperlipidemia     Osteopenia     Overactive bladder     Palpitations     Patellar tendonitis 4/13    saw [place] [facility]     Psoriasis     S/P partial hysterectomy     Situational depression     Supraventricular tachycardia        Social History     Socioeconomic History    Marital status:    Tobacco Use    Smoking status: Never    Smokeless tobacco: Never   Substance and Sexual Activity    Alcohol use: No    Drug use: No    Sexual activity: Not Currently     Partners: Male     Social Determinants of Health     Financial Resource Strain: Low Risk  (12/6/2023)    Overall Financial Resource Strain (CARDIA)     Difficulty of Paying Living Expenses: Not hard at all   Food Insecurity: No Food Insecurity (12/6/2023)    Hunger Vital Sign     Worried About Running Out of Food in the Last Year: Never true     Ran Out of Food in the Last Year: Never true   Transportation Needs: No Transportation Needs (12/6/2023)    PRAPARE - Transportation     Lack of Transportation (Medical): No     Lack of Transportation (Non-Medical): No   Physical Activity: Inactive (12/6/2023)    Exercise Vital Sign     Days of Exercise per Week: 0 days     Minutes of Exercise per Session: 0 min   Housing Stability: Unknown (12/6/2023)    Housing Stability Vital Sign     Unable to Pay for Housing in the Last Year: No     Unstable Housing in the Last Year: No       Precautions:     Allergies as of 12/08/2023 - Reviewed 12/08/2023   Allergen Reaction Noted    Ciprofloxacin Other (See Comments) 01/15/2013       [facility] Assessment  "Details/Treatment   Patient seen for wound care consultation.   Reviewed chart for this encounter.   See Flow Sheet for findings.    Amrik: 16  Albumin: 3.7  Nutrition score: ->2 probably inadequate  Moisture score: ->3 occasionally moist    Pt sitting up in bed with daughter "Rachel" at bedside, agreed to assessment. Removed foam border on left elbow revealing what appears to be a skin tear with partial thickness skin loss, cleansed with Vashe, applied foam border dressing. Noted discolored edematous hematoma to left arm, media below, continue to monitor. Daughter "Rachel" states "Mom is a  and says this is itchy." Pt has Eucerin lotion at bedside, recommended that daughter apply lotion to this area in small circular motion not applying pressure to area. She voiced understanding.   Pt right hand foam border removed revealing what appears to be a skin tear with partial thickness skin loss, cleansed with Vashe,covered with foam border.     Bedside nursing to continue care & monitoring.  Bedside nursing to maintain pressure injury prevention interventions.    RECOMMENDATIONS:  Keep clean and dry  Change dressing q5d    Discussed POC with patient's daughter and primary nurse.   See EMR for orders & patient education.    Discussed nutrition and the role of protein in wound healing with the patient. Instructed patient to optimize protein for wound healing.    Orders placed.   12/20/23 0700   WOCN Assessment   WOCN Total Time (mins) 30   Visit Date 12/20/23   Visit Time 0700   Consult Type New   WOCN Speciality Wound   Wound skin tear   Intervention assessed;changed;applied;chart review;orders   Teaching on-going        Altered Skin Integrity 12/14/23 1959 Left Elbow Skin Tear   Date First Assessed/Time First Assessed: 12/14/23 1959   Altered Skin Integrity Present on Admission - Did Patient arrive to the hospital with altered skin?: suspected hospital acquired  Side: Left  Location: Elbow  Primary Wound Type: Skin " Tear   Wound Image     Wound Length (cm) 1.5 cm   Wound Width (cm) 0.3 cm   Wound Depth (cm) 0.1 cm   Wound Volume (cm^3) 0.045 cm^3   Wound Surface Area (cm^2) 0.45 cm^2   Care Cleansed with:;Antimicrobial agent   Dressing Removed;Applied;Foam   Dressing Change Due 12/25/23        Altered Skin Integrity 12/14/23 2016 Right Hand Skin Tear   Date First Assessed/Time First Assessed: 12/14/23 2016   Altered Skin Integrity Present on Admission - Did Patient arrive to the hospital with altered skin?: suspected hospital acquired  Side: Right  Location: Hand  Primary Wound Type: Skin Tear   Wound Image    Dressing Appearance Clean;Dry;Intact;Moist drainage   Drainage Amount Scant   Drainage Characteristics/Odor Serosanguineous;No odor   Appearance Pink;Red   Tissue loss description Partial thickness   Red (%), Wound Tissue Color 100 %   Periwound Area Intact;Dry   Wound Edges Irregular   Wound Length (cm) 1.4 cm   Wound Width (cm) 1.2 cm   Wound Depth (cm) 0.1 cm   Wound Volume (cm^3) 0.168 cm^3   Wound Surface Area (cm^2) 1.68 cm^2   Care Cleansed with:;Antimicrobial agent   Dressing Removed;Applied;Foam   Dressing Change Due 12/25/23     7.0 x 6.0 hematoma--no open wound

## 2023-12-20 NOTE — PLAN OF CARE
Problem: Physical Therapy  Goal: Physical Therapy Goal  Description: Goals to be met by: 1/9/23     Patient will increase functional independence with mobility by performing:    . Supine to sit with Minimal Assistance - not met  Modifed Goal 12/18/23: Supine to sit with ModA    . Sit to supine with Minimal Assistance - not met  Modified Goal 12/18/23: Sit to supine with ModA    . Rolling to Left and Right with Minimal Assistance - not met  Modified Goal 12/18/23: Rolling to L/R with ModA    . Sit to stand transfer with Minimal Assistance - not met  Modified Goal 12/18/23: Sit to stand transfer with ModA    . Bed to chair transfer with Minimal Assistance using Rolling Walker - not met  Modified Goal 12/18/23: Bed to chair transfer with ModA    . Gait  x 50 feet with Minimal Assistance using Rolling Walker - not met  Modified Goal 12/18/23: Gait x 50 feet with ModA using RW     . Wheelchair propulsion x 50 feet with Moderate Assistance using bilateral upper extremities - In progress      Rehab Services' DME recommendations      Wheelchair  Number of hours up in a wheelchair per day 8       Style Light weight                              Justification for light weight w/c: patient cannot propel in a standard wheelchair, patient has impaired ability to participate in MRADLs, mobility limitations cannot be sifficiently resolved with a cane/walker, the home provides adequate access between rooms for a wheelchair, a wheelchair will significantly improve the ability to participate in MRADLs and will be used in the home on a regular basis, the patient has a caregiver who is available, willing, and able to provide assistance with the wheelchair, and the patient requires additional person for safety with mobility with walker     Seat Width 18 (Standard adult)     Seat Depth 18     Back Height Standard     Leg Support Elevated leg rest and Swing Away     Arm Height Full and Swing Away     Lap Belt Velcro     Accessories  Anti-tippers and Safety belt     Cushion Basic     Justification for Cushion maintain skin integrity        Hip Kit Standard/Short     Home health PT, OT, and Aide          Outcome: Ongoing, Progressing

## 2023-12-20 NOTE — PT/OT/SLP PROGRESS
Occupational Therapy   Treatment    Name: Tigist Murry  MRN: [id number]  Admit Date: 12/8/2023  Admitting Diagnosis:  Closed displaced fracture of left femoral neck    General Precautions: Standard, fall   Orthopedic Precautions: LLE weight bearing as tolerated, LLE posterior precautions   Braces: N/A    Recommendations:     Discharge Recommendations:   Low Intensity  Level of Assistance Recommended at Discharge: 24 hours physical assistance for all ADL's and home management tasks  Discharge Equipment Recommendations: bedside commode, wheelchair, hip kit, shower chair  Barriers to discharge:  Decreased caregiver support (increased assist needed for self care and mobility)    Assessment:     Tigist Murry is a 83 y.o. female with a medical diagnosis of Closed displaced fracture of left femoral neck .  Pt tolerated treatment well without incident. Pt requires significant assistance for  mobility. Pt noted with decreased L elbow extension this date. Pt is progressing towards goals, however would benefit from continued skilled OT for improved coordination, strength, and activity tolerance neccessary for safe ADL completion  to maximize QOL and functional independence. She presents with deficits listed below. Performance deficits affecting function are weakness, impaired endurance, impaired functional mobility, gait instability, impaired balance, decreased coordination, decreased upper extremity function, decreased lower extremity function, pain, impaired cardiopulmonary response to activity, orthopedic precautions, decreased ROM.     Rehab Potential is fair    Activity tolerance:  Fair    Plan:     Patient to be seen 5 x/week to address the above listed problems via self-care/home management, therapeutic activities, therapeutic exercises    Plan of Care Expires: 01/06/24  Plan of Care Reviewed with: patient, daughter    Subjective     Communicated with: RN and Rehab Zeyad Jacob prior to session.  .    Pain/Comfort:  Pain Rating 1: 5/10  Location - Side 1: Left  Location - Orientation 1: generalized  Location 1: leg  Pain Addressed 1: Reposition, Distraction, Pre-medicate for activity  Pain Rating Post-Intervention 1: 4/10    Patient's cultural, spiritual, Sikh conflicts given the current situation:  no    Objective:     Patient found HOB elevated with  (no active lines) upon OT entry to room.    Bed Mobility:    Patient completed Rolling/Turning to Left with  maximal assistance and 2 persons  Patient completed Rolling/Turning to Right with maximal assistance and 2 persons  Patient completed Scooting/Bridging with maximal assistance and 2 persons  Patient completed Supine to Sit with maximal assistance and 2 persons     Functional Mobility/Transfers:  Patient completed Sit <> Stand Transfer with maximal assistance and of 2 persons  with  hand-held assist   Patient completed Bed <> Chair Transfer using Scoot Pivot technique with maximal assistance and of 2 persons with hand-held assist  Functional Mobility: Deferred 2/2 impaired endurance.     Activities of Daily Living:  Grooming: moderate assistance while seated EOB for combing hair. Pt requiring Max A for EOB blance this date 2/2  broken bed with elevated FOB.  RN aware and add  Lower Body Dressing: maximal assistance f to yanique pants     AMPAC 6 Click ADL: 15    OT Exercises: AROM RUE with 2# weight  shoulder flexion, bicep curls   PROM passive stretching into elbow ext, Pt noted with limited elbow extension.     Treatment & Education:  Role of OT and OT POC  Educated on OOB activity with assistance   and impact on increasing functional independence.  Educated on importance of progressive mobilization to increase strength and endurance.      Patient left up in chair with  PTA.     GOALS:   Multidisciplinary Problems       Occupational Therapy Goals          Problem: Occupational Therapy    Goal Priority Disciplines Outcome Interventions   Occupational  Therapy Goal     OT, PT/OT Ongoing, Progressing    Description: Goals to be met by: 12/30/23     Patient will increase functional independence with ADLs by performing:    UE Dressing with Stand-by Assistance.- Ongoing   LE Dressing with Moderate Assistance.- Ongoing   Grooming while seated at sink with Set-up Assistance.- Met   Toileting from bedside commode with Moderate Assistance for hygiene and clothing management. - Ongoing   Bathing from  edge of bed with Minimal Assistance.- Ongoing   Toilet transfer to bedside commode with Moderate Assistance.- Met                         Time Tracking:     OT Date of Treatment: 12/20/23  OT Start Time: 1356    OT Stop Time: 1436  OT Total Time (min): 40 min    Billable Minutes:Therapeutic Activity 25  Therapeutic Exercise 15    12/20/2023

## 2023-12-20 NOTE — PLAN OF CARE
Problem: Adult Inpatient Plan of Care  Goal: Plan of Care Review  Outcome: Ongoing, Progressing  Goal: Patient-Specific Goal (Individualized)  Outcome: Ongoing, Progressing  Goal: Absence of Hospital-Acquired Illness or Injury  Outcome: Ongoing, Progressing  Goal: Optimal Comfort and Wellbeing  Outcome: Ongoing, Progressing  Goal: Readiness for Transition of Care  Outcome: Ongoing, Progressing     Problem: Skin Injury Risk Increased  Goal: Skin Health and Integrity  Outcome: Ongoing, Progressing     Problem: Fall Injury Risk  Goal: Absence of Fall and Fall-Related Injury  Outcome: Ongoing, Progressing     Problem: Impaired Wound Healing  Goal: Optimal Wound Healing  Outcome: Ongoing, Progressing   Pt admitted to Skilled Nursing for Fracture of unspecified part of ne*. Patient is AA0X1. Receiving daily PT/OT for strengthening, balance, gait training and ambulation. Pt currently requiring  2 person assistance,for transfers and ambulation. Patient also requiring 2 person assistance with dressing and set up for grooming and eating. Daily skilled nursing interventions include pain management, medication management, surgical wound management and ADL assistance.  Wound care completed by wound care nurse. Ace wrap applied to left upper arm. Pt is on a regular diet, tolerating well. Care plan reviewed and updated. No complaints at this time, will continue to update care and monitor.

## 2023-12-20 NOTE — PROGRESS NOTES
[facility]                                  Skilled Nursing Facility                   Progress Note     Admit Date: 12/8/2023  CLARKE 12/29/2023  [id number]  Principal Problem:  Closed displaced fracture of left femoral neck   HPI obtained from patient interview and chart review     Chief Complaint: Re-evaluation of medical treatment and therapy status    HPI:   Mrs. Murry is a 83 year old female PMHx of Afib on xarelto, HTN, HPLD, mild dementia who presents to SNF following hospitalization for left femoral neck fracture s/p left hip hemiarthroplasty on 12/05 with Dr. Naranjo.  Admission to SNF for secondary weakness and debility.    Interval history:    24 hr vital sign ranges reviewed and listed below, no acute abnormalities noted.  Remains at mental baseline which is very confused.  Patient denies shortness of breath, abdominal discomfort, nausea, or vomiting.  Patient reports an ok appetite.  Patient denies dysuria.  Patient reports having regular bowel movements.  Patient progressing with PT/OT- Gait: amb with RW mod A ~ 23 ft and 12 ft seated rest break vcs for inc B step length R>L Right foot occ freezes with difficulty reinitiating. Continuing to follow and treat all acute and chronic conditions.      Past Medical History: Patient has a past medical history of Anticoagulant long-term use, Anxiety, Arthritis, Atrial fibrillation, Carpal tunnel syndrome on right, Diverticulosis, Encounter for blood transfusion, HTN (hypertension), Hyperlipidemia, Osteopenia, Overactive bladder, Palpitations, Patellar tendonitis (4/13), Psoriasis, S/P partial hysterectomy, Situational depression, and Supraventricular tachycardia.    Past Surgical History: Patient has a past surgical history that includes Partial hysterectomy; Knee arthroscopy w/ debridement ('05); Carpal tunnel release; Abdominal surgery; Breast biopsy (Left); Hysterectomy;  Oophorectomy; and hemiarthroplasty, hip, posterior approach (Left, 12/5/2023).    Social History: Patient reports that she has never smoked. She has never used smokeless tobacco. She reports that she does not drink alcohol and does not use drugs.    Family History: family history includes Heart failure (age of onset: 80) in her mother; No Known Problems in her brother, daughter, daughter, daughter, and sister.    Allergies: Patient is allergic to ciprofloxacin.    ROS  Constitutional: Negative for fever   Eyes: Negative for blurred vision, double vision   Respiratory: Negative for cough, shortness of breath   Cardiovascular: Negative for chest pain, palpitations, and leg swelling.   Gastrointestinal: Negative for abdominal pain, constipation, diarrhea, nausea, vomiting.   Genitourinary: Negative for dysuria, frequency   Musculoskeletal:  + generalized weakness.  + LUE and LLE pain  Skin: Negative for itching and rash.   Neurological: Negative for dizziness, headaches.   Psychiatric/Behavioral: Negative for depression. The patient is not nervous/anxious.      24 hour Vital Sign Range   Temp:  [98.3 °F (36.8 °C)]   Pulse:  [82-92]   Resp:  [17-18]   BP: (124-132)/(58-65)   SpO2:  [95 %-96 %]     Current BMI: Body mass index is 20.7 kg/m².    PEx  Constitutional: Patient appears debilitated.  No distress noted  HENT:   Head: Normocephalic and atraumatic.   Eyes: Pupils are equal, round  Neck: Normal range of motion. Neck supple.   Cardiovascular: Normal rate, regular rhythm and normal heart sounds.    Pulmonary/Chest: Effort normal and breath sounds are clear  Abdominal: Soft. Bowel sounds are normal.   Musculoskeletal: Normal range of motion.   Neurological: Alert and oriented to person,   States incorrect age.  Disoriented to place and time.  Impaired higher level thinking.  Confusion.  Psychiatric: Normal mood and affect. Behavior is normal.   Skin: Skin is warm and dry.   Scattered areas of ecchymosis.  LUE with  "severe ecchymosis down to her wrist swelling to forearm, likely hematoma.     Altered Skin Integrity 12/14/23 1959 Left Elbow Skin Tear   Date First Assessed/Time First Assessed: 12/14/23 1959   Altered Skin Integrity Present on Admission - Did Patient arrive to the hospital with altered skin?: suspected hospital acquired  Side: Left  Location: Elbow  Primary Wound Type: Skin Tear   Wound Length (cm) 1.5 cm   Wound Width (cm) 0.3 cm   Wound Depth (cm) 0.1 cm   Wound Volume (cm^3) 0.045 cm^3   Wound Surface Area (cm^2) 0.45 cm^2   Care Cleansed with:;Antimicrobial agent   Dressing Removed;Applied;Foam   Dressing Change Due 12/25/23        Altered Skin Integrity 12/14/23 2016 Right Hand Skin Tear   Date First Assessed/Time First Assessed: 12/14/23 2016   Altered Skin Integrity Present on Admission - Did Patient arrive to the hospital with altered skin?: suspected hospital acquired  Side: Right  Location: Hand  Primary Wound Type: Skin Tear   Dressing Appearance Clean;Dry;Intact;Moist drainage   Drainage Amount Scant   Drainage Characteristics/Odor Serosanguineous;No odor   Appearance Pink;Red   Tissue loss description Partial thickness   Red (%), Wound Tissue Color 100 %   Periwound Area Intact;Dry   Wound Edges Irregular   Wound Length (cm) 1.4 cm   Wound Width (cm) 1.2 cm   Wound Depth (cm) 0.1 cm   Wound Volume (cm^3) 0.168 cm^3   Wound Surface Area (cm^2) 1.68 cm^2   Care Cleansed with:;Antimicrobial agent   Dressing Removed;Applied;Foam       No results for input(s): "GLUCOSE", "NA", "K", "CL", "CO2", "BUN", "CREATININE", "CALCIUM", "MG" in the last 24 hours.        No results for input(s): "WBC", "RBC", "HGB", "HCT", "PLT", "MCV", "MCH", "MCHC" in the last 24 hours.          No results for input(s): "POCTGLUCOSE" in the last 168 hours.     Assessment and Plan:    Closed displaced fracture of left femoral neck   Mechanical fall  s/p hemiarthroplasty on 12/5/2023  - PT/OT, WBAT with posterior hip precautions  - " "DVT PPX with home Xarelto  - maintain surgical dressing until removed by Orthopedics  - ortho KEKE to see patient weekly at SNF  - follow-up with orthopedics after discharge    Acute postoperative pain  - stable, continue acetaminophen 1000 mg q.8 hours, continue methocarbamol 500 mg QID.  Bowel regimen in place     Closed dislocation of left shoulder  - Patient found to have anterolateral left shoulder dislocation on admit. Orthopedics consulted and reduced shoulder dislocation successfully in ED with sedation.  - Per Ortho, patient placed in sling to left arm for comfort. Ortho states okay to remove sling to mobilize with therapy.   - 12/14 repeat of left shoulder x-ray reveals- Satisfactory reduction left anterior shoulder dislocation      Compression fracture of L1 lumbar vertebra  - Present on admit and noted on CT scan of chest, abdomen and pelvis to have "New compression deformity of the superior endplate of L1 with less than 25% height loss compared to CT from 09/16/2022, age indeterminate." Patient denies any low back pain.   - No treatment or intervention needed.      Essential hypertension  - BP's improved, continue home diltiazem 24 hour capsule 20 40 mg daily    Left ventricular diastolic dysfunction with preserved systolic function   - recent echo reviewed from 3/2/2, EF 55%  - Monitor I&Os, weekly weights  - okay for regular diet  - no signs of fluid overload on exam    Atrial fibrillation and flutter  Long term current use of anticoagulant   - stable, continue home diltiazem and Xarelto 15 mg daily- considering increasing back to home dose of 20 mg now that renal function has recovered once hematoma shows more improvement    Acute blood loss anemia  - expected post fracture and surgery  - stable, continue monitor twice weekly CBC, transfuse for hemoglobin < 7     Alzheimer's disease with late onset (CODE)  - Patient with dementia with likely etiology of alzheimer's dementia. Dementia is mild. The " patient does not have signs of behavioral disturbance.  Delirium precautions:  - Avoid antihistamines, anticholinergics, hypnotics, and minimize opiates while controlling for pain as these medications may exacerbate delirium. Cues for day/night will assist with keeping patient calm and oriented - during daytime, please keep adequate light in room (open windows, lights on) and please keep room dim at night-time to encourage normal sleep-wake cycles. Continuing to have nursing and family reorient the patient and encourage family to visit    Depression, moderate, reoccurring  - stable, continue escitalopram 10 mg daily    Debility   - Continue with PT/OT for gait training and strengthening and restoration of ADL's   - Encourage mobility, OOB in chair, and early ambulation as appropriate  - Fall precautions   - Monitor for bowel and bladder dysfunction  - Monitor for and prevent skin breakdown and pressure ulcers  - Continue DVT prophylaxis with Xarelto        Anticipate disposition:  Back to group home?  Maybe looking at new home.  Patient has (3) daughters: aSrah Murry who is the power of  and she lives in [place], [place] and the other (2) daughters are from [place], [place] and [place], [place].    IP OHS RISK OF UNPLANNED READMISSION Model: LOW    Follow-up needed during SNF stay-    Appointment not to send patient to- ortho 12/19    Follow-up needed after discharge from SNF: PCP, ortho 1/16    Future Appointments   Date Time Provider Department Center   1/16/2024 10:45 AM Lashay Santiago PA-C [facility] ORTHO Clark Baca NP  Department of Hospital Medicine   [facility]     DOS: 12/20/2023       Patient note was created using MModal Dictation.  Any errors in syntax or even information may not have been identified and edited on initial review prior to signing this note.

## 2023-12-21 LAB
ANION GAP SERPL CALC-SCNC: 9 MMOL/L (ref 8–16)
BASOPHILS # BLD AUTO: 0.06 K/UL (ref 0–0.2)
BASOPHILS NFR BLD: 1.2 % (ref 0–1.9)
BUN SERPL-MCNC: 20 MG/DL (ref 8–23)
CALCIUM SERPL-MCNC: 9 MG/DL (ref 8.7–10.5)
CHLORIDE SERPL-SCNC: 110 MMOL/L (ref 95–110)
CO2 SERPL-SCNC: 22 MMOL/L (ref 23–29)
CREAT SERPL-MCNC: 0.7 MG/DL (ref 0.5–1.4)
DIFFERENTIAL METHOD BLD: ABNORMAL
EOSINOPHIL # BLD AUTO: 0.2 K/UL (ref 0–0.5)
EOSINOPHIL NFR BLD: 4.1 % (ref 0–8)
ERYTHROCYTE [DISTWIDTH] IN BLOOD BY AUTOMATED COUNT: 16.6 % (ref 11.5–14.5)
EST. GFR  (NO RACE VARIABLE): >60 ML/MIN/1.73 M^2
GLUCOSE SERPL-MCNC: 90 MG/DL (ref 70–110)
HCT VFR BLD AUTO: 35.8 % (ref 37–48.5)
HGB BLD-MCNC: 11.9 G/DL (ref 12–16)
IMM GRANULOCYTES # BLD AUTO: 0.03 K/UL (ref 0–0.04)
IMM GRANULOCYTES NFR BLD AUTO: 0.6 % (ref 0–0.5)
LYMPHOCYTES # BLD AUTO: 1 K/UL (ref 1–4.8)
LYMPHOCYTES NFR BLD: 19.3 % (ref 18–48)
MAGNESIUM SERPL-MCNC: 2.1 MG/DL (ref 1.6–2.6)
MCH RBC QN AUTO: 30.9 PG (ref 27–31)
MCHC RBC AUTO-ENTMCNC: 33.2 G/DL (ref 32–36)
MCV RBC AUTO: 93 FL (ref 82–98)
MONOCYTES # BLD AUTO: 0.6 K/UL (ref 0.3–1)
MONOCYTES NFR BLD: 11.6 % (ref 4–15)
NEUTROPHILS # BLD AUTO: 3.2 K/UL (ref 1.8–7.7)
NEUTROPHILS NFR BLD: 63.2 % (ref 38–73)
NRBC BLD-RTO: 0 /100 WBC
PHOSPHATE SERPL-MCNC: 3.2 MG/DL (ref 2.7–4.5)
PLATELET # BLD AUTO: 483 K/UL (ref 150–450)
PMV BLD AUTO: 9.6 FL (ref 9.2–12.9)
POTASSIUM SERPL-SCNC: 4.4 MMOL/L (ref 3.5–5.1)
RBC # BLD AUTO: 3.85 M/UL (ref 4–5.4)
SODIUM SERPL-SCNC: 141 MMOL/L (ref 136–145)
WBC # BLD AUTO: 5.07 K/UL (ref 3.9–12.7)

## 2023-12-21 PROCEDURE — 80048 BASIC METABOLIC PNL TOTAL CA: CPT | Mod: HCNC | Performed by: HOSPITALIST

## 2023-12-21 PROCEDURE — 83735 ASSAY OF MAGNESIUM: CPT | Mod: HCNC | Performed by: HOSPITALIST

## 2023-12-21 PROCEDURE — 84100 ASSAY OF PHOSPHORUS: CPT | Mod: HCNC | Performed by: HOSPITALIST

## 2023-12-21 PROCEDURE — 85025 COMPLETE CBC W/AUTO DIFF WBC: CPT | Mod: HCNC | Performed by: HOSPITALIST

## 2023-12-21 PROCEDURE — 36415 COLL VENOUS BLD VENIPUNCTURE: CPT | Mod: HCNC | Performed by: HOSPITALIST

## 2023-12-21 PROCEDURE — 97535 SELF CARE MNGMENT TRAINING: CPT | Mod: HCNC

## 2023-12-21 PROCEDURE — 97530 THERAPEUTIC ACTIVITIES: CPT | Mod: HCNC

## 2023-12-21 PROCEDURE — 25000003 PHARM REV CODE 250: Mod: HCNC | Performed by: NURSE PRACTITIONER

## 2023-12-21 PROCEDURE — 25000003 PHARM REV CODE 250: Mod: HCNC | Performed by: HOSPITALIST

## 2023-12-21 PROCEDURE — 11000004 HC SNF PRIVATE: Mod: HCNC

## 2023-12-21 RX ADMIN — POLYETHYLENE GLYCOL 3350 17 G: 17 POWDER, FOR SOLUTION ORAL at 09:12

## 2023-12-21 RX ADMIN — ACETAMINOPHEN 1000 MG: 500 TABLET ORAL at 01:12

## 2023-12-21 RX ADMIN — SENNOSIDES AND DOCUSATE SODIUM 1 TABLET: 8.6; 5 TABLET ORAL at 09:12

## 2023-12-21 RX ADMIN — ACETAMINOPHEN 1000 MG: 500 TABLET ORAL at 05:12

## 2023-12-21 RX ADMIN — DILTIAZEM HYDROCHLORIDE 240 MG: 120 CAPSULE, COATED, EXTENDED RELEASE ORAL at 09:12

## 2023-12-21 RX ADMIN — ESCITALOPRAM OXALATE 10 MG: 10 TABLET ORAL at 09:12

## 2023-12-21 RX ADMIN — RIVAROXABAN 15 MG: 15 TABLET, FILM COATED ORAL at 05:12

## 2023-12-21 NOTE — PLAN OF CARE
Problem: Adult Inpatient Plan of Care  Goal: Plan of Care Review  Outcome: Ongoing, Progressing  Flowsheets (Taken 12/21/2023 7276)  Plan of Care Reviewed With: patient     Problem: Skin Injury Risk Increased  Goal: Skin Health and Integrity  Outcome: Ongoing, Progressing     Problem: Fall Injury Risk  Goal: Absence of Fall and Fall-Related Injury  Outcome: Ongoing, Progressing     Problem: Impaired Wound Healing  Goal: Optimal Wound Healing  Outcome: Ongoing, Progressing

## 2023-12-21 NOTE — PT/OT/SLP PROGRESS
"Physical Therapy      Patient Name:  Tigist Murry   MRN:  [id number]    Patient not seen today secondary to upon all three attempts pt reported adamantly "No, maybe after the holidays, I had a bad night, I'm waiting for my daughters to pick me up and its just a bad day today." Will follow-up next scheduled visit .    "

## 2023-12-21 NOTE — PLAN OF CARE
Problem: Adult Inpatient Plan of Care  Goal: Plan of Care Review  Outcome: Ongoing, Progressing  Flowsheets (Taken 12/21/2023 0141)  Plan of Care Reviewed With: patient  Goal: Patient-Specific Goal (Individualized)  Outcome: Ongoing, Progressing  Goal: Absence of Hospital-Acquired Illness or Injury  Outcome: Ongoing, Progressing  Goal: Optimal Comfort and Wellbeing  Outcome: Ongoing, Progressing  Goal: Readiness for Transition of Care  Outcome: Ongoing, Progressing     Problem: Skin Injury Risk Increased  Goal: Skin Health and Integrity  Outcome: Ongoing, Progressing     Problem: Fall Injury Risk  Goal: Absence of Fall and Fall-Related Injury  Outcome: Ongoing, Progressing  Intervention: Promote Injury-Free Environment  Flowsheets (Taken 12/21/2023 0141)  Safety Promotion/Fall Prevention:   assistive device/personal item within reach   Fall Risk reviewed with patient/family   medications reviewed   pulse ox   side rails raised x 3   supervised activity   instructed to call staff for mobility   /camera at bedside     Problem: Impaired Wound Healing  Goal: Optimal Wound Healing  Outcome: Ongoing, Progressing

## 2023-12-21 NOTE — PT/OT/SLP PROGRESS
Occupational Therapy   Treatment    Name: Tigist Murry  MRN: [id number]  Admit Date: 12/8/2023  Admitting Diagnosis:  Closed displaced fracture of left femoral neck    General Precautions: Standard, fall   Orthopedic Precautions: LLE weight bearing as tolerated, LLE posterior precautions   Braces: N/A    Recommendations:     Discharge Recommendations:     Level of Assistance Recommended at Discharge: 24 hours physical assistance for all ADL's and home management tasks  Discharge Equipment Recommendations: bedside commode, wheelchair, hip kit, shower chair  Barriers to discharge:  Decreased caregiver support (increased assistance required)    Assessment:     Tigist Murry is a 83 y.o. female with a medical diagnosis of Closed displaced fracture of left femoral neck .   Pt tolerated session fair, Pt confused today with Pt becoming tearful but agreeable OT this date.  Pt continues to demonstrate deficits in mobility requiring significant assistance for BSC  transfer. She presents with deficits listed below. Performance deficits affecting function are weakness, impaired endurance, impaired self care skills, impaired functional mobility, gait instability, impaired cognition, impaired balance, decreased coordination, decreased lower extremity function, decreased upper extremity function, orthopedic precautions.     Rehab Potential is good    Activity tolerance:  Good    Plan:     Patient to be seen 5 x/week to address the above listed problems via self-care/home management, therapeutic activities, therapeutic exercises    Plan of Care Expires: 01/06/24  Plan of Care Reviewed with: patient    Subjective     Communicated with: RN  prior to session .    Pain/Comfort:  Pain Rating 1: 0/10    Patient's cultural, spiritual, Advent conflicts given the current situation:  no    Objective:     Patient found up in chair with  (No active lines) upon OT entry to room.    Bed Mobility:    No bed mobility observed.       Functional Mobility/Transfers:  Patient completed Sit <> Stand Transfer with moderate assistance  with  rolling walker   Patient completed Toilet Transfer Stand Pivot technique with moderate assistance with  rolling walker  Functional Mobility: Pt  able to take 4 stesp with MOD A and RW. Pt requiring increased cueing for weight shifting to LLE during steps to BSC.     Activities of Daily Living:  Feeding:  supervision to self feed bringing fork to mouth.   Grooming: moderate assistance for oral care while sinkside seated in wheelchair.    [facility] 6 Click ADL: 15    Treatment & Education:  Role of OT and OT POC  Educated on OOB activity with assistance   and impact on increasing functional independence.  Educated on importance of progressive mobilization to increase strength and endurance.        Patient left up in chair with call button in reach and [employer] notified    GOALS:   Multidisciplinary Problems       Occupational Therapy Goals          Problem: Occupational Therapy    Goal Priority Disciplines Outcome Interventions   Occupational Therapy Goal     OT, PT/OT Ongoing, Progressing    Description: Goals to be met by: 12/30/23     Patient will increase functional independence with ADLs by performing:    UE Dressing with Stand-by Assistance.- Ongoing   LE Dressing with Moderate Assistance.- Ongoing   Grooming while seated at sink with Set-up Assistance.- Met   Toileting from bedside commode with Moderate Assistance for hygiene and clothing management. - Ongoing   Bathing from  edge of bed with Minimal Assistance.- Ongoing   Toilet transfer to bedside commode with Moderate Assistance.- Met                         Time Tracking:     OT Date of Treatment: 12/14/23  OT Start Time: 1130    OT Stop Time: 1157  OT Total Time (min): 27 min    Billable Minutes:Self Care/Home Management 15  Therapeutic Activity 12    12/21/2023

## 2023-12-21 NOTE — PROGRESS NOTES
[facility]                                  Skilled Nursing Facility                   Progress Note     Admit Date: 12/8/2023  CLARKE 12/29/2023  [id number] A  Principal Problem:  Closed displaced fracture of left femoral neck   HPI obtained from patient interview and chart review     Chief Complaint: Re-evaluation of medical treatment and therapy status: Lab review    HPI:   Mrs. Murry is a 83 year old female PMHx of Afib on xarelto, HTN, HPLD, mild dementia who presents to SNF following hospitalization for left femoral neck fracture s/p left hip hemiarthroplasty on 12/05 with Dr. Naranjo.  Admission to SNF for secondary weakness and debility.    Interval history:  All of today's labs reviewed and are listed below.  24 hr vital sign ranges reviewed and listed below.  Remains at mental baseline which is severe confusion.  Patient denies shortness of breath, abdominal discomfort, nausea, or vomiting.  Patient reports an adequate appetite. Patient denies dysuria.  Patient reports having regular bowel movements.  Patient progessing with PT/OT- Gait: pt amb 49 ft + 55 ft Mod A with RW. Tc and vc for encouragement to continue ambulating. Pt with festinating gait especially when slowing down, vc to increase step length, vc to shift weight anteriorly. Continuing to follow and treat all acute and chronic conditions.    Past Medical History: Patient has a past medical history of Anticoagulant long-term use, Anxiety, Arthritis, Atrial fibrillation, Carpal tunnel syndrome on right, Diverticulosis, Encounter for blood transfusion, HTN (hypertension), Hyperlipidemia, Osteopenia, Overactive bladder, Palpitations, Patellar tendonitis (4/13), Psoriasis, S/P partial hysterectomy, Situational depression, and Supraventricular tachycardia.    Past Surgical History: Patient has a past surgical history that includes Partial hysterectomy; Knee arthroscopy w/  debridement ('05); Carpal tunnel release; Abdominal surgery; Breast biopsy (Left); Hysterectomy; Oophorectomy; and hemiarthroplasty, hip, posterior approach (Left, 12/5/2023).    Social History: Patient reports that she has never smoked. She has never used smokeless tobacco. She reports that she does not drink alcohol and does not use drugs.    Family History: family history includes Heart failure (age of onset: 80) in her mother; No Known Problems in her brother, daughter, daughter, daughter, and sister.    Allergies: Patient is allergic to ciprofloxacin.    ROS  Constitutional: Negative for fever   Eyes: Negative for blurred vision, double vision   Respiratory: Negative for cough, shortness of breath   Cardiovascular: Negative for chest pain, palpitations, and leg swelling.   Gastrointestinal: Negative for abdominal pain, constipation, diarrhea, nausea, vomiting.   Genitourinary: Negative for dysuria, frequency   Musculoskeletal:  + generalized weakness.  + LUE and LLE pain  Skin: Negative for itching and rash.   Neurological: Negative for dizziness, headaches.   Psychiatric/Behavioral: Negative for depression. The patient is not nervous/anxious.      24 hour Vital Sign Range   Temp:  [98.1 °F (36.7 °C)]   Pulse:  [83-89]   Resp:  [16-18]   BP: (136-138)/(66-74)   SpO2:  [98 %]     Current BMI: Body mass index is 20.7 kg/m².    PEx  Constitutional: Patient appears debilitated.  No distress noted  HENT:   Head: Normocephalic and atraumatic.   Eyes: Pupils are equal, round  Neck: Normal range of motion. Neck supple.   Cardiovascular: Normal rate, regular rhythm and normal heart sounds.    Pulmonary/Chest: Effort normal and breath sounds are clear  Abdominal: Soft. Bowel sounds are normal.   Musculoskeletal: Normal range of motion.   Neurological: Alert and oriented to person,   States incorrect age.  Disoriented to place and time.  Impaired higher level thinking.  Confusion.  Psychiatric: Normal mood and affect.  "Behavior is normal.   Skin: Skin is warm and dry.   Scattered areas of ecchymosis.  HAWA with severe ecchymosis down to her wrist swelling to forearm, likely hematoma.     Altered Skin Integrity 12/14/23 1959 Left Elbow Skin Tear   Date First Assessed/Time First Assessed: 12/14/23 1959   Altered Skin Integrity Present on Admission - Did Patient arrive to the hospital with altered skin?: suspected hospital acquired  Side: Left  Location: Elbow  Primary Wound Type: Skin Tear   Wound Length (cm) 1.5 cm   Wound Width (cm) 0.3 cm   Wound Depth (cm) 0.1 cm   Wound Volume (cm^3) 0.045 cm^3   Wound Surface Area (cm^2) 0.45 cm^2   Care Cleansed with:;Antimicrobial agent   Dressing Removed;Applied;Foam   Dressing Change Due 12/25/23        Altered Skin Integrity 12/14/23 2016 Right Hand Skin Tear   Date First Assessed/Time First Assessed: 12/14/23 2016   Altered Skin Integrity Present on Admission - Did Patient arrive to the hospital with altered skin?: suspected hospital acquired  Side: Right  Location: Hand  Primary Wound Type: Skin Tear   Dressing Appearance Clean;Dry;Intact;Moist drainage   Drainage Amount Scant   Drainage Characteristics/Odor Serosanguineous;No odor   Appearance Pink;Red   Tissue loss description Partial thickness   Red (%), Wound Tissue Color 100 %   Periwound Area Intact;Dry   Wound Edges Irregular   Wound Length (cm) 1.4 cm   Wound Width (cm) 1.2 cm   Wound Depth (cm) 0.1 cm   Wound Volume (cm^3) 0.168 cm^3   Wound Surface Area (cm^2) 1.68 cm^2   Care Cleansed with:;Antimicrobial agent   Dressing Removed;Applied;Foam       Recent Labs   Lab 12/21/23  0406      K 4.4      CO2 22*   BUN 20   CREATININE 0.7   CALCIUM 9.0   MG 2.1           Recent Labs   Lab 12/21/23  0407   WBC 5.07   RBC 3.85*   HGB 11.9*   HCT 35.8*   *   MCV 93   MCH 30.9   MCHC 33.2             No results for input(s): "POCTGLUCOSE" in the last 168 hours.     Assessment and Plan:    Closed displaced fracture of left " "femoral neck   Mechanical fall  s/p hemiarthroplasty on 12/5/2023  - PT/OT, WBAT with posterior hip precautions  - DVT PPX with home Xarelto  - maintain surgical dressing until removed by Orthopedics  - ortho KEKE to see patient weekly at [facility]  - follow-up with orthopedics after discharge    Acute postoperative pain  - stable, continue acetaminophen 1000 mg q.8 hours, continue methocarbamol 500 mg QID.  Bowel regimen in place     Closed dislocation of left shoulder  - Patient found to have anterolateral left shoulder dislocation on admit. Orthopedics consulted and reduced shoulder dislocation successfully in ED with sedation.  - Per Ortho, patient placed in sling to left arm for comfort. Ortho states okay to remove sling to mobilize with therapy.   - 12/14 repeat of left shoulder x-ray reveals- Satisfactory reduction left anterior shoulder dislocation      Compression fracture of L1 lumbar vertebra  - Present on admit and noted on CT scan of chest, abdomen and pelvis to have "New compression deformity of the superior endplate of L1 with less than 25% height loss compared to CT from 09/16/2022, age indeterminate." Patient denies any low back pain.   - No treatment or intervention needed.      Essential hypertension  - BP's improved, continue home diltiazem 24 hour capsule 20 40 mg daily    Left ventricular diastolic dysfunction with preserved systolic function   - recent echo reviewed from 3/2/2, EF 55%  - Monitor I&Os, weekly weights  - okay for regular diet  - no signs of fluid overload on exam    Atrial fibrillation and flutter  Long term current use of anticoagulant   - stable, continue home diltiazem and Xarelto 15 mg daily- considering increasing back to home dose of 20 mg now that renal function has recovered once hematoma shows more improvement    Acute blood loss anemia  - expected post fracture and surgery  - stable, continue monitor twice weekly CBC, transfuse for hemoglobin < 7     Alzheimer's disease with " late onset (CODE)  - Patient with dementia with likely etiology of alzheimer's dementia. Dementia is mild. The patient does not have signs of behavioral disturbance.  Delirium precautions:  - Avoid antihistamines, anticholinergics, hypnotics, and minimize opiates while controlling for pain as these medications may exacerbate delirium. Cues for day/night will assist with keeping patient calm and oriented - during daytime, please keep adequate light in room (open windows, lights on) and please keep room dim at night-time to encourage normal sleep-wake cycles. Continuing to have nursing and family reorient the patient and encourage family to visit    Depression, moderate, reoccurring  - stable, continue escitalopram 10 mg daily    Debility   - Continue with PT/OT for gait training and strengthening and restoration of ADL's   - Encourage mobility, OOB in chair, and early ambulation as appropriate  - Fall precautions   - Monitor for bowel and bladder dysfunction  - Monitor for and prevent skin breakdown and pressure ulcers  - Continue DVT prophylaxis with Xarelto        Anticipate disposition:  Back to group home?  Maybe looking at new home.  Patient has (3) daughters: Sarah Murry who is the power of  and she lives in [place], [place] and the other (2) daughters are from [place], [place] and [place], [place].    IP [facility] RISK OF UNPLANNED READMISSION Model: LOW    Follow-up needed during SNF stay-    Follow-up needed after discharge from SNF: PCP, ortho 1/16    Future Appointments   Date Time Provider Department Center   1/16/2024 10:45 AM Lashay Santiago PA-C [facility] Clark Baca NP  Department of Hospital Medicine   [facility]     DOS: 12/21/2023       Patient note was created using MModal Dictation.  Any errors in syntax or even information may not have been identified and edited on initial review prior to signing this note.

## 2023-12-22 PROCEDURE — 11000004 HC SNF PRIVATE: Mod: HCNC

## 2023-12-22 PROCEDURE — 25000003 PHARM REV CODE 250: Mod: HCNC | Performed by: NURSE PRACTITIONER

## 2023-12-22 PROCEDURE — 97530 THERAPEUTIC ACTIVITIES: CPT | Mod: HCNC,CO

## 2023-12-22 PROCEDURE — 25000003 PHARM REV CODE 250: Mod: HCNC | Performed by: HOSPITALIST

## 2023-12-22 PROCEDURE — 97530 THERAPEUTIC ACTIVITIES: CPT | Mod: HCNC,CQ

## 2023-12-22 RX ADMIN — ESCITALOPRAM OXALATE 10 MG: 10 TABLET ORAL at 11:12

## 2023-12-22 RX ADMIN — ACETAMINOPHEN 1000 MG: 500 TABLET ORAL at 10:12

## 2023-12-22 RX ADMIN — RIVAROXABAN 15 MG: 15 TABLET, FILM COATED ORAL at 05:12

## 2023-12-22 RX ADMIN — ACETAMINOPHEN 1000 MG: 500 TABLET ORAL at 02:12

## 2023-12-22 RX ADMIN — DILTIAZEM HYDROCHLORIDE 240 MG: 120 CAPSULE, COATED, EXTENDED RELEASE ORAL at 11:12

## 2023-12-22 NOTE — CARE UPDATE
Discharge information    Spoke to Sarah Moreno, pt is planning to return to the group home where she was living prior to this admission. Dc date is stills set for 12.29.  Pt needs a wheelchair.     [facility] Information    [facility]  [address] [place] [address]  [phone]

## 2023-12-22 NOTE — PT/OT/SLP PROGRESS
"Occupational Therapy   Treatment    Name: Tigist Murry  MRN: [id number]  Admit Date: 12/8/2023  Admitting Diagnosis:  Closed displaced fracture of left femoral neck    General Precautions: Standard, fall   Orthopedic Precautions: LLE weight bearing as tolerated, LLE posterior precautions   Braces: N/A    Recommendations:     Discharge Recommendations:     Level of Assistance Recommended at Discharge: 24 hours physical assistance for all ADL's and home management tasks  Discharge Equipment Recommendations: bedside commode, wheelchair, hip kit, shower chair  Barriers to discharge:  Decreased caregiver support (increased assistance required)    Assessment:     Tigist Murry is a 83 y.o. female with a medical diagnosis of Closed displaced fracture of left femoral neck .  She presents with performance deficits affecting function are weakness, impaired endurance, impaired self care skills, impaired functional mobility, gait instability, impaired cognition, impaired balance, decreased coordination, decreased lower extremity function, decreased upper extremity function, orthopedic precautions.   Pt participated well during today's session. Pt noted with increased confusion on today, required max redirection to participate. Pt tolerated tx session without incident and is making progress, however, continues to demonstrate deficits with self care skills, balance, functional mobility, UB strength and endurance. Pt will benefit from continued OT services to progress towards goals.     Rehab Potential is fair    Activity tolerance:  Fair    Plan:     Patient to be seen 5 x/week to address the above listed problems via self-care/home management, therapeutic activities, therapeutic exercises    Plan of Care Expires: 01/06/24  Plan of Care Reviewed with: patient    Subjective   "Where are the [facility] for [facility]?"  Communicated with: Merline prior to session.     Pain/Comfort:  Pain Rating 1:  (did not rate)  Location - Side 1: " Left  Location - Orientation 1: generalized  Location 1: hip  Pain Addressed 1: Pre-medicate for activity, Reposition, Distraction  Pain Rating Post-Intervention 1:  (did not rate)    Patient's cultural, spiritual, Tenriism conflicts given the current situation:  no    Objective:     Patient found up in chair with nsg present.     Bed Mobility:    Patient completed Rolling/Turning to Right with maximal assistance  Patient completed Scooting/Bridging with maximal assistance  Patient completed Sit to Supine with maximal assistance     Functional Mobility/Transfers:  Patient completed Sit <> Stand Transfer with maximal assistance and of 2 persons  with  no assistive device   Patient completed Bed <> Chair Transfer using Squat Pivot technique with maximal assistance and of 2 persons with no assistive device    [facility] 6 Click ADL: 15    Treatment & Education:  Pt seated at table to engage in vertical ring tree activity with focus on fine motor, attention to tasks and function al reaching. Pt required max cueing and redirection to complete.     Pt educated on:  - role of OT  - level of assistance  - energy conservation and task modification to maximize independence with ADL's and mobility   -  safety while performing functional transfers and self care tasks  - orthopedic precautions.   - progress towards OT goals     Patient left HOB elevated with call button in reach and nsg notified.    GOALS:   Multidisciplinary Problems       Occupational Therapy Goals          Problem: Occupational Therapy    Goal Priority Disciplines Outcome Interventions   Occupational Therapy Goal     OT, PT/OT Ongoing, Progressing    Description: Goals to be met by: 12/30/23     Patient will increase functional independence with ADLs by performing:    UE Dressing with Stand-by Assistance.- Ongoing   LE Dressing with Moderate Assistance.- Ongoing   Grooming while seated at sink with Set-up Assistance.- Met   Toileting from bedside commode with  Moderate Assistance for hygiene and clothing management. - Ongoing   Bathing from  edge of bed with Minimal Assistance.- Ongoing   Toilet transfer to bedside commode with Moderate Assistance.- Met                         Time Tracking:     OT Date of Treatment: 12/22/23  OT Start Time: 1441    OT Stop Time: 1510  OT Total Time (min): 29 min    Billable Minutes:Therapeutic Activity 29    12/22/2023

## 2023-12-22 NOTE — CARE UPDATE
[facility] - Skilled Nursing  Initial Discharge Assessment     SW met with patient in room for Discharge Planning Assessment.     Preferred Name:  Tigist Murry  Primary Care Provider:  Gwen Porter MD  Admission Diagnosis:  L femoral neck due to bathroom fall  Other/Hx: mild dementia, afib, tachycardia, arthritis (not an inclusive dx list)  Admission Date: 12/8/2023  Expected Discharge Date: undetermined-TENTATIVE/ANTICIPATED:    12/29/2023  Discharge Barriers Identified: pt will be returning to group home where she was prior to admission      [facility]  [address] [address]  [phone]  Payor: Type:      Extended Emergency Contact Information:   Primary Emergency Contact:  Dahlia Lovely  Mobile Phone: [phone]  Relation: daughter   Preferred language : English   needed?  no     Discharge Plan A:  group home  Discharge Plan B:  unknown     Preferred Pharmacy:  [facility], [place]      Initial Discharge Assessment        Assessment Type Discharge Planning Assessment       Source of Information Patient, dtr      Communicated CLARKE with patient/caregiver yes      Lives With Other seniors      Do you expect to return to your current living situation?  yes      Do you have help at home or someone to help you manage your care at home?  Communal living      Prior to hospitalization cognitive status: Mild dementia      Current cognitive status: Memory concerns BIMS 7     Equipment Currently Used at Home RW, transport wc      Readmission within 30 days? no      Patient currently being followed by outpatient case management? no      Do you currently have service(s) that help you manage your care at home? No but communal living has assistance      Do you take prescription medications?  yes      Do you have prescription coverage?  yes      Do you have any problems affording any of your prescribed medications? Not known of      Who is going to help you get home at  discharge? dtr      How do you get to doctors appointments? dtr      Are you on dialysis? no      Do you take coumadin? xarelto      Situational depression, anxiety, lexapro  daily       Pt lives in communal living facility       DME Needed Upon Discharge  wheelchair      Discharge Plan discussed with:  Pt/dtr      Discharge Barriers Identified Pt       82 yo ,  fm with dtr as family support.  Pt lives in a senior group home and family plans for her return following her SNF stay.

## 2023-12-22 NOTE — PT/OT/SLP PROGRESS
"Physical Therapy Treatment    Patient Name:  Tigist Murry   MRN:  [id number]  Admit Date: 12/8/2023  Admitting Diagnosis: Closed displaced fracture of left femoral neck  Recent Surgeries:     General Precautions: Standard, fall  Orthopedic Precautions: LLE weight bearing as tolerated, LLE posterior precautions  Braces: N/A    Recommendations:     Discharge Recommendations:    Level of Assistance Recommended at Discharge: 24 hours significant assistance  Discharge Equipment Recommendations: hip kit, bedside commode, walker, rolling, shower chair  Barriers to discharge: Decreased caregiver support, Other (Comment) (Increased assistance for mobility)    Assessment:     Tigist Murry is a 83 y.o. female admitted with a medical diagnosis of Closed displaced fracture of left femoral neck . Pt continues to require max/mod assistance and vc/tcs for trfs, difficulty following cues/initiating steps, likely due to fear / painful and poor cognition, pt would continue to benefit from skilled PT services to improve overall functional mobility, strength and endurance.  .      Performance deficits affecting function: weakness, impaired endurance, impaired self care skills, impaired functional mobility, gait instability, impaired balance, impaired cognition, decreased coordination, decreased upper extremity function, decreased lower extremity function, decreased safety awareness, pain, decreased ROM, impaired cardiopulmonary response to activity, orthopedic precautions.    Rehab Potential is good and fair    Activity Tolerance: Fair and Poor    Plan:     Patient to be seen 5 x/week to address the above listed problems via therapeutic activities, gait training, therapeutic exercises, wheelchair management/training, neuromuscular re-education    Plan of Care Expires: 01/08/24  Plan of Care Reviewed with: patient    Subjective     "Think I have to use the bathroom" pt refusing to go to the gym/gait.     Pain/Comfort:  Pain Rating 1: "  (did not rate, difficulty with WB noted)  Location - Side 1: Left  Location - Orientation 1: generalized  Location 1: hip  Pain Addressed 1: Reposition, Distraction, Cessation of Activity  Pain Rating Post-Intervention 1:  (appears to inc with mobility, dec with rest)    Patient's cultural, spiritual, Amish conflicts given the current situation:  no    Objective:      Patient found with  (in wc camera present) upon PT entry to room.     Functional Mobility:  Transfers:     Sit to Stand:  moderate assistance and maximal assistance with rolling walker, grab bars, and vc/tcs for tech multiple trials from  and Comode over toilet  Toilet Transfer: moderate assistance, maximal assistance, and of 1-2 persons with  rolling walker and grab bars  using  Stand Pivot and max vc/tcs, multiple trials, pt unable to follow vc/tcs for sequencing/tech with pivot kept sitting back down in WC, called for tech support, total A for ivan care and clothing mgmt/set up for hand hygiene in sitting  Balance: static [facility] x 3 trials for cleaning/clothing mgmt/changed brief ~ 30-40 sec each trials    AM-PAC 6 CLICK MOBILITY  11    Patient left up in chair with call button in reach, camera notified, and belonging sin reach .    GOALS:   Multidisciplinary Problems       Physical Therapy Goals          Problem: Physical Therapy    Goal Priority Disciplines Outcome Goal Variances Interventions   Physical Therapy Goal     PT, PT/OT Ongoing, Progressing     Description: Goals to be met by: 1/9/23     Patient will increase functional independence with mobility by performing:    . Supine to sit with Minimal Assistance - not met  Modifed Goal 12/18/23: Supine to sit with ModA    . Sit to supine with Minimal Assistance - not met  Modified Goal 12/18/23: Sit to supine with ModA    . Rolling to Left and Right with Minimal Assistance - not met  Modified Goal 12/18/23: Rolling to L/R with ModA    . Sit to stand transfer with  Minimal Assistance - not met  Modified Goal 12/18/23: Sit to stand transfer with ModA    . Bed to chair transfer with Minimal Assistance using Rolling Walker - not met  Modified Goal 12/18/23: Bed to chair transfer with ModA    . Gait  x 50 feet with Minimal Assistance using Rolling Walker - not met  Modified Goal 12/18/23: Gait x 50 feet with ModA using RW     . Wheelchair propulsion x 50 feet with Moderate Assistance using bilateral upper extremities - In progress      Rehab Services' DME recommendations      Wheelchair  Number of hours up in a wheelchair per day 8       Style Light weight                              Justification for light weight w/c: patient cannot propel in a standard wheelchair, patient has impaired ability to participate in MRADLs, mobility limitations cannot be sifficiently resolved with a cane/walker, the home provides adequate access between rooms for a wheelchair, a wheelchair will significantly improve the ability to participate in MRADLs and will be used in the home on a regular basis, the patient has a caregiver who is available, willing, and able to provide assistance with the wheelchair, and the patient requires additional person for safety with mobility with walker     Seat Width 18 (Standard adult)     Seat Depth 18     Back Height Standard     Leg Support Elevated leg rest and Swing Away     Arm Height Full and Swing Away     Lap Belt Velcro     Accessories Anti-tippers and Safety belt     Cushion Basic     Justification for Cushion maintain skin integrity        Hip Kit Standard/Short     Home health PT, OT, and Aide                               Time Tracking:     PT Received On: 12/22/23  PT Start Time: 1007  PT Stop Time: 1034  PT Total Time (min): 27 min    Billable Minutes: Therapeutic Activity 27    Treatment Type: Treatment  PT/PTA: PTA     Number of PTA visits since last PT visit: 2     12/22/2023

## 2023-12-22 NOTE — PROGRESS NOTES
[facility]                                  Skilled Nursing Facility                   Progress Note     Admit Date: 12/8/2023  CLARKE 12/29/2023  [id number]  Principal Problem:  Closed displaced fracture of left femoral neck   HPI obtained from patient interview and chart review     Chief Complaint: Re-evaluation of medical treatment and therapy status: hematoma    HPI:   Mrs. Murry is a 83 year old female PMHx of Afib on xarelto, HTN, HPLD, mild dementia who presents to SNF following hospitalization for left femoral neck fracture s/p left hip hemiarthroplasty on 12/05 with Dr. Naranjo.  Admission to SNF for secondary weakness and debility.    Interval history:  All of today's labs reviewed and are listed below.  24 hr vital sign ranges reviewed and listed below.  Remains at mental baseline which is severe confusion, today at nursing station for repeatedly attempting to get out of w/c unassisted.  Continued to lightly wrapped left arm hematoma. Patient denies shortness of breath, abdominal discomfort, nausea, or vomiting.  Patient reports an adequate appetite. Patient denies dysuria.  Patient reports having regular bowel movements.  Patient progessing with PT/OT- Gait: pt amb 49 ft + 55 ft Mod A with RW. Tc and vc for encouragement to continue ambulating. Continuing to follow and treat all acute and chronic conditions.    Past Medical History: Patient has a past medical history of Anticoagulant long-term use, Anxiety, Arthritis, Atrial fibrillation, Carpal tunnel syndrome on right, Diverticulosis, Encounter for blood transfusion, HTN (hypertension), Hyperlipidemia, Osteopenia, Overactive bladder, Palpitations, Patellar tendonitis (4/13), Psoriasis, S/P partial hysterectomy, Situational depression, and Supraventricular tachycardia.    Past Surgical History: Patient has a past surgical history that includes Partial hysterectomy; Knee  arthroscopy w/ debridement ('05); Carpal tunnel release; Abdominal surgery; Breast biopsy (Left); Hysterectomy; Oophorectomy; and hemiarthroplasty, hip, posterior approach (Left, 12/5/2023).    Social History: Patient reports that she has never smoked. She has never used smokeless tobacco. She reports that she does not drink alcohol and does not use drugs.    Family History: family history includes Heart failure (age of onset: 80) in her mother; No Known Problems in her brother, daughter, daughter, daughter, and sister.    Allergies: Patient is allergic to ciprofloxacin.    ROS  Constitutional: Negative for fever   Eyes: Negative for blurred vision, double vision   Respiratory: Negative for cough, shortness of breath   Cardiovascular: Negative for chest pain, palpitations, and leg swelling.   Gastrointestinal: Negative for abdominal pain, constipation, diarrhea, nausea, vomiting.   Genitourinary: Negative for dysuria, frequency   Musculoskeletal:  + generalized weakness.  + LUE and LLE pain  Skin: Negative for itching and rash.   Neurological: Negative for dizziness, headaches.   Psychiatric/Behavioral: Negative for depression. The patient is not nervous/anxious.      24 hour Vital Sign Range   Temp:  [97.2 °F (36.2 °C)-97.7 °F (36.5 °C)]   Pulse:  []   Resp:  [18]   BP: (125-130)/(70-81)   SpO2:  [96 %-98 %]     Current BMI: Body mass index is 20.7 kg/m².    PEx  Constitutional: Patient appears debilitated.  No distress noted  HENT:   Head: Normocephalic and atraumatic.   Eyes: Pupils are equal, round  Neck: Normal range of motion. Neck supple.   Cardiovascular: Normal rate, regular rhythm and normal heart sounds.    Pulmonary/Chest: Effort normal and breath sounds are clear  Abdominal: Soft. Bowel sounds are normal.   Musculoskeletal: Normal range of motion.   Neurological: Alert and oriented to person,   States incorrect age.  Disoriented to place and time.  Impaired higher level thinking.   "Confusion.  Psychiatric:  Impulsive, confused  Skin: Skin is warm and dry.   Scattered areas of ecchymosis.  HAWA with severe ecchymosis down to her wrist swelling to forearm, likely hematoma.     Altered Skin Integrity 12/14/23 1959 Left Elbow Skin Tear   Date First Assessed/Time First Assessed: 12/14/23 1959   Altered Skin Integrity Present on Admission - Did Patient arrive to the hospital with altered skin?: suspected hospital acquired  Side: Left  Location: Elbow  Primary Wound Type: Skin Tear   Wound Length (cm) 1.5 cm   Wound Width (cm) 0.3 cm   Wound Depth (cm) 0.1 cm   Wound Volume (cm^3) 0.045 cm^3   Wound Surface Area (cm^2) 0.45 cm^2   Care Cleansed with:;Antimicrobial agent   Dressing Removed;Applied;Foam   Dressing Change Due 12/25/23        Altered Skin Integrity 12/14/23 2016 Right Hand Skin Tear   Date First Assessed/Time First Assessed: 12/14/23 2016   Altered Skin Integrity Present on Admission - Did Patient arrive to the hospital with altered skin?: suspected hospital acquired  Side: Right  Location: Hand  Primary Wound Type: Skin Tear   Dressing Appearance Clean;Dry;Intact;Moist drainage   Drainage Amount Scant   Drainage Characteristics/Odor Serosanguineous;No odor   Appearance Pink;Red   Tissue loss description Partial thickness   Red (%), Wound Tissue Color 100 %   Periwound Area Intact;Dry   Wound Edges Irregular   Wound Length (cm) 1.4 cm   Wound Width (cm) 1.2 cm   Wound Depth (cm) 0.1 cm   Wound Volume (cm^3) 0.168 cm^3   Wound Surface Area (cm^2) 1.68 cm^2   Care Cleansed with:;Antimicrobial agent   Dressing Removed;Applied;Foam       No results for input(s): "GLUCOSE", "NA", "K", "CL", "CO2", "BUN", "CREATININE", "CALCIUM", "MG" in the last 24 hours.          No results for input(s): "WBC", "RBC", "HGB", "HCT", "PLT", "MCV", "MCH", "MCHC" in the last 24 hours.            No results for input(s): "POCTGLUCOSE" in the last 168 hours.     Assessment and Plan:    Closed displaced fracture of " "left femoral neck   Mechanical fall  s/p hemiarthroplasty on 12/5/2023  - PT/OT, WBAT with posterior hip precautions  - DVT PPX with home Xarelto  - maintain surgical dressing until removed by Orthopedics  - ortho KEKE to see patient weekly at [facility]  - follow-up with orthopedics after discharge    Acute postoperative pain  - stable, continue acetaminophen 1000 mg q.8 hours, continue methocarbamol 500 mg QID.  Bowel regimen in place     Closed dislocation of left shoulder  - Patient found to have anterolateral left shoulder dislocation on admit. Orthopedics consulted and reduced shoulder dislocation successfully in ED with sedation.  - Per Ortho, patient placed in sling to left arm for comfort. Ortho states okay to remove sling to mobilize with therapy.   - 12/14 repeat of left shoulder x-ray reveals- Satisfactory reduction left anterior shoulder dislocation     Left upper extremity hematoma  -HGB stable  -continue to wrap daily     Compression fracture of L1 lumbar vertebra  - Present on admit and noted on CT scan of chest, abdomen and pelvis to have "New compression deformity of the superior endplate of L1 with less than 25% height loss compared to CT from 09/16/2022, age indeterminate." Patient denies any low back pain.   - No treatment or intervention needed.      Essential hypertension  - BP's improved, continue home diltiazem 24 hour capsule 20 40 mg daily    Left ventricular diastolic dysfunction with preserved systolic function   - recent echo reviewed from 3/2/2, EF 55%  - Monitor I&Os, weekly weights  - okay for regular diet  - no signs of fluid overload on exam    Atrial fibrillation and flutter  Long term current use of anticoagulant   - stable, continue home diltiazem and Xarelto 15 mg daily- considering increasing back to home dose of 20 mg now that renal function has recovered once hematoma shows more improvement    Acute blood loss anemia  - expected post fracture and surgery  - stable, continue monitor " twice weekly CBC, transfuse for hemoglobin < 7     Alzheimer's disease with late onset (CODE)  - Patient with dementia with likely etiology of alzheimer's dementia. Dementia is mild. The patient does not have signs of behavioral disturbance.  Delirium precautions:  - Avoid antihistamines, anticholinergics, hypnotics, and minimize opiates while controlling for pain as these medications may exacerbate delirium. Cues for day/night will assist with keeping patient calm and oriented - during daytime, please keep adequate light in room (open windows, lights on) and please keep room dim at night-time to encourage normal sleep-wake cycles. Continuing to have nursing and family reorient the patient and encourage family to visit    Depression, moderate, reoccurring  - stable, continue escitalopram 10 mg daily    Debility   - Continue with PT/OT for gait training and strengthening and restoration of ADL's   - Encourage mobility, OOB in chair, and early ambulation as appropriate  - Fall precautions   - Monitor for bowel and bladder dysfunction  - Monitor for and prevent skin breakdown and pressure ulcers  - Continue DVT prophylaxis with Xarelto      Anticipate disposition:  Back to group home?  Maybe looking at new home.  Patient has (3) daughters: Sarah Murry who is the power of  and she lives in [place], [place] and the other (2) daughters are from [place], [place] and [place], [place].    IP [facility] RISK OF UNPLANNED READMISSION Model: MODERATE    Follow-up needed during SNF stay-    Follow-up needed after discharge from SNF: PCP, ortho 1/16    Future Appointments   Date Time Provider Department Center   1/16/2024 10:45 AM Lashay Santiago PA-C [facility]     Total time spent: 32 minutes  Description of Time: counseling patient on clinical condition, therapies provided, plan of care, emotional support, coordinating patient care with other care team members, reviewing and interpreting labs and imaging, collaboration  with physician, initiating new orders, chart review, and documentation. See interval hx.       DEDRA JONES  Department of Hospital Medicine   [facility]     DOS: 12/22/2023       Patient note was created using MModal Dictation.  Any errors in syntax or even information may not have been identified and edited on initial review prior to signing this note.

## 2023-12-22 NOTE — PLAN OF CARE
Problem: Adult Inpatient Plan of Care  Goal: Plan of Care Review  Outcome: Ongoing, Progressing  Flowsheets (Taken 12/22/2023 1503)  Plan of Care Reviewed With: patient     Problem: Adult Inpatient Plan of Care  Goal: Patient-Specific Goal (Individualized)  Flowsheets (Taken 12/22/2023 1503)  Individualized Care Needs: Daily PT/OT  Goal: Absence of Hospital-Acquired Illness or Injury  Intervention: Identify and Manage Fall Risk  Flowsheets (Taken 12/22/2023 1503)  Safety Promotion/Fall Prevention:   assistive device/personal item within reach   Fall Risk reviewed with patient/family   lighting adjusted   /camera at bedside   instructed to call staff for mobility  Intervention: Prevent Skin Injury  Flowsheets (Taken 12/22/2023 1503)  Body Position: weight shifting  Skin Protection: incontinence pads utilized  Intervention: Prevent and Manage VTE (Venous Thromboembolism) Risk  Flowsheets (Taken 12/22/2023 1503)  Activity Management: Up in chair - L3  VTE Prevention/Management:   bleeding risk assessed   bleeding precations maintained

## 2023-12-23 PROCEDURE — 25000003 PHARM REV CODE 250: Mod: HCNC | Performed by: HOSPITALIST

## 2023-12-23 PROCEDURE — 97530 THERAPEUTIC ACTIVITIES: CPT | Mod: HCNC,CO

## 2023-12-23 PROCEDURE — 97110 THERAPEUTIC EXERCISES: CPT | Mod: HCNC,CO

## 2023-12-23 PROCEDURE — 11000004 HC SNF PRIVATE: Mod: HCNC

## 2023-12-23 PROCEDURE — 25000003 PHARM REV CODE 250: Mod: HCNC | Performed by: NURSE PRACTITIONER

## 2023-12-23 RX ADMIN — ESCITALOPRAM OXALATE 10 MG: 10 TABLET ORAL at 10:12

## 2023-12-23 RX ADMIN — ACETAMINOPHEN 1000 MG: 500 TABLET ORAL at 10:12

## 2023-12-23 RX ADMIN — ACETAMINOPHEN 1000 MG: 500 TABLET ORAL at 06:12

## 2023-12-23 RX ADMIN — RIVAROXABAN 15 MG: 15 TABLET, FILM COATED ORAL at 04:12

## 2023-12-23 RX ADMIN — DILTIAZEM HYDROCHLORIDE 240 MG: 120 CAPSULE, COATED, EXTENDED RELEASE ORAL at 10:12

## 2023-12-23 RX ADMIN — ACETAMINOPHEN 1000 MG: 500 TABLET ORAL at 03:12

## 2023-12-23 NOTE — PT/OT/SLP PROGRESS
"Occupational Therapy   Treatment    Name: Tigist Murry  MRN: [id number]  Admit Date: 12/8/2023  Admitting Diagnosis:  Closed displaced fracture of left femoral neck    General Precautions: Standard, fall   Orthopedic Precautions: LLE weight bearing as tolerated, LLE posterior precautions   Braces: N/A    Recommendations:     Discharge Recommendations:     Level of Assistance Recommended at Discharge: 24 hours physical assistance for all ADL's and home management tasks  Discharge Equipment Recommendations: bedside commode, wheelchair, hip kit, shower chair  Barriers to discharge:  Decreased caregiver support (increased assistance required)    Assessment:     Tigist Murry is a 83 y.o. female with a medical diagnosis of Closed displaced fracture of left femoral neck .  She presents with performance deficits affecting function are weakness, impaired endurance, impaired self care skills, impaired functional mobility, gait instability, impaired cognition, impaired balance, decreased coordination, decreased lower extremity function, decreased upper extremity function, orthopedic precautions.   Pt participated fairly during today's session. Pt continues to require max cueing and redirection throughout sessions. No carry over noted. Pt continues to demonstrate deficits with self care skills, balance, functional mobility, UB strength and endurance. Pt will benefit from continued OT services to progress towards goals.     Rehab Potential is fair    Activity tolerance:  Poor    Plan:     Patient to be seen 5 x/week to address the above listed problems via self-care/home management, therapeutic activities, therapeutic exercises    Plan of Care Expires: 01/06/24  Plan of Care Reviewed with: patient    Subjective   "I didn't sleep well last night"  Communicated with: Merline prior to session.     Pain/Comfort:  Pain Rating 1: 0/10  Pain Rating Post-Intervention 1: 0/10    Patient's cultural, spiritual, Buddhism conflicts given " the current situation:  no    Objective:     Patient found up in chair at nurses station with nsg present.     [facility] 6 Click ADL: 15    OT Exercises: UE Ergometer 5 min with min resistance. Pt perform with R UE only secondary to limited ROM in L UE.   Bilateral Danilo 2 x10 with #2 weight. Pt perform lateral slides, forward and circular motions.      Therex performed to improve overall endurance, ROM and UB strength required for functional transfers, ADL's and w/c propulsion.     Treatment & Education:  Pt seated at table to engage in functional activity with focus on cognition, attention to task and fine motor. Pt required max redirection and cueing to complete.       Pt educated on:  - role of OT  - level of assistance  - energy conservation and task modification to maximize independence with ADL's and mobility   -  safety while performing functional transfers and self care tasks  - orthopedic precautions.   - progress towards OT goals     Patient left up in chair with  nsg present at nurses station.     GOALS:   Multidisciplinary Problems       Occupational Therapy Goals          Problem: Occupational Therapy    Goal Priority Disciplines Outcome Interventions   Occupational Therapy Goal     OT, PT/OT Ongoing, Progressing    Description: Goals to be met by: 12/30/23     Patient will increase functional independence with ADLs by performing:    UE Dressing with Stand-by Assistance.- Ongoing   LE Dressing with Moderate Assistance.- Ongoing   Grooming while seated at sink with Set-up Assistance.- Met   Toileting from bedside commode with Moderate Assistance for hygiene and clothing management. - Ongoing   Bathing from  edge of bed with Minimal Assistance.- Ongoing   Toilet transfer to bedside commode with Moderate Assistance.- Met                         Time Tracking:     OT Date of Treatment: 12/23/23  OT Start Time: 1102    OT Stop Time: 1134  OT Total Time (min): 32 min    Billable Minutes:Therapeutic Activity  17  Therapeutic Exercise 15    12/23/2023

## 2023-12-23 NOTE — PLAN OF CARE
Problem: Adult Inpatient Plan of Care  Goal: Plan of Care Review  Outcome: Ongoing, Progressing  Flowsheets (Taken 12/23/2023 1549)  Plan of Care Reviewed With: patient     Problem: Adult Inpatient Plan of Care  Goal: Patient-Specific Goal (Individualized)  Flowsheets (Taken 12/23/2023 1549)  Individualized Care Needs: Daily PT/OT to regain strength to be able to return back home to an optimal level of wellness  Goal: Absence of Hospital-Acquired Illness or Injury  Intervention: Identify and Manage Fall Risk  Flowsheets (Taken 12/23/2023 1549)  Safety Promotion/Fall Prevention:   assistive device/personal item within reach   lighting adjusted   instructed to call staff for mobility   nonskid shoes/socks when out of bed   /camera at bedside  Intervention: Prevent Skin Injury  Flowsheets (Taken 12/23/2023 1549)  Skin Protection:   incontinence pads utilized   skin-to-skin areas padded  Intervention: Prevent and Manage VTE (Venous Thromboembolism) Risk  Flowsheets (Taken 12/23/2023 1549)  Activity Management: Up in chair - L3  VTE Prevention/Management: bleeding risk assessed

## 2023-12-24 PROCEDURE — 25000003 PHARM REV CODE 250: Mod: HCNC | Performed by: NURSE PRACTITIONER

## 2023-12-24 PROCEDURE — 97110 THERAPEUTIC EXERCISES: CPT | Mod: HCNC

## 2023-12-24 PROCEDURE — 25000003 PHARM REV CODE 250: Mod: HCNC | Performed by: HOSPITALIST

## 2023-12-24 PROCEDURE — 11000004 HC SNF PRIVATE: Mod: HCNC

## 2023-12-24 PROCEDURE — 97530 THERAPEUTIC ACTIVITIES: CPT | Mod: HCNC

## 2023-12-24 RX ADMIN — POLYETHYLENE GLYCOL 3350 17 G: 17 POWDER, FOR SOLUTION ORAL at 09:12

## 2023-12-24 RX ADMIN — DILTIAZEM HYDROCHLORIDE 240 MG: 120 CAPSULE, COATED, EXTENDED RELEASE ORAL at 08:12

## 2023-12-24 RX ADMIN — SENNOSIDES AND DOCUSATE SODIUM 1 TABLET: 8.6; 5 TABLET ORAL at 08:12

## 2023-12-24 RX ADMIN — POLYETHYLENE GLYCOL 3350 17 G: 17 POWDER, FOR SOLUTION ORAL at 08:12

## 2023-12-24 RX ADMIN — ACETAMINOPHEN 1000 MG: 500 TABLET ORAL at 06:12

## 2023-12-24 RX ADMIN — ESCITALOPRAM OXALATE 10 MG: 10 TABLET ORAL at 08:12

## 2023-12-24 RX ADMIN — ACETAMINOPHEN 1000 MG: 500 TABLET ORAL at 03:12

## 2023-12-24 RX ADMIN — RIVAROXABAN 15 MG: 15 TABLET, FILM COATED ORAL at 05:12

## 2023-12-24 NOTE — PLAN OF CARE
Problem: Adult Inpatient Plan of Care  Goal: Plan of Care Review  Outcome: Ongoing, Progressing  Goal: Patient-Specific Goal (Individualized)  Outcome: Ongoing, Progressing  Goal: Absence of Hospital-Acquired Illness or Injury  Outcome: Ongoing, Progressing  Intervention: Prevent Infection  Flowsheets (Taken 12/23/2023 2132)  Infection Prevention:   equipment surfaces disinfected   hand hygiene promoted   rest/sleep promoted   single patient room provided  Goal: Optimal Comfort and Wellbeing  Outcome: Ongoing, Progressing

## 2023-12-24 NOTE — PLAN OF CARE
Problem: Physical Therapy  Goal: Physical Therapy Goal  Description: Goals to be met by: 2024    Patient will increase functional independence with mobility by performin. Supine to sit with Minimal Assistance. -not met  Modifed Goal 23: Supine to sit with ModA. -In progress    2. Sit to supine with Minimal Assistance. -not met  Modified Goal 23: Sit to supine with ModA. -In progress    3. Rolling to Left and Right with Minimal Assistance. -not met  Modified Goal 23: Rolling to L/R with ModA. -Met on 2023  Updated goal on 2023: Rolling to Left and Right with Minimal Assistance. -In progress    4. Sit to stand transfer with Minimal Assistance. -not met  Modified Goal 23: Sit to stand transfer with ModA. -In progress    5. Bed to chair transfer with Minimal Assistance using Rolling Walker. -not met  Modified Goal 23: Bed to chair transfer with ModA. -In progress    6. Gait  x 50 feet with Minimal Assistance using Rolling Walker. -not met  Modified Goal 23: Gait x 50 feet with ModA using RW. -In progress     7. Wheelchair propulsion x 50 feet with Moderate Assistance using bilateral upper extremities. -In progress      Rehab Services' DME recommendations     Wheelchair  Number of hours up in a wheelchair per day 8       Style Light weight                              Justification for light weight w/c: patient cannot propel in a standard wheelchair, patient has impaired ability to participate in MRADLs, mobility limitations cannot be sifficiently resolved with a cane/walker, the home provides adequate access between rooms for a wheelchair, a wheelchair will significantly improve the ability to participate in MRADLs and will be used in the home on a regular basis, the patient has a caregiver who is available, willing, and able to provide assistance with the wheelchair, and the patient requires additional person for safety with mobility with walker     Seat  Width 18 (Standard adult)     Seat Depth 18     Back Height Standard     Leg Support Elevated leg rest and Swing Away     Arm Height Full and Swing Away     Lap Belt Velcro     Accessories Anti-tippers and Safety belt     Cushion Basic     Justification for Cushion maintain skin integrity      Hip Kit Standard/Short     Home health PT, OT, and Aide  Outcome: Ongoing, Progressing

## 2023-12-24 NOTE — PLAN OF CARE
Problem: Adult Inpatient Plan of Care  Goal: Plan of Care Review  Outcome: Ongoing, Progressing  Goal: Patient-Specific Goal (Individualized)  Outcome: Ongoing, Progressing  Goal: Absence of Hospital-Acquired Illness or Injury  Outcome: Ongoing, Progressing  Goal: Optimal Comfort and Wellbeing  Outcome: Ongoing, Progressing  Goal: Readiness for Transition of Care  Outcome: Ongoing, Progressing     Problem: Skin Injury Risk Increased  Goal: Skin Health and Integrity  Outcome: Ongoing, Progressing     Problem: Fall Injury Risk  Goal: Absence of Fall and Fall-Related Injury  Outcome: Ongoing, Progressing     Problem: Impaired Wound Healing  Goal: Optimal Wound Healing  Outcome: Ongoing, Progressing   Pt admitted to Skilled Nursing for Fracture of unspecified part of ne*. Patient is [id number]. Receiving daily PT/OT for strengthening, balance, gait training and ambulation. Pt currently requiring  2 person assistance,for transfers and ambulation. Patient also requiring 2 person assistance with dressing and set up for grooming and eating. Daily skilled nursing interventions include pain management, medication management, surgical wound management and ADL assistance. Dressing to skin tears C/D/I. Ace wrap applied to left upper arm. Pt is on a regular diet, tolerating well. Care plan reviewed and updated. No complaints at this time, will continue to update care and monitor.    Physical Therapy Daily Treatment Note     Date:  2023      Patient Name:  Trung Hidalgo                      :  1962                     MRN: [id number]  10/25 130 wall slides  10/23 115  heel slides   10/18 wall slides 112  10/16 - AROM 105, PROM 111  10/13 heel slides 105  10/9 

## 2023-12-24 NOTE — PT/OT/SLP PROGRESS
Physical Therapy Treatment    Patient Name:  Tigist Murry   MRN:  [id number]  Admit Date: 12/8/2023  Admitting Diagnosis: Closed displaced fracture of left femoral neck  Recent Surgeries: Procedure(s) (LRB): HEMIARTHROPLASTY, HIP, POSTERIOR APPROACH left (Left)    General Precautions: Standard, fall  Orthopedic Precautions: LLE weight bearing as tolerated, LLE posterior precautions  Braces: N/A    Recommendations:     Discharge Recommendations: home health PT  Level of Assistance Recommended at Discharge: 24 hours significant assistance  Discharge Equipment Recommendations: hip kit, bedside commode, walker, rolling, shower chair  Barriers to discharge: Decreased caregiver support (Increased assistance needed at this time)    Assessment:     Tigist Murry is a 83 y.o. female admitted with a medical diagnosis of Closed displaced fracture of left femoral neck. Today's treatment included strengthening, range of motion, balance training, gait training, and cardiovascular endurance interventions. Progressed exercises by meeting her bed mobility rolling left to right goal today, indicating an increase in functional mobility. She continues to require maximum assistance for standing mobility with a tech for safety. Patient requires skilled physical therapy services to address deficits and return patient to OF with no limitations.       Performance deficits affecting function: weakness, impaired endurance, impaired self care skills, impaired functional mobility, gait instability, impaired balance, impaired cognition, decreased lower extremity function, decreased safety awareness, decreased ROM, impaired skin, impaired cardiopulmonary response to activity, orthopedic precautions.    Rehab Potential is fair    Activity Tolerance: Good    Plan:     Patient to be seen 5 x/week to address the above listed problems via gait training, therapeutic activities, therapeutic exercises, neuromuscular re-education, wheelchair  management/training    Plan of Care Expires: 12/31/23  Plan of Care Reviewed with: patient    Subjective     Patient reports no pain and is agreeable to participate in therapy today.       Pain/Comfort:  Pain Rating 1: 0/10  Pain Rating Post-Intervention 1: 0/10    Patient's cultural, spiritual, Voodoo conflicts given the current situation:  no    Objective:     Communicated with nursing staff prior to session. Patient found HOB elevated with  (no active lines) upon PT entry to room.     Functional Mobility:  Bed Mobility  Rolling Left: moderate assistance using bed rails, with extended amount fo time to perform while also donning pants.  Rolling Right: moderate assistance using bed rails, with extended amount fo time to perform while also donning pants.  Scooting: moderate assistance with bilateral upper extremities in wheelchair  Supine to Sit: maximal assistance for LE management and trunk management    Transfers:  Sit to Stand x multiple trials: maximal assistance and of 2 persons with rolling walker and parallel bars .  Bed to wheelchair: maximal assistance with rolling walker using Stand Pivot. Patient required extended amount of time to perform with tech in room for safety.    Gait: Patient ambulated 2 sets of 4 side steps each direction on side of parallel bars and maximal assistance. She demonstrated unsteady gait, decreased weight shift, decreased foot clearance, flexed posture, and inconsistent right foot placement.    Wheelchair Propulsion: Pt propelled Lightweight wheelchair x 35 feet on Level tile with Bilateral upper extremity with Minimal Assistance and extended amount of time to perform.    Therapeutic Activities and Exercises:   Seated exercises: Patient performed with bilateral lower extremities.  Marches: 2x10 reps with right lower extremity only  LAQ: 2x10 reps  Calf raises: 2x10 reps  Toe raises: 2x10 reps  Hamstring curls: Yellow band, 2x10 reps  Patient donned pants with maximal  assistance.    Neuromuscular Exercises:  Balance:  Sitting: Performed sitting edge of bed and in wheelchair with bilateral upper extremities as needed and stand by assistance. No loss of balance noted.  Standing: Performed with parallel bars for 5 minutes and maximal assistance. No loss of balance noted.     Patient Education:  Patient was educated to call nurse if she wanted to get back into bed/need anything. She verbalized understanding.  Patient was educated on benefits of physical therapy on continued improvement in patient's quality of life.  Patient was provided with daily orientation and goals of this PT session.  All questions answered to satisfaction, within scope of PT practice.   Patient voiced no other concerns.  RN notified of PT therapy session.     AM-PAC 6 CLICK MOBILITY  11    Patient left in wheelchair with tray on side, all lines intact, call button in reach, and ROBINSON Doty notified.    GOALS:   Multidisciplinary Problems       Physical Therapy Goals          Problem: Physical Therapy    Goal Priority Disciplines Outcome Goal Variances Interventions   Physical Therapy Goal     PT, PT/OT Ongoing, Progressing     Description: Goals to be met by: 2024    Patient will increase functional independence with mobility by performin. Supine to sit with Minimal Assistance. -not met  Modifed Goal 23: Supine to sit with ModA. -In progress    2. Sit to supine with Minimal Assistance. -not met  Modified Goal 23: Sit to supine with ModA. -In progress    3. Rolling to Left and Right with Minimal Assistance. -not met  Modified Goal 23: Rolling to L/R with ModA. -Met on 2023  Updated goal on 2023: Rolling to Left and Right with Minimal Assistance. -In progress    4. Sit to stand transfer with Minimal Assistance. -not met  Modified Goal 23: Sit to stand transfer with ModA. -In progress    5. Bed to chair transfer with Minimal Assistance using Rolling Walker. -not  met  Modified Goal 12/18/23: Bed to chair transfer with ModA. -In progress    6. Gait  x 50 feet with Minimal Assistance using Rolling Walker. -not met  Modified Goal 12/18/23: Gait x 50 feet with ModA using RW. -In progress     7. Wheelchair propulsion x 50 feet with Moderate Assistance using bilateral upper extremities. -In progress      Rehab Services' DME recommendations     Wheelchair  Number of hours up in a wheelchair per day 8       Style Light weight                              Justification for light weight w/c: patient cannot propel in a standard wheelchair, patient has impaired ability to participate in MRADLs, mobility limitations cannot be sifficiently resolved with a cane/walker, the home provides adequate access between rooms for a wheelchair, a wheelchair will significantly improve the ability to participate in MRADLs and will be used in the home on a regular basis, the patient has a caregiver who is available, willing, and able to provide assistance with the wheelchair, and the patient requires additional person for safety with mobility with walker     Seat Width 18 (Standard adult)     Seat Depth 18     Back Height Standard     Leg Support Elevated leg rest and Swing Away     Arm Height Full and Swing Away     Lap Belt Velcro     Accessories Anti-tippers and Safety belt     Cushion Basic     Justification for Cushion maintain skin integrity      Hip Kit Standard/Short     Home health PT, OT, and Aide                       Time Tracking:     PT Received On: 12/24/23  PT Start Time: 0910  PT Stop Time: 0953  PT Total Time (min): 43 min    Billable Minutes: Therapeutic Activity 30 and Therapeutic Exercise 13    Treatment Type: Treatment  PT/PTA: PT     Number of PTA visits since last PT visit: 0     12/24/2023

## 2023-12-25 PROCEDURE — 25000003 PHARM REV CODE 250: Mod: HCNC | Performed by: NURSE PRACTITIONER

## 2023-12-25 PROCEDURE — 94761 N-INVAS EAR/PLS OXIMETRY MLT: CPT | Mod: HCNC

## 2023-12-25 PROCEDURE — 11000004 HC SNF PRIVATE: Mod: HCNC

## 2023-12-25 PROCEDURE — 25000003 PHARM REV CODE 250: Mod: HCNC | Performed by: FAMILY MEDICINE

## 2023-12-25 PROCEDURE — 25000003 PHARM REV CODE 250: Mod: HCNC | Performed by: HOSPITALIST

## 2023-12-25 RX ADMIN — ACETAMINOPHEN 1000 MG: 500 TABLET ORAL at 09:12

## 2023-12-25 RX ADMIN — ESCITALOPRAM OXALATE 10 MG: 10 TABLET ORAL at 08:12

## 2023-12-25 RX ADMIN — DILTIAZEM HYDROCHLORIDE 240 MG: 120 CAPSULE, COATED, EXTENDED RELEASE ORAL at 08:12

## 2023-12-25 RX ADMIN — RIVAROXABAN 15 MG: 15 TABLET, FILM COATED ORAL at 05:12

## 2023-12-25 RX ADMIN — SENNOSIDES AND DOCUSATE SODIUM 1 TABLET: 8.6; 5 TABLET ORAL at 09:12

## 2023-12-25 RX ADMIN — Medication 9 MG: at 09:12

## 2023-12-25 RX ADMIN — POLYETHYLENE GLYCOL 3350 17 G: 17 POWDER, FOR SOLUTION ORAL at 08:12

## 2023-12-25 RX ADMIN — POLYETHYLENE GLYCOL 3350 17 G: 17 POWDER, FOR SOLUTION ORAL at 09:12

## 2023-12-25 RX ADMIN — ACETAMINOPHEN 1000 MG: 500 TABLET ORAL at 05:12

## 2023-12-25 RX ADMIN — SENNOSIDES AND DOCUSATE SODIUM 1 TABLET: 8.6; 5 TABLET ORAL at 08:12

## 2023-12-25 RX ADMIN — Medication 9 MG: at 12:12

## 2023-12-25 NOTE — PLAN OF CARE
Problem: Adult Inpatient Plan of Care  Goal: Absence of Hospital-Acquired Illness or Injury  Intervention: Prevent Infection  Flowsheets (Taken 12/24/2023 2048)  Infection Prevention:   single patient room provided   rest/sleep promoted   hand hygiene promoted

## 2023-12-26 LAB
ANION GAP SERPL CALC-SCNC: 8 MMOL/L (ref 8–16)
BASOPHILS # BLD AUTO: 0.06 K/UL (ref 0–0.2)
BASOPHILS NFR BLD: 1.5 % (ref 0–1.9)
BUN SERPL-MCNC: 17 MG/DL (ref 8–23)
CALCIUM SERPL-MCNC: 9.3 MG/DL (ref 8.7–10.5)
CHLORIDE SERPL-SCNC: 110 MMOL/L (ref 95–110)
CO2 SERPL-SCNC: 23 MMOL/L (ref 23–29)
CREAT SERPL-MCNC: 0.7 MG/DL (ref 0.5–1.4)
DIFFERENTIAL METHOD BLD: ABNORMAL
EOSINOPHIL # BLD AUTO: 0.2 K/UL (ref 0–0.5)
EOSINOPHIL NFR BLD: 4.6 % (ref 0–8)
ERYTHROCYTE [DISTWIDTH] IN BLOOD BY AUTOMATED COUNT: 15.9 % (ref 11.5–14.5)
EST. GFR  (NO RACE VARIABLE): >60 ML/MIN/1.73 M^2
GLUCOSE SERPL-MCNC: 86 MG/DL (ref 70–110)
HCT VFR BLD AUTO: 41.4 % (ref 37–48.5)
HGB BLD-MCNC: 12.8 G/DL (ref 12–16)
IMM GRANULOCYTES # BLD AUTO: 0.03 K/UL (ref 0–0.04)
IMM GRANULOCYTES NFR BLD AUTO: 0.7 % (ref 0–0.5)
LYMPHOCYTES # BLD AUTO: 1.1 K/UL (ref 1–4.8)
LYMPHOCYTES NFR BLD: 26.9 % (ref 18–48)
MAGNESIUM SERPL-MCNC: 2 MG/DL (ref 1.6–2.6)
MCH RBC QN AUTO: 30.4 PG (ref 27–31)
MCHC RBC AUTO-ENTMCNC: 30.9 G/DL (ref 32–36)
MCV RBC AUTO: 98 FL (ref 82–98)
MONOCYTES # BLD AUTO: 0.5 K/UL (ref 0.3–1)
MONOCYTES NFR BLD: 12.7 % (ref 4–15)
NEUTROPHILS # BLD AUTO: 2.2 K/UL (ref 1.8–7.7)
NEUTROPHILS NFR BLD: 53.6 % (ref 38–73)
NRBC BLD-RTO: 0 /100 WBC
PHOSPHATE SERPL-MCNC: 3.9 MG/DL (ref 2.7–4.5)
PLATELET # BLD AUTO: 447 K/UL (ref 150–450)
PMV BLD AUTO: 9.9 FL (ref 9.2–12.9)
POTASSIUM SERPL-SCNC: 3.9 MMOL/L (ref 3.5–5.1)
RBC # BLD AUTO: 4.21 M/UL (ref 4–5.4)
SODIUM SERPL-SCNC: 141 MMOL/L (ref 136–145)
WBC # BLD AUTO: 4.09 K/UL (ref 3.9–12.7)

## 2023-12-26 PROCEDURE — 25000003 PHARM REV CODE 250: Mod: HCNC | Performed by: HOSPITALIST

## 2023-12-26 PROCEDURE — 36415 COLL VENOUS BLD VENIPUNCTURE: CPT | Mod: HCNC | Performed by: HOSPITALIST

## 2023-12-26 PROCEDURE — 85025 COMPLETE CBC W/AUTO DIFF WBC: CPT | Mod: HCNC | Performed by: HOSPITALIST

## 2023-12-26 PROCEDURE — 97530 THERAPEUTIC ACTIVITIES: CPT | Mod: HCNC,CO

## 2023-12-26 PROCEDURE — 11000004 HC SNF PRIVATE: Mod: HCNC

## 2023-12-26 PROCEDURE — 97110 THERAPEUTIC EXERCISES: CPT | Mod: HCNC,CO

## 2023-12-26 PROCEDURE — 97110 THERAPEUTIC EXERCISES: CPT | Mod: HCNC

## 2023-12-26 PROCEDURE — 84100 ASSAY OF PHOSPHORUS: CPT | Mod: HCNC | Performed by: HOSPITALIST

## 2023-12-26 PROCEDURE — 80048 BASIC METABOLIC PNL TOTAL CA: CPT | Mod: HCNC | Performed by: HOSPITALIST

## 2023-12-26 PROCEDURE — 83735 ASSAY OF MAGNESIUM: CPT | Mod: HCNC | Performed by: HOSPITALIST

## 2023-12-26 PROCEDURE — 97116 GAIT TRAINING THERAPY: CPT | Mod: HCNC

## 2023-12-26 PROCEDURE — 25000003 PHARM REV CODE 250: Mod: HCNC | Performed by: NURSE PRACTITIONER

## 2023-12-26 RX ADMIN — ESCITALOPRAM OXALATE 10 MG: 10 TABLET ORAL at 08:12

## 2023-12-26 RX ADMIN — ACETAMINOPHEN 1000 MG: 500 TABLET ORAL at 09:12

## 2023-12-26 RX ADMIN — ACETAMINOPHEN 1000 MG: 500 TABLET ORAL at 01:12

## 2023-12-26 RX ADMIN — POLYETHYLENE GLYCOL 3350 17 G: 17 POWDER, FOR SOLUTION ORAL at 09:12

## 2023-12-26 RX ADMIN — POLYETHYLENE GLYCOL 3350 17 G: 17 POWDER, FOR SOLUTION ORAL at 08:12

## 2023-12-26 RX ADMIN — RIVAROXABAN 15 MG: 15 TABLET, FILM COATED ORAL at 05:12

## 2023-12-26 RX ADMIN — SENNOSIDES AND DOCUSATE SODIUM 1 TABLET: 8.6; 5 TABLET ORAL at 09:12

## 2023-12-26 RX ADMIN — DILTIAZEM HYDROCHLORIDE 240 MG: 120 CAPSULE, COATED, EXTENDED RELEASE ORAL at 08:12

## 2023-12-26 RX ADMIN — ACETAMINOPHEN 1000 MG: 500 TABLET ORAL at 06:12

## 2023-12-26 RX ADMIN — SENNOSIDES AND DOCUSATE SODIUM 1 TABLET: 8.6; 5 TABLET ORAL at 08:12

## 2023-12-26 NOTE — PT/OT/SLP PROGRESS
"Occupational Therapy   Treatment    Name: Tigist Murry  MRN: [id number]  Admit Date: 12/8/2023  Admitting Diagnosis:  Closed displaced fracture of left femoral neck    General Precautions: Standard, fall   Orthopedic Precautions: LLE weight bearing as tolerated, LLE posterior precautions   Braces: N/A    Recommendations:     Discharge Recommendations:     Level of Assistance Recommended at Discharge: 24 hours physical assistance for all ADL's and home management tasks  Discharge Equipment Recommendations: bedside commode, wheelchair, hip kit, shower chair  Barriers to discharge:  Decreased caregiver support (increased assistance required)    Assessment:     Tigist Murry is a 83 y.o. female with a medical diagnosis of Closed displaced fracture of left femoral neck .  She presents with performance deficits affecting function are weakness, impaired endurance, impaired self care skills, impaired functional mobility, gait instability, impaired cognition, impaired balance, decreased coordination, decreased lower extremity function, decreased upper extremity function, orthopedic precautions.   Pt participated well during today's session. Pt tolerated tx session without incident and is making progress, however, continues to demonstrate deficits with self care skills, balance, functional mobility, UB strength and endurance. Pt will benefit from continued OT services to progress towards goals.     Rehab Potential is good    Activity tolerance:  Fair    Plan:     Patient to be seen 5 x/week to address the above listed problems via self-care/home management, therapeutic activities, therapeutic exercises    Plan of Care Expires: 01/06/24  Plan of Care Reviewed with: patient, daughter    Subjective   "I think my family is coming today"  Communicated with: Merline prior to session.  .    Pain/Comfort:  Pain Rating 1: 0/10  Pain Rating Post-Intervention 1: 0/10    Patient's cultural, spiritual, Pentecostalism conflicts given the current " situation:  no    Objective:     Patient found up in chair with PT in rehab gym.     [facility] 6 Click ADL: 15    OT Exercises: Bilateral Danilo 2 x10 with #2 weight. Pt perform lateral slides, forward and circular motions.      Therex performed to improve overall endurance, ROM and UB strength required for functional transfers, ADL's and w/c propulsion    Treatment & Education:  P seated at table to engage in functional activizes with focus on fine motor, cognition, functional reaching. Pt required redirection to complete.     Pt manipulate theraputty at table. Perform hand squeezes and pulls.     WASSERMAN spoke with daughter regarding patient POC. Answer all questions within scope of practice.       Pt and family educated on:  - role of OT  - level of assistance  - DME recommendations   - adaptive equipment  -  safety while performing functional transfers and self care tasks,   - orthopedic precautions and importance to adhering to precautions  - progress towards OT goals.     Patient left up in chair with  daughter present.    GOALS:   Multidisciplinary Problems       Occupational Therapy Goals          Problem: Occupational Therapy    Goal Priority Disciplines Outcome Interventions   Occupational Therapy Goal     OT, PT/OT Ongoing, Progressing    Description: Goals to be met by: 12/30/23     Patient will increase functional independence with ADLs by performing:    UE Dressing with Stand-by Assistance.- Ongoing   LE Dressing with Moderate Assistance.- Ongoing   Grooming while seated at sink with Set-up Assistance.- Met   Toileting from bedside commode with Moderate Assistance for hygiene and clothing management. - Ongoing   Bathing from  edge of bed with Minimal Assistance.- Ongoing   Toilet transfer to bedside commode with Moderate Assistance.- Met                         Time Tracking:     OT Date of Treatment: 12/26/23  OT Start Time: 0950    OT Stop Time: 1029  OT Total Time (min): 39 min    Billable  Minutes:Therapeutic Activity 26  Therapeutic Exercise 13    12/26/2023

## 2023-12-26 NOTE — PROGRESS NOTES
[facility]                                  Skilled Nursing Facility                   Progress Note     Admit Date: 12/8/2023  CLARKE 12/29/2023  [id number]  Principal Problem:  Closed displaced fracture of left femoral neck   HPI obtained from patient interview and chart review     Chief Complaint: Re-evaluation of medical treatment and therapy status:  Lab review    HPI:   Mrs. Murry is a 83 year old female PMHx of Afib on xarelto, HTN, HPLD, mild dementia who presents to SNF following hospitalization for left femoral neck fracture s/p left hip hemiarthroplasty on 12/05 with Dr. Naranjo.  Admission to SNF for secondary weakness and debility.    Interval history:    Patient denies shortness of breath, abdominal discomfort, nausea, or vomiting.  Patient reports an adequate appetite.   Vital signs stable   Labs reviewed no critical results  Patient progessing with PT/OT- ambulated 78ft +58ft with RW and ModA     ROS  Constitutional: Negative for fever   Eyes: Negative for blurred vision, double vision   Respiratory: Negative for cough, shortness of breath   Cardiovascular: Negative for chest pain, palpitations, and leg swelling.   Gastrointestinal: Negative for abdominal pain, constipation, diarrhea, nausea, vomiting.    Musculoskeletal:  + generalized weakness.  + LUE and LLE pain  Skin: Negative for itching and rash.   Neurological: Negative for dizziness, headaches.   Psychiatric/Behavioral: Negative for depression. The patient is not nervous/anxious.      24 hour Vital Sign Range   Temp:  [97.5 °F (36.4 °C)-98.6 °F (37 °C)]   Pulse:  []   Resp:  [17-18]   BP: (127-155)/(65-93)   SpO2:  [97 %-100 %]     Current BMI: Body mass index is 18.39 kg/m².    PEx  Constitutional: Patient appears debilitated.  No distress noted  HENT:   Head: Normocephalic and atraumatic.   Eyes: Pupils are equal, round  Neck: Normal range of motion.  "Neck supple.   Cardiovascular: Normal rate, regular rhythm and normal heart sounds.    Pulmonary/Chest: Effort normal and breath sounds are clear  Abdominal: Soft. Bowel sounds are normal.   Musculoskeletal: Normal range of motion.   Neurological: Alert and oriented to person,   States incorrect age.  Disoriented to place and time.  Impaired higher level thinking.  Confusion.  Psychiatric:  Impulsive, confused  Skin: Skin is warm and dry.   Scattered areas of ecchymosis.  LUE with severe ecchymosis down to her wrist swelling to forearm, likely hematoma.     Altered Skin Integrity 12/14/23 1959 Left Elbow Skin Tear        Altered Skin Integrity 12/14/23 2016 Right Hand Skin Tear       Recent Labs   Lab 12/26/23  0443      K 3.9      CO2 23   BUN 17   CREATININE 0.7   CALCIUM 9.3   MG 2.0             Recent Labs   Lab 12/26/23  0443   WBC 4.09   RBC 4.21   HGB 12.8   HCT 41.4      MCV 98   MCH 30.4   MCHC 30.9*               No results for input(s): "POCTGLUCOSE" in the last 168 hours.     Assessment and Plan:    Closed displaced fracture of left femoral neck   Mechanical fall  s/p hemiarthroplasty on 12/5/2023  - PT/OT, WBAT with posterior hip precautions  - DVT PPX with home Xarelto  - maintain surgical dressing until removed by Orthopedics  - ortho KEKE to see patient weekly at SNF  - follow-up with orthopedics after discharge    Acute postoperative pain  - stable, continue acetaminophen 1000 mg q.8 hours, continue methocarbamol 500 mg QID.  Bowel regimen in place     Closed dislocation of left shoulder  - Patient found to have anterolateral left shoulder dislocation on admit. Orthopedics consulted and reduced shoulder dislocation successfully in ED with sedation.  - Per Ortho, patient placed in sling to left arm for comfort. Ortho states okay to remove sling to mobilize with therapy.   - 12/14 repeat of left shoulder x-ray reveals- Satisfactory reduction left anterior shoulder dislocation " "    Left upper extremity hematoma  -HGB stable  -continue to wrap daily     Compression fracture of L1 lumbar vertebra  - Present on admit and noted on CT scan of chest, abdomen and pelvis to have "New compression deformity of the superior endplate of L1 with less than 25% height loss compared to CT from 09/16/2022, age indeterminate." Patient denies any low back pain.   - No treatment or intervention needed.      Essential hypertension  - BP's improved, continue home diltiazem 24 hour capsule 20 40 mg daily    Left ventricular diastolic dysfunction with preserved systolic function   - recent echo reviewed from 3/2/2, EF 55%  - Monitor I&Os, weekly weights  - okay for regular diet  - no signs of fluid overload on exam    Atrial fibrillation and flutter  Long term current use of anticoagulant   - stable, continue home diltiazem and Xarelto 15 mg daily- considering increasing back to home dose of 20 mg now that renal function has recovered once hematoma shows more improvement    Acute blood loss anemia  - expected post fracture and surgery  - stable, continue monitor twice weekly CBC, transfuse for hemoglobin < 7     Alzheimer's disease with late onset (CODE)  - Patient with dementia with likely etiology of alzheimer's dementia. Dementia is mild. The patient does not have signs of behavioral disturbance.  Delirium precautions:  - Avoid antihistamines, anticholinergics, hypnotics, and minimize opiates while controlling for pain as these medications may exacerbate delirium. Cues for day/night will assist with keeping patient calm and oriented - during daytime, please keep adequate light in room (open windows, lights on) and please keep room dim at night-time to encourage normal sleep-wake cycles. Continuing to have nursing and family reorient the patient and encourage family to visit    Depression, moderate, reoccurring  - stable, continue escitalopram 10 mg daily    Debility   - Continue with PT/OT for gait training " and strengthening and restoration of ADL's   - Encourage mobility, OOB in chair, and early ambulation as appropriate  - Fall precautions   - Monitor for bowel and bladder dysfunction  - Monitor for and prevent skin breakdown and pressure ulcers  - Continue DVT prophylaxis with Xarelto    Anticipate disposition:  Back to group home?  Maybe looking at new home.  Patient has (3) daughters: Sarah Murry who is the power of  and she lives in [place], [place] and the other (2) daughters are from [place], [place] and [place], [place].    IP [facility] RISK OF UNPLANNED READMISSION Model: MODERATE    Follow-up needed during SNF stay-    Follow-up needed after discharge from SNF: PCP, ortho 1/16    Future Appointments   Date Time Provider Department Center   1/16/2024 10:45 AM Lashay Santiago PA-C [facility] Clark Tobin NP  Department of Hospital Medicine   [facility]     DOS: 12/26/2023

## 2023-12-26 NOTE — PLAN OF CARE
Problem: Adult Inpatient Plan of Care  Goal: Plan of Care Review  Outcome: Ongoing, Progressing  Goal: Patient-Specific Goal (Individualized)  Outcome: Ongoing, Progressing  Goal: Absence of Hospital-Acquired Illness or Injury  Outcome: Ongoing, Progressing  Goal: Optimal Comfort and Wellbeing  Outcome: Ongoing, Progressing  Goal: Readiness for Transition of Care  Outcome: Ongoing, Progressing     Problem: Skin Injury Risk Increased  Goal: Skin Health and Integrity  Outcome: Ongoing, Progressing  Intervention: Promote and Optimize Oral Intake  Flowsheets (Taken 12/26/2023 1156)  Oral Nutrition Promotion:   medicated   physical activity promoted     Problem: Fall Injury Risk  Goal: Absence of Fall and Fall-Related Injury  Outcome: Ongoing, Progressing  Intervention: Promote Injury-Free Environment  Flowsheets (Taken 12/26/2023 1156)  Safety Promotion/Fall Prevention:   assistive device/personal item within reach   in recliner, wheels locked   lighting adjusted   /camera at bedside   nonskid shoes/socks when out of bed    Pt alert.  Able to make needs known.  Accepted and tolerated medications whole. Telesitter continued monitor. Denies pain or discomfort at this time. No new skin issues noted.  Pt up in w/c a bedside. Safety maintained.

## 2023-12-26 NOTE — PT/OT/SLP PROGRESS
Physical Therapy Treatment    Patient Name:  Tigist Murry   MRN:  [id number]  Admit Date: 12/8/2023  Admitting Diagnosis: Closed displaced fracture of left femoral neck  Recent Surgeries:  Procedure(s) (LRB): HEMIARTHROPLASTY, HIP, POSTERIOR APPROACH left (Left)     General Precautions: Standard, fall  Orthopedic Precautions: LLE weight bearing as tolerated, LLE posterior precautions  Braces: N/A    Recommendations:     Discharge Recommendations: home health PT  Level of Assistance Recommended at Discharge: 24 hours significant assistance  Discharge Equipment Recommendations: hip kit, bedside commode, walker, rolling, shower chair  Barriers to discharge: Decreased caregiver support (Increased assistance needed at this time)    Assessment:     Tigist Murry is a 83 y.o. female admitted with a medical diagnosis of Closed displaced fracture of left femoral neck . Pt appeared fearful of falling and therefore needed Mod A for transfers d.t posterior lean. Pt also with festinating GT pattern during GT requiring Mod A for safety and for walker management. Pt will continue to benefit from skilled PT services during this hospital admit to continue to improve transfer ability and efficiency as well as continue to progress pt's ambulation distance and cardiopulmonary endurance to maximize pt's functional independence and return to PLOF.       Performance deficits affecting function: weakness, impaired endurance, impaired self care skills, impaired functional mobility, gait instability, impaired balance, decreased upper extremity function, decreased coordination, decreased lower extremity function, impaired cardiopulmonary response to activity, orthopedic precautions.    Rehab Potential is good    Activity Tolerance: Fair    Plan:     Patient to be seen 5 x/week to address the above listed problems via gait training, therapeutic activities, therapeutic exercises, neuromuscular re-education, wheelchair  management/training    Plan of Care Expires: 23  Plan of Care Reviewed with: patient    Subjective     Pt. Agreeable to work with PT.     Pain/Comfort:  Pain Rating 1: 0/10  Pain Rating Post-Intervention 1: 0/10    Patient's cultural, spiritual, Yazidism conflicts given the current situation:  no    Objective:     Communicated with pt's nurse prior to session.  Patient found up in chair with   upon PT entry to room.     Therapeutic Activities and Exercises: B L/E active/assist therex x 30reps: AP, LAQs, Hip Flex RLE only    Functional Mobility:  Transfers:     Sit to Stand:  moderate assistance with rolling walker  Gait: 78ft +58ft with RW and ModA d.t pt with posterior lean initially and then needed assistance d.t festinating GT and for walker management. Pt needed a seated rest break during GT trials d.t fatigue.    AM-PAC 6 CLICK MOBILITY  11    Patient left up in chair with  direct hand off to OT in the gym .    GOALS:   Multidisciplinary Problems       Physical Therapy Goals          Problem: Physical Therapy    Goal Priority Disciplines Outcome Goal Variances Interventions   Physical Therapy Goal     PT, PT/OT Ongoing, Progressing     Description: Goals to be met by: 2024    Patient will increase functional independence with mobility by performin. Supine to sit with Minimal Assistance. -not met  Modifed Goal 23: Supine to sit with ModA. -In progress    2. Sit to supine with Minimal Assistance. -not met  Modified Goal 23: Sit to supine with ModA. -In progress    3. Rolling to Left and Right with Minimal Assistance. -not met  Modified Goal 23: Rolling to L/R with ModA. -Met on 2023  Updated goal on 2023: Rolling to Left and Right with Minimal Assistance. -In progress    4. Sit to stand transfer with Minimal Assistance. -not met  Modified Goal 23: Sit to stand transfer with ModA. -In progress    5. Bed to chair transfer with Minimal Assistance using Rolling  Walker. -not met  Modified Goal 12/18/23: Bed to chair transfer with ModA. -In progress    6. Gait  x 50 feet with Minimal Assistance using Rolling Walker. -not met  Modified Goal 12/18/23: Gait x 50 feet with ModA using RW. -In progress     7. Wheelchair propulsion x 50 feet with Moderate Assistance using bilateral upper extremities. -In progress      Rehab Services' DME recommendations     Wheelchair  Number of hours up in a wheelchair per day 8       Style Light weight                              Justification for light weight w/c: patient cannot propel in a standard wheelchair, patient has impaired ability to participate in MRADLs, mobility limitations cannot be sifficiently resolved with a cane/walker, the home provides adequate access between rooms for a wheelchair, a wheelchair will significantly improve the ability to participate in MRADLs and will be used in the home on a regular basis, the patient has a caregiver who is available, willing, and able to provide assistance with the wheelchair, and the patient requires additional person for safety with mobility with walker     Seat Width 18 (Standard adult)     Seat Depth 18     Back Height Standard     Leg Support Elevated leg rest and Swing Away     Arm Height Full and Swing Away     Lap Belt Velcro     Accessories Anti-tippers and Safety belt     Cushion Basic     Justification for Cushion maintain skin integrity      Hip Kit Standard/Short     Home health PT, OT, and Aide                       Time Tracking:     PT Received On: 12/26/23  PT Start Time: 0922  PT Stop Time: 0946  PT Total Time (min): 24 min    Billable Minutes: Gait Training 16 and Therapeutic Exercise 8    Treatment Type: Treatment  PT/PTA: PT     Number of PTA visits since last PT visit: 0     12/26/2023

## 2023-12-26 NOTE — CARE UPDATE
IDT:  CHIQUITA   Family is wanting to extend pt's LOS however, therapy is firm on no extension due to cognitive challenges.  Pt is not expected to improve anymore than her baseline now.  Family was explained that they need to contact group home to see if her condition is accepted  to return as she is now. Family will contact [facility] and have them evaluate her.    While typing this, Lalitha brought a business card with [facility] information on it to send medical records to the facility for their evaluation.   SS will send information today.    CHIQUIAT also served today for Friday, 12.29. Sent to 3 different [employer] reps.     agreed to get a wc van to transport pt to group home on the day of transport.  Pt will need a wc. DC is set for 12.29.2023.

## 2023-12-26 NOTE — PLAN OF CARE
Problem: Adult Inpatient Plan of Care  Goal: Absence of Hospital-Acquired Illness or Injury  Intervention: Prevent Infection  Flowsheets (Taken 12/26/2023 0407)  Infection Prevention:   single patient room provided   rest/sleep promoted   hand hygiene promoted     Problem: Adult Inpatient Plan of Care  Goal: Plan of Care Review  12/26/2023 0407 by Evelina Arevalo LPN  Outcome: Ongoing, Progressing  12/26/2023 0406 by Evelina Arevalo LPN  Outcome: Ongoing, Progressing     Problem: Adult Inpatient Plan of Care  Goal: Patient-Specific Goal (Individualized)  12/26/2023 0407 by Evelina Arevalo LPN  Outcome: Ongoing, Progressing  12/26/2023 0406 by Evelina Arevalo LPN  Outcome: Ongoing, Progressing

## 2023-12-27 PROBLEM — D62 ACUTE BLOOD LOSS ANEMIA: Status: RESOLVED | Noted: 2023-12-06 | Resolved: 2023-12-27

## 2023-12-27 PROCEDURE — 11000004 HC SNF PRIVATE: Mod: HCNC

## 2023-12-27 PROCEDURE — 25000003 PHARM REV CODE 250: Mod: HCNC | Performed by: NURSE PRACTITIONER

## 2023-12-27 PROCEDURE — 97110 THERAPEUTIC EXERCISES: CPT | Mod: HCNC,CO

## 2023-12-27 PROCEDURE — 97116 GAIT TRAINING THERAPY: CPT | Mod: HCNC,CQ

## 2023-12-27 PROCEDURE — 97530 THERAPEUTIC ACTIVITIES: CPT | Mod: HCNC,CO

## 2023-12-27 PROCEDURE — 97530 THERAPEUTIC ACTIVITIES: CPT | Mod: HCNC,CQ

## 2023-12-27 PROCEDURE — 25000003 PHARM REV CODE 250: Mod: HCNC | Performed by: HOSPITALIST

## 2023-12-27 RX ORDER — ESCITALOPRAM OXALATE 10 MG/1
TABLET ORAL
Qty: 90 TABLET | Refills: 1 | Status: SHIPPED | OUTPATIENT
Start: 2023-12-27

## 2023-12-27 RX ORDER — DILTIAZEM HYDROCHLORIDE 240 MG/1
240 CAPSULE, COATED, EXTENDED RELEASE ORAL DAILY
Qty: 90 CAPSULE | Refills: 3 | Status: SHIPPED | OUTPATIENT
Start: 2023-12-27 | End: 2024-12-26

## 2023-12-27 RX ORDER — ACETAMINOPHEN 325 MG/1
650 TABLET ORAL EVERY 6 HOURS PRN
Refills: 0
Start: 2023-12-27

## 2023-12-27 RX ADMIN — ACETAMINOPHEN 1000 MG: 500 TABLET ORAL at 02:12

## 2023-12-27 RX ADMIN — ACETAMINOPHEN 1000 MG: 500 TABLET ORAL at 05:12

## 2023-12-27 RX ADMIN — DILTIAZEM HYDROCHLORIDE 240 MG: 120 CAPSULE, COATED, EXTENDED RELEASE ORAL at 08:12

## 2023-12-27 RX ADMIN — POLYETHYLENE GLYCOL 3350 17 G: 17 POWDER, FOR SOLUTION ORAL at 08:12

## 2023-12-27 RX ADMIN — RIVAROXABAN 15 MG: 15 TABLET, FILM COATED ORAL at 04:12

## 2023-12-27 RX ADMIN — SENNOSIDES AND DOCUSATE SODIUM 1 TABLET: 8.6; 5 TABLET ORAL at 08:12

## 2023-12-27 RX ADMIN — POLYETHYLENE GLYCOL 3350 17 G: 17 POWDER, FOR SOLUTION ORAL at 09:12

## 2023-12-27 RX ADMIN — SENNOSIDES AND DOCUSATE SODIUM 1 TABLET: 8.6; 5 TABLET ORAL at 09:12

## 2023-12-27 RX ADMIN — ESCITALOPRAM OXALATE 10 MG: 10 TABLET ORAL at 09:12

## 2023-12-27 RX ADMIN — ACETAMINOPHEN 1000 MG: 500 TABLET ORAL at 09:12

## 2023-12-27 NOTE — PT/OT/SLP PROGRESS
Occupational Therapy   Treatment    Name: Tigist Murry  MRN: [id number]  Admit Date: 12/8/2023  Admitting Diagnosis:  Closed displaced fracture of left femoral neck    General Precautions: Standard, fall   Orthopedic Precautions: LLE weight bearing as tolerated, LLE posterior precautions   Braces: N/A    Recommendations:     Discharge Recommendations:     Level of Assistance Recommended at Discharge: 24 hours physical assistance for all ADL's and home management tasks  Discharge Equipment Recommendations: bedside commode, wheelchair, hip kit, shower chair  Barriers to discharge:  Decreased caregiver support (increased assistance required)    Assessment:     Tigist Murry is a 83 y.o. female with a medical diagnosis of Closed displaced fracture of left femoral neck.  She presents with limitations in performance of self-care, functional mobility, and ADLs. Performance deficits affecting function are weakness, impaired endurance, impaired self care skills, impaired functional mobility, gait instability, impaired cognition, impaired balance, decreased coordination, decreased lower extremity function, decreased upper extremity function, orthopedic precautions. Pt tolerated Tx without incident. Pt continues to require assist to perform self care tasks, functional mobility and functional transfers. Pt would continue to benefit from OT intervention to further functional (I)ce and safety.     Rehab Potential is fair    Activity tolerance:  Fair    Plan:     Patient to be seen 5 x/week to address the above listed problems via self-care/home management, therapeutic activities, therapeutic exercises    Plan of Care Expires: 01/06/24  Plan of Care Reviewed with: patient, daughter    Subjective     Communicated with: Nursing prior to session.   Pain/Comfort:  Pain Rating 1: 0/10  Pain Rating Post-Intervention 1: 0/10    Patient's cultural, spiritual, Yarsani conflicts given the current situation:  no    Objective:      Patient found up in chair at nurses station with  (no lines) upon OT entry to room.    Bed Mobility:    Pt seated in chair at onset of treatment session.     [facility] 6 Click ADL: 15    OT Exercises: Bilateral Danilo 2 x10 with #2 weight. Pt perform lateral slides, forward and circular motions.      Therex performed to improve overall endurance, ROM and UB strength required for functional transfers, ADL's and w/c propulsion    Treatment & Education:  Pt seated at table to engage in functional activities with focus on fine motor, cognition, functional reaching. Pt required redirection to complete.     Pt educated on:  - role of OT  - level of assistance  - energy conservation and task modification to maximized independence with ADL's and mobility   -  safety while performing functional transfers and self care tasks  - progress towards OT goals    Patient left up in chair at nurses station with  staff present    GOALS:   Multidisciplinary Problems       Occupational Therapy Goals          Problem: Occupational Therapy    Goal Priority Disciplines Outcome Interventions   Occupational Therapy Goal     OT, PT/OT Ongoing, Progressing    Description: Goals to be met by: 12/30/23     Patient will increase functional independence with ADLs by performing:    UE Dressing with Stand-by Assistance.- Ongoing   LE Dressing with Moderate Assistance.- Ongoing   Grooming while seated at sink with Set-up Assistance.- Met   Toileting from bedside commode with Moderate Assistance for hygiene and clothing management. - Ongoing   Bathing from  edge of bed with Minimal Assistance.- Ongoing   Toilet transfer to bedside commode with Moderate Assistance.- Met                         Time Tracking:     OT Date of Treatment: 12/27/23  OT Start Time: 1035    OT Stop Time: 1107  OT Total Time (min): 32 min    Billable Minutes:Therapeutic Activity 21  Therapeutic Exercise 11    12/27/2023  A client care conference was performed between the CONY  and WASSERMAN, prior to treatment by WASSERMAN, to discuss the patient's status, treatment plan and established goals.

## 2023-12-27 NOTE — PT/OT/SLP PROGRESS
"Physical Therapy Treatment    Patient Name:  Tigist Murry   MRN:  [id number]  Admit Date: 12/8/2023  Admitting Diagnosis: Closed displaced fracture of left femoral neck  Recent Surgeries: HEMIARTHROPLASTY, HIP, POSTERIOR APPROACH left (Left)     General Precautions: Standard, fall  Orthopedic Precautions: LLE weight bearing as tolerated, LLE posterior precautions  Braces: N/A    Recommendations:     Discharge Recommendations: home health PT  Level of Assistance Recommended at Discharge: 24 hours significant assistance  Discharge Equipment Recommendations: hip kit, bedside commode, walker, rolling, shower chair  Barriers to discharge: Decreased caregiver support (Increased assistance needed at this time)    Assessment:     Tigist Murry is a 83 y.o. female admitted with a medical diagnosis of Closed displaced fracture of left femoral neck .   Pt was agreeable and tolerated session fairly well. Pt completed bed mobility, transfer, gait, and wheelchair mobility. Pt progress most limited by impaired cognition and minimal carryover from previous session.  Patient continues to demonstrate the need for low intensity therapy on a scheduled basis exhibited by decreased independence with functional mobility.      Performance deficits affecting function: weakness, impaired endurance, impaired self care skills, impaired functional mobility, gait instability, impaired balance, decreased upper extremity function, decreased coordination, decreased lower extremity function, impaired cardiopulmonary response to activity, orthopedic precautions.    Rehab Potential is fair    Activity Tolerance: Fair    Plan:     Patient to be seen 5 x/week to address the above listed problems via gait training, therapeutic activities, therapeutic exercises, neuromuscular re-education, wheelchair management/training    Plan of Care Expires: 12/31/23  Plan of Care Reviewed with: patient    Subjective     "Can you help me find my shoes? I need them " "tonight".     Pain/Comfort:  Pain Rating 1:  (did not rate)  Location - Orientation 1: generalized  Location 1: shoulder  Pain Addressed 1: Reposition  Pain Rating Post-Intervention 1:  (did not rate)    Patient's cultural, spiritual, Christian conflicts given the current situation:  no    Objective:     Patient found  up in wheelchair at nurses station  with  (no lines) upon PT entry.     Therapeutic Activities and Exercises:   Patient educated on role of therapy, goals of session, and benefits of out of bed mobility.   Instructed on use of call button and importance of calling nursing staff for assistance with mobility   Questions/concerns addressed within PTA scope of practice    Functional Mobility:  Bed Mobility:   Rolling to Left/Right: minimum assistance, no rails  Scooting to EOB: minimum assistance  Supine to Sit: moderate assistance; HOB elevated  Assisted with trunk management  Sit to Supine: maximal assistance  Assisted with trunk and LE management  Transfers:   Sit <> Stand Transfer: moderate assistance with rolling walker   Mat <> Chair Transfer: maximal assistance with no assistive device using Stand Pivot technique   Gait:  Pt ambulated ~75 ft with moderate assistance and rolling walker. Rehab tech following with wheelchair   Gait Deviation(s): mild unsteadiness, festinating gait with decrease shereen, decrease step length, and narrow CLARIBEL  Verbal/tactile cues for RW management   Wheelchair Propulsion:    Pt propelled lightweight wheelchair x 50 feet on Level tile with  Bilateral upper extremity with Minimal Assistance  Cues for BUE placement on wheels   Armrest removed       AM-PAC 6 CLICK MOBILITY  11    Patient left  up in wheelchair at nurses station  with  staff present.    GOALS:   Multidisciplinary Problems       Physical Therapy Goals          Problem: Physical Therapy    Goal Priority Disciplines Outcome Goal Variances Interventions   Physical Therapy Goal     PT, PT/OT Ongoing, Progressing   "   Description: Goals to be met by: 2024    Patient will increase functional independence with mobility by performin. Supine to sit with Minimal Assistance. -not met  Modifed Goal 23: Supine to sit with ModA. -In progress    2. Sit to supine with Minimal Assistance. -not met  Modified Goal 23: Sit to supine with ModA. -In progress    3. Rolling to Left and Right with Minimal Assistance. -not met  Modified Goal 23: Rolling to L/R with ModA. -Met on 2023  Updated goal on 2023: Rolling to Left and Right with Minimal Assistance. -In progress    4. Sit to stand transfer with Minimal Assistance. -not met  Modified Goal 23: Sit to stand transfer with ModA. -In progress    5. Bed to chair transfer with Minimal Assistance using Rolling Walker. -not met  Modified Goal 23: Bed to chair transfer with ModA. -In progress    6. Gait  x 50 feet with Minimal Assistance using Rolling Walker. -not met  Modified Goal 23: Gait x 50 feet with ModA using RW. -In progress     7. Wheelchair propulsion x 50 feet with Moderate Assistance using bilateral upper extremities. -In progress      Rehab Services' DME recommendations     Wheelchair  Number of hours up in a wheelchair per day 8       Style Light weight                              Justification for light weight w/c: patient cannot propel in a standard wheelchair, patient has impaired ability to participate in MRADLs, mobility limitations cannot be sifficiently resolved with a cane/walker, the home provides adequate access between rooms for a wheelchair, a wheelchair will significantly improve the ability to participate in MRADLs and will be used in the home on a regular basis, the patient has a caregiver who is available, willing, and able to provide assistance with the wheelchair, and the patient requires additional person for safety with mobility with walker     Seat Width 18 (Standard adult)     Seat Depth 18     Back Height  Standard     Leg Support Elevated leg rest and Swing Away     Arm Height Full and Swing Away     Lap Belt Velcro     Accessories Anti-tippers and Safety belt     Cushion Basic     Justification for Cushion maintain skin integrity      Hip Kit Standard/Short     Home health PT, OT, and Aide                       Time Tracking:     PT Received On: 12/27/23  PT Start Time: 1356  PT Stop Time: 1423  PT Total Time (min): 27 min    Billable Minutes: Gait Training 10 and Therapeutic Activity 17    Treatment Type: Treatment  PT/PTA: PTA     Number of PTA visits since last PT visit: 1 12/27/2023

## 2023-12-27 NOTE — PLAN OF CARE
Problem: Adult Inpatient Plan of Care  Goal: Plan of Care Review  Outcome: Ongoing, Progressing  Flowsheets (Taken 12/27/2023 0110)  Plan of Care Reviewed With: patient  Goal: Patient-Specific Goal (Individualized)  Outcome: Ongoing, Progressing  Goal: Absence of Hospital-Acquired Illness or Injury  Outcome: Ongoing, Progressing  Goal: Optimal Comfort and Wellbeing  Outcome: Ongoing, Progressing  Goal: Readiness for Transition of Care  Outcome: Ongoing, Progressing     Problem: Skin Injury Risk Increased  Goal: Skin Health and Integrity  Outcome: Ongoing, Progressing     Problem: Fall Injury Risk  Goal: Absence of Fall and Fall-Related Injury  Outcome: Ongoing, Progressing     Problem: Impaired Wound Healing  Goal: Optimal Wound Healing  Intervention: Promote Wound Healing  Flowsheets (Taken 12/27/2023 0110)  Oral Nutrition Promotion:   medicated   physical activity promoted   safe use of adaptive equipment encouraged  Sleep/Rest Enhancement:   awakenings minimized   relaxation techniques promoted  Activity Management: Rolling - L1  Pain Management Interventions:   relaxation techniques promoted   warm blanket provided

## 2023-12-27 NOTE — CARE UPDATE
Pt will need a wheelchair van to return to her group home on Friday, 12.29 at 3:00 pm. The address is [facility]  [address] [id number] [phone]      Sent to Evelia, secure chat  On email sent to [email or web address] [email or web address]

## 2023-12-27 NOTE — DISCHARGE INSTRUCTIONS
ADDITIONAL DISCHARGE INSTRUCTIONS TO FOLLOW:      [facility] will start on 12.30 in service to you.  They will call you to set up a time and schedule.     You will have transportation to the group home on your date of discharge per [facility].    Please be sure you see your primary care physician for a follow up, hospital visit within 1-2 weeks of discharge.  Any appointment you have here on this discharge summary of care, please honor all appointments.  IT IS IMPORTANT YOUR PRIMARY CARE PHYSICIAN IS NOTIFIED OF YOUR HOSPITALIZATION AND HAS A CHANCE TO REVIEW YOUR MEDICATIONS THAT YOU WERE DISCHARGED WITH.  BRING THIS DOCUMENT WITH YOU/THIS INFORMATION TO YOUR FIRST APPOINTMENT.

## 2023-12-27 NOTE — PLAN OF CARE
[facility] Skilled Nursing      HOME HEALTH ORDERS  FACE TO FACE ENCOUNTER    Patient Name: Tigist Murry  YOB: 1940    PCP: Gwen Porter MD   PCP Address: [address] [place] [address] / [place] [id number]  PCP Phone Number: [phone]  PCP Fax: [phone]    Encounter Date: 12/27/2023      Admit to Home Health    Diagnoses:  Active Hospital Problems    Diagnosis  POA    *Closed displaced fracture of left femoral neck s/p hemiarthroplasty on 12/5/2023 [S72.002A]  Yes    Closed dislocation of left shoulder [S43.005A]  Yes    Atelectasis [J98.11]  Yes    Alzheimer's disease with late onset (CODE) [G30.1]  Yes    Parkinsonism, unspecified Parkinsonism type [G20.C]  Yes    Weakness [R53.1]  Yes    Memory impairment [R41.3]  Yes    CKD (chronic kidney disease) stage 3, GFR 30-59 ml/min [N18.30]  Yes     Chronic    Long term current use of anticoagulant [Z79.01]  Not Applicable    Gastroesophageal reflux disease [K21.9]  Yes     Chronic    Left ventricular diastolic dysfunction with preserved systolic function [I51.89]  Yes     Chronic    PAF (paroxysmal atrial fibrillation) [I48.0]  Yes    Situational depression [F43.21]  Yes     Chronic    Pure hypercholesterolemia [E78.00]  Yes    Overactive bladder [N32.81]  Yes     Chronic    Essential hypertension [I10]  Yes     Chronic    Anxiety [F41.9]  Yes     Chronic      Resolved Hospital Problems    Diagnosis Date Resolved POA    Acute blood loss anemia [D62] 12/27/2023 Yes       Follow Up Appointments:  Future Appointments   Date Time Provider Department Center   1/16/2024 10:45 AM Lashay Santiago PA-C [facility] ORTHO Clark Vásquez       Allergies:  Review of patient's allergies indicates:   Allergen Reactions    Ciprofloxacin Other (See Comments)     Other reaction(s): Nausea  Other reaction(s): Nausea       Medications: Review discharge medications with patient and family and provide education.      I have seen and examined this patient within the last 30  days. My clinical findings that support the need for the home health skilled services and home bound status are the following:no   Weakness/numbness causing balance and gait disturbance due to Fracture and Weakness/Debility making it taxing to leave home.  Requiring assistive device to leave home due to unsteady gait caused by  Fracture and Weakness/Debility.  Medical restrictions requiring assistance of another human to leave home due to  Unstable ambulation, Frequent Falls, Decreased range of motions in extremities, and Weight bearing restricted.     Diet:   regular diet      Referrals/ Consults  Physical Therapy to evaluate and treat. Evaluate for home safety and equipment needs; Establish/upgrade home exercise program. Perform / instruct on therapeutic exercises, gait training, transfer training, and Range of Motion.  Occupational Therapy to evaluate and treat. Evaluate home environment for safety and equipment needs. Perform/Instruct on transfers, ADL training, ROM, and therapeutic exercises.    Activities:   activity as tolerated  LLE weight bearing as tolerated, LLE posterior precautions   Wheelchair ordered    Caregiver is capable and willing to operate wheelchair safely.Patient's upper body strength is sufficient for propulsion.The patient requires the use of a w/c for activities of daily living within the Home.Patient mobility limitations cannot be sufficiently resolved by the use of other ambulatory therapies.    Nursing:   Agency to admit patient within 24 hours of hospital discharge unless specified on physician order or at patient request    SN to complete comprehensive assessment including routine vital signs. Instruct on disease process and s/s of complications to report to MD. Review/verify medication list sent home with the patient at time of discharge  and instruct patient/caregiver as needed. Frequency may be adjusted depending on start of care date.     Skilled nurse to perform up to 3 visits  PRN for symptoms related to diagnosis    Notify MD if SBP > 160 or < 90; DBP > 90 or < 50; HR > 120 or < 50; Temp > 101; O2 < 88%    Ok to schedule additional visits based on staff availability and patient request on consecutive days within the home health episode.    Miscellaneous   Routine Skin for Bedridden Patients: Instruct patient/caregiver to apply moisture barrier cream to all skin folds and wet areas in perineal area daily and after baths and all bowel movements.    Home Health Aide:  Nursing eval and treat, Physical Therapy eval and treat, Occupational Therapy eval and treat, and Home Health Aide eval and treat    Wound Care Orders  Keep dressing to LLE clean and intact until f/u w/ orthopedics     Right dorsal hand / Left elbow - cleanse with normal saline, gently pat dry, apply foam border every 5 days.     Follow up with ortho Lashay Santiago PA-C on Jan 16, 2024    I certify that this patient is confined to her home and needs intermittent skilled nursing care, physical therapy, and occupational therapy.

## 2023-12-27 NOTE — PROGRESS NOTES
[facility] - Skilled Nursing  Wound Care    Patient Name:  Tigist Murry   MRN:  [id number]  Date: 12/27/2023  Diagnosis: Closed displaced fracture of left femoral neck    History:     Past Medical History:   Diagnosis Date    Anticoagulant long-term use     Anxiety     Arthritis     Atrial fibrillation     Carpal tunnel syndrome on right     Diverticulosis     Encounter for blood transfusion     HTN (hypertension)     Hyperlipidemia     Osteopenia     Overactive bladder     Palpitations     Patellar tendonitis 4/13    saw [place] [facility]     Psoriasis     S/P partial hysterectomy     Situational depression     Supraventricular tachycardia        Social History     Socioeconomic History    Marital status:    Tobacco Use    Smoking status: Never    Smokeless tobacco: Never   Substance and Sexual Activity    Alcohol use: No    Drug use: No    Sexual activity: Not Currently     Partners: Male     Social Determinants of Health     Financial Resource Strain: Low Risk  (12/6/2023)    Overall Financial Resource Strain (CARDIA)     Difficulty of Paying Living Expenses: Not hard at all   Food Insecurity: No Food Insecurity (12/6/2023)    Hunger Vital Sign     Worried About Running Out of Food in the Last Year: Never true     Ran Out of Food in the Last Year: Never true   Transportation Needs: No Transportation Needs (12/6/2023)    PRAPARE - Transportation     Lack of Transportation (Medical): No     Lack of Transportation (Non-Medical): No   Physical Activity: Inactive (12/6/2023)    Exercise Vital Sign     Days of Exercise per Week: 0 days     Minutes of Exercise per Session: 0 min   Housing Stability: Unknown (12/6/2023)    Housing Stability Vital Sign     Unable to Pay for Housing in the Last Year: No     Unstable Housing in the Last Year: No       Precautions:     Allergies as of 12/08/2023 - Reviewed 12/08/2023   Allergen Reaction Noted    Ciprofloxacin Other (See Comments) 01/15/2013       [facility] Assessment  Details/Treatment   Patient seen for wound care follow-up.  Reviewed chart for this encounter.   See Flow Sheet for findings.    Pt sitting up in bed, agreed to assessment. Left elbow cleansed with NS, intact scab noted. Leave CHELSY. Hematoma and swelling on left lower arm appears the same as previous assessment, bedside nursing continue to monitor.   Right dorsal hand, cleansed with NS revealing intact scab.     RECOMMENDATIONS:  Bedside nursing to continue care & monitoring.  Bedside nursing to maintain pressure injury prevention interventions.    Discussed POC with patient and primary nurse.   See EMR for orders & patient education.    Discussed nutrition and the role of protein in wound healing with the patient. Instructed patient to optimize protein for wound healing.    WC sign off --Please re-consult if needed.    12/27/23 0715   WOCN Assessment   WOCN Total Time (mins) 20   Visit Date 12/27/23   Visit Time 0715   Consult Type Follow Up   WOCN Speciality Wound   Wound skin tear;other  (Hematoma)   Intervention assessed;chart review;orders   Teaching complication        Altered Skin Integrity 12/14/23 1959 Left Elbow Skin Tear   Date First Assessed/Time First Assessed: 12/14/23 1959   Altered Skin Integrity Present on Admission - Did Patient arrive to the hospital with altered skin?: suspected hospital acquired  Side: Left  Location: Elbow  Primary Wound Type: Skin Tear   Wound Image    Dressing Appearance Open to air   Drainage Amount None   Drainage Characteristics/Odor No odor   Appearance Dry;Other (see comments)  (Intact scab)   Tissue loss description Not applicable   Periwound Area Intact;Dry   Care Cleansed with:;Sterile normal saline        Altered Skin Integrity 12/14/23 2016 Right Hand Skin Tear   Date First Assessed/Time First Assessed: 12/14/23 2016   Altered Skin Integrity Present on Admission - Did Patient arrive to the hospital with altered skin?: suspected hospital acquired  Side: Right   Location: Hand  Primary Wound Type: Skin Tear   Wound Image    Dressing Appearance Open to air   Drainage Amount None   Drainage Characteristics/Odor No odor   Appearance Dry;Other (see comments)  (intact scab)   Tissue loss description Not applicable   Periwound Area Intact;Dry   Care Cleansed with:;Sterile normal saline

## 2023-12-27 NOTE — CARE UPDATE
Pt will be discharged on 12.29 Friday around 3:00 pm with our wheelchair van service (per therapy recommendation for transportation.)    Discharge Location: ([facility] home)    [facility]  [address] [address]  [phone]     154 pm 12.27 confirmed with rip mo that [facility] is the correct pharmacy for the patient

## 2023-12-28 LAB
ANION GAP SERPL CALC-SCNC: 10 MMOL/L (ref 8–16)
BASOPHILS # BLD AUTO: 0.04 K/UL (ref 0–0.2)
BASOPHILS NFR BLD: 0.7 % (ref 0–1.9)
BUN SERPL-MCNC: 19 MG/DL (ref 8–23)
CALCIUM SERPL-MCNC: 9 MG/DL (ref 8.7–10.5)
CHLORIDE SERPL-SCNC: 109 MMOL/L (ref 95–110)
CO2 SERPL-SCNC: 23 MMOL/L (ref 23–29)
CREAT SERPL-MCNC: 0.7 MG/DL (ref 0.5–1.4)
DIFFERENTIAL METHOD BLD: ABNORMAL
EOSINOPHIL # BLD AUTO: 0.2 K/UL (ref 0–0.5)
EOSINOPHIL NFR BLD: 3.2 % (ref 0–8)
ERYTHROCYTE [DISTWIDTH] IN BLOOD BY AUTOMATED COUNT: 16 % (ref 11.5–14.5)
EST. GFR  (NO RACE VARIABLE): >60 ML/MIN/1.73 M^2
GLUCOSE SERPL-MCNC: 84 MG/DL (ref 70–110)
HCT VFR BLD AUTO: 40.6 % (ref 37–48.5)
HGB BLD-MCNC: 12.7 G/DL (ref 12–16)
IMM GRANULOCYTES # BLD AUTO: 0.03 K/UL (ref 0–0.04)
IMM GRANULOCYTES NFR BLD AUTO: 0.6 % (ref 0–0.5)
LYMPHOCYTES # BLD AUTO: 1.1 K/UL (ref 1–4.8)
LYMPHOCYTES NFR BLD: 19.9 % (ref 18–48)
MAGNESIUM SERPL-MCNC: 4.6 MG/DL (ref 1.6–2.6)
MCH RBC QN AUTO: 30.8 PG (ref 27–31)
MCHC RBC AUTO-ENTMCNC: 31.3 G/DL (ref 32–36)
MCV RBC AUTO: 98 FL (ref 82–98)
MONOCYTES # BLD AUTO: 0.5 K/UL (ref 0.3–1)
MONOCYTES NFR BLD: 9.7 % (ref 4–15)
NEUTROPHILS # BLD AUTO: 3.6 K/UL (ref 1.8–7.7)
NEUTROPHILS NFR BLD: 65.9 % (ref 38–73)
NRBC BLD-RTO: 0 /100 WBC
PHOSPHATE SERPL-MCNC: 3.1 MG/DL (ref 2.7–4.5)
PLATELET # BLD AUTO: 394 K/UL (ref 150–450)
PMV BLD AUTO: 10.4 FL (ref 9.2–12.9)
POTASSIUM SERPL-SCNC: 4.2 MMOL/L (ref 3.5–5.1)
RBC # BLD AUTO: 4.13 M/UL (ref 4–5.4)
SODIUM SERPL-SCNC: 142 MMOL/L (ref 136–145)
WBC # BLD AUTO: 5.38 K/UL (ref 3.9–12.7)

## 2023-12-28 PROCEDURE — 83735 ASSAY OF MAGNESIUM: CPT | Mod: HCNC | Performed by: NURSE PRACTITIONER

## 2023-12-28 PROCEDURE — 25000003 PHARM REV CODE 250: Mod: HCNC | Performed by: NURSE PRACTITIONER

## 2023-12-28 PROCEDURE — 25000003 PHARM REV CODE 250: Mod: HCNC | Performed by: HOSPITALIST

## 2023-12-28 PROCEDURE — 85025 COMPLETE CBC W/AUTO DIFF WBC: CPT | Mod: HCNC | Performed by: NURSE PRACTITIONER

## 2023-12-28 PROCEDURE — 80048 BASIC METABOLIC PNL TOTAL CA: CPT | Mod: HCNC | Performed by: NURSE PRACTITIONER

## 2023-12-28 PROCEDURE — 36415 COLL VENOUS BLD VENIPUNCTURE: CPT | Mod: HCNC | Performed by: NURSE PRACTITIONER

## 2023-12-28 PROCEDURE — 97530 THERAPEUTIC ACTIVITIES: CPT | Mod: HCNC

## 2023-12-28 PROCEDURE — 84100 ASSAY OF PHOSPHORUS: CPT | Mod: HCNC | Performed by: NURSE PRACTITIONER

## 2023-12-28 PROCEDURE — 97110 THERAPEUTIC EXERCISES: CPT | Mod: HCNC,CO

## 2023-12-28 PROCEDURE — 11000004 HC SNF PRIVATE: Mod: HCNC

## 2023-12-28 PROCEDURE — 25000003 PHARM REV CODE 250: Mod: HCNC | Performed by: FAMILY MEDICINE

## 2023-12-28 PROCEDURE — 97530 THERAPEUTIC ACTIVITIES: CPT | Mod: HCNC,CO

## 2023-12-28 RX ADMIN — Medication 9 MG: at 08:12

## 2023-12-28 RX ADMIN — POLYETHYLENE GLYCOL 3350 17 G: 17 POWDER, FOR SOLUTION ORAL at 08:12

## 2023-12-28 RX ADMIN — ESCITALOPRAM OXALATE 10 MG: 10 TABLET ORAL at 08:12

## 2023-12-28 RX ADMIN — SENNOSIDES AND DOCUSATE SODIUM 1 TABLET: 8.6; 5 TABLET ORAL at 08:12

## 2023-12-28 RX ADMIN — RIVAROXABAN 15 MG: 15 TABLET, FILM COATED ORAL at 04:12

## 2023-12-28 RX ADMIN — ACETAMINOPHEN 1000 MG: 500 TABLET ORAL at 06:12

## 2023-12-28 RX ADMIN — DILTIAZEM HYDROCHLORIDE 240 MG: 120 CAPSULE, COATED, EXTENDED RELEASE ORAL at 08:12

## 2023-12-28 NOTE — PT/OT/SLP PROGRESS
"Occupational Therapy   Treatment/Discharge Summary    Name: Tigist Murry  MRN: [id number]  Admit Date: 12/8/2023  Admitting Diagnosis:  Closed displaced fracture of left femoral neck    General Precautions: Standard, fall   Orthopedic Precautions: LLE weight bearing as tolerated   Braces: N/A    Recommendations:     Discharge Recommendations:     Level of Assistance Recommended at Discharge: 24 hours physical assistance for all ADL's and home management tasks  Discharge Equipment Recommendations: bedside commode, wheelchair, hip kit, shower chair  Barriers to discharge:  Decreased caregiver support (increased assistance required)    Assessment:     Tigist Murry is a 83 y.o. female with a medical diagnosis of Closed displaced fracture of left femoral neck .  She presents with performance deficits affecting function are weakness, impaired endurance, impaired self care skills, impaired functional mobility, gait instability, impaired cognition, impaired balance, decreased coordination, decreased lower extremity function, decreased upper extremity function, orthopedic precautions.   Pt participated fairly during today's session. Pt continues to require max cueing and redirection throughout sessions. No carry over noted. Pt continues to demonstrate deficits with self care skills, balance, functional mobility, UB strength and endurance. Pt will benefit from continued OT services to progress towards goals. .     Rehab Potential is fair    Activity tolerance:  Poor    Plan:     Patient to be seen 5 x/week to address the above listed problems via self-care/home management, therapeutic activities, therapeutic exercises    Plan of Care Expires: 01/06/24  Plan of Care Reviewed with: patient    Subjective   "I think my brother is taking me to the country"  Communicated with: Merline prior to session.      Pain/Comfort:  Pain Rating 1:  (did not rate)  Location - Side 1: Left  Location - Orientation 1: generalized  Location 1: " shoulder  Pain Addressed 1: Reposition, Cessation of Activity  Pain Rating Post-Intervention 1:  (did not rate)    Patient's cultural, spiritual, Hoahaoism conflicts given the current situation:  no    Objective:     Patient found up in chair with  (avasys) upon OT entry to room.    [facility] 6 Click ADL: 15    OT Exercises:Bilateral Danilo 2 x10 with #2 weight. Pt perform lateral slides, forward and circular motions.     AROM x 1 set of 15 with #2 hand weight Pt perform forward flex motion and bicep curls with R UE only     PROM of shoulder flex/ext to R UE.     Therex performed to improve overall endurance, ROM and UB strength required for functional transfers, ADL's and w/c propulsion.     Treatment & Education:  Pt seated at table to engage in functional activity with focus on cognition, attention to task and fine motor. Pt required max redirection and cueing to complete.     Pt educated on:  - role of OT  - level of assistance  - energy conservation and task modification to maximize independence with ADL's and mobility   -  safety while performing functional transfers and self care tasks  - orthopedic precautions  - progress towards OT goals     Patient left up in chair with call button in reach    GOALS:   Multidisciplinary Problems       Occupational Therapy Goals          Problem: Occupational Therapy    Goal Priority Disciplines Outcome Interventions   Occupational Therapy Goal     OT, PT/OT Unable to Meet, Plan Revised    Description: Goals to be met by: 12/30/23     Patient will increase functional independence with ADLs by performing:    UE Dressing with Stand-by Assistance.- Not Met   LE Dressing with Moderate Assistance.-  Not Met   Grooming while seated at sink with Set-up Assistance.- Met   Toileting from bedside commode with Moderate Assistance for hygiene and clothing management. -  Not Met   Bathing from  edge of bed with Minimal Assistance.-  Not Met   Toilet transfer to bedside commode with Moderate  Assistance.- Met                         Time Tracking:     OT Date of Treatment: 12/28/23  OT Start Time: 1134    OT Stop Time: 1200  OT Total Time (min): 26 min    Billable Minutes:Therapeutic Activity 13  Therapeutic Exercise 13    12/28/2023

## 2023-12-28 NOTE — PT/OT/SLP PROGRESS
Physical Therapy Treatment/Discharge Note    Patient Name:  Tigist Murry   MRN:  [id number]  Admit Date: 12/8/2023  Admitting Diagnosis: Closed displaced fracture of left femoral neck  Recent Surgeries: HEMIARTHROPLASTY, HIP, POSTERIOR APPROACH left (Left)     General Precautions: Standard, fall  Orthopedic Precautions: LLE weight bearing as tolerated, LLE posterior precautions  Braces: N/A    Recommendations:     Discharge Recommendations: home health PT (24 hour care/assistance at D/C)  Level of Assistance Recommended at Discharge: 24 hours significant assistance  Discharge Equipment Recommendations: hip kit, bedside commode, walker, rolling, shower chair  Barriers to discharge: Decreased caregiver support, Other (Comment) (Increased assistance needed for mobility)    Assessment:     Tigist Murry is a 83 y.o. female admitted with a medical diagnosis of Closed displaced fracture of left femoral neck. Patient politely declined therapy out of room including hallway ambulation. Patient agreeable to ambulation in room from wheelchair to bed. Cognitive deficits remain present. Patient requiring significant assistance for bed mobility, functional transfers and ambulation. Posterior trunk lean noted with standing limiting safety and balance. Patient scheduled for D/C on 12/29/23 with recommendation for Home Health PT/OT and 24 hour care for safe transition to discharge environment.     Performance deficits affecting function: weakness, impaired endurance, impaired self care skills, impaired functional mobility, gait instability, impaired balance, impaired cognition, decreased coordination, decreased upper extremity function, decreased lower extremity function, decreased safety awareness, decreased ROM, abnormal tone, orthopedic precautions.    Rehab Potential is fair    Activity Tolerance: Fair    Plan:     Patient to be seen 5 x/week to address the above listed problems via gait training, therapeutic activities,  "therapeutic exercises, neuromuscular re-education, wheelchair management/training    Plan of Care Expires: 23  Plan of Care Reviewed with: patient    Subjective     "No. I think I better just get back to bed."     Pain/Comfort:  Pain Rating 1: 0/10  Pain Rating Post-Intervention 1: 0/10    Patient's cultural, spiritual, Religion conflicts given the current situation:  no    Objective:     Communicated with nursing staff prior to session.  Patient found up in chair with  ([employer] camera) upon PT entry to room.     Therapeutic Activities and Exercises:   PT assisted patient back to bed following short distance ambulation training within room.    Functional Mobility:  Bed Mobility:     Scooting: maximal assistance and HOB flat (scooting to R to HOB)  Sit to Supine: moderate assistance, maximal assistance, and HOB flat  Transfers:     Sit to Stand:  moderate assistance with rolling walker and posterior trunk lean upon standing; cues to shift weight anterior with forward movement of walker to initiate shift  Gait: Patient ambulated approximately 10 feet in room from W/C to bed using RW with ModA/MaxA. Shuffled gait pattern noted with decreased step length on RLE. Cues to increased step length and height for improved clearance.     AM-PAC 6 CLICK MOBILITY  12    Patient left supine with call button in reach, PCT notified, and [employer] camera in room .    GOALS:   Multidisciplinary Problems       Physical Therapy Goals          Problem: Physical Therapy    Goal Priority Disciplines Outcome Goal Variances Interventions   Physical Therapy Goal     PT, PT/OT Adequate for Care Transition     Description: Goals to be met by: 2024    Patient will increase functional independence with mobility by performin. Supine to sit with Minimal Assistance. -not met  Modifed Goal 23: Supine to sit with ModA. - not met, HOB elevated for assist    2. Sit to supine with Minimal Assistance. -not met  Modified Goal 23: " Sit to supine with ModA. - not met, MaxA    3. Rolling to Left and Right with Minimal Assistance. -not met  Modified Goal 12/18/23: Rolling to L/R with ModA. -Met on 12/24/2023  Updated goal on 12/24/2023: Rolling to Left and Right with Minimal Assistance. - MET 12/27/23    4. Sit to stand transfer with Minimal Assistance. -not met  Modified Goal 12/18/23: Sit to stand transfer with ModA. - MET 12/28/23    5. Bed to chair transfer with Minimal Assistance using Rolling Walker. -not met  Modified Goal 12/18/23: Bed to chair transfer with ModA. -not met, MaxA    6. Gait  x 50 feet with Minimal Assistance using Rolling Walker. -not met  Modified Goal 12/18/23: Gait x 50 feet with ModA using RW. - MET 12/27/23     7. Wheelchair propulsion x 50 feet with Moderate Assistance using bilateral upper extremities. - MET 12/27/23      Rehab Services' DME recommendations     Wheelchair  Number of hours up in a wheelchair per day 8       Style Light weight                              Justification for light weight w/c: patient cannot propel in a standard wheelchair, patient has impaired ability to participate in MRADLs, mobility limitations cannot be sifficiently resolved with a cane/walker, the home provides adequate access between rooms for a wheelchair, a wheelchair will significantly improve the ability to participate in MRADLs and will be used in the home on a regular basis, the patient has a caregiver who is available, willing, and able to provide assistance with the wheelchair, and the patient requires additional person for safety with mobility with walker     Seat Width 18 (Standard adult)     Seat Depth 18     Back Height Standard     Leg Support Elevated leg rest and Swing Away     Arm Height Full and Swing Away     Lap Belt Velcro     Accessories Anti-tippers and Safety belt     Cushion Basic     Justification for Cushion maintain skin integrity      Hip Kit Standard/Short     Home health PT, OT, and Aide                        Time Tracking:     PT Received On: 12/28/23  PT Start Time: 1348  PT Stop Time: 1359  PT Total Time (min): 11 min    Billable Minutes: Therapeutic Activity 11    Treatment Type: Treatment  PT/PTA: PT     Number of PTA visits since last PT visit: 0     12/28/2023

## 2023-12-28 NOTE — PLAN OF CARE
"CHW delivered DME equipment (18" WC) to patient's room. CHW called daughter/Sarah @ [phone] and informed her that W/C was delivered.   "

## 2023-12-28 NOTE — PLAN OF CARE
Problem: Adult Inpatient Plan of Care  Goal: Plan of Care Review  Outcome: Ongoing, Progressing  Flowsheets (Taken 12/27/2023 3195)  Plan of Care Reviewed With: patient  Goal: Patient-Specific Goal (Individualized)  Outcome: Ongoing, Progressing  Goal: Absence of Hospital-Acquired Illness or Injury  Outcome: Ongoing, Progressing  Goal: Optimal Comfort and Wellbeing  Outcome: Ongoing, Progressing  Goal: Readiness for Transition of Care  Outcome: Ongoing, Progressing     Problem: Skin Injury Risk Increased  Goal: Skin Health and Integrity  Outcome: Ongoing, Progressing     Problem: Fall Injury Risk  Goal: Absence of Fall and Fall-Related Injury  Outcome: Ongoing, Progressing     Problem: Impaired Wound Healing  Goal: Optimal Wound Healing  Outcome: Ongoing, Progressing

## 2023-12-28 NOTE — PLAN OF CARE
Problem: Occupational Therapy  Goal: Occupational Therapy Goal  Description: Goals to be met by: 12/30/23     Patient will increase functional independence with ADLs by performing:    UE Dressing with Stand-by Assistance.- Not Met   LE Dressing with Moderate Assistance.-  Not Met   Grooming while seated at sink with Set-up Assistance.- Met   Toileting from bedside commode with Moderate Assistance for hygiene and clothing management. -  Not Met   Bathing from  edge of bed with Minimal Assistance.-  Not Met   Toilet transfer to bedside commode with Moderate Assistance.- Met    Outcome: Unable to Meet

## 2023-12-28 NOTE — PLAN OF CARE
Interdisciplinary team, Lalitha Pablo, RN Charge Nurse, Luzma Adams, , and Martha Tovar PT, spoke to patient for care plan conference, weekly status update, and therapy progress update. Tentative discharge date set for 12/29/23.

## 2023-12-28 NOTE — PROGRESS NOTES
[facility]                                  Skilled Nursing Facility                   Progress Note     Admit Date: 12/8/2023  CLARKE 12/29/2023  [id number]  Principal Problem:  Closed displaced fracture of left femoral neck   HPI obtained from patient interview and chart review     Chief Complaint: Re-evaluation of medical treatment and therapy status:  Discharge planning, med review    HPI:   Mrs. Murry is a 83 year old female PMHx of Afib on xarelto, HTN, HPLD, mild dementia who presents to SNF following hospitalization for left femoral neck fracture s/p left hip hemiarthroplasty on 12/05 with Dr. Naranjo.  Admission to SNF for secondary weakness and debility.    Interval history:    Patient denies shortness of breath, abdominal discomfort, nausea, or vomiting.  Patient reports an adequate appetite.   Vital signs stable   Patient progessing with PT/OT- ambulated 75 ft with moderate assistance and rolling walker   Discharge coordinated with nurse and  throughout the day  Home health orders completed. Med rec completed. Meds sent to pharmacy. DME ordered. Wheelchair ordered--  Caregiver is capable and willing to operate wheelchair safely.Patient's upper body strength is sufficient for propulsion.The patient requires the use of a w/c for activities of daily living within the Home.Patient mobility limitations cannot be sufficiently resolved by the use of other ambulatory therapies.             ROS  Constitutional: Negative for fever   Eyes: Negative for blurred vision, double vision   Respiratory: Negative for cough, shortness of breath   Cardiovascular: Negative for chest pain, palpitations, and leg swelling.   Gastrointestinal: Negative for abdominal pain, constipation, diarrhea, nausea, vomiting.    Musculoskeletal:  + generalized weakness.  + LUE and LLE pain  Skin: Negative for itching and rash.   Neurological: Negative  "for dizziness, headaches.   Psychiatric/Behavioral: Negative for depression. The patient is not nervous/anxious.      24 hour Vital Sign Range   Temp:  [98 °F (36.7 °C)-98.2 °F (36.8 °C)]   Pulse:  [86-90]   Resp:  [15-16]   BP: (115-136)/(62-67)   SpO2:  [96 %-97 %]     Current BMI: Body mass index is 18.39 kg/m².    PEx  Constitutional: Patient appears debilitated.  No distress noted  HENT:   Head: Normocephalic and atraumatic.   Eyes: Pupils are equal, round  Neck: Normal range of motion. Neck supple.   Cardiovascular: Normal rate, regular rhythm and normal heart sounds.    Pulmonary/Chest: Effort normal and breath sounds are clear  Abdominal: Soft. Bowel sounds are normal.   Musculoskeletal: Normal range of motion.   Neurological: Alert and oriented to person,   States incorrect age.  Disoriented to place and time.  Impaired higher level thinking.  Confusion.  Psychiatric:  Impulsive, confused  Skin: Skin is warm and dry.   Scattered areas of ecchymosis.  LUE with severe ecchymosis down to her wrist swelling to forearm, likely hematoma.     Altered Skin Integrity 12/14/23 1959 Left Elbow Skin Tear        Altered Skin Integrity 12/14/23 2016 Right Hand Skin Tear       No results for input(s): "GLUCOSE", "NA", "K", "CL", "CO2", "BUN", "CREATININE", "CALCIUM", "MG" in the last 24 hours.            No results for input(s): "WBC", "RBC", "HGB", "HCT", "PLT", "MCV", "MCH", "MCHC" in the last 24 hours.              No results for input(s): "POCTGLUCOSE" in the last 168 hours.     Assessment and Plan:    Closed displaced fracture of left femoral neck   Mechanical fall  s/p hemiarthroplasty on 12/5/2023  - PT/OT, WBAT with posterior hip precautions  - DVT PPX with home Xarelto  - maintain surgical dressing until removed by Orthopedics  - ortho KEKE to see patient weekly at SNF  - follow-up with orthopedics after discharge  Wheelchair ordered--  Caregiver is capable and willing to operate wheelchair safely.Patient's upper " "body strength is sufficient for propulsion.The patient requires the use of a w/c for activities of daily living within the Home.Patient mobility limitations cannot be sufficiently resolved by the use of other ambulatory therapies.    Acute postoperative pain  - stable, continue acetaminophen 1000 mg q.8 hours, continue methocarbamol 500 mg QID.  Bowel regimen in place     Closed dislocation of left shoulder  - Patient found to have anterolateral left shoulder dislocation on admit. Orthopedics consulted and reduced shoulder dislocation successfully in ED with sedation.  - Per Ortho, patient placed in sling to left arm for comfort. Ortho states okay to remove sling to mobilize with therapy.   - 12/14 repeat of left shoulder x-ray reveals- Satisfactory reduction left anterior shoulder dislocation     Left upper extremity hematoma  -HGB stable  -continue to wrap daily     Compression fracture of L1 lumbar vertebra  - Present on admit and noted on CT scan of chest, abdomen and pelvis to have "New compression deformity of the superior endplate of L1 with less than 25% height loss compared to CT from 09/16/2022, age indeterminate." Patient denies any low back pain.   - No treatment or intervention needed.      Essential hypertension  - BP's improved, continue home diltiazem 24 hour capsule 20 40 mg daily    Left ventricular diastolic dysfunction with preserved systolic function   - recent echo reviewed from 3/2/2, EF 55%  - Monitor I&Os, weekly weights  - okay for regular diet  - no signs of fluid overload on exam    Atrial fibrillation and flutter  Long term current use of anticoagulant   - stable, continue home diltiazem and Xarelto 15 mg daily- considering increasing back to home dose of 20 mg now that renal function has recovered once hematoma shows more improvement    Acute blood loss anemia  - expected post fracture and surgery  - stable, continue monitor twice weekly CBC, transfuse for hemoglobin < 7     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 Dementia is mild. The patient does not have signs of behavioral disturbance.  Delirium precautions:  - Avoid antihistamines, anticholinergics, hypnotics, and minimize opiates while controlling for pain as these medications may exacerbate delirium. Cues for day/night will assist with keeping patient calm and oriented - during daytime, please keep adequate light in room (open windows, lights on) and please keep room dim at night-time to encourage normal sleep-wake cycles. Continuing to have nursing and family reorient the patient and encourage family to visit    Depression, moderate, reoccurring  - stable, continue escitalopram 10 mg daily    Debility   - Continue with PT/OT for gait training and strengthening and restoration of ADL's   - Encourage mobility, OOB in chair, and early ambulation as appropriate  - Fall precautions   - Monitor for bowel and bladder dysfunction  - Monitor for and prevent skin breakdown and pressure ulcers  - Continue DVT prophylaxis with Xarelto    Anticipate disposition:  Back to group home?  Maybe looking at new home.  Patient has (3) daughters: Sarah Murry who is the power of  and she lives in [place], [place] and the other (2) daughters are from [place], [place] and [place], [place].    IP [facility] RISK OF UNPLANNED READMISSION Model: MODERATE    Follow-up needed during SNF stay-    Follow-up needed after discharge from SNF: PCP, ortho 1/16    Future Appointments   Date Time Provider Department Center   1/16/2024 10:45 AM Lashay Santiago PA-C [facility]     Total time spent: >48 minutes  Description of Time: counseling patient on clinical condition, therapies provided, plan of care, emotional support, coordinating patient care with other care team members, reviewing and interpreting labs and imaging, collaboration with physician, initiating new orders, chart review, and  documentation.         Mya Tobin NP  Department of Hospital Medicine   [facility]     DOS: 12/27/2023

## 2023-12-28 NOTE — PLAN OF CARE
D/C scheduled on 23.    Problem: Physical Therapy  Goal: Physical Therapy Goal  Description: Goals to be met by: 2024    Patient will increase functional independence with mobility by performin. Supine to sit with Minimal Assistance. -not met  Modifed Goal 23: Supine to sit with ModA. - not met, HOB elevated for assist    2. Sit to supine with Minimal Assistance. -not met  Modified Goal 23: Sit to supine with ModA. - not met, MaxA    3. Rolling to Left and Right with Minimal Assistance. -not met  Modified Goal 23: Rolling to L/R with ModA. -Met on 2023  Updated goal on 2023: Rolling to Left and Right with Minimal Assistance. - MET 23    4. Sit to stand transfer with Minimal Assistance. -not met  Modified Goal 23: Sit to stand transfer with ModA. - MET 23    5. Bed to chair transfer with Minimal Assistance using Rolling Walker. -not met  Modified Goal 23: Bed to chair transfer with ModA. -not met, MaxA    6. Gait  x 50 feet with Minimal Assistance using Rolling Walker. -not met  Modified Goal 23: Gait x 50 feet with ModA using RW. - MET 23     7. Wheelchair propulsion x 50 feet with Moderate Assistance using bilateral upper extremities. - MET 23      Rehab Services' DME recommendations     Wheelchair  Number of hours up in a wheelchair per day 8       Style Light weight                              Justification for light weight w/c: patient cannot propel in a standard wheelchair, patient has impaired ability to participate in MRADLs, mobility limitations cannot be sifficiently resolved with a cane/walker, the home provides adequate access between rooms for a wheelchair, a wheelchair will significantly improve the ability to participate in MRADLs and will be used in the home on a regular basis, the patient has a caregiver who is available, willing, and able to provide assistance with the wheelchair, and the patient requires  additional person for safety with mobility with walker     Seat Width 18 (Standard adult)     Seat Depth 18     Back Height Standard     Leg Support Elevated leg rest and Swing Away     Arm Height Full and Swing Away     Lap Belt Velcro     Accessories Anti-tippers and Safety belt     Cushion Basic     Justification for Cushion maintain skin integrity      Hip Kit Standard/Short     Home health PT, OT, and Aide  Outcome: Adequate for Care Transition   12/28/2023

## 2023-12-29 VITALS
BODY MASS INDEX: 18.39 KG/M2 | TEMPERATURE: 98 F | RESPIRATION RATE: 18 BRPM | HEIGHT: 63 IN | DIASTOLIC BLOOD PRESSURE: 68 MMHG | WEIGHT: 103.81 LBS | OXYGEN SATURATION: 94 % | SYSTOLIC BLOOD PRESSURE: 134 MMHG | HEART RATE: 96 BPM

## 2023-12-29 PROCEDURE — 25000003 PHARM REV CODE 250: Mod: HCNC | Performed by: NURSE PRACTITIONER

## 2023-12-29 PROCEDURE — 25000003 PHARM REV CODE 250: Mod: HCNC | Performed by: HOSPITALIST

## 2023-12-29 RX ADMIN — ACETAMINOPHEN 1000 MG: 500 TABLET ORAL at 05:12

## 2023-12-29 RX ADMIN — DILTIAZEM HYDROCHLORIDE 240 MG: 120 CAPSULE, COATED, EXTENDED RELEASE ORAL at 09:12

## 2023-12-29 RX ADMIN — POLYETHYLENE GLYCOL 3350 17 G: 17 POWDER, FOR SOLUTION ORAL at 09:12

## 2023-12-29 RX ADMIN — ESCITALOPRAM OXALATE 10 MG: 10 TABLET ORAL at 09:12

## 2023-12-29 RX ADMIN — SENNOSIDES AND DOCUSATE SODIUM 1 TABLET: 8.6; 5 TABLET ORAL at 09:12

## 2023-12-29 NOTE — NURSING
Admission Diagnosis: Fracture of unspecified part of ne*     POC reviewed with daughters at bedside, daughters verbalized understanding. Safety maintained throughout shift, bed locked and in lowest position, call light in reach. Pt remained free of fall/trauma. VSS, afebrile this shift.    Pt pain  Last Documentation = Comfort/Acceptable Pain Level: 0 (12/24/23 0846)    Last Documentation = Pain Rating (0-10): Activity: 0 (12/28/23 1000)    Last Documentation = Pain Rating (0-10): Rest: 0 (12/28/23 2100)    Skin assessment completed prior to discharge   Location: Left hip with steri-strips  Description:   Recommendation:      AVS reviewed with family prior to discharge. Discharge instruction, follow up appointments and medication instructions given to daughters at bedside. Patient IV lines have been removed prior to discharge. Rx sent to patients pharmacy. [place] [facility] [place] [facility]        * No surgery found *      OUTCOME: Patient tolerated treatment/procedure well without complication and is now ready for discharge.    DISPOSITION: Home-Health Care [facility]    FOLLOWUP: With primary care provider

## 2023-12-29 NOTE — PROGRESS NOTES
[facility]                                  Skilled Nursing Facility                   Progress Note     Admit Date: 12/8/2023  CLARKE 12/29/2023  [id number]  Principal Problem:  Closed displaced fracture of left femoral neck   HPI obtained from patient interview and chart review     Chief Complaint: Re-evaluation of medical treatment and therapy status:  Discharge planning, lab review    HPI:   Mrs. Murry is a 83 year old female PMHx of Afib on xarelto, HTN, HPLD, mild dementia who presents to SNF following hospitalization for left femoral neck fracture s/p left hip hemiarthroplasty on 12/05 with Dr. Naranjo.  Admission to SNF for secondary weakness and debility.    Interval history:    Patient denies shortness of breath, abdominal discomfort, nausea, or vomiting.  Patient reports an adequate appetite.   Vital signs stable   Labs reviewed no critical results  Left arm hematoma wrapped in ace wrap, fingers warm, radial pulse + 2  Patient progessing with PT/OT- patient ambulated approximately 10 feet in room from W/C to bed using RW with ModA/MaxA.   Discharge coordinated with nurse and  throughout the day      ROS  Constitutional: Negative for fever   Eyes: Negative for blurred vision, double vision   Respiratory: Negative for cough, shortness of breath   Cardiovascular: Negative for chest pain, palpitations, and leg swelling.   Gastrointestinal: Negative for abdominal pain, constipation, diarrhea, nausea, vomiting.    Musculoskeletal:  + generalized weakness.  + LUE and LLE pain  Skin: Negative for itching and rash.   Neurological: Negative for dizziness, headaches.   Psychiatric/Behavioral: Negative for depression. The patient is not nervous/anxious.      24 hour Vital Sign Range   Temp:  [98.1 °F (36.7 °C)]   Pulse:  [97]   Resp:  [16]   BP: (156)/(77)   SpO2:  [96 %]     Current BMI: Body mass index is 18.39  "kg/m².    PEx  Constitutional: Patient appears debilitated.  No distress noted  Cardiovascular: Normal rate, regular rhythm and normal heart sounds.    Pulmonary/Chest: Effort normal and breath sounds are clear  Abdominal: Soft. Bowel sounds are normal.   Musculoskeletal: Normal range of motion.   Neurological: Alert and oriented to person,   States incorrect age.  Disoriented to place and time.  Impaired higher level thinking.  Confusion.  Psychiatric:  Impulsive, confused  Skin: Skin is warm and dry.   Scattered areas of ecchymosis.  LUE with severe ecchymosis down to her wrist swelling to forearm, likely hematoma, ace wrap in place.     Altered Skin Integrity 12/14/23 1959 Left Elbow Skin Tear        Altered Skin Integrity 12/14/23 2016 Right Hand Skin Tear       Recent Labs   Lab 12/28/23  0416      K 4.2      CO2 23   BUN 19   CREATININE 0.7   CALCIUM 9.0   MG 4.6*               Recent Labs   Lab 12/28/23  0416   WBC 5.38   RBC 4.13   HGB 12.7   HCT 40.6      MCV 98   MCH 30.8   MCHC 31.3*                 No results for input(s): "POCTGLUCOSE" in the last 168 hours.     Assessment and Plan:    Closed displaced fracture of left femoral neck   Mechanical fall  s/p hemiarthroplasty on 12/5/2023  - PT/OT, WBAT with posterior hip precautions  - DVT PPX with home Xarelto  - maintain surgical dressing until removed by Orthopedics  - ortho KEKE to see patient weekly at SNF  - follow-up with orthopedics after discharge  Wheelchair ordered--  Caregiver is capable and willing to operate wheelchair safely.Patient's upper body strength is sufficient for propulsion.The patient requires the use of a w/c for activities of daily living within the Home.Patient mobility limitations cannot be sufficiently resolved by the use of other ambulatory therapies.    Acute postoperative pain  - stable, continue acetaminophen 1000 mg q.8 hours, continue methocarbamol 500 mg QID.  Bowel regimen in place     Closed " "dislocation of left shoulder  - Patient found to have anterolateral left shoulder dislocation on admit. Orthopedics consulted and reduced shoulder dislocation successfully in ED with sedation.  - Per Ortho, patient placed in sling to left arm for comfort. Ortho states okay to remove sling to mobilize with therapy.   - 12/14 repeat of left shoulder x-ray reveals- Satisfactory reduction left anterior shoulder dislocation     Left upper extremity hematoma  -HGB stable  -continue to wrap daily     Compression fracture of L1 lumbar vertebra  - Present on admit and noted on CT scan of chest, abdomen and pelvis to have "New compression deformity of the superior endplate of L1 with less than 25% height loss compared to CT from 09/16/2022, age indeterminate." Patient denies any low back pain.   - No treatment or intervention needed.      Essential hypertension  - BP's improved, continue home diltiazem 24 hour capsule 20 40 mg daily    Left ventricular diastolic dysfunction with preserved systolic function   - recent echo reviewed from 3/2/2, EF 55%  - Monitor I&Os, weekly weights  - okay for regular diet  - no signs of fluid overload on exam    Atrial fibrillation and flutter  Long term current use of anticoagulant   - stable, continue home diltiazem and Xarelto 15 mg daily- considering increasing back to home dose of 20 mg now that renal function has recovered once hematoma shows more improvement    Acute blood loss anemia  - expected post fracture and surgery  - stable, continue monitor twice weekly CBC, transfuse for hemoglobin < 7     Alzheimer's disease with late onset (CODE)  - Patient with dementia with likely etiology of alzheimer's dementia. Dementia is mild. The patient does not have signs of behavioral disturbance.  Delirium precautions:  - Avoid antihistamines, anticholinergics, hypnotics, and minimize opiates while controlling for pain as these medications may exacerbate delirium. Cues for day/night will assist " with keeping patient calm and oriented - during daytime, please keep adequate light in room (open windows, lights on) and please keep room dim at night-time to encourage normal sleep-wake cycles. Continuing to have nursing and family reorient the patient and encourage family to visit    Depression, moderate, reoccurring  - stable, continue escitalopram 10 mg daily    Debility   - Continue with PT/OT for gait training and strengthening and restoration of ADL's   - Encourage mobility, OOB in chair, and early ambulation as appropriate  - Fall precautions   - Monitor for bowel and bladder dysfunction  - Monitor for and prevent skin breakdown and pressure ulcers  - Continue DVT prophylaxis with Xarelto    Anticipate disposition:  Back to group home?  Maybe looking at new home.  Patient has (3) daughters: Sarah Murry who is the power of  and she lives in [place], [place] and the other (2) daughters are from [place], [place] and [place], [place].    IP [facility] RISK OF UNPLANNED READMISSION Model: MODERATE    Follow-up needed during SNF stay-    Follow-up needed after discharge from SNF: PCP, ortho 1/16    Future Appointments   Date Time Provider Department Center   1/16/2024 10:45 AM Lashay Santiago PA-C [facility] Clark Tobin NP  Department of Hospital Medicine   [facility]     DOS: 12/28/2023

## 2024-01-02 ENCOUNTER — PATIENT OUTREACH (OUTPATIENT)
Dept: ADMINISTRATIVE | Facility: CLINIC | Age: 84
End: 2024-01-02
Payer: MEDICARE

## 2024-01-02 NOTE — PROGRESS NOTES
C3 nurse attempted to contact Tigist Murry  for a TCC post hospital discharge follow up call. No answer. Left voicemail with callback information. The patient does not have a scheduled HOSFU appointment. Message sent to PCP staff for assistance with scheduling visit with patient.

## 2024-01-03 NOTE — PROGRESS NOTES
Group home. Spoke with patient's daughter, she advised pt resides in group home which provides medication management/administration.

## 2024-01-03 NOTE — TELEPHONE ENCOUNTER
Patient's daughter denied the option of scheduling an appointment because she has a doctor that goes to see her mother

## 2024-01-07 NOTE — DISCHARGE SUMMARY
[facility]   Skilled Nursing Facility   Discharge Summary  Patient Name: Tigist Murry  : 1940  MRN: [id number]  Attending Physician: No att. providers found  Nurse Practitioner: Mya Tobin NP    Admit Date: 2023   Date of Discharge:  2023  Length of Stay:  LOS: 21 days     Date of Service: 23    Principal Problem: Closed displaced fracture of left femoral neck    HPI  Mrs. Murry is a 83 year old female PMHx of Afib on xarelto, HTN, HPLD, mild dementia who presents to SNF following hospitalization for left femoral neck fracture s/p left hip hemiarthroplasty on  with Dr. Naranjo.  Admission to SNF for secondary weakness and debility.    Hospital Course  Patient progressed well with PT and OT- last PT note states that patient ambulated approximately 10 feet in room from W/C to bed using RW with ModA/MaxA. Shuffled gait pattern with decreased step length on RLE. Patient had no significant events during their stay at SNF. Home health was set up. DME was ordered if needed. Follow up appointment to be made by patient within one week. All prescriptions and discharge instructions were ordered to be given to the patient prior to discharge.     Closed displaced fracture of left femoral neck   Mechanical fall  s/p hemiarthroplasty on 2023  - PT/OT, WBAT with posterior hip precautions  - DVT PPX with home Xarelto  - maintain surgical dressing until removed by Orthopedics  - ortho KEKE to see patient weekly at SNF  - follow-up with orthopedics after discharge  Wheelchair ordered--  Caregiver is capable and willing to operate wheelchair safely.Patient's upper body strength is sufficient for propulsion.The patient requires the use of a w/c for activities of daily living within the Home.Patient mobility limitations cannot be sufficiently resolved by the use of other ambulatory therapies.     Acute postoperative pain  - stable, continue acetaminophen 1000 mg q.8 hours, continue  "methocarbamol 500 mg QID.  Bowel regimen in place     Closed dislocation of left shoulder  - Patient found to have anterolateral left shoulder dislocation on admit. Orthopedics consulted and reduced shoulder dislocation successfully in ED with sedation.  - Per Ortho, patient placed in sling to left arm for comfort. Ortho states okay to remove sling to mobilize with therapy.   - 12/14 repeat of left shoulder x-ray reveals- Satisfactory reduction left anterior shoulder dislocation      Left upper extremity hematoma  -HGB stable  -continue to wrap daily     Compression fracture of L1 lumbar vertebra  - Present on admit and noted on CT scan of chest, abdomen and pelvis to have "New compression deformity of the superior endplate of L1 with less than 25% height loss compared to CT from 09/16/2022, age indeterminate." Patient denies any low back pain.   - No treatment or intervention needed.      Essential hypertension  - BP's improved, continue home diltiazem 24 hour capsule 20 40 mg daily     Left ventricular diastolic dysfunction with preserved systolic function   - recent echo reviewed from 3/2/2, EF 55%  - Monitor I&Os, weekly weights  - okay for regular diet  - no signs of fluid overload on exam     Atrial fibrillation and flutter  Long term current use of anticoagulant   - stable, continue home diltiazem and Xarelto 15 mg daily- considering increasing back to home dose of 20 mg now that renal function has recovered once hematoma shows more improvement     Acute blood loss anemia  - expected post fracture and surgery  - stable, continue monitor twice weekly CBC, transfuse for hemoglobin < 7     Alzheimer's disease with late onset (CODE)  - Patient with dementia with likely etiology of alzheimer's dementia. Dementia is mild. The patient does not have signs of behavioral disturbance.  Delirium precautions:  - Avoid antihistamines, anticholinergics, hypnotics, and minimize opiates while controlling for pain as these " "medications may exacerbate delirium. Cues for day/night will assist with keeping patient calm and oriented - during daytime, please keep adequate light in room (open windows, lights on) and please keep room dim at night-time to encourage normal sleep-wake cycles. Continuing to have nursing and family reorient the patient and encourage family to visit     Depression, moderate, reoccurring  - stable, continue escitalopram 10 mg daily     Debility   - Continue with PT/OT for gait training and strengthening and restoration of ADL's   - Encourage mobility, OOB in chair, and early ambulation as appropriate  - Fall precautions   - Monitor for bowel and bladder dysfunction  - Monitor for and prevent skin breakdown and pressure ulcers  - Continue DVT prophylaxis with Xarelto     Anticipate disposition:  Back to group home?  Maybe looking at new home.  Patient has (3) daughters: Sarah Murry who is the power of  and she lives in [place], [place] and the other (2) daughters are from [place], [place] and [place], [place].       Physical Exam  Constitutional: Patient appears debilitated.  No distress noted  Cardiovascular: Normal rate, regular rhythm and normal heart sounds.    Pulmonary/Chest: Effort normal and breath sounds are clear  Abdominal: Soft. Bowel sounds are normal.   Musculoskeletal: Normal range of motion.   Neurological: Alert and oriented to person,   States incorrect age.  Disoriented to place and time.  Impaired higher level thinking.  Confusion.  Psychiatric:  Impulsive, confused  Skin: Skin is warm and dry.   Scattered areas of ecchymosis.  LUE with severe ecchymosis down to her wrist swelling to forearm, likely hematoma, ace wrap in place.      Altered Skin Integrity 12/14/23 1959 Left Elbow Skin Tear        Altered Skin Integrity 12/14/23 2016 Right Hand Skin Tear       No results for input(s): "WBC", "HGB", "HCT", "PLT" in the last 168 hours.  No results for input(s): "NA", "K", "CL", "CO2", "BUN", " ""CREATININE", "GLU", "CALCIUM", "MG", "PHOS", "LIPASE", "AMYLASE" in the last 168 hours.  No results for input(s): "ALKPHOS", "ALT", "AST", "ALBUMIN", "PROT", "BILITOT", "INR" in the last 168 hours.   No results for input(s): "CPK", "CPKMB", "MB", "TROPONINI" in the last 72 hours.  No results for input(s): "LACTATE" in the last 72 hours.    No results for input(s): "POCTGLUCOSE" in the last 168 hours.   Hemoglobin A1C   Date Value Ref Range Status   12/04/2023 5.3 4.0 - 5.6 % Final     Comment:     ADA Screening Guidelines:  5.7-6.4%  Consistent with prediabetes  >or=6.5%  Consistent with diabetes    High levels of fetal hemoglobin interfere with the HbA1C  assay. Heterozygous hemoglobin variants (HbS, HgC, etc)do  not significantly interfere with this assay.   However, presence of multiple variants may affect accuracy.     01/28/2021 5.5 4.0 - 5.6 % Final     Comment:     ADA Screening Guidelines:  5.7-6.4%  Consistent with prediabetes  >or=6.5%  Consistent with diabetes    High levels of fetal hemoglobin interfere with the HbA1C  assay. Heterozygous hemoglobin variants (HbS, HgC, etc)do  not significantly interfere with this assay.   However, presence of multiple variants may affect accuracy.           Discharge Medication List as of 12/29/2023  2:21 PM        CONTINUE these medications which have CHANGED    Details   acetaminophen (TYLENOL) 325 MG tablet Take 2 tablets (650 mg total) by mouth every 6 (six) hours as needed for Pain., Starting Wed 12/27/2023, No Print      diltiaZEM (CARDIZEM CD) 240 MG 24 hr capsule Take 1 capsule (240 mg total) by mouth once daily., Starting Wed 12/27/2023, Until u 12/26/2024, Normal      EScitalopram oxalate (LEXAPRO) 10 MG tablet Take 1 tablet (10 mg total) by mouth once daily., Normal      rivaroxaban (XARELTO) 15 mg Tab Take 1 tablet (15 mg total) by mouth daily with dinner or evening meal., Starting Wed 12/27/2023, Normal           CONTINUE these medications which have NOT " CHANGED    Details   melatonin (MELATIN) 3 mg tablet Take 2 tablets (6 mg total) by mouth nightly as needed for Insomnia., Starting Fri 12/8/2023, Normal      polyethylene glycol (GLYCOLAX) 17 gram/dose powder Use to cap to measure 17g, mix with liquid, and take by mouth 2 (two) times daily., Starting Fri 12/8/2023, Normal      senna-docusate 8.6-50 mg (PERICOLACE) 8.6-50 mg per tablet Take 1 tablet by mouth 2 (two) times daily., Starting Fri 12/8/2023, Normal           STOP taking these medications       bisacodyL (DULCOLAX) 10 mg Supp Comments:   Reason for Stopping:         methocarbamoL (ROBAXIN) 500 MG Tab Comments:   Reason for Stopping:         potassium, sodium phosphates (PHOS-NAK) 280-160-250 mg PwPk Comments:   Reason for Stopping:               Discharge Diet:regular diet with Normal Fluid intake of 1500 - 2000 mL per day    Activity:   activity as tolerated  LLE weight bearing as tolerated, LLE posterior precautions   Wheelchair ordered    Discharge Condition: Good     Disposition: Home-Health Care [facility]    Future Appointments   Date Time Provider Department Center   1/16/2024 10:45 AM Lashay Santiago PA-C [facility]       39 minutes total time spent on the discharge of the patient including review of hospital course with the patient, reviewing discharge medications, and arranging follow-up care.      Mya Tobin NP  [facility]   Skilled [facility]

## 2024-01-09 NOTE — ANESTHESIA POST-OP PAIN MANAGEMENT
Acute Pain Service Progress Note    Tigist Murry is a 83 y.o., female, [id number].    Surgery:  HEMIARTHROPLASTY, HIP, POSTERIOR APPROACH left (Left: Hip)     Post Op Day #: 2    Catheter type: perineural  L SIFI 15ml/3h    Infusion type: Ropivacaine 0.2%  15ml/3h    Problem List:    Active Hospital Problems    Diagnosis  POA    *Closed displaced fracture of left femoral neck s/p hemiarthroplasty on 12/5/2023 [S72.002A]  Yes    Goals of care, counseling/discussion [Z71.89]  Not Applicable    Hypophosphatemia [E83.39]  No    Closed dislocation of left shoulder [S43.005A]  Yes    Acute cystitis without hematuria [N30.00]  Yes    Atelectasis [J98.11]  Yes    Compression fracture of L1 lumbar vertebra [S32.010A]  Yes    Alzheimer's disease with late onset (CODE) [G30.1]  Yes    Atrial fibrillation and flutter [I48.91, I48.92]  Yes    Long term current use of anticoagulant [Z79.01]  Not Applicable    Pure hypercholesterolemia [E78.00]  Yes    Essential hypertension [I10]  Yes     Chronic      Resolved Hospital Problems    Diagnosis Date Resolved POA    Acute blood loss anemia [D62] 12/27/2023 No    Elevated INR [R79.1] 12/05/2023 Yes     Suspect 2/2 xarelto      Elevated LFTs [R79.89] 12/06/2023 Yes       Subjective:     General appearance of alert, oriented, no complaints   Pain with rest: 1    Numbers   Pain with movement: 4    Numbers   Side Effects    1. Pruritis No    2. Nausea No    3. Motor Blockade No, 1=Ability to bend knees and ankles    4. Sedation No, 1=awake and alert    Objective:     Catheter level    Catheter site clean, dry, intact         Vitals   Vitals:    12/08/23 1537   BP: (!) 127/55   Pulse: 81   Resp: 16   Temp: 36.8 °C (98.3 °F)        Labs    No results displayed because visit has over 200 results.      No results displayed because visit has over 200 results.           Meds   No current facility-administered medications for this encounter.     Current Outpatient Medications   Medication Sig  Dispense Refill    acetaminophen (TYLENOL) 325 MG tablet Take 2 tablets (650 mg total) by mouth every 6 (six) hours as needed for Pain.  0    diltiaZEM (CARDIZEM CD) 240 MG 24 hr capsule Take 1 capsule (240 mg total) by mouth once daily. 90 capsule 3    EScitalopram oxalate (LEXAPRO) 10 MG tablet Take 1 tablet (10 mg total) by mouth once daily. 90 tablet 1    melatonin (MELATIN) 3 mg tablet Take 2 tablets (6 mg total) by mouth nightly as needed for Insomnia. 30 tablet 0    polyethylene glycol (GLYCOLAX) 17 gram/dose powder Use to cap to measure 17g, mix with liquid, and take by mouth 2 (two) times daily. 510 g 0    rivaroxaban (XARELTO) 15 mg Tab Take 1 tablet (15 mg total) by mouth daily with dinner or evening meal. 30 tablet 11    senna-docusate 8.6-50 mg (PERICOLACE) 8.6-50 mg per tablet Take 1 tablet by mouth 2 (two) times daily. 60 tablet 0        Anticoagulant dose xarelto at 15mg with dinner.    Assessment:     Pain control adequate    Plan:     Patient doing well, continue present treatment.    1) Continue SIFI PNC at current infusion rate: 15ml/3h    2) Continue multimodals including   Tylenol 650mg q6h  Robaxin 500 4xdaily  Escitalopram 10mg    3) No PRN requirements currently      Case discussed with staff, Dr. Reynolds; final recommendations per attestation above.     Thank you for your consult and allowing us to participate in the care of this patient. We will continue to follow along. Please call the Acute Pain Service/Anesthesia if you have any questions or concerns.        Hannah Schmucker [facility] PGY3

## 2024-01-10 NOTE — ADDENDUM NOTE
Addendum  created 01/10/24 1610 by Mary Reynolds MD    Charge Capture section accepted, Cosign clinical note with attestation

## 2024-01-16 ENCOUNTER — TELEPHONE (OUTPATIENT)
Dept: ORTHOPEDICS | Facility: CLINIC | Age: 84
End: 2024-01-16

## 2024-01-16 NOTE — TELEPHONE ENCOUNTER
Left a voice message for pt on Monday 1/15/24. Appointment in 1/16/24 will be cancel. PA-C will not be in the office today; due to the  emergency arctic blast freeze weather 1/16/24.  Appointment is cancel 1/16/24. Appointment is reschedule to 1/23/24. Message sent to pt, by text and pt portal.

## 2024-01-22 NOTE — PROGRESS NOTES
Principal Orthopedic Problem:  Left femoral neck fracture      12/05/23: :  Left hip hemiarthroplasty for femoral neck fracture     Ms. Murry is here today for a post-operative visit    Interval History:  she reports that she is doing well.   she is at home . she is  participating in PT/OT. Home health using walker  Pain is controlled.  she is not taking pain medication.    she denies fever, chills, and sweats .       Physical exam:    Patient arrives to exam room: wheelchair.  Patient is  accompanied daughter    Full range of motion knee and ankle, range of motion similar to other side     RADS: All pertinent images were reviewed by myself:   Hemiarthroplasty in place, no new fracture no hardware failure    Assessment:  Post-op visit ( 6 weeks)    Plan:  Current care, treatment plan, precautions, activity level/ modifications, limitations, rehabilitation exercises and proposed future treatment were discussed with the patient. We discussed the need to monitor for changes in symptoms and condition and report them to the physician.  Discussed importance of compliance with all appointments and follow up examinations.     WOUND CARE :  - You may use vitamin E (break the capsule open), aloe, scar cream (mederma) or hand lotion to rub the scar.  You must rub across the scar and in the line of the scar.  The goal is to make the scar not tender and make the scar not adhere (stick to) the tissues below the scar.  There may be some mild discomfort with this initially.    -Patient was advised to monitor wound closely and multiple times daily for any problems. Call clinic immediately or report to ED for immediate medical attention for any complications including reopening of wound, drainage, purulence, redness, streaking, odor, pain out of proportion, fever, chills, etc.       ACTIVITY:   - as tolerated  -range of motion as tolerated  posterior hip precautions   - WBAT      -PT/OT, home health , Patient is  responsible to establish and continue care      PAIN MEDICATION:   - Multimodal pain control  - Pain medication: refill was not needed  - Pain medication refill policy provided to patient for review, yes.    - Patient was informed a multi-modal approach is used to treat their pain. With the goal to get off of narcotic pain medication and discontinue as soon as possible.   - ice and elevation to reduce pain and swelling     DVT PROPHYLAXIS:   - Xarelto     FOLLOW UP:   - Patient will follow up in the clinic in 6 weeks, sooner if any concerns.  - X-ray of her hip is needed.        If there are any questions prior to scheduled follow up, the patient was instructed to contact the office

## 2024-01-23 ENCOUNTER — HOSPITAL ENCOUNTER (OUTPATIENT)
Dept: RADIOLOGY | Facility: HOSPITAL | Age: 84
Discharge: HOME | End: 2024-01-23
Attending: PHYSICIAN ASSISTANT
Payer: MEDICARE

## 2024-01-23 ENCOUNTER — OFFICE VISIT (OUTPATIENT)
Dept: ORTHOPEDICS | Facility: CLINIC | Age: 84
End: 2024-01-23
Payer: MEDICARE

## 2024-01-23 DIAGNOSIS — M25.512 ACUTE PAIN OF LEFT SHOULDER: ICD-10-CM

## 2024-01-23 DIAGNOSIS — S72.002A CLOSED DISPLACED FRACTURE OF LEFT FEMORAL NECK: ICD-10-CM

## 2024-01-23 DIAGNOSIS — S72.002A CLOSED DISPLACED FRACTURE OF LEFT FEMORAL NECK: Primary | ICD-10-CM

## 2024-01-23 PROCEDURE — 73030 X-RAY EXAM OF SHOULDER: CPT | Mod: TC,HCNC,LT

## 2024-01-23 PROCEDURE — 99999 PR PBB SHADOW E&M-EST. PATIENT-LVL I: CPT | Mod: PBBFAC,HCNC,, | Performed by: PHYSICIAN ASSISTANT

## 2024-01-23 PROCEDURE — 73502 X-RAY EXAM HIP UNI 2-3 VIEWS: CPT | Mod: TC,HCNC,LT

## 2024-01-23 PROCEDURE — 73030 X-RAY EXAM OF SHOULDER: CPT | Mod: 26,HCNC,LT, | Performed by: RADIOLOGY

## 2024-01-23 PROCEDURE — 99024 POSTOP FOLLOW-UP VISIT: CPT | Mod: HCNC,S$GLB,, | Performed by: PHYSICIAN ASSISTANT

## 2024-01-23 PROCEDURE — 73502 X-RAY EXAM HIP UNI 2-3 VIEWS: CPT | Mod: 26,HCNC,LT, | Performed by: RADIOLOGY

## 2024-01-23 PROCEDURE — 1157F ADVNC CARE PLAN IN RCRD: CPT | Mod: HCNC,CPTII,S$GLB, | Performed by: PHYSICIAN ASSISTANT

## 2024-02-05 ENCOUNTER — APPOINTMENT (OUTPATIENT)
Dept: LAB | Facility: HOSPITAL | Age: 84
End: 2024-02-05
Attending: INTERNAL MEDICINE
Payer: MEDICARE

## 2024-02-05 DIAGNOSIS — N39.0 URINARY TRACT INFECTION, SITE NOT SPECIFIED: Primary | ICD-10-CM

## 2024-02-05 LAB
BACTERIA #/AREA URNS HPF: ABNORMAL /HPF
BILIRUB UR QL STRIP: NEGATIVE
CLARITY UR: ABNORMAL
COLOR UR: YELLOW
GLUCOSE UR QL STRIP: NEGATIVE
HGB UR QL STRIP: ABNORMAL
HYALINE CASTS #/AREA URNS LPF: 0 /LPF
KETONES UR QL STRIP: ABNORMAL
LEUKOCYTE ESTERASE UR QL STRIP: ABNORMAL
MICROSCOPIC COMMENT: ABNORMAL
NITRITE UR QL STRIP: POSITIVE
PH UR STRIP: 6 [PH] (ref 5–8)
PROT UR QL STRIP: ABNORMAL
RBC #/AREA URNS HPF: 4 /HPF (ref 0–4)
SP GR UR STRIP: >1.03 (ref 1–1.03)
SQUAMOUS #/AREA URNS HPF: 1 /HPF
URN SPEC COLLECT METH UR: ABNORMAL
UROBILINOGEN UR STRIP-ACNC: ABNORMAL EU/DL
WBC #/AREA URNS HPF: 73 /HPF (ref 0–5)
WBC CLUMPS URNS QL MICRO: ABNORMAL

## 2024-02-05 PROCEDURE — 87086 URINE CULTURE/COLONY COUNT: CPT | Mod: HCNC | Performed by: NURSE PRACTITIONER

## 2024-02-05 PROCEDURE — 81000 URINALYSIS NONAUTO W/SCOPE: CPT | Mod: HCNC | Performed by: NURSE PRACTITIONER

## 2024-02-06 LAB
BACTERIA UR CULT: NORMAL
BACTERIA UR CULT: NORMAL

## 2024-02-07 ENCOUNTER — PATIENT MESSAGE (OUTPATIENT)
Dept: RESEARCH | Facility: HOSPITAL | Age: 84
End: 2024-02-07
Payer: MEDICARE

## 2024-02-22 ENCOUNTER — EXTERNAL HOME HEALTH (OUTPATIENT)
Dept: HOME HEALTH SERVICES | Facility: HOSPITAL | Age: 84
End: 2024-02-22
Payer: MEDICARE

## 2024-03-05 ENCOUNTER — HOSPITAL ENCOUNTER (OUTPATIENT)
Dept: RADIOLOGY | Facility: HOSPITAL | Age: 84
Discharge: HOME | End: 2024-03-05
Attending: PHYSICIAN ASSISTANT
Payer: MEDICARE

## 2024-03-05 ENCOUNTER — OFFICE VISIT (OUTPATIENT)
Dept: ORTHOPEDICS | Facility: CLINIC | Age: 84
End: 2024-03-05
Payer: MEDICARE

## 2024-03-05 VITALS — WEIGHT: 103.81 LBS | BODY MASS INDEX: 18.39 KG/M2 | HEIGHT: 63 IN

## 2024-03-05 DIAGNOSIS — S72.002A CLOSED DISPLACED FRACTURE OF LEFT FEMORAL NECK: ICD-10-CM

## 2024-03-05 DIAGNOSIS — S72.002A CLOSED DISPLACED FRACTURE OF LEFT FEMORAL NECK: Primary | ICD-10-CM

## 2024-03-05 PROCEDURE — 73502 X-RAY EXAM HIP UNI 2-3 VIEWS: CPT | Mod: 26,HCNC,LT, | Performed by: RADIOLOGY

## 2024-03-05 PROCEDURE — 1126F AMNT PAIN NOTED NONE PRSNT: CPT | Mod: HCNC,CPTII,S$GLB, | Performed by: PHYSICIAN ASSISTANT

## 2024-03-05 PROCEDURE — 73502 X-RAY EXAM HIP UNI 2-3 VIEWS: CPT | Mod: TC,HCNC,LT

## 2024-03-05 PROCEDURE — 1157F ADVNC CARE PLAN IN RCRD: CPT | Mod: HCNC,CPTII,S$GLB, | Performed by: PHYSICIAN ASSISTANT

## 2024-03-05 PROCEDURE — 99999 PR PBB SHADOW E&M-EST. PATIENT-LVL III: CPT | Mod: PBBFAC,HCNC,, | Performed by: PHYSICIAN ASSISTANT

## 2024-03-05 PROCEDURE — 1159F MED LIST DOCD IN RCRD: CPT | Mod: HCNC,CPTII,S$GLB, | Performed by: PHYSICIAN ASSISTANT

## 2024-03-05 PROCEDURE — 99024 POSTOP FOLLOW-UP VISIT: CPT | Mod: HCNC,S$GLB,, | Performed by: PHYSICIAN ASSISTANT

## 2024-03-05 NOTE — PROGRESS NOTES
Principal Orthopedic Problem:  Left femoral neck fracture      12/05/23: :  Left hip hemiarthroplasty for femoral neck fracture     Ms. Murry is here today for a post-operative visit    Interval History:  she reports that she is doing well.   she is at home . she is  participating in PT/OT. Home health using walker  Pain is controlled.  she is not taking pain medication.    she denies fever, chills, and sweats .     Physical exam:    Patient arrives to exam room: wheelchair.  Patient is  accompanied daughter    Full range of motion knee and ankle, range of motion similar to other side     RADS: All pertinent images were reviewed by myself:   Hemiarthroplasty is present on the left in good position and alignment.  Bones of the pelvis are intact.  Some soft tissue calcifications seen over the right side of the the ileum.       Assessment:  Post-op visit ( 12 weeks)    Plan:  Current care, treatment plan, precautions, activity level/ modifications, limitations, rehabilitation exercises and proposed future treatment were discussed with the patient. We discussed the need to monitor for changes in symptoms and condition and report them to the physician.  Discussed importance of compliance with all appointments and follow up examinations.     WOUND CARE :  - none well healed       ACTIVITY:   - as tolerated  -range of motion as tolerated  posterior hip precautions   - WBAT      -PT/OT, home health , Patient is responsible to establish and continue care      PAIN MEDICATION:   - Multimodal pain control  - Pain medication: refill was not needed  - Pain medication refill policy provided to patient for review, yes.    - Patient was informed a multi-modal approach is used to treat their pain. With the goal to get off of narcotic pain medication and discontinue as soon as possible.   - ice and elevation to reduce pain and swelling     DVT PROPHYLAXIS:   - Xarelto     FOLLOW UP:   - Patient will follow up in the  clinic in 1 year post op, sooner if any concerns.  - X-ray of her hip is needed.        If there are any questions prior to scheduled follow up, the patient was instructed to contact the office

## 2024-03-21 ENCOUNTER — DOCUMENT SCAN (OUTPATIENT)
Dept: HOME HEALTH SERVICES | Facility: HOSPITAL | Age: 84
End: 2024-03-21
Payer: MEDICARE

## 2024-05-22 ENCOUNTER — PATIENT MESSAGE (OUTPATIENT)
Dept: NEUROLOGY | Facility: CLINIC | Age: 84
End: 2024-05-22
Payer: MEDICARE

## 2024-06-21 ENCOUNTER — PATIENT OUTREACH (OUTPATIENT)
Dept: ADMINISTRATIVE | Facility: HOSPITAL | Age: 84
End: 2024-06-21
Payer: MEDICARE

## 2024-06-21 NOTE — PROGRESS NOTES
Population Health Chart Review & Patient Outreach Details      Additional [facility] Health Notes:               Updates Requested / Reviewed:      Updated Care Coordination Note, Care Everywhere, and Immunizations Reconciliation Completed or Queried: [place]         Health Maintenance Topics Overdue:      VB Score: 2     Mammogram  Uncontrolled BP    Shingles/Zoster Vaccine  RSV Vaccine                  Health Maintenance Topic(s) Outreach Outcomes & Actions Taken:    Provider Pt Reattribution - Outreach Outcomes & Actions Taken  : Telephone outreach, lvm

## 2024-08-23 ENCOUNTER — PATIENT MESSAGE (OUTPATIENT)
Dept: ADMINISTRATIVE | Facility: HOSPITAL | Age: 84
End: 2024-08-23
Payer: MEDICARE

## 2024-11-22 NOTE — PLAN OF CARE
Problem: Physical Therapy  Goal: Physical Therapy Goal  Description: Goals to be met by: 2022     Patient will increase functional independence with mobility by performin. Supine to sit with Contact Guard Assistance  2. Sit to supine with Contact Guard Assistance  3. Rolling to Left and Right with Stand-by Assistance.  4. Sit to stand transfer with Stand-by Assistance  5. Bed to chair transfer with Stand-by Assistance using Rolling Walker  6. Gait  x 100 feet with Contact Guard Assistance using Rolling Walker.   7. Ascend/descend 1 stair withContact Guard Assistance using Rolling Walker.     Outcome: Ongoing, Progressing     Pt tolerated treatment well today. Pt required mod A transferring sup<>sit, but progressed to only needing CGA for sit-stand and ambulation. Pt ambulated 20 ft x1 and 50 ft x1 w/ RW. Recommending  PT/OT /  care/assistance upon d/c. If family is unable to provide, recommending SNF.   No

## (undated) DEVICE — DRESSING AQUACEL AG RBBN 2X45

## (undated) DEVICE — SUT VICRYL PLUS 2-0 CT1 18

## (undated) DEVICE — TUBE SUCTION YANKAUER

## (undated) DEVICE — SUT STRATAFIX 3-0 30CM

## (undated) DEVICE — SUT 2-0 VICRYL / SH (J417)

## (undated) DEVICE — BLADE RECP OFFSET 77.5X11X1.23

## (undated) DEVICE — DRAPE STERI INSTRUMENT 1018

## (undated) DEVICE — SUT ETHIBOND XTRA 1 OS-6

## (undated) DEVICE — DRAPE INCISE IOBAN 2 23X17IN

## (undated) DEVICE — DRESSING AQUACEL RIBBON 2X45CM

## (undated) DEVICE — Device

## (undated) DEVICE — APPLICATOR CHLORAPREP ORN 26ML

## (undated) DEVICE — ELECTRODE REM PLYHSV RETURN 9

## (undated) DEVICE — SPONGE COTTON TRAY 4X4IN

## (undated) DEVICE — DRAPE THREE-QTR REINF 53X77IN

## (undated) DEVICE — DRESSING TRANS 4X4 TEGADERM

## (undated) DEVICE — ADHESIVE DERMABOND ADVANCED

## (undated) DEVICE — TIP YANKAUERS BULB NO VENT

## (undated) DEVICE — KIT IRR SUCTION HND PIECE

## (undated) DEVICE — SUT VICRYL PLUS 0 CT1 18IN

## (undated) DEVICE — GOWN AERO CHROME W/ TOWEL XL

## (undated) DEVICE — KIT TOTAL HIP HPOFH OMC

## (undated) DEVICE — SUT VICRYL+ 1 CT1 18IN

## (undated) DEVICE — SUT VICRYL BR 1 GEN 27 CT-1

## (undated) DEVICE — COVER PROXIMA MAYO STAND

## (undated) DEVICE — TAPE SURG DURAPORE 2 X10YD

## (undated) DEVICE — SOL IRR NACL .9% 3000ML

## (undated) DEVICE — HOOD T7 W/ PEEL AWAY LENS

## (undated) DEVICE — SUT ETHIBOND XTRA2 OS-4 30